# Patient Record
Sex: MALE | Race: WHITE | NOT HISPANIC OR LATINO | Employment: OTHER | URBAN - METROPOLITAN AREA
[De-identification: names, ages, dates, MRNs, and addresses within clinical notes are randomized per-mention and may not be internally consistent; named-entity substitution may affect disease eponyms.]

---

## 2017-01-04 ENCOUNTER — TRANSCRIBE ORDERS (OUTPATIENT)
Dept: PHYSICAL THERAPY | Facility: CLINIC | Age: 47
End: 2017-01-04

## 2017-01-04 ENCOUNTER — TREATMENT (OUTPATIENT)
Dept: PHYSICAL THERAPY | Facility: CLINIC | Age: 47
End: 2017-01-04

## 2017-01-04 DIAGNOSIS — Z96.651 STATUS POST TOTAL RIGHT KNEE REPLACEMENT: Primary | ICD-10-CM

## 2017-01-04 DIAGNOSIS — M25.561 RIGHT KNEE PAIN, UNSPECIFIED CHRONICITY: ICD-10-CM

## 2017-01-04 DIAGNOSIS — M25.661 KNEE STIFFNESS, RIGHT: Primary | ICD-10-CM

## 2017-01-04 DIAGNOSIS — Z96.651 STATUS POST TOTAL RIGHT KNEE REPLACEMENT: ICD-10-CM

## 2017-01-04 PROCEDURE — 97014 ELECTRIC STIMULATION THERAPY: CPT | Performed by: PHYSICAL THERAPIST

## 2017-01-04 PROCEDURE — 97161 PT EVAL LOW COMPLEX 20 MIN: CPT | Performed by: PHYSICAL THERAPIST

## 2017-01-04 PROCEDURE — 97110 THERAPEUTIC EXERCISES: CPT | Performed by: PHYSICAL THERAPIST

## 2017-01-04 NOTE — PATIENT INSTRUCTIONS
Access Code: [email or web address]   URL: [email or web address]   Date: 01/04/2017   Prepared by: Yissel Flannery     Exercises   Long Sitting Quad Set - 10 reps - 10 hold - 1x daily   Supine Hamstring Stretch with Strap - 3 reps - 20 hold - 1x daily   Supine Heel Slide with Strap - 10 reps - 2 sets - 1x daily

## 2017-01-04 NOTE — PROGRESS NOTES
Physical Therapy Initial Evaluation      Patient Name: Anthony Sol Jr.       Patient MRN: [id number]  : 1970  Physician: Dr. Anthony Andino  Date: 2017    Encounter Diagnoses   Name Primary?   • Knee stiffness, right Yes   • Right knee pain, unspecified chronicity    • Status post total right knee replacement      History: Right non-cemented TKA on 12/15/2016.  Home health until last Friday.  Pt is WBAT with walker for 6 weeks, with no heavy resistance until 6 weeks post op.  Pt diagnosed with DVT on 2016, no activity restrictions at this time.  Pt works for [employer], is currently off work.  Pain ranges from 4/10 at best to 10/10 at worst.    Objective Testing: See flowsheet    THERAPY ASSESSMENT  Anthony Sol Jr. is a pleasant 46 y.o. year old male who presents with signs and symptoms consistent with diagnosis.  Subjective outcome measure indicates 56% perceived disability.  Patient is a  good candidate for skilled PT services in order to address impairments and facilitate return to normal daily activities including ADL's, work and recreational activities.        Functional Limitations: Walking, Sleeping, Complaints of Pain, Difficulty moving, Decreased Strength, Decreased ROM, Decreased ability to perform critical demands of job, Decreased ability to perform ADL's   Length of Therapy: 1 month   PT Frequency: PT 3x week   PT Interventions: Therapeutic exercise - AROM, Therapeutic exercise - stretching, Therapeutic exercise - strengthening, Manual Therapy, Bracing/Taping, Soft Tissue mobilization, Hot packs/ Moist heat, Cold packs, Electrical stimulation, Balance Training, Home Exercise Program   Patient Agrees with Plan of Care: Yes    REHAB POTENTIAL: good            Short Term Goals: 4 weeks. Patient will:  1. Be independent with initial HEP  2. Be instructed in posture and body mechanics  3. Report pain of </= 3 with all daily activities  4. Demonstrate Right knee ROM 5-100 degrees  5.  Demonstrate normal patellar mobility    Long Term Goals: 6-8 weeks. Patient will:  1. Demonstrate improved Bilateral lower extremity MMT of >/= 4+/5  2. Demonstrate Right knee ROM 0-125 degrees for independence with ADL's and return to recreational activities.  3. Report pain of </= 0/10 with all daily activities  4. Ambulate with normal symmetrical gait without need for AD  5. LEFS >/= 75/80  6. Be independent with long term HEP  7. Return to work     Therapy Exercise [id number] 24 minutes    Timed Code Treatment: 24   Minutes     Total Treatment Time: 55      Minutes    PT SIGNATURE: Yissel Flannery, PT   DATE TREATMENT INITIATED: 1/4/2017    Initial Certification Certification Period: 2/3/2017  I certify that the therapy services are furnished while this patient is under my care.  The services outlined above are required by this patient, and will be reviewed every 30 days.     PHYSICIAN:       DATE:     Please sign and return via fax to [phone].. Thank you, [facility].

## 2017-01-06 ENCOUNTER — TREATMENT (OUTPATIENT)
Dept: PHYSICAL THERAPY | Facility: CLINIC | Age: 47
End: 2017-01-06

## 2017-01-06 DIAGNOSIS — Z96.651 STATUS POST TOTAL RIGHT KNEE REPLACEMENT: ICD-10-CM

## 2017-01-06 DIAGNOSIS — M25.561 RIGHT KNEE PAIN, UNSPECIFIED CHRONICITY: ICD-10-CM

## 2017-01-06 DIAGNOSIS — M25.661 KNEE STIFFNESS, RIGHT: Primary | ICD-10-CM

## 2017-01-06 PROCEDURE — 97110 THERAPEUTIC EXERCISES: CPT | Performed by: PHYSICAL THERAPIST

## 2017-01-06 PROCEDURE — 97014 ELECTRIC STIMULATION THERAPY: CPT | Performed by: PHYSICAL THERAPIST

## 2017-01-06 NOTE — PROGRESS NOTES
Daily Progress Note      Subjective   Pt states his knee swelled a lot yesterday, difficult to bend or straighten.  States his friend is going to let him use a recumbent bike.  Pain Scale (0-10): 4/10      Objective          PROCEDURES AND MODALITIES:  Paraffin:    Moist Heat:    Ice: Rx Minutes: 15 mins  E-Stim: Rx Minutes: 15 mins  Ultrasound:    Ionto:   Traction:        Therapy Exercise [id number] 45 minutes     Timed Code Treatment: 45 Minutes  Total Treatment Time: 60 Minutes    Assessment/Plan   Tolerated well.  Unable to make full revolution on recumbent bike, but advised pt to do partial revolutions at home until ROM improves.  Progress per Plan of Care         Yissel Flannery, PT  Physical Therapist

## 2017-01-09 ENCOUNTER — TREATMENT (OUTPATIENT)
Dept: PHYSICAL THERAPY | Facility: CLINIC | Age: 47
End: 2017-01-09

## 2017-01-09 DIAGNOSIS — M25.661 KNEE STIFFNESS, RIGHT: Primary | ICD-10-CM

## 2017-01-09 DIAGNOSIS — M25.561 RIGHT KNEE PAIN, UNSPECIFIED CHRONICITY: ICD-10-CM

## 2017-01-09 DIAGNOSIS — Z96.651 STATUS POST TOTAL RIGHT KNEE REPLACEMENT: ICD-10-CM

## 2017-01-09 PROCEDURE — 97014 ELECTRIC STIMULATION THERAPY: CPT | Performed by: PHYSICAL THERAPIST

## 2017-01-09 PROCEDURE — 97110 THERAPEUTIC EXERCISES: CPT | Performed by: PHYSICAL THERAPIST

## 2017-01-09 NOTE — PROGRESS NOTES
Daily Progress Note      Subjective   Knee feels stiff this morning, states he sat a lot yesterday and his knee swelled.    Pain Scale (0-10): 4/10      Objective    Increased swelling today.      PROCEDURES AND MODALITIES:  Paraffin:    Moist Heat:    Ice: Rx Minutes: 15 mins  E-Stim: Rx Minutes: 15 mins  Ultrasound:    Ionto:   Traction:        Manual PT [id number] 3 minutes and Therapy Exercise [id number] 45 minutes     Timed Code Treatment: 48 Minutes  Total Treatment Time: 63 Minutes    Assessment/Plan   Edema likely contributing to limitation in flexion ROM today.  Extension is improved.  Progress per Plan of Care         Yissel Flannery, PT  Physical Therapist

## 2017-01-12 ENCOUNTER — TREATMENT (OUTPATIENT)
Dept: PHYSICAL THERAPY | Facility: CLINIC | Age: 47
End: 2017-01-12

## 2017-01-12 DIAGNOSIS — M25.661 KNEE STIFFNESS, RIGHT: Primary | ICD-10-CM

## 2017-01-12 DIAGNOSIS — Z96.651 STATUS POST TOTAL RIGHT KNEE REPLACEMENT: ICD-10-CM

## 2017-01-12 DIAGNOSIS — M25.561 RIGHT KNEE PAIN, UNSPECIFIED CHRONICITY: ICD-10-CM

## 2017-01-12 PROCEDURE — 97110 THERAPEUTIC EXERCISES: CPT | Performed by: PHYSICAL THERAPIST

## 2017-01-12 PROCEDURE — 97140 MANUAL THERAPY 1/> REGIONS: CPT | Performed by: PHYSICAL THERAPIST

## 2017-01-12 PROCEDURE — 97014 ELECTRIC STIMULATION THERAPY: CPT | Performed by: PHYSICAL THERAPIST

## 2017-01-12 NOTE — PROGRESS NOTES
Daily Progress Note      Subjective   Saw MD, who is pleased with progress.     Pain Scale (0-10): 3/10      Objective          PROCEDURES AND MODALITIES:  Paraffin:    Moist Heat:    Ice: Rx Minutes: 15 mins  E-Stim: Rx Minutes: 15 mins  Ultrasound:    Ionto:   Traction:        Manual PT 08300 8 minutes and Therapy Exercise [id number] 35 minutes     Timed Code Treatment: 43 Minutes  Total Treatment Time: 58 Minutes    Assessment/Plan   ROM improving.  Progress per Plan of Care         Yissel Flannery, PT  Physical Therapist

## 2017-01-13 ENCOUNTER — TREATMENT (OUTPATIENT)
Dept: PHYSICAL THERAPY | Facility: CLINIC | Age: 47
End: 2017-01-13

## 2017-01-13 DIAGNOSIS — M25.661 KNEE STIFFNESS, RIGHT: Primary | ICD-10-CM

## 2017-01-13 DIAGNOSIS — Z96.651 STATUS POST TOTAL RIGHT KNEE REPLACEMENT: ICD-10-CM

## 2017-01-13 DIAGNOSIS — M25.561 RIGHT KNEE PAIN, UNSPECIFIED CHRONICITY: ICD-10-CM

## 2017-01-13 PROCEDURE — 97110 THERAPEUTIC EXERCISES: CPT | Performed by: PHYSICAL THERAPIST

## 2017-01-13 PROCEDURE — 97140 MANUAL THERAPY 1/> REGIONS: CPT | Performed by: PHYSICAL THERAPIST

## 2017-01-13 PROCEDURE — 97014 ELECTRIC STIMULATION THERAPY: CPT | Performed by: PHYSICAL THERAPIST

## 2017-01-13 NOTE — PROGRESS NOTES
Daily Progress Note      Subjective   No new complaints.  Feels that manual therapy is helping ROM.  Pain Scale (0-10): 3/10      Objective          PROCEDURES AND MODALITIES:  Paraffin:    Moist Heat:    Ice: Rx Minutes: 15 mins  E-Stim: Rx Minutes: 15 mins  Ultrasound:    Ionto:   Traction:        Manual PT 08041 8 minutes and Therapy Exercise [id number] 35 minutes     Timed Code Treatment: 43 Minutes  Total Treatment Time: 58 Minutes    Assessment/Plan   ROM improving.  Progressing steadily toward goals.  Progress per Plan of Care         Yissel Flannery, PT  Physical Therapist

## 2017-01-16 ENCOUNTER — TREATMENT (OUTPATIENT)
Dept: PHYSICAL THERAPY | Facility: CLINIC | Age: 47
End: 2017-01-16

## 2017-01-16 DIAGNOSIS — Z96.651 STATUS POST TOTAL RIGHT KNEE REPLACEMENT: ICD-10-CM

## 2017-01-16 DIAGNOSIS — M25.661 KNEE STIFFNESS, RIGHT: Primary | ICD-10-CM

## 2017-01-16 DIAGNOSIS — M25.561 RIGHT KNEE PAIN, UNSPECIFIED CHRONICITY: ICD-10-CM

## 2017-01-16 PROCEDURE — 97014 ELECTRIC STIMULATION THERAPY: CPT | Performed by: PHYSICAL THERAPIST

## 2017-01-16 PROCEDURE — 97140 MANUAL THERAPY 1/> REGIONS: CPT | Performed by: PHYSICAL THERAPIST

## 2017-01-16 PROCEDURE — 97110 THERAPEUTIC EXERCISES: CPT | Performed by: PHYSICAL THERAPIST

## 2017-01-16 NOTE — PROGRESS NOTES
Daily Progress Note      Subjective   Pt reports area of swelling in calf over the weekend, felt that it was due to his brace being too tight.  Adjusted brace, swelling resolved.    Pain Scale (0-10): 4/10      Objective          PROCEDURES AND MODALITIES:  Paraffin:    Moist Heat:    Ice: Rx Minutes: 15 mins  E-Stim: Rx Minutes: 15 mins  Ultrasound:    Ionto:   Traction:        Manual PT 76560 10 minutes and Therapy Exercise 73340 40 minutes     Timed Code Treatment: 50 Minutes  Total Treatment Time: 65 Minutes    Assessment/Plan   Tolerated well.  Progress per Plan of Care         Yissel Flannery, PT  Physical Therapist

## 2017-01-18 ENCOUNTER — TREATMENT (OUTPATIENT)
Dept: PHYSICAL THERAPY | Facility: CLINIC | Age: 47
End: 2017-01-18

## 2017-01-18 DIAGNOSIS — M25.661 KNEE STIFFNESS, RIGHT: Primary | ICD-10-CM

## 2017-01-18 DIAGNOSIS — M25.561 RIGHT KNEE PAIN, UNSPECIFIED CHRONICITY: ICD-10-CM

## 2017-01-18 DIAGNOSIS — Z96.651 STATUS POST TOTAL RIGHT KNEE REPLACEMENT: ICD-10-CM

## 2017-01-18 PROCEDURE — 97110 THERAPEUTIC EXERCISES: CPT | Performed by: PHYSICAL THERAPIST

## 2017-01-18 PROCEDURE — 97014 ELECTRIC STIMULATION THERAPY: CPT | Performed by: PHYSICAL THERAPIST

## 2017-01-18 PROCEDURE — 97140 MANUAL THERAPY 1/> REGIONS: CPT | Performed by: PHYSICAL THERAPIST

## 2017-01-18 NOTE — PROGRESS NOTES
"Daily Progress Note      Subjective   Calf muscle cramped last night, no swelling or redness.  States he \"overdid it yesterday\", felt good so he was on his feet a lot doing things around the house.    Pain Scale (0-10): 4/10      Objective          PROCEDURES AND MODALITIES:  Paraffin:    Moist Heat:    Ice: Rx Minutes: 15 mins  E-Stim: Rx Minutes: 15 mins  Ultrasound:    Ionto:   Traction:        Manual PT 05598 10 minutes and Therapy Exercise [id number] 30 minutes     Timed Code Treatment: 40 Minutes  Total Treatment Time: 55 Minutes    Assessment/Plan   ROM is improving, as is patellar mobility.  Progress per Plan of Care         Yissel Flannery, PT  Physical Therapist        "

## 2017-01-20 ENCOUNTER — TREATMENT (OUTPATIENT)
Dept: PHYSICAL THERAPY | Facility: CLINIC | Age: 47
End: 2017-01-20

## 2017-01-20 DIAGNOSIS — Z96.651 STATUS POST TOTAL RIGHT KNEE REPLACEMENT: ICD-10-CM

## 2017-01-20 DIAGNOSIS — M25.661 KNEE STIFFNESS, RIGHT: Primary | ICD-10-CM

## 2017-01-20 DIAGNOSIS — M25.561 RIGHT KNEE PAIN, UNSPECIFIED CHRONICITY: ICD-10-CM

## 2017-01-20 PROCEDURE — 97110 THERAPEUTIC EXERCISES: CPT | Performed by: PHYSICAL THERAPIST

## 2017-01-20 PROCEDURE — 97140 MANUAL THERAPY 1/> REGIONS: CPT | Performed by: PHYSICAL THERAPIST

## 2017-01-20 PROCEDURE — 97014 ELECTRIC STIMULATION THERAPY: CPT | Performed by: PHYSICAL THERAPIST

## 2017-01-20 NOTE — PROGRESS NOTES
Daily Progress Note      Subjective   Feels like pain is a lot better.  Now is anxious for knee to move better.    Pain Scale (0-10): 2/10      Objective          PROCEDURES AND MODALITIES:  Paraffin:    Moist Heat:    Ice: Rx Minutes: 15 mins  E-Stim: Rx Minutes: 15 mins  Ultrasound:    Ionto:   Traction:        Manual PT 17547 10 minutes and Therapy Exercise 49055 35 minutes     Timed Code Treatment: 45 Minutes  Total Treatment Time: 60 Minutes    Assessment/Plan   Tolerated well.   Progress per Plan of Care         Yissel Flannery, PT  Physical Therapist

## 2017-01-23 ENCOUNTER — TREATMENT (OUTPATIENT)
Dept: PHYSICAL THERAPY | Facility: CLINIC | Age: 47
End: 2017-01-23

## 2017-01-23 DIAGNOSIS — Z96.651 STATUS POST TOTAL RIGHT KNEE REPLACEMENT: ICD-10-CM

## 2017-01-23 DIAGNOSIS — M25.561 RIGHT KNEE PAIN, UNSPECIFIED CHRONICITY: ICD-10-CM

## 2017-01-23 DIAGNOSIS — M25.661 KNEE STIFFNESS, RIGHT: Primary | ICD-10-CM

## 2017-01-23 PROCEDURE — 97014 ELECTRIC STIMULATION THERAPY: CPT | Performed by: PHYSICAL THERAPIST

## 2017-01-23 PROCEDURE — 97110 THERAPEUTIC EXERCISES: CPT | Performed by: PHYSICAL THERAPIST

## 2017-01-23 PROCEDURE — 97140 MANUAL THERAPY 1/> REGIONS: CPT | Performed by: PHYSICAL THERAPIST

## 2017-01-23 NOTE — PROGRESS NOTES
Physical Therapy Daily Progress Note    Subjective     Anthony Sol reports: he was on his feet a lot yesterday for kids ball games.  Overall feels like his knee is moving better      Objective     Passive Range of Motion     Right Knee   Flexion: 97 degrees   Extension: 5 degrees      See Exercise, Manual, and Modality Logs for complete treatment.       Assessment/Plan  Continues to make good progress toward goals.  Able to make full revolution on rec bike for part of the time today.  Progress per Plan of Care           Manual Therapy:    10     mins  89936;  Therapeutic Exercise:    35     mins  44961;     Neuromuscular Zach:    0    mins  76517;    Therapeutic Activity:     0     mins  73885;     Gait Trainin     mins  74310;     Ultrasound:     0     mins  51218;    Electrical Stimulation:    15     mins  00692 ( );    Timed Treatment:   45   mins   Total Treatment:     60   mins    Yissel Flannery PT  Physical Therapist  [place] License #[id number]

## 2017-01-25 ENCOUNTER — TREATMENT (OUTPATIENT)
Dept: PHYSICAL THERAPY | Facility: CLINIC | Age: 47
End: 2017-01-25

## 2017-01-25 DIAGNOSIS — M25.561 RIGHT KNEE PAIN, UNSPECIFIED CHRONICITY: ICD-10-CM

## 2017-01-25 DIAGNOSIS — Z96.651 STATUS POST TOTAL RIGHT KNEE REPLACEMENT: ICD-10-CM

## 2017-01-25 DIAGNOSIS — M25.661 KNEE STIFFNESS, RIGHT: Primary | ICD-10-CM

## 2017-01-25 PROCEDURE — 97140 MANUAL THERAPY 1/> REGIONS: CPT | Performed by: PHYSICAL THERAPIST

## 2017-01-25 PROCEDURE — 97014 ELECTRIC STIMULATION THERAPY: CPT | Performed by: PHYSICAL THERAPIST

## 2017-01-25 PROCEDURE — 97110 THERAPEUTIC EXERCISES: CPT | Performed by: PHYSICAL THERAPIST

## 2017-01-25 NOTE — PROGRESS NOTES
Re-Assessment / Re-Certification    Patient: Anthony Sol Jr.   : 1970  Diagnosis/ICD-10 Code:  Knee stiffness, right [M25.661]  Referring practitioner: Anthony Andino MD  Date of Initial Visit: 2017  Today's Date: 2017  Patient seen for 10 sessions      Subjective:   Anthony Sol reports: pain is significantly improved, he is diligent with his HEP.  Is anxious to walk without walker  Subjective Questionnaire: LEFS: 36/80  Clinical Progress: improved  Home Program Compliance: Yes  Treatment has included: therapeutic exercise, neuromuscular re-education, manual therapy, electrical stimulation and cryotherapy    Subjective Evaluation    Pain  At best pain ratin  At worst pain ratin  Location: right knee  Quality: dull ache and sharp         Objective     Passive Range of Motion     Right Knee   Flexion: 95 degrees   Extension: 1 degrees     Strength/Myotome Testing     Left Knee   Flexion: 5  Prone flexion: 5  Quadriceps contraction: good    Right Knee   Flexion: 3-  Extension: 3-  Quadriceps contraction: good    Ambulation     Ambulation: Level Surfaces   Ambulation with assistive device: independent (walker and brace)    Observational Gait   Gait: antalgic   Decreased walking speed and stride length.     Quality of Movement During Gait     Hip    Hip (Right): Positive hike.      Assessment/Plan  Progress toward previous goals: Partially Met    Short Term Goals: 4 weeks. Patient will:  1. Be independent with initial HEP (MET)  2. Be instructed in posture and body mechanics (MET)  3. Report pain of </= 3 with all daily activities (NOT MET)  4. Demonstrate Right knee ROM 5-100 degrees (PARTIALLY MET)  5. Demonstrate normal patellar mobility (NOT MET)     Long Term Goals: 6-8 weeks. Patient will:  1. Demonstrate improved Bilateral lower extremity MMT of >/= 4+/5 (NOT MET)  2. Demonstrate Right knee ROM 0-125 degrees for independence with ADL's and return to recreational activities. (NOT  MET)  3. Report pain of </= 0/10 with all daily activities (NOT MET)  4. Ambulate with normal symmetrical gait without need for AD (NOT MET)  5. LEFS >/= 75/80 (NOT MET)  6. Be independent with long term HEP (NOT MET)  7. Return to work (NOT MET)      Recommendations: Continue as planned, progressing as able when pt is allowed to wean from brace and walker  Timeframe: 3 months  Prognosis to achieve goals: good    PT Signature: Yissel Flannery, PT  [place] License #[id number]      Based upon review of the patient's progress and continued therapy plan, it is my medical opinion that Anthony Sol should continue physical therapy treatment at [facility]  [address] [place] [id number].    Signature: __________________________________  Anthony Andino MD    Manual Therapy:    10     mins  68765;  Therapeutic Exercise:    35     mins  80491;     Neuromuscular Zach:    0    mins  02131;    Therapeutic Activity:     0     mins  79019;     Gait Trainin     mins  77482;     Ultrasound:     0     mins  73275;    Electrical Stimulation:    15     mins  49254 ( );    Timed Treatment:   45   mins   Total Treatment:     60   mins

## 2017-01-26 ENCOUNTER — TREATMENT (OUTPATIENT)
Dept: PHYSICAL THERAPY | Facility: CLINIC | Age: 47
End: 2017-01-26

## 2017-01-26 DIAGNOSIS — M25.561 RIGHT KNEE PAIN, UNSPECIFIED CHRONICITY: ICD-10-CM

## 2017-01-26 DIAGNOSIS — M25.661 KNEE STIFFNESS, RIGHT: Primary | ICD-10-CM

## 2017-01-26 DIAGNOSIS — Z96.651 STATUS POST TOTAL RIGHT KNEE REPLACEMENT: ICD-10-CM

## 2017-01-26 PROCEDURE — 97110 THERAPEUTIC EXERCISES: CPT | Performed by: PHYSICAL THERAPIST

## 2017-01-26 PROCEDURE — 97014 ELECTRIC STIMULATION THERAPY: CPT | Performed by: PHYSICAL THERAPIST

## 2017-01-26 PROCEDURE — 97140 MANUAL THERAPY 1/> REGIONS: CPT | Performed by: PHYSICAL THERAPIST

## 2017-01-26 NOTE — PROGRESS NOTES
" Physical Therapy Daily Progress Note    Subjective     Anthony Sol reports: severe pain in calf yesterday, \"I thought I had another blood clot\", pain resolved by this morning.  Sees MD tomorrow.      Objective   See Exercise, Manual, and Modality Logs for complete treatment.       Assessment/Plan  ROM continues to improve  Progress per Plan of Care           Manual Therapy:    10     mins  18902;  Therapeutic Exercise:    35     mins  45824;     Neuromuscular Zach:    0    mins  17795;    Therapeutic Activity:     0     mins  63087;     Gait Trainin     mins  28998;     Ultrasound:     0     mins  13051;    Electrical Stimulation:    15     mins  42096 ( );    Timed Treatment:   45   mins   Total Treatment:     60   mins    Yissel Flannery PT  Physical Therapist  [place] License #[id number]                    "

## 2017-01-30 ENCOUNTER — TREATMENT (OUTPATIENT)
Dept: PHYSICAL THERAPY | Facility: CLINIC | Age: 47
End: 2017-01-30

## 2017-01-30 DIAGNOSIS — M25.561 RIGHT KNEE PAIN, UNSPECIFIED CHRONICITY: ICD-10-CM

## 2017-01-30 DIAGNOSIS — Z96.651 STATUS POST TOTAL RIGHT KNEE REPLACEMENT: ICD-10-CM

## 2017-01-30 DIAGNOSIS — M25.661 KNEE STIFFNESS, RIGHT: Primary | ICD-10-CM

## 2017-01-30 PROCEDURE — 97110 THERAPEUTIC EXERCISES: CPT | Performed by: PHYSICAL THERAPIST

## 2017-01-30 PROCEDURE — 97014 ELECTRIC STIMULATION THERAPY: CPT | Performed by: PHYSICAL THERAPIST

## 2017-01-30 PROCEDURE — 97140 MANUAL THERAPY 1/> REGIONS: CPT | Performed by: PHYSICAL THERAPIST

## 2017-01-30 NOTE — PROGRESS NOTES
" Physical Therapy Daily Progress Note    Subjective     Anthony Sol reports: he saw MD, who D/C'd his brace and walker.  Pt is to use a cane PRN, \"but not get too attached to it\".  Pt has a cane at home, ambulates into clinic without AD today.      Objective     Ambulation     Observational Gait   Gait: antalgic and asymmetric   Decreased stride length, right swing time and right step length.      See Exercise, Manual, and Modality Logs for complete treatment.       Assessment/Plan  PT did well with new exercises.  Significant gait deviation without cane, but much improved when cane is used.  Educated pt regarding use of cane when walking in the community to ensure good gait pattern.  Progress per Plan of Care           Manual Therapy:    10     mins  49638;  Therapeutic Exercise:    45     mins  81664;     Neuromuscular Zach:    0    mins  38889;    Therapeutic Activity:     0     mins  23351;     Gait Trainin     mins  28986;     Ultrasound:     0     mins  85469;    Electrical Stimulation:    15     mins  53826 ( );    Timed Treatment:   55   mins   Total Treatment:     70   mins    Yissel Flannery PT  Physical Therapist  [place] License #[id number]                    "

## 2017-02-01 ENCOUNTER — TREATMENT (OUTPATIENT)
Dept: PHYSICAL THERAPY | Facility: CLINIC | Age: 47
End: 2017-02-01

## 2017-02-01 DIAGNOSIS — M25.661 KNEE STIFFNESS, RIGHT: Primary | ICD-10-CM

## 2017-02-01 DIAGNOSIS — M25.561 RIGHT KNEE PAIN, UNSPECIFIED CHRONICITY: ICD-10-CM

## 2017-02-01 DIAGNOSIS — Z96.651 STATUS POST TOTAL RIGHT KNEE REPLACEMENT: ICD-10-CM

## 2017-02-01 PROCEDURE — 97014 ELECTRIC STIMULATION THERAPY: CPT | Performed by: PHYSICAL THERAPIST

## 2017-02-01 PROCEDURE — 97110 THERAPEUTIC EXERCISES: CPT | Performed by: PHYSICAL THERAPIST

## 2017-02-01 PROCEDURE — 97140 MANUAL THERAPY 1/> REGIONS: CPT | Performed by: PHYSICAL THERAPIST

## 2017-02-01 NOTE — PROGRESS NOTES
Physical Therapy Daily Progress Note    Subjective     Anthony Sol reports he is using the cane, shin has been very sore but he is working hard on exercises at home.      Objective   See Exercise, Manual, and Modality Logs for complete treatment.       Assessment/Plan  Gait is improved.  ROM continues to increase.  Progressing well toward goals.  Progress per Plan of Care           Manual Therapy:    10     mins  91302;  Therapeutic Exercise:    45     mins  05990;     Neuromuscular Zach:    0    mins  27179;    Therapeutic Activity:     0     mins  19950;     Gait Trainin     mins  73675;     Ultrasound:     0     mins  21978;    Electrical Stimulation:    15     mins  99686 ( );    Timed Treatment:   55   mins   Total Treatment:     70   mins    Yissel Flannery PT  Physical Therapist  [place] License #[id number]

## 2017-02-03 ENCOUNTER — TREATMENT (OUTPATIENT)
Dept: PHYSICAL THERAPY | Facility: CLINIC | Age: 47
End: 2017-02-03

## 2017-02-03 DIAGNOSIS — M25.661 KNEE STIFFNESS, RIGHT: Primary | ICD-10-CM

## 2017-02-03 DIAGNOSIS — Z96.651 STATUS POST TOTAL RIGHT KNEE REPLACEMENT: ICD-10-CM

## 2017-02-03 DIAGNOSIS — M25.561 RIGHT KNEE PAIN, UNSPECIFIED CHRONICITY: ICD-10-CM

## 2017-02-03 PROCEDURE — 97140 MANUAL THERAPY 1/> REGIONS: CPT | Performed by: PHYSICAL THERAPIST

## 2017-02-03 PROCEDURE — 97110 THERAPEUTIC EXERCISES: CPT | Performed by: PHYSICAL THERAPIST

## 2017-02-03 PROCEDURE — 97014 ELECTRIC STIMULATION THERAPY: CPT | Performed by: PHYSICAL THERAPIST

## 2017-02-03 NOTE — PROGRESS NOTES
Physical Therapy Daily Progress Note    Subjective     Anthony Sol reports: continued adherence with HEP.  Calf muscle is sore.      Objective   See Exercise, Manual, and Modality Logs for complete treatment.       Assessment/Plan  ROM continues to improve.  Gait is also becoming more symmetrical, with improved dynamic knee extension.  Progress strengthening /stabilization /functional activity           Manual Therapy:    10     mins  40928;  Therapeutic Exercise:    35     mins  91861;     Neuromuscular Zach:    0    mins  42183;    Therapeutic Activity:     0     mins  89956;     Gait Trainin     mins  09299;     Ultrasound:     0     mins  55438;    Electrical Stimulation:    15     mins  35259 ( );    Timed Treatment:   45   mins   Total Treatment:     60   mins    Yissel Flannery PT, DPT  Physical Therapist  [place] License #[id number]

## 2017-02-06 ENCOUNTER — TREATMENT (OUTPATIENT)
Dept: PHYSICAL THERAPY | Facility: CLINIC | Age: 47
End: 2017-02-06

## 2017-02-06 DIAGNOSIS — Z96.651 STATUS POST TOTAL RIGHT KNEE REPLACEMENT: ICD-10-CM

## 2017-02-06 DIAGNOSIS — M25.661 KNEE STIFFNESS, RIGHT: Primary | ICD-10-CM

## 2017-02-06 DIAGNOSIS — M25.561 RIGHT KNEE PAIN, UNSPECIFIED CHRONICITY: ICD-10-CM

## 2017-02-06 PROCEDURE — 97014 ELECTRIC STIMULATION THERAPY: CPT | Performed by: PHYSICAL THERAPIST

## 2017-02-06 PROCEDURE — 97110 THERAPEUTIC EXERCISES: CPT | Performed by: PHYSICAL THERAPIST

## 2017-02-06 PROCEDURE — 97140 MANUAL THERAPY 1/> REGIONS: CPT | Performed by: PHYSICAL THERAPIST

## 2017-02-06 NOTE — PROGRESS NOTES
Physical Therapy Daily Progress Note    Subjective Evaluation    Pain  Current pain ratin  At best pain ratin  At worst pain ratin        Anthony Sol reports: walking is getting easier, is adherent with HEP.  Had some significant swelling yesterday after being on his feet for a long time, but resolved with elevation of the limb.      Objective     Passive Range of Motion     Right Knee   Flexion: 94 degrees   Extension: 0 degrees     Strength/Myotome Testing     Left Knee   Flexion: 5  Extension: 5  Quadriceps contraction: good    Right Knee   Flexion: 3-  Extension: 3-  Quadriceps contraction: good    Additional Strength Details  MMT based on ROM limitation; strength of right quad/hamstring 4/5 in available range     See Exercise, Manual, and Modality Logs for complete treatment.       Assessment/Plan  Gait is improving, still with antalgic pattern, decreased hip/knee flexion, decreased stance phase on the right.  Overall making good progress toward all goals, appropriate to continue 3x weekly to increase ROM/strength and normalize gait.  Progress per Plan of Care           Manual Therapy:    10     mins  23862;  Therapeutic Exercise:    35     mins  33963;     Neuromuscular Zach:    0    mins  14244;    Therapeutic Activity:     0     mins  83587;     Gait Trainin     mins  82037;     Ultrasound:     0     mins  00368;    Electrical Stimulation:    15     mins  33722 ( );    Timed Treatment:   45   mins   Total Treatment:     60   mins    Yissel Flannery PT, DPT  Physical Therapist  [place] License #[id number]

## 2017-02-08 ENCOUNTER — TREATMENT (OUTPATIENT)
Dept: PHYSICAL THERAPY | Facility: CLINIC | Age: 47
End: 2017-02-08

## 2017-02-08 DIAGNOSIS — M25.561 RIGHT KNEE PAIN, UNSPECIFIED CHRONICITY: ICD-10-CM

## 2017-02-08 DIAGNOSIS — M25.661 KNEE STIFFNESS, RIGHT: Primary | ICD-10-CM

## 2017-02-08 DIAGNOSIS — Z96.651 STATUS POST TOTAL RIGHT KNEE REPLACEMENT: ICD-10-CM

## 2017-02-08 PROCEDURE — 97110 THERAPEUTIC EXERCISES: CPT | Performed by: PHYSICAL THERAPIST

## 2017-02-08 PROCEDURE — 97140 MANUAL THERAPY 1/> REGIONS: CPT | Performed by: PHYSICAL THERAPIST

## 2017-02-08 PROCEDURE — 97014 ELECTRIC STIMULATION THERAPY: CPT | Performed by: PHYSICAL THERAPIST

## 2017-02-08 NOTE — PROGRESS NOTES
Physical Therapy Daily Progress Note    Subjective     Anthony Sol reports: knee is still swelling, but resolved with rest/elevation      Objective   See Exercise, Manual, and Modality Logs for complete treatment.       Assessment/Plan  Gait continues to improve.  Increased patellar mobility noted.  Progress per Plan of Care           Manual Therapy:    10     mins  85793;  Therapeutic Exercise:    45     mins  64532;     Neuromuscular Zach:    0    mins  47599;    Therapeutic Activity:     0     mins  05900;     Gait Trainin     mins  96178;     Ultrasound:     0     mins  69884;    Electrical Stimulation:    15     mins  73667 ( );    Timed Treatment:   55   mins   Total Treatment:     70   mins    Yissel Flannery PT, DPT  Physical Therapist  [place] License #[id number]

## 2017-02-10 ENCOUNTER — TREATMENT (OUTPATIENT)
Dept: PHYSICAL THERAPY | Facility: CLINIC | Age: 47
End: 2017-02-10

## 2017-02-10 DIAGNOSIS — M25.661 KNEE STIFFNESS, RIGHT: Primary | ICD-10-CM

## 2017-02-10 DIAGNOSIS — Z96.651 STATUS POST TOTAL RIGHT KNEE REPLACEMENT: ICD-10-CM

## 2017-02-10 DIAGNOSIS — M25.561 RIGHT KNEE PAIN, UNSPECIFIED CHRONICITY: ICD-10-CM

## 2017-02-10 PROCEDURE — 97110 THERAPEUTIC EXERCISES: CPT | Performed by: PHYSICAL THERAPIST

## 2017-02-10 PROCEDURE — 97014 ELECTRIC STIMULATION THERAPY: CPT | Performed by: PHYSICAL THERAPIST

## 2017-02-10 PROCEDURE — 97140 MANUAL THERAPY 1/> REGIONS: CPT | Performed by: PHYSICAL THERAPIST

## 2017-02-10 NOTE — PROGRESS NOTES
Physical Therapy Daily Progress Note    Subjective     Anthony Sol reports: he saw his MD this morning regarding swelling.   He states MD isn't concerned about swelling, does not suspect another blood clot.  However, he did decide to schedule pt for a SUKI next Thursday due to limited knee flexion ROM.      Objective   See Exercise, Manual, and Modality Logs for complete treatment.       Assessment/Plan  Gait pattern still antalgic, but improved with regard to lateral sway and knee extension.  Progress per Plan of Care           Manual Therapy:    10     mins  42071;  Therapeutic Exercise:    20     mins  66416;   direct  Neuromuscular Zach:    0    mins  57336;    Therapeutic Activity:     0     mins  03797;     Gait Trainin     mins  47708;     Ultrasound:     0     mins  43644;    Electrical Stimulation:    15     mins  38778 ( );    Timed Treatment:   30   mins   Total Treatment:     60   mins    Yissel Flannery PT, DPT  Physical Therapist  [place] License #[id number]

## 2017-02-13 ENCOUNTER — TREATMENT (OUTPATIENT)
Dept: PHYSICAL THERAPY | Facility: CLINIC | Age: 47
End: 2017-02-13

## 2017-02-13 ENCOUNTER — APPOINTMENT (OUTPATIENT)
Dept: PREADMISSION TESTING | Facility: HOSPITAL | Age: 47
End: 2017-02-13

## 2017-02-13 ENCOUNTER — HOSPITAL ENCOUNTER (OUTPATIENT)
Dept: GENERAL RADIOLOGY | Facility: HOSPITAL | Age: 47
Discharge: HOME | End: 2017-02-13
Admitting: ORTHOPAEDIC SURGERY

## 2017-02-13 VITALS
RESPIRATION RATE: 16 BRPM | DIASTOLIC BLOOD PRESSURE: 91 MMHG | BODY MASS INDEX: 35.73 KG/M2 | WEIGHT: 263.8 LBS | SYSTOLIC BLOOD PRESSURE: 138 MMHG | TEMPERATURE: 98.9 F | OXYGEN SATURATION: 96 % | HEIGHT: 72 IN | HEART RATE: 75 BPM

## 2017-02-13 DIAGNOSIS — Z96.651 STATUS POST TOTAL RIGHT KNEE REPLACEMENT: ICD-10-CM

## 2017-02-13 DIAGNOSIS — M25.661 KNEE STIFFNESS, RIGHT: Primary | ICD-10-CM

## 2017-02-13 DIAGNOSIS — M25.561 RIGHT KNEE PAIN, UNSPECIFIED CHRONICITY: ICD-10-CM

## 2017-02-13 LAB
ALBUMIN SERPL-MCNC: 4.7 G/DL (ref 3.5–5.2)
ALBUMIN/GLOB SERPL: 1.9 G/DL
ALP SERPL-CCNC: 44 U/L (ref 39–117)
ALT SERPL W P-5'-P-CCNC: 31 U/L (ref 1–41)
ANION GAP SERPL CALCULATED.3IONS-SCNC: 16.2 MMOL/L
AST SERPL-CCNC: 20 U/L (ref 1–40)
BASOPHILS # BLD AUTO: 0.02 10*3/MM3 (ref 0–0.2)
BASOPHILS NFR BLD AUTO: 0.3 % (ref 0–1.5)
BILIRUB SERPL-MCNC: 0.2 MG/DL (ref 0.1–1.2)
BUN BLD-MCNC: 14 MG/DL (ref 6–20)
BUN/CREAT SERPL: 14.4 (ref 7–25)
CALCIUM SPEC-SCNC: 9.8 MG/DL (ref 8.6–10.5)
CHLORIDE SERPL-SCNC: 99 MMOL/L (ref 98–107)
CO2 SERPL-SCNC: 24.8 MMOL/L (ref 22–29)
CREAT BLD-MCNC: 0.97 MG/DL (ref 0.76–1.27)
DEPRECATED RDW RBC AUTO: 38.6 FL (ref 37–54)
EOSINOPHIL # BLD AUTO: 0.12 10*3/MM3 (ref 0–0.7)
EOSINOPHIL NFR BLD AUTO: 1.6 % (ref 0.3–6.2)
ERYTHROCYTE [DISTWIDTH] IN BLOOD BY AUTOMATED COUNT: 12.7 % (ref 11.5–14.5)
GFR SERPL CREATININE-BSD FRML MDRD: 83 ML/MIN/1.73
GLOBULIN UR ELPH-MCNC: 2.5 GM/DL
GLUCOSE BLD-MCNC: 118 MG/DL (ref 65–99)
HCT VFR BLD AUTO: 40.6 % (ref 40.4–52.2)
HGB BLD-MCNC: 13.4 G/DL (ref 13.7–17.6)
IMM GRANULOCYTES # BLD: 0.03 10*3/MM3 (ref 0–0.03)
IMM GRANULOCYTES NFR BLD: 0.4 % (ref 0–0.5)
LYMPHOCYTES # BLD AUTO: 1.94 10*3/MM3 (ref 0.9–4.8)
LYMPHOCYTES NFR BLD AUTO: 25.8 % (ref 19.6–45.3)
MCH RBC QN AUTO: 27.8 PG (ref 27–32.7)
MCHC RBC AUTO-ENTMCNC: 33 G/DL (ref 32.6–36.4)
MCV RBC AUTO: 84.2 FL (ref 79.8–96.2)
MONOCYTES # BLD AUTO: 0.39 10*3/MM3 (ref 0.2–1.2)
MONOCYTES NFR BLD AUTO: 5.2 % (ref 5–12)
NEUTROPHILS # BLD AUTO: 5.01 10*3/MM3 (ref 1.9–8.1)
NEUTROPHILS NFR BLD AUTO: 66.7 % (ref 42.7–76)
PLATELET # BLD AUTO: 302 10*3/MM3 (ref 140–500)
PMV BLD AUTO: 10.4 FL (ref 6–12)
POTASSIUM BLD-SCNC: 3.9 MMOL/L (ref 3.5–5.2)
PROT SERPL-MCNC: 7.2 G/DL (ref 6–8.5)
RBC # BLD AUTO: 4.82 10*6/MM3 (ref 4.6–6)
SODIUM BLD-SCNC: 140 MMOL/L (ref 136–145)
WBC NRBC COR # BLD: 7.51 10*3/MM3 (ref 4.5–10.7)

## 2017-02-13 PROCEDURE — 97140 MANUAL THERAPY 1/> REGIONS: CPT | Performed by: PHYSICAL THERAPIST

## 2017-02-13 PROCEDURE — 85025 COMPLETE CBC W/AUTO DIFF WBC: CPT | Performed by: ORTHOPAEDIC SURGERY

## 2017-02-13 PROCEDURE — 36415 COLL VENOUS BLD VENIPUNCTURE: CPT

## 2017-02-13 PROCEDURE — 71020 HC CHEST PA AND LATERAL: CPT

## 2017-02-13 PROCEDURE — 97110 THERAPEUTIC EXERCISES: CPT | Performed by: PHYSICAL THERAPIST

## 2017-02-13 PROCEDURE — 80053 COMPREHEN METABOLIC PANEL: CPT | Performed by: ORTHOPAEDIC SURGERY

## 2017-02-13 PROCEDURE — 97014 ELECTRIC STIMULATION THERAPY: CPT | Performed by: PHYSICAL THERAPIST

## 2017-02-13 RX ORDER — AMLODIPINE BESYLATE AND BENAZEPRIL HYDROCHLORIDE 10; 40 MG/1; MG/1
1 CAPSULE ORAL EVERY MORNING
COMMUNITY
End: 2017-03-14 | Stop reason: SDUPTHER

## 2017-02-13 RX ORDER — OXYCODONE HYDROCHLORIDE AND ACETAMINOPHEN 5; 325 MG/1; MG/1
1-2 TABLET ORAL EVERY 6 HOURS PRN
Refills: 0 | COMMUNITY
Start: 2017-02-10 | End: 2017-03-03 | Stop reason: CLARIF

## 2017-02-13 RX ORDER — ASPIRIN 325 MG
325 TABLET ORAL DAILY
COMMUNITY
End: 2018-02-06 | Stop reason: ALTCHOICE

## 2017-02-13 RX ORDER — TESTOSTERONE 16.2 MG/G
1 GEL TRANSDERMAL DAILY
COMMUNITY
Start: 2017-02-07 | End: 2018-02-06 | Stop reason: ALTCHOICE

## 2017-02-13 NOTE — PROGRESS NOTES
Physical Therapy Daily Progress Note    Subjective     Anthony Sol reports: swelling has been better.  Is pushing his ROM as much as he can at home.      Objective   See Exercise, Manual, and Modality Logs for complete treatment.       Assessment/Plan  Gait is much improved, with increased speed and decreased lateral sway.    Progress per Plan of Care           Manual Therapy:    10     mins  38941;  Therapeutic Exercise:    35     mins  73926;     Neuromuscular Zach:    0    mins  02883;    Therapeutic Activity:     0     mins  25335;     Gait Trainin     mins  21783;     Ultrasound:     0     mins  50510;    Electrical Stimulation:    15     mins  57812 ( );    Timed Treatment:   45   mins   Total Treatment:     60   mins    Yissel Flannery PT, DPT  Physical Therapist  [place] License #[id number]

## 2017-02-13 NOTE — DISCHARGE INSTRUCTIONS
Take the following medications the morning of surgery with a small sip of water.  METOPROLOL    Arrive to hospital on your day of surgery at 5:30 AM    General Instructions:  • Do not eat or drink after midnight: includes water, mints, or gum. You may brush your teeth.  • Do not smoke, chew tobacco, or drink alcohol.  • Bring medications in original bottles, any inhalers and if applicable your C-PAP/ BI-PAP machine.  • Bring any papers given to you in the doctor’s office.  • Wear clean comfortable clothes and socks.  • Do not wear contact lenses or make-up.  Bring a case for your glasses if applicable.   • Bring crutches or walker if applicable.  • Leave all other valuables and jewelry at home.    If you were given a blood bank ID arm band remember to bring it with you the day of surgery.    Preventing a Surgical Site Infection:  Shower on the morning of surgery using a fresh bar of anti-bacterial soap (such as Dial) and clean washcloth.  Dry with a clean towel and dress in clean clothing.  For 2 to 3 days before surgery, avoid shaving with a razor near where you will have surgery because the razor can irritate skin and make it easier to develop an infection  Ask your surgeon if you will be receiving antibiotics prior to surgery  Make sure you, your family, and all healthcare providers clean their hands with soap and water or an alcohol based hand  before caring for you or your wound  If at all possible, quit smoking as many days before surgery as you can.    Day of surgery:  Upon arrival, a Pre-op nurse and Anesthesiologist will review your health history, obtain vital signs, and answer questions you may have.  The only belongings needed at this time will be your home medications and if applicable your C-PAP/BI-PAP machine.  If you are staying overnight your family can leave the rest of your belongings in the car and bring them to your room later.  A Pre-op nurse will start an IV and you may receive  medication in preparation for surgery, including something to help you relax.  Your family will be able to see you in the Pre-op area.  While you are in surgery your family should notify the waiting room  if they leave the waiting room area and provide a contact phone number.    Please be aware that surgery does come with discomfort.  We want to make every effort to control your discomfort so please discuss any uncontrolled symptoms with your nurse.   Your doctor will most likely have prescribed pain medications.      If you are going home after surgery you will receive individualized written care instructions before being discharged.  A responsible adult must drive you to and from the hospital on the day of your surgery and stay with you for 24 hours.    If you are staying overnight following surgery, you will be transported to your hospital room following the recovery period.  [facility] has all private rooms.    If you have any questions please call Pre-Admission Testing at [phone].  Deductibles and co-payments are collected on the day of service. Please be prepared to pay the required co-pay, deductible or deposit on the day of service as defined by your plan.

## 2017-02-16 ENCOUNTER — TREATMENT (OUTPATIENT)
Dept: PHYSICAL THERAPY | Facility: CLINIC | Age: 47
End: 2017-02-16

## 2017-02-16 ENCOUNTER — HOSPITAL ENCOUNTER (OUTPATIENT)
Facility: HOSPITAL | Age: 47
Setting detail: HOSPITAL OUTPATIENT SURGERY
Discharge: HOME | End: 2017-02-16
Attending: ORTHOPAEDIC SURGERY | Admitting: ORTHOPAEDIC SURGERY

## 2017-02-16 ENCOUNTER — ANESTHESIA (OUTPATIENT)
Dept: PERIOP | Facility: HOSPITAL | Age: 47
End: 2017-02-16

## 2017-02-16 ENCOUNTER — ANESTHESIA EVENT (OUTPATIENT)
Dept: PERIOP | Facility: HOSPITAL | Age: 47
End: 2017-02-16

## 2017-02-16 VITALS
RESPIRATION RATE: 16 BRPM | BODY MASS INDEX: 35.89 KG/M2 | HEART RATE: 63 BPM | SYSTOLIC BLOOD PRESSURE: 104 MMHG | TEMPERATURE: 98 F | OXYGEN SATURATION: 93 % | WEIGHT: 265 LBS | HEIGHT: 72 IN | DIASTOLIC BLOOD PRESSURE: 66 MMHG

## 2017-02-16 DIAGNOSIS — M25.561 RIGHT KNEE PAIN, UNSPECIFIED CHRONICITY: ICD-10-CM

## 2017-02-16 DIAGNOSIS — Z96.651 STATUS POST TOTAL RIGHT KNEE REPLACEMENT: ICD-10-CM

## 2017-02-16 DIAGNOSIS — M25.661 KNEE STIFFNESS, RIGHT: Primary | ICD-10-CM

## 2017-02-16 LAB
GLUCOSE BLDC GLUCOMTR-MCNC: 123 MG/DL (ref 70–130)
GLUCOSE BLDC GLUCOMTR-MCNC: 141 MG/DL (ref 70–130)

## 2017-02-16 PROCEDURE — 97110 THERAPEUTIC EXERCISES: CPT | Performed by: PHYSICAL THERAPIST

## 2017-02-16 PROCEDURE — 25010000002 ONDANSETRON PER 1 MG: Performed by: ANESTHESIOLOGY

## 2017-02-16 PROCEDURE — 25010000002 FENTANYL CITRATE (PF) 100 MCG/2ML SOLUTION: Performed by: ANESTHESIOLOGY

## 2017-02-16 PROCEDURE — 25010000002 MIDAZOLAM PER 1 MG

## 2017-02-16 PROCEDURE — 97140 MANUAL THERAPY 1/> REGIONS: CPT | Performed by: PHYSICAL THERAPIST

## 2017-02-16 PROCEDURE — 97014 ELECTRIC STIMULATION THERAPY: CPT | Performed by: PHYSICAL THERAPIST

## 2017-02-16 PROCEDURE — 82962 GLUCOSE BLOOD TEST: CPT

## 2017-02-16 PROCEDURE — 25010000002 PROPOFOL 10 MG/ML EMULSION: Performed by: ANESTHESIOLOGY

## 2017-02-16 RX ORDER — MIDAZOLAM HYDROCHLORIDE 1 MG/ML
2 INJECTION INTRAMUSCULAR; INTRAVENOUS
Status: DISCONTINUED | OUTPATIENT
Start: 2017-02-16 | End: 2017-02-16

## 2017-02-16 RX ORDER — MIDAZOLAM HYDROCHLORIDE 1 MG/ML
INJECTION INTRAMUSCULAR; INTRAVENOUS
Status: COMPLETED
Start: 2017-02-16 | End: 2017-02-16

## 2017-02-16 RX ORDER — PROMETHAZINE HYDROCHLORIDE 25 MG/ML
12.5 INJECTION, SOLUTION INTRAMUSCULAR; INTRAVENOUS ONCE AS NEEDED
Status: DISCONTINUED | OUTPATIENT
Start: 2017-02-16 | End: 2017-02-16 | Stop reason: HOSPADM

## 2017-02-16 RX ORDER — SODIUM CHLORIDE 0.9 % (FLUSH) 0.9 %
1-10 SYRINGE (ML) INJECTION AS NEEDED
Status: DISCONTINUED | OUTPATIENT
Start: 2017-02-16 | End: 2017-02-16

## 2017-02-16 RX ORDER — HYDROMORPHONE HYDROCHLORIDE 1 MG/ML
0.25 INJECTION, SOLUTION INTRAMUSCULAR; INTRAVENOUS; SUBCUTANEOUS
Status: DISCONTINUED | OUTPATIENT
Start: 2017-02-16 | End: 2017-02-16 | Stop reason: HOSPADM

## 2017-02-16 RX ORDER — PROPOFOL 10 MG/ML
VIAL (ML) INTRAVENOUS AS NEEDED
Status: DISCONTINUED | OUTPATIENT
Start: 2017-02-16 | End: 2017-02-16 | Stop reason: SURG

## 2017-02-16 RX ORDER — HYDROCODONE BITARTRATE AND ACETAMINOPHEN 7.5; 325 MG/1; MG/1
1 TABLET ORAL ONCE AS NEEDED
Status: DISCONTINUED | OUTPATIENT
Start: 2017-02-16 | End: 2017-02-16 | Stop reason: HOSPADM

## 2017-02-16 RX ORDER — HYDROMORPHONE HYDROCHLORIDE 1 MG/ML
0.5 INJECTION, SOLUTION INTRAMUSCULAR; INTRAVENOUS; SUBCUTANEOUS
Status: DISCONTINUED | OUTPATIENT
Start: 2017-02-16 | End: 2017-02-16 | Stop reason: HOSPADM

## 2017-02-16 RX ORDER — MIDAZOLAM HYDROCHLORIDE 1 MG/ML
1 INJECTION INTRAMUSCULAR; INTRAVENOUS
Status: DISCONTINUED | OUTPATIENT
Start: 2017-02-16 | End: 2017-02-16

## 2017-02-16 RX ORDER — LABETALOL HYDROCHLORIDE 5 MG/ML
5 INJECTION, SOLUTION INTRAVENOUS
Status: DISCONTINUED | OUTPATIENT
Start: 2017-02-16 | End: 2017-02-16 | Stop reason: HOSPADM

## 2017-02-16 RX ORDER — ONDANSETRON 2 MG/ML
4 INJECTION INTRAMUSCULAR; INTRAVENOUS ONCE AS NEEDED
Status: COMPLETED | OUTPATIENT
Start: 2017-02-16 | End: 2017-02-16

## 2017-02-16 RX ORDER — FAMOTIDINE 10 MG/ML
20 INJECTION, SOLUTION INTRAVENOUS ONCE
Status: COMPLETED | OUTPATIENT
Start: 2017-02-16 | End: 2017-02-16

## 2017-02-16 RX ORDER — DIPHENHYDRAMINE HYDROCHLORIDE 50 MG/ML
12.5 INJECTION INTRAMUSCULAR; INTRAVENOUS
Status: DISCONTINUED | OUTPATIENT
Start: 2017-02-16 | End: 2017-02-16 | Stop reason: HOSPADM

## 2017-02-16 RX ORDER — FLUMAZENIL 0.1 MG/ML
0.2 INJECTION INTRAVENOUS AS NEEDED
Status: DISCONTINUED | OUTPATIENT
Start: 2017-02-16 | End: 2017-02-16 | Stop reason: HOSPADM

## 2017-02-16 RX ORDER — FAMOTIDINE 10 MG/ML
INJECTION, SOLUTION INTRAVENOUS
Status: COMPLETED
Start: 2017-02-16 | End: 2017-02-16

## 2017-02-16 RX ORDER — HYDRALAZINE HYDROCHLORIDE 20 MG/ML
5 INJECTION INTRAMUSCULAR; INTRAVENOUS
Status: DISCONTINUED | OUTPATIENT
Start: 2017-02-16 | End: 2017-02-16 | Stop reason: HOSPADM

## 2017-02-16 RX ORDER — OXYCODONE HYDROCHLORIDE AND ACETAMINOPHEN 5; 325 MG/1; MG/1
1-2 TABLET ORAL EVERY 4 HOURS PRN
Qty: 75 TABLET | Refills: 0 | Status: SHIPPED | OUTPATIENT
Start: 2017-02-16 | End: 2017-03-03 | Stop reason: CLARIF

## 2017-02-16 RX ORDER — PROMETHAZINE HYDROCHLORIDE 25 MG/1
25 TABLET ORAL ONCE AS NEEDED
Status: DISCONTINUED | OUTPATIENT
Start: 2017-02-16 | End: 2017-02-16 | Stop reason: HOSPADM

## 2017-02-16 RX ORDER — FENTANYL CITRATE 50 UG/ML
INJECTION, SOLUTION INTRAMUSCULAR; INTRAVENOUS
Status: DISCONTINUED
Start: 2017-02-16 | End: 2017-02-16 | Stop reason: HOSPADM

## 2017-02-16 RX ORDER — PROMETHAZINE HYDROCHLORIDE 25 MG/1
25 SUPPOSITORY RECTAL ONCE AS NEEDED
Status: DISCONTINUED | OUTPATIENT
Start: 2017-02-16 | End: 2017-02-16 | Stop reason: HOSPADM

## 2017-02-16 RX ORDER — LIDOCAINE HYDROCHLORIDE 20 MG/ML
INJECTION, SOLUTION INFILTRATION; PERINEURAL AS NEEDED
Status: DISCONTINUED | OUTPATIENT
Start: 2017-02-16 | End: 2017-02-16 | Stop reason: SURG

## 2017-02-16 RX ORDER — OXYCODONE AND ACETAMINOPHEN 7.5; 325 MG/1; MG/1
1 TABLET ORAL ONCE AS NEEDED
Status: COMPLETED | OUTPATIENT
Start: 2017-02-16 | End: 2017-02-16

## 2017-02-16 RX ORDER — FENTANYL CITRATE 50 UG/ML
50 INJECTION, SOLUTION INTRAMUSCULAR; INTRAVENOUS
Status: DISCONTINUED | OUTPATIENT
Start: 2017-02-16 | End: 2017-02-16 | Stop reason: HOSPADM

## 2017-02-16 RX ORDER — SODIUM CHLORIDE, SODIUM LACTATE, POTASSIUM CHLORIDE, CALCIUM CHLORIDE 600; 310; 30; 20 MG/100ML; MG/100ML; MG/100ML; MG/100ML
9 INJECTION, SOLUTION INTRAVENOUS CONTINUOUS
Status: DISCONTINUED | OUTPATIENT
Start: 2017-02-16 | End: 2017-02-16

## 2017-02-16 RX ORDER — NALOXONE HCL 0.4 MG/ML
0.2 VIAL (ML) INJECTION AS NEEDED
Status: DISCONTINUED | OUTPATIENT
Start: 2017-02-16 | End: 2017-02-16 | Stop reason: HOSPADM

## 2017-02-16 RX ORDER — PROMETHAZINE HYDROCHLORIDE 25 MG/1
12.5 TABLET ORAL ONCE AS NEEDED
Status: DISCONTINUED | OUTPATIENT
Start: 2017-02-16 | End: 2017-02-16 | Stop reason: HOSPADM

## 2017-02-16 RX ORDER — OXYCODONE AND ACETAMINOPHEN 7.5; 325 MG/1; MG/1
TABLET ORAL
Status: DISCONTINUED
Start: 2017-02-16 | End: 2017-02-16 | Stop reason: HOSPADM

## 2017-02-16 RX ORDER — FENTANYL CITRATE 50 UG/ML
50 INJECTION, SOLUTION INTRAMUSCULAR; INTRAVENOUS
Status: DISCONTINUED | OUTPATIENT
Start: 2017-02-16 | End: 2017-02-16

## 2017-02-16 RX ADMIN — FENTANYL CITRATE 50 MCG: 50 INJECTION INTRAMUSCULAR; INTRAVENOUS at 07:01

## 2017-02-16 RX ADMIN — FAMOTIDINE 20 MG: 10 INJECTION, SOLUTION INTRAVENOUS at 06:56

## 2017-02-16 RX ADMIN — FENTANYL CITRATE 50 MCG: 50 INJECTION INTRAMUSCULAR; INTRAVENOUS at 07:35

## 2017-02-16 RX ADMIN — FENTANYL CITRATE 50 MCG: 50 INJECTION INTRAMUSCULAR; INTRAVENOUS at 07:45

## 2017-02-16 RX ADMIN — SODIUM CHLORIDE, POTASSIUM CHLORIDE, SODIUM LACTATE AND CALCIUM CHLORIDE 9 ML/HR: 600; 310; 30; 20 INJECTION, SOLUTION INTRAVENOUS at 06:57

## 2017-02-16 RX ADMIN — FENTANYL CITRATE 50 MCG: 50 INJECTION INTRAMUSCULAR; INTRAVENOUS at 07:55

## 2017-02-16 RX ADMIN — LIDOCAINE HYDROCHLORIDE 50 MG: 20 INJECTION, SOLUTION INFILTRATION; PERINEURAL at 07:08

## 2017-02-16 RX ADMIN — PROPOFOL 250 MG: 10 INJECTION, EMULSION INTRAVENOUS at 07:08

## 2017-02-16 RX ADMIN — OXYCODONE HYDROCHLORIDE AND ACETAMINOPHEN 1 TABLET: 7.5; 325 TABLET ORAL at 07:48

## 2017-02-16 RX ADMIN — MIDAZOLAM HYDROCHLORIDE 2 MG: 1 INJECTION INTRAMUSCULAR; INTRAVENOUS at 06:57

## 2017-02-16 RX ADMIN — MIDAZOLAM 2 MG: 1 INJECTION INTRAMUSCULAR; INTRAVENOUS at 06:57

## 2017-02-16 RX ADMIN — LABETALOL HYDROCHLORIDE 5 MG: 5 INJECTION, SOLUTION INTRAVENOUS at 07:55

## 2017-02-16 RX ADMIN — ONDANSETRON 4 MG: 2 INJECTION, SOLUTION INTRAMUSCULAR; INTRAVENOUS at 08:46

## 2017-02-16 RX ADMIN — FENTANYL CITRATE 50 MCG: 50 INJECTION INTRAMUSCULAR; INTRAVENOUS at 08:07

## 2017-02-16 NOTE — ANESTHESIA POSTPROCEDURE EVALUATION
Patient: Anthony Sol Jr.    Procedure Summary     Date Anesthesia Start Anesthesia Stop Room / Location    02/16/17 0701 0722  [facility]       Procedure Diagnosis Surgeon Provider    MANIPULATION OF RT KNEE (Right ) No diagnosis on file. MD Kem Park MD          Anesthesia Type: MAC  Last vitals  /85 (02/16/17 0830)    Temp 36.7 °C (98 °F) (02/16/17 0815)    Pulse 68 (02/16/17 0830)   Resp 16 (02/16/17 0830)    SpO2 97 % (02/16/17 0830)      Post Anesthesia Care and Evaluation    Patient location during evaluation: PACU  Patient participation: complete - patient participated  Level of consciousness: awake and alert  Pain management: adequate  Airway patency: patent  Anesthetic complications: No anesthetic complications    Cardiovascular status: acceptable  Respiratory status: acceptable  Hydration status: acceptable

## 2017-02-16 NOTE — DISCHARGE INSTRUCTIONS
You were given Percocet (pain pills) today at 7:48 am.  You may have the next dose at 11:50 am.      Outpatient Surgery Guidelines, Adult  Outpatient procedures are those for which the person having the procedure is allowed to go home the same day as the procedure. Various procedures are done on an outpatient basis. You should follow some general guidelines if you will be having an outpatient procedure.  AFTER THE  PROCEDURE  After surgery, you will be taken to a recovery area, where your progress will be monitored. If there are no complications, you will be allowed to go home when you are awake, stable, and taking fluids well. You may have numbness around the surgical site. Healing will take some time. You will have tenderness at the surgical site and may have some swelling and bruising. You may also have some nausea.  HOME CARE INSTRUCTIONS  · Do not drive for 24 hours, or as directed by your health care provider. Do not drive while taking prescription pain medicines.  · Do not drink alcohol for 24 hours.  · Do not make important decisions or sign legal documents for 24 hours.  · Plan on having a responsible adult stay with your for 24 hours following your procedure.  · You may resume a normal diet and activities as directed.  · Only take over-the-counter or prescription medicines as directed by your health care provider.  · Follow up with your health care provider as directed.  SEEK MEDICAL CARE IF:  · You have redness, swelling, or increasing pain in the wound.  · You have a fever > 101.  · You feel lightheaded or faint.  · You develop a rash.  · You have trouble breathing.  · You develop allergies.  MAKE SURE YOU:  · Understand these instructions.  · Will watch your condition.  · Will get help right away if you are not doing well or get worse.

## 2017-02-16 NOTE — OP NOTE
DATE OF PROCEDURE:  02/16/2017    PREOPERATIVE DIAGNOSIS:   Arthrofibrosis, cementless right total knee replacement.     POSTOPERATIVE DIAGNOSIS:   Arthrofibrosis, cementless right total knee replacement.     ANESTHESIA: General.     SURGEON:  Anthony Andino MD    PROCEDURE PERFORMED:   Manipulation right knee.     DESCRIPTION OF PROCEDURE: After anesthesia was induced, I manipulated his knee to full extension, 135° of flexion. Peripatellar adhesions were felt to release. There were no complications.       Anthony Andino M.D.  FERN:[email or web address]  D:   02/16/2017 07:15:46  T:   02/16/2017 07:31:38  Job ID:   [id number]  Document ID:   [id number]  cc:

## 2017-02-16 NOTE — PLAN OF CARE
Problem: Patient Care Overview (Adult)  Goal: Plan of Care Review  Outcome: Outcome(s) achieved Date Met:  02/16/17 02/16/17 0928   Coping/Psychosocial Response Interventions   Plan Of Care Reviewed With patient;spouse   Outcome Evaluation   Outcome Summary/Follow up Plan ready for discharge       Goal: Adult Individualization and Mutuality  Outcome: Outcome(s) achieved Date Met:  02/16/17  Goal: Discharge Needs Assessment  Outcome: Outcome(s) achieved Date Met:  02/16/17

## 2017-02-16 NOTE — ANESTHESIA PREPROCEDURE EVALUATION
Anesthesia Evaluation     Patient summary reviewed   NPO Status: > 8 hours   Airway   Mallampati: III  TM distance: >3 FB  Neck ROM: full  no difficulty expected  Dental - normal exam     Pulmonary    Cardiovascular   Exercise tolerance: good (4-7 METS)    Rhythm: regular    (+) hypertension well controlled,       Neuro/Psych  GI/Hepatic/Renal/Endo      Musculoskeletal     Abdominal    Substance History      OB/GYN          Other                                    Anesthesia Plan    ASA 3     MAC     intravenous induction

## 2017-02-16 NOTE — PROGRESS NOTES
Re-Assessment / Re-Certification    Patient: Anthony Sol Jr.   : 1970  Diagnosis/ICD-10 Code:  Knee stiffness, right [M25.661]  Referring practitioner: Anthony Andino MD  Date of Initial Visit: 2017  Today's Date: 2017  Patient seen for 19 sessions      Subjective:   Anthony Sol reports: SUKI performed this morning.  States he isn't having very much pain in his knee, but his hamstring and quad muscles are severely painful.  He is lightheaded and nauseated because of medication.  His wife brought him to PT and will be with him the rest of the day.  ROM was 0-135 degrees in the OR.  Clinical Progress: improved  Home Program Compliance: Yes  Treatment has included: therapeutic exercise, neuromuscular re-education, manual therapy, electrical stimulation and cryotherapy    Subjective Evaluation    Pain  Current pain ratin  At best pain ratin  At worst pain ratin  Location: right knee, quad, hamstring        Objective     Passive Range of Motion     Right Knee   Flexion: 97 degrees   Extension: 5 degrees      Assessment/Plan   Pt with significant pain, nausea that limited tolerance to AROM/PROM today.  Pt required CGA for safety with transfers and was unable to walk more than 10 feet due to pain.   Progress toward previous goals: Partially Met    Short Term Goals: 4 weeks. Patient will:  1. Be independent with initial HEP (MET)  2. Be instructed in posture and body mechanics (MET)  3. Report pain of </= 3 with all daily activities (NOT MET)  4. Demonstrate Right knee ROM 5-100 degrees (PARTIALLY MET)  5. Demonstrate normal patellar mobility (NOT MET)      Long Term Goals: 6-8 weeks. Patient will:  1. Demonstrate improved Bilateral lower extremity MMT of >/= 4+/5 (NOT MET)  2. Demonstrate Right knee ROM 0-125 degrees for independence with ADL's and return to recreational activities. (NOT MET)  3. Report pain of </= 0/10 with all daily activities (NOT MET)  4. Ambulate with normal  symmetrical gait without need for AD (NOT MET)  5. LEFS >/= 75/80 (NOT MET)  6. Be independent with long term HEP (NOT MET)  7. Return to work (NOT MET)      Recommendations: Continue as planned  Timeframe: 2 months  Prognosis to achieve goals: good    PT Signature: Yissel Flannery PT, DPT                         Physical Therapist                         [place] License #[id number]    Based upon review of the patient's progress and continued therapy plan, it is my medical opinion that Anthony Slo should continue physical therapy treatment at [facility] [place] [place] [facility]  [address] [place] [id number].    Signature: __________________________________  Anthony Andino MD    Manual Therapy:    30     mins  23838;  Therapeutic Exercise:    15     mins  61197;     Neuromuscular Zach:    0    mins  32050;    Therapeutic Activity:     0     mins  03813;     Gait Trainin     mins  96877;     Ultrasound:     0     mins  56461;    Electrical Stimulation:    15     mins  75969 ( );    Timed Treatment:   45   mins   Total Treatment:     60   mins

## 2017-02-16 NOTE — BRIEF OP NOTE
MANIPULATION OF  Procedure Note    Anthony Sol Jr.  2/16/2017    Pre-op Diagnosis:   * No pre-op diagnosis entered *    Post-op Diagnosis:     * No Diagnosis Codes entered *    Procedure/CPT® Codes:      Procedure(s):  MANIPULATION OF RT KNEE    Surgeon(s):  Anthony Andino MD    Anesthesia: General    Staff:   Circulator: Monica Eddy RN  Scrub Person: Sheila Bernal    Estimated Blood Loss: * No values recorded between 2/16/2017  7:01 AM and 2/16/2017  7:13 AM *    Specimens:                * No specimens in log *      Drains:           Findings:     Complications:       Anthony Andino MD     Date: 2/16/2017  Time: 7:15 AM

## 2017-02-17 ENCOUNTER — TREATMENT (OUTPATIENT)
Dept: PHYSICAL THERAPY | Facility: CLINIC | Age: 47
End: 2017-02-17

## 2017-02-17 DIAGNOSIS — Z96.651 STATUS POST TOTAL RIGHT KNEE REPLACEMENT: ICD-10-CM

## 2017-02-17 DIAGNOSIS — M25.661 KNEE STIFFNESS, RIGHT: Primary | ICD-10-CM

## 2017-02-17 DIAGNOSIS — M25.561 RIGHT KNEE PAIN, UNSPECIFIED CHRONICITY: ICD-10-CM

## 2017-02-17 PROCEDURE — 97110 THERAPEUTIC EXERCISES: CPT | Performed by: PHYSICAL THERAPIST

## 2017-02-17 PROCEDURE — 97014 ELECTRIC STIMULATION THERAPY: CPT | Performed by: PHYSICAL THERAPIST

## 2017-02-17 PROCEDURE — 97140 MANUAL THERAPY 1/> REGIONS: CPT | Performed by: PHYSICAL THERAPIST

## 2017-02-17 NOTE — PROGRESS NOTES
" Physical Therapy Daily Progress Note    Subjective     Anthony Sol reports: Adherence with HEP every couple of hours last night.  Knee isn't too painful, quad is \"swollen and tight and very painful\"      Objective   See Exercise, Manual, and Modality Logs for complete treatment.   Edema noted in right thigh, knee.  No erythema or pitting edema noted.  Calf is soft and nontender.  Right knee PROM 5-97 degrees today.    Assessment/Plan  Pain, edema limits ROM in the clinic despite varying approaches to stretching and exercise.  Progress per Plan of Care           Manual Therapy:    30     mins  78031;  Therapeutic Exercise:    25     mins  38611;     Neuromuscular Zach:    0    mins  42049;    Therapeutic Activity:     0     mins  86536;     Gait Trainin     mins  66154;     Ultrasound:     0     mins  35615;    Electrical Stimulation:    15     mins  90696 ( );    Timed Treatment:   55   mins   Total Treatment:     70   mins    Yissel Flannery PT, DPT  Physical Therapist  [place] License #[id number]                    "

## 2017-02-20 ENCOUNTER — TREATMENT (OUTPATIENT)
Dept: PHYSICAL THERAPY | Facility: CLINIC | Age: 47
End: 2017-02-20

## 2017-02-20 DIAGNOSIS — M25.561 RIGHT KNEE PAIN, UNSPECIFIED CHRONICITY: ICD-10-CM

## 2017-02-20 DIAGNOSIS — Z96.651 STATUS POST TOTAL RIGHT KNEE REPLACEMENT: ICD-10-CM

## 2017-02-20 DIAGNOSIS — M25.661 KNEE STIFFNESS, RIGHT: Primary | ICD-10-CM

## 2017-02-20 PROCEDURE — 97110 THERAPEUTIC EXERCISES: CPT | Performed by: PHYSICAL THERAPIST

## 2017-02-20 PROCEDURE — 97014 ELECTRIC STIMULATION THERAPY: CPT | Performed by: PHYSICAL THERAPIST

## 2017-02-20 NOTE — PROGRESS NOTES
Physical Therapy Daily Progress Note    Subjective     Anthony Sol reports: continued significant swelling that is not affected by ice and elevation.      Objective   See Exercise, Manual, and Modality Logs for complete treatment.   Area of bruising noted right medial thigh   Girth measurements of thigh/knee ranged from 5 cm to 7 cm difference compared to left side  PT contacted MD office, who set up appointment for doppler.  Pt aware.    Assessment/Plan  Held further PT Rx until after doppler.  Progress per Plan of Care when cleared by MD           Manual Therapy:    0     mins  25950;  Therapeutic Exercise:    20     mins  67192;     Neuromuscular Zach:    0    mins  60393;    Therapeutic Activity:     0     mins  00170;     Gait Trainin     mins  92409;     Ultrasound:     0     mins  90955;    Electrical Stimulation:    15     mins  97004 ( );    Timed Treatment:   20   mins   Total Treatment:     60   mins    Yissel Flannery PT, DPT  Physical Therapist  [place] License #[id number]

## 2017-02-21 ENCOUNTER — TREATMENT (OUTPATIENT)
Dept: PHYSICAL THERAPY | Facility: CLINIC | Age: 47
End: 2017-02-21

## 2017-02-21 DIAGNOSIS — Z96.651 STATUS POST TOTAL RIGHT KNEE REPLACEMENT: ICD-10-CM

## 2017-02-21 DIAGNOSIS — M25.661 KNEE STIFFNESS, RIGHT: Primary | ICD-10-CM

## 2017-02-21 DIAGNOSIS — M25.561 RIGHT KNEE PAIN, UNSPECIFIED CHRONICITY: ICD-10-CM

## 2017-02-21 PROCEDURE — 97014 ELECTRIC STIMULATION THERAPY: CPT | Performed by: PHYSICAL THERAPIST

## 2017-02-21 PROCEDURE — 97140 MANUAL THERAPY 1/> REGIONS: CPT | Performed by: PHYSICAL THERAPIST

## 2017-02-21 PROCEDURE — 97110 THERAPEUTIC EXERCISES: CPT | Performed by: PHYSICAL THERAPIST

## 2017-02-21 NOTE — PROGRESS NOTES
" Physical Therapy Daily Progress Note    Subjective     Anthony Sol reports: he had doppler yesterday, no evidence of DVT.  States \"the pain is just too much\" to push the range of motion. Is seeing MD this afternoon.      Objective   See Exercise, Manual, and Modality Logs for complete treatment.       Assessment/Plan  See letter to MD.  Pt unable to tolerate aggressive stretching due to pain level.    Progress per Plan of Care           Manual Therapy:    15     mins  49112;  Therapeutic Exercise:    35     mins  83569;     Neuromuscular Zach:    0    mins  34493;    Therapeutic Activity:     0     mins  11959;     Gait Trainin     mins  06133;     Ultrasound:     0     mins  76512;    Electrical Stimulation:    15     mins  07564 ( );    Timed Treatment:   50   mins   Total Treatment:     65   mins    Yissel Flannery PT, DPT  Physical Therapist  [place] License #[id number]                    "

## 2017-02-22 ENCOUNTER — TREATMENT (OUTPATIENT)
Dept: PHYSICAL THERAPY | Facility: CLINIC | Age: 47
End: 2017-02-22

## 2017-02-22 DIAGNOSIS — Z96.651 STATUS POST TOTAL RIGHT KNEE REPLACEMENT: ICD-10-CM

## 2017-02-22 DIAGNOSIS — M25.561 RIGHT KNEE PAIN, UNSPECIFIED CHRONICITY: ICD-10-CM

## 2017-02-22 DIAGNOSIS — M25.661 KNEE STIFFNESS, RIGHT: Primary | ICD-10-CM

## 2017-02-22 PROCEDURE — 97110 THERAPEUTIC EXERCISES: CPT | Performed by: PHYSICAL THERAPIST

## 2017-02-22 PROCEDURE — 97014 ELECTRIC STIMULATION THERAPY: CPT | Performed by: PHYSICAL THERAPIST

## 2017-02-22 NOTE — PROGRESS NOTES
Physical Therapy Daily Progress Note    Subjective     Anthony Hernandezmore reports: he saw Dr. Andino yesterday, who told him that his quad was torn during the manipulation.  Recommended returning to therapy, but to avoid aggressive ROM/stretching.      Objective   See Exercise, Manual, and Modality Logs for complete treatment.   Pt ambulates with straight cane today, area of bruising right medial thigh.      Assessment/Plan  Tolerated limited treatment well without increased.pain.  Progress per Plan of Care, contact MD office for clarification of restrictions/goals for PT.           Manual Therapy:    0     mins  89548;  Therapeutic Exercise:    25     mins  94138;     Neuromuscular Zach:    0    mins  16119;    Therapeutic Activity:     0     mins  80570;     Gait Trainin     mins  97969;     Ultrasound:     0     mins  20206;    Electrical Stimulation:    15     mins  00853 ( );    Timed Treatment:   25   mins   Total Treatment:     40   mins    Yissel Flannery PT, DPT  Physical Therapist  [place] License #[id number]

## 2017-02-24 ENCOUNTER — TREATMENT (OUTPATIENT)
Dept: PHYSICAL THERAPY | Facility: CLINIC | Age: 47
End: 2017-02-24

## 2017-02-24 DIAGNOSIS — M25.561 RIGHT KNEE PAIN, UNSPECIFIED CHRONICITY: ICD-10-CM

## 2017-02-24 DIAGNOSIS — M25.661 KNEE STIFFNESS, RIGHT: Primary | ICD-10-CM

## 2017-02-24 DIAGNOSIS — Z96.651 STATUS POST TOTAL RIGHT KNEE REPLACEMENT: ICD-10-CM

## 2017-02-24 PROCEDURE — 97110 THERAPEUTIC EXERCISES: CPT | Performed by: PHYSICAL THERAPIST

## 2017-02-24 PROCEDURE — 97014 ELECTRIC STIMULATION THERAPY: CPT | Performed by: PHYSICAL THERAPIST

## 2017-02-24 PROCEDURE — 97140 MANUAL THERAPY 1/> REGIONS: CPT | Performed by: PHYSICAL THERAPIST

## 2017-02-24 NOTE — PROGRESS NOTES
Physical Therapy Daily Progress Note    Subjective     Anthony Sol reports: it feels like his kneecap is stuck again, has pain relief with patellar mobs.  Quad pain is getting better.      Objective   See Exercise, Manual, and Modality Logs for complete treatment.       Assessment/Plan   Decreased patellar mobility compared to before SUKI.  Did improve motion and pain with mobilizations.  Progress per Plan of Care           Manual Therapy:    10     mins  43664;  Therapeutic Exercise:    30     mins  64557;     Neuromuscular Zahc:    0    mins  60091;    Therapeutic Activity:     0     mins  64374;     Gait Trainin     mins  20069;     Ultrasound:     0     mins  81834;    Electrical Stimulation:    15     mins  38116 ( );    Timed Treatment:   40   mins   Total Treatment:     55   mins    Yissel Flannery PT, DPT  Physical Therapist  [place] License #[id number]

## 2017-02-28 ENCOUNTER — TREATMENT (OUTPATIENT)
Dept: PHYSICAL THERAPY | Facility: CLINIC | Age: 47
End: 2017-02-28

## 2017-02-28 DIAGNOSIS — M25.561 RIGHT KNEE PAIN, UNSPECIFIED CHRONICITY: ICD-10-CM

## 2017-02-28 DIAGNOSIS — M25.661 KNEE STIFFNESS, RIGHT: Primary | ICD-10-CM

## 2017-02-28 DIAGNOSIS — Z96.651 STATUS POST TOTAL RIGHT KNEE REPLACEMENT: ICD-10-CM

## 2017-02-28 PROCEDURE — 97110 THERAPEUTIC EXERCISES: CPT | Performed by: PHYSICAL THERAPIST

## 2017-02-28 PROCEDURE — 97140 MANUAL THERAPY 1/> REGIONS: CPT | Performed by: PHYSICAL THERAPIST

## 2017-02-28 PROCEDURE — 97014 ELECTRIC STIMULATION THERAPY: CPT | Performed by: PHYSICAL THERAPIST

## 2017-02-28 NOTE — PROGRESS NOTES
Physical Therapy Daily Progress Note      Anthony Sol Jr. reports: he saw MD today, said they are going to try to schedule surgery in 2 weeks.  Pt reports MD gave no change in restrictions for PT, said to continue doing recumbent bike to tolerance.     Objective  See Exercise, Manual, and Modality Logs for complete treatment.     Assessment/Plan  Pt performed well with all therex, reported pain with patellar mobilizations at medial kneecap.  Pt would benefit from additional skilled PT to continue to progress knee ROM to tolerance.    Progress per Plan of Care    Manual PT 06139 10 minutes and Therapy Exercise 96554 30 minutes    Timed Code Treatment: 40   Minutes     Total Treatment Time: 60      Minutes    Molly Meraz, PT, DPT  Physical Therapist #[id number]

## 2017-03-02 ENCOUNTER — TREATMENT (OUTPATIENT)
Dept: PHYSICAL THERAPY | Facility: CLINIC | Age: 47
End: 2017-03-02

## 2017-03-02 DIAGNOSIS — Z96.651 STATUS POST TOTAL RIGHT KNEE REPLACEMENT: ICD-10-CM

## 2017-03-02 DIAGNOSIS — M25.661 KNEE STIFFNESS, RIGHT: Primary | ICD-10-CM

## 2017-03-02 DIAGNOSIS — M25.561 RIGHT KNEE PAIN, UNSPECIFIED CHRONICITY: ICD-10-CM

## 2017-03-02 PROCEDURE — 97014 ELECTRIC STIMULATION THERAPY: CPT | Performed by: PHYSICAL THERAPIST

## 2017-03-02 PROCEDURE — 97110 THERAPEUTIC EXERCISES: CPT | Performed by: PHYSICAL THERAPIST

## 2017-03-02 NOTE — PROGRESS NOTES
Physical Therapy Daily Progress Note    Subjective     Anthony Sol reports: his surgery is rescheduled to Monday morning.      Objective   See Exercise, Manual, and Modality Logs for complete treatment.       Assessment/Plan  Tolerated exercises well.  Hold PT at this time since surgery is planned, will reevaluate when ordered by MD.           Manual Therapy:    0     mins  85059;  Therapeutic Exercise:    40     mins  74655;     Neuromuscular Zach:    0    mins  99725;    Therapeutic Activity:     0     mins  43047;     Gait Trainin     mins  92844;     Ultrasound:     0     mins  48003;    Electrical Stimulation:    15     mins  46875 ( );    Timed Treatment:   40   mins   Total Treatment:     55   mins    Yissel Flannery PT, DPT  Physical Therapist  [place] License #[id number]

## 2017-03-03 ENCOUNTER — APPOINTMENT (OUTPATIENT)
Dept: PREADMISSION TESTING | Facility: HOSPITAL | Age: 47
End: 2017-03-03

## 2017-03-03 ENCOUNTER — HOSPITAL ENCOUNTER (OUTPATIENT)
Dept: GENERAL RADIOLOGY | Facility: HOSPITAL | Age: 47
Discharge: HOME | End: 2017-03-03
Admitting: ORTHOPAEDIC SURGERY

## 2017-03-03 ENCOUNTER — HOSPITAL ENCOUNTER (OUTPATIENT)
Dept: GENERAL RADIOLOGY | Facility: HOSPITAL | Age: 47
Discharge: HOME | End: 2017-03-03

## 2017-03-03 VITALS
HEART RATE: 70 BPM | WEIGHT: 261 LBS | RESPIRATION RATE: 20 BRPM | TEMPERATURE: 98.2 F | OXYGEN SATURATION: 97 % | BODY MASS INDEX: 35.35 KG/M2 | HEIGHT: 72 IN

## 2017-03-03 LAB
ALBUMIN SERPL-MCNC: 4.7 G/DL (ref 3.5–5.2)
ALBUMIN/GLOB SERPL: 2 G/DL
ALP SERPL-CCNC: 45 U/L (ref 39–117)
ALT SERPL W P-5'-P-CCNC: 26 U/L (ref 1–41)
ANION GAP SERPL CALCULATED.3IONS-SCNC: 13.3 MMOL/L
APTT PPP: 31 SECONDS (ref 22.7–35.4)
AST SERPL-CCNC: 19 U/L (ref 1–40)
BASOPHILS # BLD AUTO: 0.01 10*3/MM3 (ref 0–0.2)
BASOPHILS NFR BLD AUTO: 0.2 % (ref 0–1.5)
BILIRUB SERPL-MCNC: 0.4 MG/DL (ref 0.1–1.2)
BILIRUB UR QL STRIP: NEGATIVE
BUN BLD-MCNC: 14 MG/DL (ref 6–20)
BUN/CREAT SERPL: 14.9 (ref 7–25)
CALCIUM SPEC-SCNC: 9.7 MG/DL (ref 8.6–10.5)
CHLORIDE SERPL-SCNC: 101 MMOL/L (ref 98–107)
CLARITY UR: CLEAR
CO2 SERPL-SCNC: 26.7 MMOL/L (ref 22–29)
COLOR UR: YELLOW
CREAT BLD-MCNC: 0.94 MG/DL (ref 0.76–1.27)
DEPRECATED RDW RBC AUTO: 40.8 FL (ref 37–54)
EOSINOPHIL # BLD AUTO: 0.14 10*3/MM3 (ref 0–0.7)
EOSINOPHIL NFR BLD AUTO: 2.6 % (ref 0.3–6.2)
ERYTHROCYTE [DISTWIDTH] IN BLOOD BY AUTOMATED COUNT: 13.1 % (ref 11.5–14.5)
GFR SERPL CREATININE-BSD FRML MDRD: 85 ML/MIN/1.73
GLOBULIN UR ELPH-MCNC: 2.4 GM/DL
GLUCOSE BLD-MCNC: 119 MG/DL (ref 65–99)
GLUCOSE UR STRIP-MCNC: NEGATIVE MG/DL
HCT VFR BLD AUTO: 35.2 % (ref 40.4–52.2)
HGB BLD-MCNC: 11.1 G/DL (ref 13.7–17.6)
HGB UR QL STRIP.AUTO: NEGATIVE
IMM GRANULOCYTES # BLD: 0.02 10*3/MM3 (ref 0–0.03)
IMM GRANULOCYTES NFR BLD: 0.4 % (ref 0–0.5)
INR PPP: 1.01 (ref 0.9–1.1)
KETONES UR QL STRIP: NEGATIVE
LEUKOCYTE ESTERASE UR QL STRIP.AUTO: NEGATIVE
LYMPHOCYTES # BLD AUTO: 1.58 10*3/MM3 (ref 0.9–4.8)
LYMPHOCYTES NFR BLD AUTO: 29.3 % (ref 19.6–45.3)
MCH RBC QN AUTO: 26.9 PG (ref 27–32.7)
MCHC RBC AUTO-ENTMCNC: 31.5 G/DL (ref 32.6–36.4)
MCV RBC AUTO: 85.4 FL (ref 79.8–96.2)
MONOCYTES # BLD AUTO: 0.49 10*3/MM3 (ref 0.2–1.2)
MONOCYTES NFR BLD AUTO: 9.1 % (ref 5–12)
NEUTROPHILS # BLD AUTO: 3.16 10*3/MM3 (ref 1.9–8.1)
NEUTROPHILS NFR BLD AUTO: 58.4 % (ref 42.7–76)
NITRITE UR QL STRIP: NEGATIVE
PH UR STRIP.AUTO: 7 [PH] (ref 5–8)
PLATELET # BLD AUTO: 364 10*3/MM3 (ref 140–500)
PMV BLD AUTO: 9.6 FL (ref 6–12)
POTASSIUM BLD-SCNC: 3.7 MMOL/L (ref 3.5–5.2)
PROT SERPL-MCNC: 7.1 G/DL (ref 6–8.5)
PROT UR QL STRIP: NEGATIVE
PROTHROMBIN TIME: 12.9 SECONDS (ref 11.7–14.2)
RBC # BLD AUTO: 4.12 10*6/MM3 (ref 4.6–6)
SODIUM BLD-SCNC: 141 MMOL/L (ref 136–145)
SP GR UR STRIP: 1.01 (ref 1–1.03)
UROBILINOGEN UR QL STRIP: NORMAL
WBC NRBC COR # BLD: 5.4 10*3/MM3 (ref 4.5–10.7)

## 2017-03-03 PROCEDURE — 85730 THROMBOPLASTIN TIME PARTIAL: CPT | Performed by: ORTHOPAEDIC SURGERY

## 2017-03-03 PROCEDURE — 85025 COMPLETE CBC W/AUTO DIFF WBC: CPT | Performed by: ORTHOPAEDIC SURGERY

## 2017-03-03 PROCEDURE — 85610 PROTHROMBIN TIME: CPT | Performed by: ORTHOPAEDIC SURGERY

## 2017-03-03 PROCEDURE — 80053 COMPREHEN METABOLIC PANEL: CPT | Performed by: ORTHOPAEDIC SURGERY

## 2017-03-03 PROCEDURE — 36415 COLL VENOUS BLD VENIPUNCTURE: CPT

## 2017-03-03 PROCEDURE — 81003 URINALYSIS AUTO W/O SCOPE: CPT | Performed by: ORTHOPAEDIC SURGERY

## 2017-03-03 PROCEDURE — 73560 X-RAY EXAM OF KNEE 1 OR 2: CPT

## 2017-03-03 RX ORDER — CHLORHEXIDINE GLUCONATE 500 MG/1
1 CLOTH TOPICAL
COMMUNITY
End: 2017-03-08 | Stop reason: HOSPADM

## 2017-03-03 RX ORDER — OXYCODONE AND ACETAMINOPHEN 7.5; 325 MG/1; MG/1
1-2 TABLET ORAL EVERY 4 HOURS PRN
COMMUNITY
Start: 2017-02-23 | End: 2017-08-24

## 2017-03-03 NOTE — DISCHARGE INSTRUCTIONS
Take the following medications the morning of surgery with a small sip of water.        General Instructions:  • Do not eat or drink after midnight: includes water, mints, or gum. You may brush your teeth.  • Do not smoke, chew tobacco, or drink alcohol.  • The Pre-Admission Testing nurse will instruct you to bring medications if unable to obtain an accurate list in Pre-Admission Testing.    • If applicable bring your C-PAP/ BI-PAP machine.  • Bring any papers given to you in the doctor’s office.  • Wear clean comfortable clothes and socks.  • Do not wear contact lenses or make-up.  Bring a case for your glasses if applicable.   • Bring crutches or walker if applicable.  • Leave all other valuables and jewelry at home.    If you were given a blood bank ID arm band remember to bring it with you the day of surgery.    Preventing a Surgical Site Infection:  Shower on the morning of surgery using a fresh bar of anti-bacterial soap (such as Dial) and clean washcloth.  Dry with a clean towel and dress in clean clothing.  For 2 to 3 days before surgery, avoid shaving with a razor near where you will have surgery because the razor can irritate skin and make it easier to develop an infection  Ask your surgeon if you will be receiving antibiotics prior to surgery  Make sure you, your family, and all healthcare providers clean their hands with soap and water or an alcohol based hand  before caring for you or your wound  If at all possible, quit smoking as many days before surgery as you can.    Day of surgery:3/6/17  0530  Upon arrival, a Pre-op nurse and Anesthesiologist will review your health history, obtain vital signs, and answer questions you may have.  The only belongings needed at this time will be your home medications and if applicable your C-PAP/BI-PAP machine.  If you are staying overnight your family can leave the rest of your belongings in the car and bring them to your room later.  A Pre-op nurse will  start an IV and you may receive medication in preparation for surgery, including something to help you relax.  Your family will be able to see you in the Pre-op area.  While you are in surgery your family should notify the waiting room  if they leave the waiting room area and provide a contact phone number.    Please be aware that surgery does come with discomfort.  We want to make every effort to control your discomfort so please discuss any uncontrolled symptoms with your nurse.   Your doctor will most likely have prescribed pain medications.      If you are going home after surgery you will receive individualized written care instructions before being discharged.  A responsible adult must drive you to and from the hospital on the day of your surgery and stay with you for 24 hours.    If you are staying overnight following surgery, you will be transported to your hospital room following the recovery period.  [facility] has all private rooms.    If you have any questions please call Pre-Admission Testing at [phone].  Deductibles and co-payments are collected on the day of service. Please be prepared to pay the required co-pay, deductible or deposit on the day of service as defined by your plan.    2% CHLORAHEXIDINE GLUCONATE* CLOTH  Preparing or “prepping” skin before surgery can reduce the risk of infection at the surgical site. To make the process easier, [facility] has chosen disposable cloths moistened with a rinse-free, 2% Chlorhexidine Gluconate (CHG) antiseptic solution. The steps below outline the prepping process and should be carefully followed.        Use the prep cloth on the area that is circled in the diagram             Directions Night before Surgery  1) Shower using a fresh bar of anti-bacterial soap (such as Dial) and clean washcloth.  Use a clean towel to completely dry your skin.  2) Do not use any lotions, oils or creams on your skin.  3) Open the  package and remove 1 cloth, wipe your skin for 30 seconds in a circular motion.  Allow to dry for 3 minutes.  4) Repeat #3 with second cloth.  5) Do not touch your eyes, ears, or mouth with the prep cloth.  6) Allow the wet prep solution to air dry.  7) Discard the prep cloth and wash your hands with soap and water.   8) Dress in clean bed clothes and sleep on fresh clean bed sheets.   9) You may experience some temporary itching after the prep.    Directions Day of Surgery  1) Repeat steps 1,2,3,4,5,6,7, and 9.   2) Dress in clean clothes before coming to the hospital.    BACTROBAN NASAL OINTMENT  There are many germs normally in your nose. Bactroban is an ointment that will help reduce these germs. Please follow these instructions for Bactroban use:    ____Two days before surgery in the evening Date________    _1___The day before surgery in the morning  Date_3/5/17_______    _2___The day before surgery in the evening              Date_3/5/17_______    _3___The day of surgery in the morning    Date_3/6/17_______    **Squirt ½ package of Bactroban Ointment onto a cotton applicator and apply to inside of 1st nostril.  Squirt the remaining Bactroban and apply to the inside of the other nostril.    PERIDEX- ORAL:  Use only if your surgeon has ordered  Use the night before and morning of surgery - Swish, gargle, and spit - do not swallow.

## 2017-03-06 ENCOUNTER — ANESTHESIA EVENT (OUTPATIENT)
Dept: PERIOP | Facility: HOSPITAL | Age: 47
End: 2017-03-06

## 2017-03-06 ENCOUNTER — APPOINTMENT (OUTPATIENT)
Dept: GENERAL RADIOLOGY | Facility: HOSPITAL | Age: 47
End: 2017-03-06

## 2017-03-06 ENCOUNTER — HOSPITAL ENCOUNTER (INPATIENT)
Facility: HOSPITAL | Age: 47
LOS: 2 days | Discharge: HOME HEALTH | End: 2017-03-08
Attending: ORTHOPAEDIC SURGERY | Admitting: ORTHOPAEDIC SURGERY

## 2017-03-06 ENCOUNTER — ANESTHESIA (OUTPATIENT)
Dept: PERIOP | Facility: HOSPITAL | Age: 47
End: 2017-03-06

## 2017-03-06 DIAGNOSIS — T84.84XA PAINFUL TOTAL KNEE REPLACEMENT (HCC): ICD-10-CM

## 2017-03-06 DIAGNOSIS — Z96.659 PAINFUL TOTAL KNEE REPLACEMENT (HCC): ICD-10-CM

## 2017-03-06 DIAGNOSIS — R26.2 DIFFICULTY WALKING: Primary | ICD-10-CM

## 2017-03-06 LAB
GLUCOSE BLDC GLUCOMTR-MCNC: 107 MG/DL (ref 70–130)
GLUCOSE BLDC GLUCOMTR-MCNC: 121 MG/DL (ref 70–130)
GLUCOSE BLDC GLUCOMTR-MCNC: 127 MG/DL (ref 70–130)
GLUCOSE BLDC GLUCOMTR-MCNC: 153 MG/DL (ref 70–130)

## 2017-03-06 PROCEDURE — 0SUV09Z SUPPLEMENT RIGHT KNEE JOINT, TIBIAL SURFACE WITH LINER, OPEN APPROACH: ICD-10-PCS | Performed by: ORTHOPAEDIC SURGERY

## 2017-03-06 PROCEDURE — C1713 ANCHOR/SCREW BN/BN,TIS/BN: HCPCS | Performed by: ORTHOPAEDIC SURGERY

## 2017-03-06 PROCEDURE — 94799 UNLISTED PULMONARY SVC/PX: CPT

## 2017-03-06 PROCEDURE — 73560 X-RAY EXAM OF KNEE 1 OR 2: CPT

## 2017-03-06 PROCEDURE — 82962 GLUCOSE BLOOD TEST: CPT

## 2017-03-06 PROCEDURE — 25010000002 MIDAZOLAM PER 1 MG: Performed by: NURSE ANESTHETIST, CERTIFIED REGISTERED

## 2017-03-06 PROCEDURE — 87070 CULTURE OTHR SPECIMN AEROBIC: CPT | Performed by: ORTHOPAEDIC SURGERY

## 2017-03-06 PROCEDURE — 25010000002 METHYLPREDNISOLONE PER 40 MG: Performed by: ORTHOPAEDIC SURGERY

## 2017-03-06 PROCEDURE — 25010000003 CEFAZOLIN IN DEXTROSE 2-4 GM/100ML-% SOLUTION: Performed by: ORTHOPAEDIC SURGERY

## 2017-03-06 PROCEDURE — C1776 JOINT DEVICE (IMPLANTABLE): HCPCS | Performed by: ORTHOPAEDIC SURGERY

## 2017-03-06 PROCEDURE — 25010000002 PROPOFOL 10 MG/ML EMULSION: Performed by: NURSE ANESTHETIST, CERTIFIED REGISTERED

## 2017-03-06 PROCEDURE — 25010000002 SUCCINYLCHOLINE PER 20 MG: Performed by: NURSE ANESTHETIST, CERTIFIED REGISTERED

## 2017-03-06 PROCEDURE — 3E0U3BZ INTRODUCTION OF ANESTHETIC AGENT INTO JOINTS, PERCUTANEOUS APPROACH: ICD-10-PCS | Performed by: ORTHOPAEDIC SURGERY

## 2017-03-06 PROCEDURE — 0SPC0JZ REMOVAL OF SYNTHETIC SUBSTITUTE FROM RIGHT KNEE JOINT, OPEN APPROACH: ICD-10-PCS | Performed by: ORTHOPAEDIC SURGERY

## 2017-03-06 PROCEDURE — 97162 PT EVAL MOD COMPLEX 30 MIN: CPT | Performed by: PHYSICAL THERAPIST

## 2017-03-06 PROCEDURE — 25010000002 MIDAZOLAM PER 1 MG: Performed by: ANESTHESIOLOGY

## 2017-03-06 PROCEDURE — 25010000002 FENTANYL CITRATE (PF) 100 MCG/2ML SOLUTION: Performed by: NURSE ANESTHETIST, CERTIFIED REGISTERED

## 2017-03-06 PROCEDURE — 25010000002 KETOROLAC TROMETHAMINE PER 15 MG: Performed by: ORTHOPAEDIC SURGERY

## 2017-03-06 PROCEDURE — 97110 THERAPEUTIC EXERCISES: CPT | Performed by: PHYSICAL THERAPIST

## 2017-03-06 PROCEDURE — 0SPC09Z REMOVAL OF LINER FROM RIGHT KNEE JOINT, OPEN APPROACH: ICD-10-PCS | Performed by: ORTHOPAEDIC SURGERY

## 2017-03-06 PROCEDURE — 0SRC0J9 REPLACEMENT OF RIGHT KNEE JOINT WITH SYNTHETIC SUBSTITUTE, CEMENTED, OPEN APPROACH: ICD-10-PCS | Performed by: ORTHOPAEDIC SURGERY

## 2017-03-06 PROCEDURE — 87075 CULTR BACTERIA EXCEPT BLOOD: CPT | Performed by: ORTHOPAEDIC SURGERY

## 2017-03-06 PROCEDURE — 25010000002 FENTANYL CITRATE (PF) 100 MCG/2ML SOLUTION: Performed by: ANESTHESIOLOGY

## 2017-03-06 PROCEDURE — 87205 SMEAR GRAM STAIN: CPT | Performed by: ORTHOPAEDIC SURGERY

## 2017-03-06 PROCEDURE — 25010000002 VANCOMYCIN PER 500 MG: Performed by: ORTHOPAEDIC SURGERY

## 2017-03-06 PROCEDURE — 3E0U33Z INTRODUCTION OF ANTI-INFLAMMATORY INTO JOINTS, PERCUTANEOUS APPROACH: ICD-10-PCS | Performed by: ORTHOPAEDIC SURGERY

## 2017-03-06 PROCEDURE — 25010000002 ONDANSETRON PER 1 MG: Performed by: ANESTHESIOLOGY

## 2017-03-06 PROCEDURE — 25010000002 HYDROMORPHONE PER 4 MG: Performed by: ORTHOPAEDIC SURGERY

## 2017-03-06 PROCEDURE — 25010000002 HYDROMORPHONE PER 4 MG: Performed by: NURSE ANESTHETIST, CERTIFIED REGISTERED

## 2017-03-06 PROCEDURE — 25010000002 ONDANSETRON PER 1 MG: Performed by: NURSE ANESTHETIST, CERTIFIED REGISTERED

## 2017-03-06 DEVICE — IMPLANTABLE DEVICE
Type: IMPLANTABLE DEVICE | Site: KNEE | Status: FUNCTIONAL
Brand: BIOMET KNEE SYSTEM

## 2017-03-06 DEVICE — IMPLANTABLE DEVICE: Type: IMPLANTABLE DEVICE | Site: KNEE | Status: FUNCTIONAL

## 2017-03-06 DEVICE — IMPLANTABLE DEVICE
Type: IMPLANTABLE DEVICE | Site: KNEE | Status: FUNCTIONAL
Brand: VANGUARD KNEE SYSTEM

## 2017-03-06 DEVICE — IMPLANTABLE DEVICE
Type: IMPLANTABLE DEVICE | Site: KNEE | Status: FUNCTIONAL
Brand: VANGUARD® KNEE SYSTEM

## 2017-03-06 DEVICE — CMT BONE PALACOS 120001: Type: IMPLANTABLE DEVICE | Site: KNEE | Status: FUNCTIONAL

## 2017-03-06 RX ORDER — CEFAZOLIN SODIUM 2 G/100ML
2 INJECTION, SOLUTION INTRAVENOUS ONCE
Status: COMPLETED | OUTPATIENT
Start: 2017-03-06 | End: 2017-03-06

## 2017-03-06 RX ORDER — FENTANYL CITRATE 50 UG/ML
50 INJECTION, SOLUTION INTRAMUSCULAR; INTRAVENOUS
Status: DISCONTINUED | OUTPATIENT
Start: 2017-03-06 | End: 2017-03-06 | Stop reason: HOSPADM

## 2017-03-06 RX ORDER — SODIUM CHLORIDE, SODIUM LACTATE, POTASSIUM CHLORIDE, CALCIUM CHLORIDE 600; 310; 30; 20 MG/100ML; MG/100ML; MG/100ML; MG/100ML
9 INJECTION, SOLUTION INTRAVENOUS CONTINUOUS
Status: DISCONTINUED | OUTPATIENT
Start: 2017-03-06 | End: 2017-03-08 | Stop reason: HOSPADM

## 2017-03-06 RX ORDER — ONDANSETRON 2 MG/ML
INJECTION INTRAMUSCULAR; INTRAVENOUS AS NEEDED
Status: DISCONTINUED | OUTPATIENT
Start: 2017-03-06 | End: 2017-03-06 | Stop reason: SURG

## 2017-03-06 RX ORDER — LIDOCAINE HYDROCHLORIDE 20 MG/ML
INJECTION, SOLUTION INFILTRATION; PERINEURAL AS NEEDED
Status: DISCONTINUED | OUTPATIENT
Start: 2017-03-06 | End: 2017-03-06 | Stop reason: SURG

## 2017-03-06 RX ORDER — ONDANSETRON 4 MG/1
4 TABLET, FILM COATED ORAL EVERY 6 HOURS PRN
Status: DISCONTINUED | OUTPATIENT
Start: 2017-03-06 | End: 2017-03-08 | Stop reason: HOSPADM

## 2017-03-06 RX ORDER — PANTOPRAZOLE SODIUM 40 MG/1
40 TABLET, DELAYED RELEASE ORAL
Status: DISCONTINUED | OUTPATIENT
Start: 2017-03-07 | End: 2017-03-08 | Stop reason: HOSPADM

## 2017-03-06 RX ORDER — PROMETHAZINE HYDROCHLORIDE 25 MG/ML
12.5 INJECTION, SOLUTION INTRAMUSCULAR; INTRAVENOUS ONCE AS NEEDED
Status: DISCONTINUED | OUTPATIENT
Start: 2017-03-06 | End: 2017-03-06 | Stop reason: HOSPADM

## 2017-03-06 RX ORDER — OXYCODONE AND ACETAMINOPHEN 10; 325 MG/1; MG/1
1 TABLET ORAL EVERY 4 HOURS PRN
Status: DISCONTINUED | OUTPATIENT
Start: 2017-03-06 | End: 2017-03-08 | Stop reason: HOSPADM

## 2017-03-06 RX ORDER — ACETAMINOPHEN 500 MG
1000 TABLET ORAL ONCE
Status: COMPLETED | OUTPATIENT
Start: 2017-03-06 | End: 2017-03-06

## 2017-03-06 RX ORDER — ACETAMINOPHEN 325 MG/1
325 TABLET ORAL EVERY 4 HOURS PRN
Status: DISCONTINUED | OUTPATIENT
Start: 2017-03-06 | End: 2017-03-08 | Stop reason: HOSPADM

## 2017-03-06 RX ORDER — HYDROMORPHONE HYDROCHLORIDE 1 MG/ML
0.5 INJECTION, SOLUTION INTRAMUSCULAR; INTRAVENOUS; SUBCUTANEOUS
Status: DISCONTINUED | OUTPATIENT
Start: 2017-03-06 | End: 2017-03-06 | Stop reason: HOSPADM

## 2017-03-06 RX ORDER — SODIUM CHLORIDE 0.9 % (FLUSH) 0.9 %
1-10 SYRINGE (ML) INJECTION AS NEEDED
Status: DISCONTINUED | OUTPATIENT
Start: 2017-03-06 | End: 2017-03-08 | Stop reason: HOSPADM

## 2017-03-06 RX ORDER — ACETAMINOPHEN 325 MG/1
650 TABLET ORAL EVERY 4 HOURS PRN
Status: DISCONTINUED | OUTPATIENT
Start: 2017-03-06 | End: 2017-03-08 | Stop reason: HOSPADM

## 2017-03-06 RX ORDER — MIDAZOLAM HYDROCHLORIDE 1 MG/ML
1 INJECTION INTRAMUSCULAR; INTRAVENOUS
Status: DISCONTINUED | OUTPATIENT
Start: 2017-03-06 | End: 2017-03-06 | Stop reason: HOSPADM

## 2017-03-06 RX ORDER — OXYCODONE AND ACETAMINOPHEN 7.5; 325 MG/1; MG/1
1 TABLET ORAL ONCE AS NEEDED
Status: DISCONTINUED | OUTPATIENT
Start: 2017-03-06 | End: 2017-03-06 | Stop reason: HOSPADM

## 2017-03-06 RX ORDER — SODIUM CHLORIDE, SODIUM LACTATE, POTASSIUM CHLORIDE, CALCIUM CHLORIDE 600; 310; 30; 20 MG/100ML; MG/100ML; MG/100ML; MG/100ML
100 INJECTION, SOLUTION INTRAVENOUS CONTINUOUS
Status: DISCONTINUED | OUTPATIENT
Start: 2017-03-06 | End: 2017-03-08 | Stop reason: HOSPADM

## 2017-03-06 RX ORDER — MIDAZOLAM HYDROCHLORIDE 1 MG/ML
INJECTION INTRAMUSCULAR; INTRAVENOUS AS NEEDED
Status: DISCONTINUED | OUTPATIENT
Start: 2017-03-06 | End: 2017-03-06 | Stop reason: SURG

## 2017-03-06 RX ORDER — PROMETHAZINE HYDROCHLORIDE 25 MG/1
25 TABLET ORAL ONCE AS NEEDED
Status: DISCONTINUED | OUTPATIENT
Start: 2017-03-06 | End: 2017-03-06 | Stop reason: HOSPADM

## 2017-03-06 RX ORDER — NALOXONE HCL 0.4 MG/ML
0.1 VIAL (ML) INJECTION
Status: DISCONTINUED | OUTPATIENT
Start: 2017-03-06 | End: 2017-03-08 | Stop reason: HOSPADM

## 2017-03-06 RX ORDER — KETOROLAC TROMETHAMINE 30 MG/ML
30 INJECTION, SOLUTION INTRAMUSCULAR; INTRAVENOUS EVERY 6 HOURS
Status: COMPLETED | OUTPATIENT
Start: 2017-03-06 | End: 2017-03-07

## 2017-03-06 RX ORDER — DIPHENHYDRAMINE HYDROCHLORIDE 50 MG/ML
12.5 INJECTION INTRAMUSCULAR; INTRAVENOUS
Status: DISCONTINUED | OUTPATIENT
Start: 2017-03-06 | End: 2017-03-06 | Stop reason: HOSPADM

## 2017-03-06 RX ORDER — HYDROMORPHONE HCL 110MG/55ML
PATIENT CONTROLLED ANALGESIA SYRINGE INTRAVENOUS AS NEEDED
Status: DISCONTINUED | OUTPATIENT
Start: 2017-03-06 | End: 2017-03-06 | Stop reason: SURG

## 2017-03-06 RX ORDER — ONDANSETRON 2 MG/ML
4 INJECTION INTRAMUSCULAR; INTRAVENOUS EVERY 6 HOURS PRN
Status: DISCONTINUED | OUTPATIENT
Start: 2017-03-06 | End: 2017-03-08 | Stop reason: HOSPADM

## 2017-03-06 RX ORDER — MIDAZOLAM HYDROCHLORIDE 1 MG/ML
2 INJECTION INTRAMUSCULAR; INTRAVENOUS
Status: DISCONTINUED | OUTPATIENT
Start: 2017-03-06 | End: 2017-03-06 | Stop reason: HOSPADM

## 2017-03-06 RX ORDER — SUCCINYLCHOLINE CHLORIDE 20 MG/ML
INJECTION INTRAMUSCULAR; INTRAVENOUS AS NEEDED
Status: DISCONTINUED | OUTPATIENT
Start: 2017-03-06 | End: 2017-03-06 | Stop reason: SURG

## 2017-03-06 RX ORDER — ASPIRIN 325 MG
325 TABLET, DELAYED RELEASE (ENTERIC COATED) ORAL DAILY
Status: DISCONTINUED | OUTPATIENT
Start: 2017-03-06 | End: 2017-03-07

## 2017-03-06 RX ORDER — DOCUSATE SODIUM 100 MG/1
100 CAPSULE, LIQUID FILLED ORAL 2 TIMES DAILY PRN
Status: DISCONTINUED | OUTPATIENT
Start: 2017-03-06 | End: 2017-03-08 | Stop reason: HOSPADM

## 2017-03-06 RX ORDER — BISACODYL 10 MG
10 SUPPOSITORY, RECTAL RECTAL DAILY PRN
Status: DISCONTINUED | OUTPATIENT
Start: 2017-03-06 | End: 2017-03-08 | Stop reason: HOSPADM

## 2017-03-06 RX ORDER — AMLODIPINE BESYLATE AND BENAZEPRIL HYDROCHLORIDE 10; 20 MG/1; MG/1
1 CAPSULE ORAL EVERY MORNING
Status: DISCONTINUED | OUTPATIENT
Start: 2017-03-07 | End: 2017-03-08 | Stop reason: HOSPADM

## 2017-03-06 RX ORDER — TRANEXAMIC ACID 100 MG/ML
INJECTION, SOLUTION INTRAVENOUS AS NEEDED
Status: DISCONTINUED | OUTPATIENT
Start: 2017-03-06 | End: 2017-03-06 | Stop reason: SURG

## 2017-03-06 RX ORDER — OXYCODONE AND ACETAMINOPHEN 10; 325 MG/1; MG/1
2 TABLET ORAL EVERY 4 HOURS PRN
Status: DISCONTINUED | OUTPATIENT
Start: 2017-03-06 | End: 2017-03-08 | Stop reason: HOSPADM

## 2017-03-06 RX ORDER — CEFAZOLIN SODIUM 2 G/100ML
2 INJECTION, SOLUTION INTRAVENOUS EVERY 8 HOURS
Status: COMPLETED | OUTPATIENT
Start: 2017-03-06 | End: 2017-03-07

## 2017-03-06 RX ORDER — ONDANSETRON 4 MG/1
4 TABLET, ORALLY DISINTEGRATING ORAL EVERY 6 HOURS PRN
Status: DISCONTINUED | OUTPATIENT
Start: 2017-03-06 | End: 2017-03-08 | Stop reason: HOSPADM

## 2017-03-06 RX ORDER — FLUMAZENIL 0.1 MG/ML
0.2 INJECTION INTRAVENOUS AS NEEDED
Status: DISCONTINUED | OUTPATIENT
Start: 2017-03-06 | End: 2017-03-06 | Stop reason: HOSPADM

## 2017-03-06 RX ORDER — OXYCODONE AND ACETAMINOPHEN 7.5; 325 MG/1; MG/1
1 TABLET ORAL EVERY 4 HOURS PRN
Status: DISCONTINUED | OUTPATIENT
Start: 2017-03-06 | End: 2017-03-08 | Stop reason: HOSPADM

## 2017-03-06 RX ORDER — FENTANYL CITRATE 50 UG/ML
INJECTION, SOLUTION INTRAMUSCULAR; INTRAVENOUS AS NEEDED
Status: DISCONTINUED | OUTPATIENT
Start: 2017-03-06 | End: 2017-03-06 | Stop reason: SURG

## 2017-03-06 RX ORDER — NALOXONE HCL 0.4 MG/ML
0.2 VIAL (ML) INJECTION AS NEEDED
Status: DISCONTINUED | OUTPATIENT
Start: 2017-03-06 | End: 2017-03-06 | Stop reason: HOSPADM

## 2017-03-06 RX ORDER — METOPROLOL TARTRATE 50 MG/1
50 TABLET, FILM COATED ORAL 2 TIMES DAILY
Status: DISCONTINUED | OUTPATIENT
Start: 2017-03-06 | End: 2017-03-08 | Stop reason: HOSPADM

## 2017-03-06 RX ORDER — GLIPIZIDE 10 MG/1
10 TABLET, FILM COATED, EXTENDED RELEASE ORAL DAILY
Status: DISCONTINUED | OUTPATIENT
Start: 2017-03-06 | End: 2017-03-08 | Stop reason: HOSPADM

## 2017-03-06 RX ORDER — HYDRALAZINE HYDROCHLORIDE 20 MG/ML
5 INJECTION INTRAMUSCULAR; INTRAVENOUS
Status: DISCONTINUED | OUTPATIENT
Start: 2017-03-06 | End: 2017-03-06 | Stop reason: HOSPADM

## 2017-03-06 RX ORDER — ONDANSETRON 2 MG/ML
4 INJECTION INTRAMUSCULAR; INTRAVENOUS ONCE AS NEEDED
Status: COMPLETED | OUTPATIENT
Start: 2017-03-06 | End: 2017-03-06

## 2017-03-06 RX ORDER — SODIUM CHLORIDE 0.9 % (FLUSH) 0.9 %
1-10 SYRINGE (ML) INJECTION AS NEEDED
Status: DISCONTINUED | OUTPATIENT
Start: 2017-03-06 | End: 2017-03-06 | Stop reason: HOSPADM

## 2017-03-06 RX ORDER — METHYLPREDNISOLONE ACETATE 40 MG/ML
INJECTION, SUSPENSION INTRA-ARTICULAR; INTRALESIONAL; INTRAMUSCULAR; SOFT TISSUE AS NEEDED
Status: DISCONTINUED | OUTPATIENT
Start: 2017-03-06 | End: 2017-03-06 | Stop reason: HOSPADM

## 2017-03-06 RX ORDER — LABETALOL HYDROCHLORIDE 5 MG/ML
5 INJECTION, SOLUTION INTRAVENOUS
Status: DISCONTINUED | OUTPATIENT
Start: 2017-03-06 | End: 2017-03-06 | Stop reason: HOSPADM

## 2017-03-06 RX ORDER — FENOFIBRATE 145 MG/1
145 TABLET, COATED ORAL DAILY
Status: DISCONTINUED | OUTPATIENT
Start: 2017-03-06 | End: 2017-03-08 | Stop reason: HOSPADM

## 2017-03-06 RX ORDER — PROMETHAZINE HYDROCHLORIDE 25 MG/1
25 SUPPOSITORY RECTAL ONCE AS NEEDED
Status: DISCONTINUED | OUTPATIENT
Start: 2017-03-06 | End: 2017-03-06 | Stop reason: HOSPADM

## 2017-03-06 RX ORDER — PROPOFOL 10 MG/ML
VIAL (ML) INTRAVENOUS AS NEEDED
Status: DISCONTINUED | OUTPATIENT
Start: 2017-03-06 | End: 2017-03-06 | Stop reason: SURG

## 2017-03-06 RX ORDER — HYDROCODONE BITARTRATE AND ACETAMINOPHEN 7.5; 325 MG/1; MG/1
1 TABLET ORAL ONCE AS NEEDED
Status: DISCONTINUED | OUTPATIENT
Start: 2017-03-06 | End: 2017-03-06 | Stop reason: HOSPADM

## 2017-03-06 RX ORDER — FAMOTIDINE 10 MG/ML
20 INJECTION, SOLUTION INTRAVENOUS ONCE
Status: COMPLETED | OUTPATIENT
Start: 2017-03-06 | End: 2017-03-06

## 2017-03-06 RX ORDER — DIPHENHYDRAMINE HCL 25 MG
25 CAPSULE ORAL EVERY 6 HOURS PRN
Status: DISCONTINUED | OUTPATIENT
Start: 2017-03-06 | End: 2017-03-08 | Stop reason: HOSPADM

## 2017-03-06 RX ORDER — TRIAMTERENE AND HYDROCHLOROTHIAZIDE 37.5; 25 MG/1; MG/1
1 TABLET ORAL DAILY
Status: DISCONTINUED | OUTPATIENT
Start: 2017-03-06 | End: 2017-03-08 | Stop reason: HOSPADM

## 2017-03-06 RX ORDER — OXYCODONE AND ACETAMINOPHEN 7.5; 325 MG/1; MG/1
2 TABLET ORAL EVERY 4 HOURS PRN
Status: DISCONTINUED | OUTPATIENT
Start: 2017-03-06 | End: 2017-03-08 | Stop reason: HOSPADM

## 2017-03-06 RX ORDER — PROMETHAZINE HYDROCHLORIDE 25 MG/1
12.5 TABLET ORAL ONCE AS NEEDED
Status: DISCONTINUED | OUTPATIENT
Start: 2017-03-06 | End: 2017-03-06 | Stop reason: HOSPADM

## 2017-03-06 RX ORDER — FERROUS SULFATE 325(65) MG
325 TABLET ORAL
Status: DISCONTINUED | OUTPATIENT
Start: 2017-03-06 | End: 2017-03-08 | Stop reason: HOSPADM

## 2017-03-06 RX ADMIN — METFORMIN HYDROCHLORIDE 1000 MG: 1000 TABLET ORAL at 17:58

## 2017-03-06 RX ADMIN — FENTANYL CITRATE 50 MCG: 50 INJECTION, SOLUTION INTRAMUSCULAR; INTRAVENOUS at 07:25

## 2017-03-06 RX ADMIN — OXYCODONE HYDROCHLORIDE AND ACETAMINOPHEN 2 TABLET: 10; 325 TABLET ORAL at 23:04

## 2017-03-06 RX ADMIN — HYDROMORPHONE HYDROCHLORIDE 0.5 MG: 2 INJECTION, SOLUTION INTRAMUSCULAR; INTRAVENOUS; SUBCUTANEOUS at 07:57

## 2017-03-06 RX ADMIN — FENTANYL CITRATE 50 MCG: 50 INJECTION INTRAMUSCULAR; INTRAVENOUS at 11:51

## 2017-03-06 RX ADMIN — HYDROMORPHONE HYDROCHLORIDE 0.5 MG: 2 INJECTION, SOLUTION INTRAMUSCULAR; INTRAVENOUS; SUBCUTANEOUS at 08:37

## 2017-03-06 RX ADMIN — FAMOTIDINE 20 MG: 10 INJECTION, SOLUTION INTRAVENOUS at 06:50

## 2017-03-06 RX ADMIN — METOPROLOL TARTRATE 50 MG: 50 TABLET ORAL at 20:44

## 2017-03-06 RX ADMIN — ACETAMINOPHEN 1000 MG: 500 TABLET ORAL at 06:48

## 2017-03-06 RX ADMIN — SODIUM CHLORIDE, POTASSIUM CHLORIDE, SODIUM LACTATE AND CALCIUM CHLORIDE 9 ML/HR: 600; 310; 30; 20 INJECTION, SOLUTION INTRAVENOUS at 21:21

## 2017-03-06 RX ADMIN — SODIUM CHLORIDE, POTASSIUM CHLORIDE, SODIUM LACTATE AND CALCIUM CHLORIDE: 600; 310; 30; 20 INJECTION, SOLUTION INTRAVENOUS at 06:58

## 2017-03-06 RX ADMIN — VANCOMYCIN HYDROCHLORIDE 2000 MG: 1 INJECTION, POWDER, LYOPHILIZED, FOR SOLUTION INTRAVENOUS at 06:30

## 2017-03-06 RX ADMIN — TRANEXAMIC ACID 1000 MG: 100 INJECTION, SOLUTION INTRAVENOUS at 09:07

## 2017-03-06 RX ADMIN — HYDROMORPHONE HYDROCHLORIDE 0.5 MG: 2 INJECTION, SOLUTION INTRAMUSCULAR; INTRAVENOUS; SUBCUTANEOUS at 08:06

## 2017-03-06 RX ADMIN — OXYCODONE HYDROCHLORIDE AND ACETAMINOPHEN 2 TABLET: 10; 325 TABLET ORAL at 13:58

## 2017-03-06 RX ADMIN — HYDROMORPHONE HYDROCHLORIDE 0.5 MG: 2 INJECTION, SOLUTION INTRAMUSCULAR; INTRAVENOUS; SUBCUTANEOUS at 07:43

## 2017-03-06 RX ADMIN — FENTANYL CITRATE 50 MCG: 50 INJECTION, SOLUTION INTRAMUSCULAR; INTRAVENOUS at 07:32

## 2017-03-06 RX ADMIN — SUCCINYLCHOLINE CHLORIDE 120 MG: 20 INJECTION, SOLUTION INTRAMUSCULAR; INTRAVENOUS; PARENTERAL at 08:02

## 2017-03-06 RX ADMIN — CEFAZOLIN SODIUM 2 G: 2 INJECTION, SOLUTION INTRAVENOUS at 11:56

## 2017-03-06 RX ADMIN — MIDAZOLAM 2 MG: 1 INJECTION INTRAMUSCULAR; INTRAVENOUS at 06:49

## 2017-03-06 RX ADMIN — SITAGLIPTIN 100 MG: 100 TABLET, FILM COATED ORAL at 15:35

## 2017-03-06 RX ADMIN — HYDROMORPHONE HYDROCHLORIDE 1 MG: 1 INJECTION, SOLUTION INTRAMUSCULAR; INTRAVENOUS; SUBCUTANEOUS at 12:20

## 2017-03-06 RX ADMIN — LIDOCAINE HYDROCHLORIDE 60 MG: 20 INJECTION, SOLUTION INFILTRATION; PERINEURAL at 07:06

## 2017-03-06 RX ADMIN — FENTANYL CITRATE 50 MCG: 50 INJECTION, SOLUTION INTRAMUSCULAR; INTRAVENOUS at 07:42

## 2017-03-06 RX ADMIN — FENOFIBRATE 145 MG: 145 TABLET ORAL at 20:45

## 2017-03-06 RX ADMIN — ONDANSETRON 4 MG: 2 INJECTION, SOLUTION INTRAMUSCULAR; INTRAVENOUS at 11:27

## 2017-03-06 RX ADMIN — KETOROLAC TROMETHAMINE 30 MG: 30 INJECTION, SOLUTION INTRAMUSCULAR at 17:58

## 2017-03-06 RX ADMIN — CEFAZOLIN SODIUM 2 G: 2 INJECTION, SOLUTION INTRAVENOUS at 18:24

## 2017-03-06 RX ADMIN — PROPOFOL 300 MG: 10 INJECTION, EMULSION INTRAVENOUS at 07:06

## 2017-03-06 RX ADMIN — FERROUS SULFATE TAB 325 MG (65 MG ELEMENTAL FE) 325 MG: 325 (65 FE) TAB at 15:35

## 2017-03-06 RX ADMIN — DOCUSATE SODIUM 100 MG: 100 CAPSULE, LIQUID FILLED ORAL at 15:35

## 2017-03-06 RX ADMIN — SODIUM CHLORIDE, POTASSIUM CHLORIDE, SODIUM LACTATE AND CALCIUM CHLORIDE 100 ML/HR: 600; 310; 30; 20 INJECTION, SOLUTION INTRAVENOUS at 15:38

## 2017-03-06 RX ADMIN — GLIPIZIDE 10 MG: 10 TABLET, FILM COATED, EXTENDED RELEASE ORAL at 15:35

## 2017-03-06 RX ADMIN — OXYCODONE HYDROCHLORIDE AND ACETAMINOPHEN 2 TABLET: 10; 325 TABLET ORAL at 17:57

## 2017-03-06 RX ADMIN — HYDROMORPHONE HYDROCHLORIDE 1 MG: 1 INJECTION, SOLUTION INTRAMUSCULAR; INTRAVENOUS; SUBCUTANEOUS at 20:49

## 2017-03-06 RX ADMIN — MIDAZOLAM HYDROCHLORIDE 2 MG: 1 INJECTION, SOLUTION INTRAMUSCULAR; INTRAVENOUS at 07:00

## 2017-03-06 RX ADMIN — KETOROLAC TROMETHAMINE 30 MG: 30 INJECTION, SOLUTION INTRAMUSCULAR at 11:07

## 2017-03-06 RX ADMIN — ASPIRIN 325 MG: 325 TABLET, DELAYED RELEASE ORAL at 15:35

## 2017-03-06 RX ADMIN — FENTANYL CITRATE 50 MCG: 50 INJECTION, SOLUTION INTRAMUSCULAR; INTRAVENOUS at 07:27

## 2017-03-06 RX ADMIN — FENTANYL CITRATE 50 MCG: 50 INJECTION, SOLUTION INTRAMUSCULAR; INTRAVENOUS at 07:06

## 2017-03-06 RX ADMIN — FENTANYL CITRATE 50 MCG: 50 INJECTION INTRAMUSCULAR; INTRAVENOUS at 11:05

## 2017-03-06 RX ADMIN — KETOROLAC TROMETHAMINE 30 MG: 30 INJECTION, SOLUTION INTRAMUSCULAR at 23:06

## 2017-03-06 RX ADMIN — SODIUM CHLORIDE, POTASSIUM CHLORIDE, SODIUM LACTATE AND CALCIUM CHLORIDE: 600; 310; 30; 20 INJECTION, SOLUTION INTRAVENOUS at 08:37

## 2017-03-06 RX ADMIN — ONDANSETRON 4 MG: 2 INJECTION INTRAMUSCULAR; INTRAVENOUS at 09:02

## 2017-03-06 RX ADMIN — PROPOFOL 100 MG: 10 INJECTION, EMULSION INTRAVENOUS at 08:01

## 2017-03-06 NOTE — OP NOTE
Date of Procedure:  03/06/2017     PREOPERATIVE DIAGNOSIS: Failed cementless right total knee replacement due to the lack of ingrowth in both the femoral and tibial implant complicated by arthrofibrosis, severe.     POSTOPERATIVE DIAGNOSIS: Failed cementless right total knee replacement due to the lack of ingrowth in both the femoral and tibial implant complicated by arthrofibrosis, severe.     ANESTHESIA: General supplemented by periarticular Exparel/bupivacaine injection.     SURGEON: Anthony Andino MD     ASSISTANT: Stella Larose PA-C     NOTE: As part of the surgical procedure I utilized the services of an assistant surgeon; specifically, Stella Larose PA-C. The assistant surgeon participated in crucial portions of the operation. The use of the assistant surgeon greatly reduced overall operative time, thus significantly reducing overall morbidity for the patient.     PROCEDURE PERFORMED: Revision right knee replacement.     IMPLANT: Biomet 65 mm open box cemented cruciate-sacrificing femoral implant. I used a 79 mm modular tibial tray with a 40 stem. We used a 14 mm standard PS polyethylene tibial insert.     NOTE: As part of the surgical procedure I did inject the left osteoarthritic knee with 40 mg Depo-Medrol and 0.25% Xylocaine.     ESTIMATED BLOOD LOSS: 40 mL.     NOTE: We gave 1 g of IV tranexamic acid with implantation of the implants.     COMPLICATION: None.     QUALITY MEASURES: I have documented the patient's current medications including dosage, frequency, and route of administration in the medical record today. Conservative alternatives were discussed with the patient as part of the decision-making process prior to the procedure. The patient was evaluated immediately preoperatively for cardiovascular and thromboembolic risk factors. This patient has had a calf thrombus in the past. Prophylactic IV antibiotics were administered before the tourniquet went up.     DESCRIPTION OF  PROCEDURE: I prepped and draped the left knee and I injected it with 40 mg of Depo-Medrol and 2 mL of 0.25% Xylocaine.     We prepped and draped the right knee. I utilized the previous skin incision, making a midline longitudinal anterior approach. Medial parapatellar arthrotomy was extended to the vastus medialis obliquus. Fluid present in the knee was cleared and this was cultured, though there was no sign of infection. He had dense arthrofibrosis. We began the case by excising excessive scar tissue in the suprapatellar pouch and thinning out the medial and lateral capsule. Care was taken to protect the peroneal nerve and the popliteal neurovascular structures. Once this was accomplished I removed the polyethylene tibial insert. I then flexed the knee. Prior to surgery he only had about 60° of flexion. We had to progressively work on removing and resecting scar tissue to be able to flex him enough to throw the femur into relief. Adhesions on the rectus femoris to the femoral shaft were released. Ultimately after I was able to get the knee to 90° I demonstrated the femoral component was loose. For the most part I just worked this loose, removing fibrous tissue with an osteotome under the implant, but really it was about exposure in terms of getting the femoral implant out. I was able to pat the implant out and then examined the remaining bony surfaces. We had excellent remaining bony surfaces and did not remove any bone with explantation of the femoral component.     Attention was turned to the tibia. We quickly learned the tibia had not ingrown either. The challenge was flexing the knee enough to be able to get the tibia out without damaging the femur. With further resection of the scar tissue and mobilization of the knee, ultimately I was able to get underneath the tibial implant and tap it out without damaging the femur.     Attention was turned back to the femur. We sized the implant to 65 mm. I freshened the  distal cut about 3 mm. Anterior, posterior, and chamfer cuts were made. I took care to anteriorize the implant enough to increase the flexion space. I had great bony surfaces throughout and we did not necessarily need a stem. I then freshened the tibial cut about 3-4 mm, getting down to good healthy bone, over an intramedullary david cut in 5° posterior slope. I removed scar tissue in the back of the knee, again protecting the peroneal nerve and posterior neurovascular structures. I did a trial reduction at this point and we were still very tight in flexion and had excessive rollback. I elected to resect the intercondylar notch and the PCL. The flexion and extension space balance was much better now.     The bony surface was thoroughly cleaned and dried. I injected the posterior capsule and mediolateral gutter with an Exparel solution containing 20 mL of Exparel, 25 mL of 0.25% bupivacaine with epinephrine, and 20 mL of saline. Care again was taken to protect the popliteal neurovascular structures and the peroneal nerve. I cemented implants in stages. With my first stage of cement, I cemented in the 79 modular tibial baseplate with a 40 stem. After this cement hardened I cemented the 65 open box PS femoral component. I did put the lugs on the femoral component as these matched up with the old lug holes. The patellar component, which was fine, was examined. I had to do a lateral release of the patella to get it tracked properly. Trial reduction revealed a 14 mm standard PS polyethylene insert with best balance, stability, and range of motion, and this was locked in the tibial tray.     The tourniquet was released. Hemostasis was obtained. The wound was closed in layers with #1 Vicryl in the capsule, 2-0 Vicryl in the subcutaneous tissue, and staples in the skin over a 1/8-inch drain. Note that I injected my capsule with my Exparel solution during closure.       Anthony Andino M.D.  FERN:ab  D:   03/06/2017  15:03:05  T:   03/06/2017 16:22:42  Job ID:   [id number]  Document ID:   [id number]  cc:

## 2017-03-06 NOTE — ANESTHESIA PROCEDURE NOTES
Airway  Urgency: elective    Airway not difficult    General Information and Staff    Patient location during procedure: OR  Anesthesiologist: PAM MAHER  CRNA: ROSINA PULIDO    Indications and Patient Condition  Indications for airway management: airway protection    Preoxygenated: yes  MILS maintained throughout  Mask difficulty assessment: 0 - not attempted    Final Airway Details  Final airway type: endotracheal airway      Successful airway: ETT  Cuffed: yes   Successful intubation technique: direct laryngoscopy  Facilitating devices/methods: intubating stylet  Endotracheal tube insertion site: oral  Blade: Guzman  Blade size: #2  ETT size: 8.0 mm  Cormack-Lehane Classification: grade I - full view of glottis  Placement verified by: chest auscultation and capnometry   Cuff volume (mL): 8  Measured from: lips  ETT to lips (cm): 24  Number of attempts at approach: 1    Additional Comments  Pt preoxygenated, SIVI, bag mask vent, ATETI, dentition as before

## 2017-03-06 NOTE — ANESTHESIA PREPROCEDURE EVALUATION
Anesthesia Evaluation     Patient summary reviewed and Nursing notes reviewed      Airway   Mallampati: II  Neck ROM: limited  no difficulty expected  Dental      Pulmonary - negative pulmonary ROS   Cardiovascular - negative cardio ROS    ECG reviewed  Rhythm: regular  Rate: normal        Neuro/Psych- negative ROS  GI/Hepatic/Renal/Endo - negative ROS     Musculoskeletal (-) negative ROS    Abdominal    Substance History - negative use     OB/GYN negative ob/gyn ROS         Other                                    Anesthesia Plan    ASA 2     general     intravenous induction   Anesthetic plan and risks discussed with patient.

## 2017-03-06 NOTE — PLAN OF CARE
Problem: Patient Care Overview (Adult)  Goal: Plan of Care Review    03/06/17 1444   Outcome Evaluation   Outcome Summary/Follow up Plan Pt would benefit fromPT to address strengthening, ROM and gait training.         Problem: Inpatient Physical Therapy  Goal: Bed Mobility Goal LTG- PT    03/06/17 1444   Bed Mobility PT LTG   Bed Mobility PT LTG, Date Established 03/06/17   Bed Mobility PT LTG, Time to Achieve 4 days   Bed Mobility PT LTG, Activity Type all bed mobility   Bed Mobility PT LTG, [place] Level independent       Goal: Transfer Training Goal 1 LTG- PT    03/06/17 1444   Transfer Training PT LTG   Transfer Training PT LTG, Date Established 03/06/17   Transfer Training PT LTG, Time to Achieve 4 days   Transfer Training PT LTG, Activity Type all transfers   Transfer Training PT LTG, [place] Level supervision required   Transfer Training PT LTG, Assist Device walker, rolling       Goal: Gait Training Goal LTG- PT    03/06/17 1444   Gait Training PT LTG   Gait Training Goal PT LTG, Date Established 03/06/17   Gait Training Goal PT LTG, Time to Achieve 4 days   Gait Training Goal PT LTG, [place] Level supervision required   Gait Training Goal PT LTG, Assist Device walker, rolling   Gait Training Goal PT LTG, Distance to Achieve 150 ft       Goal: Stair Training Goal LTG- PT    03/06/17 1444   Stair Training PT LTG   Stair Training Goal PT LTG, Date Established 03/06/17   Stair Training Goal PT LTG, Time to Achieve 4 days   Stair Training Goal PT LTG, Number of Steps 3   Stair Training Goal PT LTG, [place] Level contact guard assist   Stair Training Goal PT LTG, Assist Device 1 handrail       Goal: Range of Motion Goal LTG- PT    03/06/17 1444   Range of Motion PT LTG   Range of Motion Goal PT LTG, Date Established 03/06/17   Range of Motion Goal PT LTG, Time to Achieve 4 days   Range fo Motion Goal PT LTG, Joint R knee   Range of Motion Goal PT LTG, AROM Measure 5-85       Goal: Patient  Education Goal LTG- PT    03/06/17 1444   Patient Education PT LTG   Patient Education PT LTG, Date Established 03/06/17   Patient Education PT LTG, Time to Achieve 4 days   Patient Education PT LTG, Education Type HEP   Patient Education PT LTG, Education Understanding demonstrate adequately

## 2017-03-06 NOTE — ANESTHESIA PROCEDURE NOTES
Airway  Urgency: elective    Airway not difficult    General Information and Staff    Patient location during procedure: OR  Anesthesiologist: PAM MAHER  CRNA: ROSINA PULIDO    Indications and Patient Condition  Indications for airway management: airway protection    Preoxygenated: yes  MILS maintained throughout  Mask difficulty assessment: 1 - vent by mask    Final Airway Details  Final airway type: supraglottic airway      Successful airway: classic  Size 5    Number of attempts at approach: 1    Additional Comments  Pt preoxygenated, sivi, LMA placed with adequate seal and TV

## 2017-03-06 NOTE — PLAN OF CARE
Problem: Patient Care Overview (Adult)  Goal: Plan of Care Review  Outcome: Outcome(s) achieved Date Met:  03/06/17 03/06/17 0655   Coping/Psychosocial Response Interventions   Plan Of Care Reviewed With patient   Patient Care Overview   Progress no change       Goal: Adult Individualization and Mutuality  Outcome: Ongoing (interventions implemented as appropriate)    03/06/17 0655   Individualization   Patient Specific Preferences PT GOES BY [facility]       Goal: Discharge Needs Assessment  Outcome: Ongoing (interventions implemented as appropriate)    03/06/17 0655   Self-Care   Equipment Currently Used at Home cane, straight         Problem: Perioperative Period (Adult)  Goal: Signs and Symptoms of Listed Potential Problems Will be Absent or Manageable (Perioperative Period)  Outcome: Ongoing (interventions implemented as appropriate)    03/06/17 0655   Perioperative Period   Problems Assessed (Perioperative Period) pain;infection   Problems Present (Perioperative Period) pain

## 2017-03-06 NOTE — ANESTHESIA POSTPROCEDURE EVALUATION
Patient: Anthony Sol Jr.    Procedure Summary     Date Anesthesia Start Anesthesia Stop Room / Location    03/06/17 0658 1009 [facility] OR [facility] / [facility]       Procedure Diagnosis Surgeon Provider    RIGHT TOTAL KNEE ARTHROPLASTY REVISION WITH LEFT KNEE STERIOID INJECTION (Right Knee) No diagnosis on file. MD Joe Park MD          Anesthesia Type: general  Last vitals  /84 (03/06/17 1130)    Temp 37 °C (98.6 °F) (03/06/17 1130)    Pulse 76 (03/06/17 1130)   Resp 14 (03/06/17 1130)    SpO2 95 % (03/06/17 1130)      Post Anesthesia Care and Evaluation    Patient location during evaluation: PACU  Patient participation: complete - patient participated  Level of consciousness: awake and alert  Pain management: adequate  Airway patency: patent  Anesthetic complications: No anesthetic complications    Cardiovascular status: acceptable  Respiratory status: acceptable  Hydration status: acceptable

## 2017-03-06 NOTE — PROGRESS NOTES
Acute Care - Physical Therapy Initial Evaluation  [facility]     Patient Name: Anthony Sol Jr.  : 1970  MRN: [id number]  Today's Date: 3/6/2017                Admit Date: 3/6/2017     Visit Dx:    ICD-10-CM ICD-9-CM   1. Difficulty walking R26.2 719.7   2. Painful total knee replacement T84.84XA 996.77    Z96.659 V43.65     Patient Active Problem List   Diagnosis   • Type 2 diabetes mellitus, uncontrolled   • Hyperlipidemia   • Essential hypertension   • DJD (degenerative joint disease) of knee   • Type 2 diabetes mellitus with hyperglycemia   • Hyponatremia   • Acute kidney injury   • Painful total knee replacement     Past Medical History   Diagnosis Date   • Adhesive capsulitis of right knee    • Arthritis    • Diabetes mellitus    • Diverticulosis    • GERD (gastroesophageal reflux disease)    • H/O blood clots      rt calf  following rt total knee surgery 2016 was on xarelto now off on aspirin only   • Heart murmur    • History of hepatitis      AS A CHILD food related   • Hyperlipidemia    • Hypertension    • Knee pain, right    • Low back pain      Past Surgical History   Procedure Laterality Date   • Back surgery     • Tonsillectomy     • Adenoidectomy     • Knee arthroscopy Right      X3   • Pr total knee arthroplasty Right 12/15/2016     Procedure: RT TOTAL KNEE ARTHROPLASTY;  Surgeon: Anthony Andino MD;  Location: [facility];  Service: Orthopedics   • Joint manipulation Right 2017     Procedure: MANIPULATION OF RT KNEE;  Surgeon: Anthony Andino MD;  Location: [facility];  Service:           PT ASSESSMENT (last 72 hours)      PT Evaluation       17 1438 17 1248    Rehab Evaluation    Document Type evaluation  -KH     Subjective Information agree to therapy;no complaints  -KH     Patient Effort, Rehab Treatment excellent  -KH     Symptoms Noted During/After Treatment none  -KH     General Information    General Observations sitting EOB  -KH      Precautions/Limitations fall precautions  -     Prior Level of Function independent:  -KH     Equipment Currently Used at Home  cane, straight  -KS    Plans/Goals Discussed With patient and family  -     Clinical Impression    Patient/Family Goals Statement return  home  -     Criteria for Skilled Therapeutic Interventions Met yes;treatment indicated  -     Impairments Found (describe specific impairments) gait, locomotion, and balance;ROM  -     Rehab Potential good, to achieve stated therapy goals  -     Cognitive Assessment/Intervention    Current Cognitive/Communication Assessment functional  -     Orientation Status oriented x 4  -     Follows Commands/Answers Questions 100% of the time  -     Personal Safety WNL/WFL  -     Personal Safety Interventions fall prevention program maintained;gait belt  -     ROM (Range of Motion)    General ROM Detail [facility] except R knee 15-65  -     MMT (Manual Muscle Testing)    General MMT Assessment Detail WF  -     Bed Mobility, Assessment/Treatment    Bed Mob, Supine to Sit, [place] not tested  -     Bed Mobility, Comment sitting EOB  -     Transfer Assessment/Treatment    Transfers, Sit-Stand [place] contact guard assist;2 person assist required  -     Transfers, Stand-Sit [place] contact guard assist;2 person assist required  -     Transfers, Sit-Stand-Sit, Assist Device rolling walker  -     Gait Assessment/Treatment    Gait, [place] Level contact guard assist;2 person assist required  -     Gait, Assistive Device rolling walker  -     Gait, Distance (Feet) 40  -     Gait, Gait Deviations sunil decreased;step length decreased  -     Gait, Safety Issues step length decreased  -     Therapy Exercises    Exercise Protocols total knee  -     Total Knee Exercises right:;10 reps;completed protocol  -     Positioning and Restraints    Pre-Treatment Position in bed  -     Post Treatment Position chair  -      In Chair reclined;call light within reach;encouraged to call for assist;with family/caregiver;notified nsg  -       03/06/17 0655 03/06/17 0633    General Information    Equipment Currently Used at Home cane, straight  -TG none;cane, straight  -TG    Living Environment    Lives With  child(yari), dependent;spouse  -TG    Living Arrangements  house  -TG    Home Accessibility  stairs to enter home  -TG    Number of Stairs to Enter Home  3  -TG    Stair Railings at Home  outside, present on right side  -TG    Type of Financial/Environmental Concern  none  -TG    Transportation Available  car  -TG      User Key  (r) = Recorded By, (t) = Taken By, (c) = Cosigned By    Initials Name Provider Type     Elvia Morales, PT Physical Therapist    TG Kendall Ji, RN Registered Nurse    MACKENZIE Silva RN Registered Nurse          Physical Therapy Education     Title: PT OT SLP Therapies (Done)     Topic: Physical Therapy (Done)     Point: Mobility training (Done)    Learning Progress Summary    Learner Readiness Method Response Comment Documented by Status   Patient Acceptance E [facility] 03/06/17 1443 Done               Point: Home exercise program (Done)    Learning Progress Summary    Learner Readiness Method Response Comment Documented by Status   Patient Acceptance E [facility] 03/06/17 1443 Done                      User Key     Initials Effective Dates Name Provider Type Discipline     05/18/15 -  Elvia Morales, PT Physical Therapist PT                PT Recommendation and Plan  Planned Therapy Interventions: bed mobility training, gait training, home exercise program, patient/family education, ROM (Range of Motion), transfer training, strengthening, stair training  PT Frequency: 2 times/day  Plan of Care Review  Outcome Summary/Follow up Plan: Pt would benefit fromPT to address strengthening, ROM and gait training.          IP PT Goals       03/06/17 1444          Bed Mobility PT LTG    Bed  Mobility PT LTG, Date Established 03/06/17  -KH      Bed Mobility PT LTG, Time to Achieve 4 days  -KH      Bed Mobility PT LTG, Activity Type all bed mobility  -KH      Bed Mobility PT LTG, [place] Level independent  -KH      Transfer Training PT LTG    Transfer Training PT LTG, Date Established 03/06/17  -KH      Transfer Training PT LTG, Time to Achieve 4 days  -KH      Transfer Training PT LTG, Activity Type all transfers  -KH      Transfer Training PT LTG, [place] Level supervision required  -KH      Transfer Training PT LTG, Assist Device walker, rolling  -KH      Gait Training PT LTG    Gait Training Goal PT LTG, Date Established 03/06/17  -KH      Gait Training Goal PT LTG, Time to Achieve 4 days  -KH      Gait Training Goal PT LTG, [place] Level supervision required  -KH      Gait Training Goal PT LTG, Assist Device walker, rolling  -KH      Gait Training Goal PT LTG, Distance to Achieve 150 ft  -KH      Stair Training PT LTG    Stair Training Goal PT LTG, Date Established 03/06/17  -KH      Stair Training Goal PT LTG, Time to Achieve 4 days  -KH      Stair Training Goal PT LTG, Number of Steps 3  -KH      Stair Training Goal PT LTG, [place] Level contact guard assist  -KH      Stair Training Goal PT LTG, Assist Device 1 handrail  -KH      Range of Motion PT LTG    Range of Motion Goal PT LTG, Date Established 03/06/17  -KH      Range of Motion Goal PT LTG, Time to Achieve 4 days  -KH      Range fo Motion Goal PT LTG, Joint R knee  -KH      Range of Motion Goal PT LTG, AROM Measure 5-85  -KH      Patient Education PT LTG    Patient Education PT LTG, Date Established 03/06/17  -KH      Patient Education PT LTG, Time to Achieve 4 days  -KH      Patient Education PT LTG, Education Type HEP  -KH      Patient Education PT LTG, Education Understanding demonstrate adequately  -KH        User Key  (r) = Recorded By, (t) = Taken By, (c) = Cosigned By    Initials Name Provider Type    ABHISHEK Gan  Angélica Morales, PT Physical Therapist                Outcome Measures       03/06/17 1445          How much help from another person do you currently need...    Turning from your back to your side while in flat bed without using bedrails? 3  -KH      Moving from lying on back to sitting on the side of a flat bed without bedrails? 3  -KH      Moving to and from a bed to a chair (including a wheelchair)? 3  -KH      Standing up from a chair using your arms (e.g., wheelchair, bedside chair)? 3  -KH      Climbing 3-5 steps with a railing? 2  -KH      To walk in hospital room? 3  -KH      AM-PAC 6 Clicks Score 17  -KH      Functional Assessment    Outcome Measure Options AM-PAC 6 Clicks Basic Mobility (PT)  -ABHISHEK        User Key  (r) = Recorded By, (t) = Taken By, (c) = Cosigned By    Initials Name Provider Type    ABHISHEK Morales, PT Physical Therapist           Time Calculation:         PT Charges       03/06/17 1446          Time Calculation    Start Time 1435  -KH      Stop Time 1455  -KH      Time Calculation (min) 20 min  -KH      PT Received On 03/06/17  -ABHISHEK      PT - Next Appointment 03/07/17  -ABHISHEK      PT Goal Re-Cert Due Date 03/10/17  -ABHISHEK        User Key  (r) = Recorded By, (t) = Taken By, (c) = Cosigned By    Initials Name Provider Type    ABHISHEK Morales, PT Physical Therapist          Therapy Charges for Today     Code Description Service Date Service Provider Modifiers Qty    47896064592 HC PT EVAL MOD COMPLEXITY 2 3/6/2017 Elvia Morales, PT GP 1    64668269005 HC PT THER PROC EA 15 MIN 3/6/2017 Elvia Morales, PT GP 1          PT G-Codes  Outcome Measure Options: AM-PAC 6 Clicks Basic Mobility (PT)      Elvia Morales, PT  3/6/2017

## 2017-03-07 ENCOUNTER — APPOINTMENT (OUTPATIENT)
Dept: CT IMAGING | Facility: HOSPITAL | Age: 47
End: 2017-03-07
Attending: ORTHOPAEDIC SURGERY

## 2017-03-07 LAB
ANION GAP SERPL CALCULATED.3IONS-SCNC: 11.9 MMOL/L
BUN BLD-MCNC: 12 MG/DL (ref 6–20)
BUN/CREAT SERPL: 11.4 (ref 7–25)
CALCIUM SPEC-SCNC: 8.4 MG/DL (ref 8.6–10.5)
CHLORIDE SERPL-SCNC: 102 MMOL/L (ref 98–107)
CO2 SERPL-SCNC: 27.1 MMOL/L (ref 22–29)
CREAT BLD-MCNC: 1.05 MG/DL (ref 0.76–1.27)
GFR SERPL CREATININE-BSD FRML MDRD: 75 ML/MIN/1.73
GLUCOSE BLD-MCNC: 131 MG/DL (ref 65–99)
GLUCOSE BLDC GLUCOMTR-MCNC: 102 MG/DL (ref 70–130)
GLUCOSE BLDC GLUCOMTR-MCNC: 124 MG/DL (ref 70–130)
GLUCOSE BLDC GLUCOMTR-MCNC: 128 MG/DL (ref 70–130)
GLUCOSE BLDC GLUCOMTR-MCNC: 142 MG/DL (ref 70–130)
HCT VFR BLD AUTO: 27.5 % (ref 40.4–52.2)
HGB BLD-MCNC: 8.6 G/DL (ref 13.7–17.6)
POTASSIUM BLD-SCNC: 3.5 MMOL/L (ref 3.5–5.2)
SODIUM BLD-SCNC: 141 MMOL/L (ref 136–145)

## 2017-03-07 PROCEDURE — 71275 CT ANGIOGRAPHY CHEST: CPT

## 2017-03-07 PROCEDURE — 85018 HEMOGLOBIN: CPT | Performed by: ORTHOPAEDIC SURGERY

## 2017-03-07 PROCEDURE — 97150 GROUP THERAPEUTIC PROCEDURES: CPT

## 2017-03-07 PROCEDURE — 25010000003 CEFAZOLIN IN DEXTROSE 2-4 GM/100ML-% SOLUTION: Performed by: ORTHOPAEDIC SURGERY

## 2017-03-07 PROCEDURE — 25010000002 KETOROLAC TROMETHAMINE PER 15 MG: Performed by: ORTHOPAEDIC SURGERY

## 2017-03-07 PROCEDURE — 0 IOPAMIDOL PER 1 ML: Performed by: ORTHOPAEDIC SURGERY

## 2017-03-07 PROCEDURE — 82962 GLUCOSE BLOOD TEST: CPT

## 2017-03-07 PROCEDURE — 97110 THERAPEUTIC EXERCISES: CPT

## 2017-03-07 PROCEDURE — 80048 BASIC METABOLIC PNL TOTAL CA: CPT | Performed by: ORTHOPAEDIC SURGERY

## 2017-03-07 PROCEDURE — 93005 ELECTROCARDIOGRAM TRACING: CPT | Performed by: ORTHOPAEDIC SURGERY

## 2017-03-07 PROCEDURE — 94799 UNLISTED PULMONARY SVC/PX: CPT

## 2017-03-07 PROCEDURE — 85014 HEMATOCRIT: CPT | Performed by: ORTHOPAEDIC SURGERY

## 2017-03-07 PROCEDURE — 93010 ELECTROCARDIOGRAM REPORT: CPT | Performed by: INTERNAL MEDICINE

## 2017-03-07 RX ADMIN — OXYCODONE HYDROCHLORIDE AND ACETAMINOPHEN 2 TABLET: 10; 325 TABLET ORAL at 18:52

## 2017-03-07 RX ADMIN — IOPAMIDOL 95 ML: 755 INJECTION, SOLUTION INTRAVENOUS at 12:26

## 2017-03-07 RX ADMIN — KETOROLAC TROMETHAMINE 30 MG: 30 INJECTION, SOLUTION INTRAMUSCULAR at 06:14

## 2017-03-07 RX ADMIN — OXYCODONE HYDROCHLORIDE AND ACETAMINOPHEN 2 TABLET: 10; 325 TABLET ORAL at 07:02

## 2017-03-07 RX ADMIN — FENOFIBRATE 145 MG: 145 TABLET ORAL at 07:57

## 2017-03-07 RX ADMIN — METOPROLOL TARTRATE 50 MG: 50 TABLET ORAL at 07:57

## 2017-03-07 RX ADMIN — AMLODIPINE AND BENAZEPRIL HYDROCHLORIDE 1 CAPSULE: 10; 20 CAPSULE ORAL at 07:57

## 2017-03-07 RX ADMIN — SITAGLIPTIN 100 MG: 100 TABLET, FILM COATED ORAL at 07:57

## 2017-03-07 RX ADMIN — FERROUS SULFATE TAB 325 MG (65 MG ELEMENTAL FE) 325 MG: 325 (65 FE) TAB at 07:57

## 2017-03-07 RX ADMIN — TRIAMTERENE AND HYDROCHLOROTHIAZIDE 1 TABLET: 37.5; 25 TABLET ORAL at 07:57

## 2017-03-07 RX ADMIN — PANTOPRAZOLE SODIUM 40 MG: 40 TABLET, DELAYED RELEASE ORAL at 06:11

## 2017-03-07 RX ADMIN — RIVAROXABAN 15 MG: 15 TABLET, FILM COATED ORAL at 14:59

## 2017-03-07 RX ADMIN — OXYCODONE HYDROCHLORIDE AND ACETAMINOPHEN 2 TABLET: 10; 325 TABLET ORAL at 03:05

## 2017-03-07 RX ADMIN — OXYCODONE HYDROCHLORIDE AND ACETAMINOPHEN 2 TABLET: 10; 325 TABLET ORAL at 10:45

## 2017-03-07 RX ADMIN — METFORMIN HYDROCHLORIDE 1000 MG: 1000 TABLET ORAL at 07:57

## 2017-03-07 RX ADMIN — OXYCODONE HYDROCHLORIDE AND ACETAMINOPHEN 2 TABLET: 10; 325 TABLET ORAL at 14:59

## 2017-03-07 RX ADMIN — OXYCODONE HYDROCHLORIDE AND ACETAMINOPHEN 2 TABLET: 10; 325 TABLET ORAL at 23:00

## 2017-03-07 RX ADMIN — METOPROLOL TARTRATE 50 MG: 50 TABLET ORAL at 17:19

## 2017-03-07 RX ADMIN — GLIPIZIDE 10 MG: 10 TABLET, FILM COATED, EXTENDED RELEASE ORAL at 07:57

## 2017-03-07 RX ADMIN — CEFAZOLIN SODIUM 2 G: 2 INJECTION, SOLUTION INTRAVENOUS at 03:05

## 2017-03-07 NOTE — PROGRESS NOTES
"Visit Vitals   • /64 (BP Location: Left arm, Patient Position: Lying)   • Pulse 81   • Temp 97 °F (36.1 °C) (Oral)   • Resp 16   • Ht 72\" (182.9 cm)   • Wt 261 lb (118 kg)   • SpO2 97%   • BMI 35.4 kg/m2       Lab Results (last 24 hours)     Procedure Component Value Units Date/Time    Anaerobic Culture [[id number]] Collected:  03/06/17 0735    Specimen:  Wound from Knee, Right Updated:  03/06/17 0753    Wound Culture [[id number]] Collected:  03/06/17 0735    Specimen:  Wound from Knee, Right Updated:  03/06/17 1125     Gram Stain Result Rare (1+) WBCs seen       No organisms seen     POC Glucose Fingerstick [[id number]]  (Normal) Collected:  03/06/17 1248    Specimen:  Blood Updated:  03/06/17 1250     Glucose 127 mg/dL     Narrative:       Meter: [id number] : [id number] [employer]    POC Glucose Fingerstick [[id number]]  (Normal) Collected:  03/06/17 1639    Specimen:  Blood Updated:  03/06/17 1645     Glucose 107 mg/dL     Narrative:       Meter: [id number] : [id number] Darudar Zeynep    POC Glucose Fingerstick [[id number]]  (Abnormal) Collected:  03/06/17 2137    Specimen:  Blood Updated:  03/06/17 2139     Glucose 153 (H) mg/dL     Narrative:       Meter: [id number] : [id number] Gabriela Bennett    Basic Metabolic Panel [[id number]]  (Abnormal) Collected:  03/07/17 0421    Specimen:  Blood Updated:  03/07/17 0511     Glucose 131 (H) mg/dL      BUN 12 mg/dL      Creatinine 1.05 mg/dL      Sodium 141 mmol/L      Potassium 3.5 mmol/L      Chloride 102 mmol/L      CO2 27.1 mmol/L      Calcium 8.4 (L) mg/dL      eGFR Non African Amer 75 mL/min/1.73      BUN/Creatinine Ratio 11.4      Anion Gap 11.9 mmol/L     Narrative:       GFR Normal >60  Chronic Kidney Disease <60  Kidney Failure <15    Hemoglobin & Hematocrit, Blood [[id number]]  (Abnormal) Collected:  03/07/17 0421    Specimen:  Blood Updated:  03/07/17 0523     Hemoglobin 8.6 (L) g/dL      Hematocrit 27.5 (L) %     POC Glucose Fingerstick " [[id number]]  (Normal) Collected:  03/07/17 0610    Specimen:  Blood Updated:  03/07/17 0612     Glucose 128 mg/dL     Narrative:       Meter: [id number] : [id number] Bennett          Imaging Results (last 24 hours)     Procedure Component Value Units Date/Time    XR Knee 1 or 2 View Right [[id number]] Collected:  03/06/17 1041     Updated:  03/06/17 1045    Narrative:       XR KNEE 1 OR 2 VW RIGHT-     POSTOP PORTABLE 2 VIEWS RIGHT KNEE     CLINICAL INFORMATION: Post arthroplasty     FINDINGS: Prosthesis is satisfactory in position. A complicating process  is not identified.     This report was finalized on 3/6/2017 10:42 AM by Dr. Trey Reyes MD.             Patient Care Team:  Myranda Levine MD as PCP - General  Richard Mccloud MD as Consulting Physician (Urology)    SUBJECTIVE  Fairly comfortable  PHYSICAL EXAM   NV intact  Active Problems:    Painful total knee replacement  acute blood loss anemia on top of chronic anemia (pre op Hb 11)    PLAN / DISPOSITION:  Xarelto started today (nistory of calf vein clot)  HH discharge probably Thur  Watch Hb  Anthony Andino MD  03/07/17  7:04 AM

## 2017-03-07 NOTE — PLAN OF CARE
Problem: Patient Care Overview (Adult)  Goal: Plan of Care Review    03/07/17 1137   Coping/Psychosocial Response Interventions   Plan Of Care Reviewed With patient   Patient Care Overview   Progress improving   Outcome Evaluation   Outcome Summary/Follow up Plan Improved tolerance to functional activity this AM with an increase in gait distance, progression of ther. ex. protocol, and decreased assist required for overall functional mobility.

## 2017-03-07 NOTE — PROGRESS NOTES
Acute Care - Physical Therapy Treatment Note  [facility]     Patient Name: Anthony Sol Jr.  : 1970  MRN: [id number]  Today's Date: 3/7/2017             Admit Date: 3/6/2017    Visit Dx:    ICD-10-CM ICD-9-CM   1. Difficulty walking R26.2 719.7   2. Painful total knee replacement T84.84XA 996.77    Z96.659 V43.65     Patient Active Problem List   Diagnosis   • Type 2 diabetes mellitus, uncontrolled   • Hyperlipidemia   • Essential hypertension   • DJD (degenerative joint disease) of knee   • Type 2 diabetes mellitus with hyperglycemia   • Hyponatremia   • Acute kidney injury   • Painful total knee replacement               Adult Rehabilitation Note       17 1135          Rehab Assessment/Intervention    Discipline physical therapist  -MS      Document Type therapy note (daily note)  -MS      Subjective Information agree to therapy;complains of;fatigue;pain  -MS      Patient Effort, Rehab Treatment good  -MS      Symptoms Noted During/After Treatment fatigue;increased pain  -MS      Precautions/Limitations fall precautions  -MS      Recorded by [MS] Ayan Stark, PT      Pain Assessment    Pain Assessment 0-10  -MS      Pain Score 6  -MS      Post Pain Score 6  -MS      Pain Type Acute pain;Surgical pain  -MS      Pain Location Knee  -MS      Pain Orientation Right  -MS      Pain Intervention(s) Medication (See MAR);Cold applied;Repositioned;Elevated;Rest  -MS      Recorded by [MS] Ayan Stark, PT      Cognitive Assessment/Intervention    Current Cognitive/Communication Assessment functional  -MS      Orientation Status oriented x 4  -MS      Follows Commands/Answers Questions 100% of the time  -MS      Personal Safety WNL/WFL  -MS      Personal Safety Interventions fall prevention program maintained;gait belt;nonskid shoes/slippers when out of bed;supervised activity  -MS      Recorded by [MS] Ayan Stark, PT      ROM (Range of Motion)    General ROM Detail Right knee AROM (-8, 78)   -MS      Recorded by [MS] Ayan Stark PT      Bed Mobility, Assessment/Treatment    Bed Mobility, Comment Up in chair  -MS      Recorded by [MS] Ayan Stark PT      Transfer Assessment/Treatment    Transfers, Sit-Stand [place] contact guard assist  -MS      Transfers, Stand-Sit [place] contact guard assist  -MS      Transfers, Sit-Stand-Sit, Assist Device rolling walker  -MS      Recorded by [MS] Ayan Stark PT      Gait Assessment/Treatment    Gait, [place] Level contact guard assist  -MS      Gait, Assistive Device rolling walker  -MS      Gait, Distance (Feet) 55  -MS      Gait, Gait Deviations right:;antalgic;sunil decreased;forward flexed posture;step length decreased  -MS      Gait, Comment Verbal/tactile cues for posture correction.  -MS      Recorded by [MS] Ayan Satrk PT      Therapy Exercises    Exercise Protocols total knee  -MS      Total Knee Exercises right:;15 reps;completed protocol  -MS      Recorded by [MS] Ayan Stark PT      Positioning and Restraints    Pre-Treatment Position sitting in chair/recliner  -MS      Post Treatment Position chair  -MS      In Chair notified nsg;reclined;sitting;call light within reach;encouraged to call for assist   Ice pack to Right knee.  -MS      Recorded by [MS] Ayan Stark PT        User Key  (r) = Recorded By, (t) = Taken By, (c) = Cosigned By    Initials Name Effective Dates    MS Ayan Stark, PT 12/01/15 -                 IP PT Goals       03/06/17 1444          Bed Mobility PT LTG    Bed Mobility PT LTG, Date Established 03/06/17  -KH      Bed Mobility PT LTG, Time to Achieve 4 days  -KH      Bed Mobility PT LTG, Activity Type all bed mobility  -KH      Bed Mobility PT LTG, [place] Level independent  -KH      Transfer Training PT LTG    Transfer Training PT LTG, Date Established 03/06/17  -KH      Transfer Training PT LTG, Time to Achieve 4 days  -KH      Transfer Training PT LTG, Activity Type  all transfers  -KH      Transfer Training PT LTG, [place] Level supervision required  -KH      Transfer Training PT LTG, Assist Device walker, rolling  -KH      Gait Training PT LTG    Gait Training Goal PT LTG, Date Established 03/06/17  -KH      Gait Training Goal PT LTG, Time to Achieve 4 days  -KH      Gait Training Goal PT LTG, [place] Level supervision required  -KH      Gait Training Goal PT LTG, Assist Device walker, rolling  -KH      Gait Training Goal PT LTG, Distance to Achieve 150 ft  -KH      Stair Training PT LTG    Stair Training Goal PT LTG, Date Established 03/06/17  -KH      Stair Training Goal PT LTG, Time to Achieve 4 days  -KH      Stair Training Goal PT LTG, Number of Steps 3  -KH      Stair Training Goal PT LTG, [place] Level contact guard assist  -KH      Stair Training Goal PT LTG, Assist Device 1 handrail  -KH      Range of Motion PT LTG    Range of Motion Goal PT LTG, Date Established 03/06/17  -KH      Range of Motion Goal PT LTG, Time to Achieve 4 days  -KH      Range fo Motion Goal PT LTG, Joint R knee  -KH      Range of Motion Goal PT LTG, AROM Measure 5-85  -KH      Patient Education PT LTG    Patient Education PT LTG, Date Established 03/06/17  -      Patient Education PT LTG, Time to Achieve 4 days  -KH      Patient Education PT LTG, Education Type HEP  -KH      Patient Education PT LTG, Education Understanding demonstrate adequately  -KH        User Key  (r) = Recorded By, (t) = Taken By, (c) = Cosigned By    Initials Name Provider Type    ABHISHEK Morales, PT Physical Therapist          Physical Therapy Education     Title: PT OT SLP Therapies (Done)     Topic: Physical Therapy (Done)     Point: Mobility training (Done)    Learning Progress Summary    Learner Readiness Method Response Comment Documented by Status   Patient Acceptance E,D NR,SKYLER  MS 03/07/17 1137 Done    Acceptance E SKYLER  KH 03/06/17 1443 Done               Point: Home exercise program  (Done)    Learning Progress Summary    Learner Readiness Method Response Comment Documented by Status   Patient Acceptance E,D NR,SKYLER  MS 03/07/17 1137 Done    Acceptance E [facility] 03/06/17 1443 Done                      User Key     Initials Effective Dates Name Provider Type Discipline     05/18/15 -  Elvia Morales, PT Physical Therapist PT    MS 12/01/15 -  Ayan Stark, PT Physical Therapist PT                    PT Recommendation and Plan  Planned Therapy Interventions: bed mobility training, gait training, home exercise program, patient/family education, ROM (Range of Motion), transfer training, strengthening, stair training  PT Frequency: 2 times/day  Plan of Care Review  Plan Of Care Reviewed With: patient  Progress: improving  Outcome Summary/Follow up Plan: Improved tolerance to functional activity this AM with an increase in gait distance, progression of ther. ex. protocol, and decreased assist required for overall functional mobility.          Outcome Measures       03/07/17 1100 03/06/17 1445       How much help from another person do you currently need...    Turning from your back to your side while in flat bed without using bedrails? 3  -MS 3  -KH     Moving from lying on back to sitting on the side of a flat bed without bedrails? 3  -MS 3  -KH     Moving to and from a bed to a chair (including a wheelchair)? 3  -MS 3  -KH     Standing up from a chair using your arms (e.g., wheelchair, bedside chair)? 3  -MS 3  -KH     Climbing 3-5 steps with a railing? 3  -MS 2  -KH     To walk in hospital room? 3  -MS 3  -KH     AM-PAC 6 Clicks Score 18  -MS 17  -KH     Functional Assessment    Outcome Measure Options AM-PAC 6 Clicks Basic Mobility (PT)  -MS AM-PAC 6 Clicks Basic Mobility (PT)  -KH       User Key  (r) = Recorded By, (t) = Taken By, (c) = Cosigned By    Initials Name Provider Type    ABHISHEK Morales, PT Physical Therapist    MS Ayan Stark, PT Physical Therapist            Time Calculation:         PT Charges       03/07/17 1138          Time Calculation    Start Time 0929  -MS      Stop Time 0958  -MS      Time Calculation (min) 29 min  -MS      PT Received On 03/07/17  -MS      PT - Next Appointment 03/07/17  -MS        User Key  (r) = Recorded By, (t) = Taken By, (c) = Cosigned By    Initials Name Provider Type    MS Ayan Stark, PT Physical Therapist          Therapy Charges for Today     Code Description Service Date Service Provider Modifiers Qty    70666112736 HC PT THER PROC EA 15 MIN 3/7/2017 Ayan Stark, PT GP 1    92718576272 HC PT THER PROC GROUP 3/7/2017 Ayan Stark, PT GP 1          PT G-Codes  Outcome Measure Options: AM-PAC 6 Clicks Basic Mobility (PT)    Ayan Stark, PT  3/7/2017

## 2017-03-07 NOTE — PLAN OF CARE
Problem: Patient Care Overview (Adult)  Goal: Plan of Care Review  Outcome: Ongoing (interventions implemented as appropriate)    03/07/17 0340   Coping/Psychosocial Response Interventions   Plan Of Care Reviewed With patient   Patient Care Overview   Progress progress toward functional goals as expected   Outcome Evaluation   Outcome Summary/Follow up Plan PO PAIN MEDICATION CONTROLLING PAIN. AMBULATING WELL WITH 1 ASSIST. VSS       Goal: Adult Individualization and Mutuality  Outcome: Ongoing (interventions implemented as appropriate)  Goal: Discharge Needs Assessment  Outcome: Ongoing (interventions implemented as appropriate)    Problem: Perioperative Period (Adult)  Goal: Signs and Symptoms of Listed Potential Problems Will be Absent or Manageable (Perioperative Period)  Outcome: Ongoing (interventions implemented as appropriate)    Problem: Knee Replacement, Total (Adult)  Goal: Signs and Symptoms of Listed Potential Problems Will be Absent or Manageable (Knee Replacement, Total)  Outcome: Ongoing (interventions implemented as appropriate)    Problem: Fall Risk (Adult)  Goal: Identify Related Risk Factors and Signs and Symptoms  Outcome: Outcome(s) achieved Date Met:  03/07/17  Goal: Absence of Falls  Outcome: Ongoing (interventions implemented as appropriate)

## 2017-03-07 NOTE — PLAN OF CARE
Problem: Patient Care Overview (Adult)  Goal: Plan of Care Review    03/07/17 1625   Coping/Psychosocial Response Interventions   Plan Of Care Reviewed With patient   Patient Care Overview   Progress improving   Outcome Evaluation   Outcome Summary/Follow up Plan Improved tolerance to functional activity this PM with an increase in gait distance and progression of ther. ex. protocol.

## 2017-03-07 NOTE — PLAN OF CARE
Problem: Patient Care Overview (Adult)  Goal: Plan of Care Review  Outcome: Ongoing (interventions implemented as appropriate)    03/07/17 1531   Coping/Psychosocial Response Interventions   Plan Of Care Reviewed With patient;spouse   Patient Care Overview   Progress progress toward functional goals as expected   Outcome Evaluation   Outcome Summary/Follow up Plan pt ambulating well with therapy. pain control issues, but better. family remains at bedside, dressing C/D/I. VSS. possible c/d home with home health tomorrow.        Goal: Adult Individualization and Mutuality  Outcome: Ongoing (interventions implemented as appropriate)  Goal: Discharge Needs Assessment  Outcome: Ongoing (interventions implemented as appropriate)    Problem: Perioperative Period (Adult)  Goal: Signs and Symptoms of Listed Potential Problems Will be Absent or Manageable (Perioperative Period)  Outcome: Ongoing (interventions implemented as appropriate)    Problem: Knee Replacement, Total (Adult)  Goal: Signs and Symptoms of Listed Potential Problems Will be Absent or Manageable (Knee Replacement, Total)  Outcome: Ongoing (interventions implemented as appropriate)    Problem: Fall Risk (Adult)  Goal: Identify Related Risk Factors and Signs and Symptoms  Outcome: Ongoing (interventions implemented as appropriate)  Goal: Absence of Falls  Outcome: Ongoing (interventions implemented as appropriate)

## 2017-03-07 NOTE — SIGNIFICANT NOTE
Was called into patients room per wife.  Pt stating he is having jaw pain when he bites down and chest pain all across his chest that is worse with deep breaths and coughing or the use of the IS. All vss. O2 sats stable. Pt states that the pain odes not radiate anywhere else. Dr. Andino notified. Orders given per ZENON Larose and noted. Will cont. To monitor.

## 2017-03-07 NOTE — PROGRESS NOTES
Discharge Planning Assessment  [facility]     Patient Name: Anthony Sol Jr.  MRN: [id number]  Today's Date: 3/7/2017    Admit Date: 3/6/2017          Discharge Needs Assessment       03/07/17 1520    Living Environment    Lives With child(yari), dependent;spouse    Living Arrangements house    Discharge Needs Assessment    Concerns To Be Addressed basic needs concerns    Readmission Within The Last 30 Days no previous admission in last 30 days    Anticipated Changes Related to Illness none    Equipment Currently Used at Home walker, standard    Discharge Facility/Level Of Care Needs home with home health    Transportation Available car;family or friend will provide            Discharge Plan       03/07/17 1520    Case Management/Social Work Plan    Plan [facility]    Patient/Family In Agreement With Plan yes    Additional Comments Spoke with pt, verified correct information on facesheet and explained the role of CCP. Pt would like to d/c home with [facility], referral given to Kimi with [facility] who states they are able to accept. Plan will be to d/c home with HH and family support.        Discharge Placement     Facility/Agency Request Status Selected? Address Phone Number Fax Number    [facility] Accepted    [address] [address] [phone]        Arabella Alonzo RN

## 2017-03-08 VITALS
SYSTOLIC BLOOD PRESSURE: 121 MMHG | WEIGHT: 261 LBS | OXYGEN SATURATION: 97 % | TEMPERATURE: 97.6 F | HEART RATE: 85 BPM | BODY MASS INDEX: 35.35 KG/M2 | HEIGHT: 72 IN | RESPIRATION RATE: 16 BRPM | DIASTOLIC BLOOD PRESSURE: 69 MMHG

## 2017-03-08 LAB
BASOPHILS # BLD AUTO: 0.01 10*3/MM3 (ref 0–0.2)
BASOPHILS NFR BLD AUTO: 0.2 % (ref 0–1.5)
DEPRECATED RDW RBC AUTO: 40.7 FL (ref 37–54)
EOSINOPHIL # BLD AUTO: 0.16 10*3/MM3 (ref 0–0.7)
EOSINOPHIL NFR BLD AUTO: 3 % (ref 0.3–6.2)
ERYTHROCYTE [DISTWIDTH] IN BLOOD BY AUTOMATED COUNT: 13 % (ref 11.5–14.5)
GLUCOSE BLDC GLUCOMTR-MCNC: 123 MG/DL (ref 70–130)
GLUCOSE BLDC GLUCOMTR-MCNC: 91 MG/DL (ref 70–130)
HCT VFR BLD AUTO: 26 % (ref 40.4–52.2)
HGB BLD-MCNC: 8.2 G/DL (ref 13.7–17.6)
IMM GRANULOCYTES # BLD: 0 10*3/MM3 (ref 0–0.03)
IMM GRANULOCYTES NFR BLD: 0 % (ref 0–0.5)
LYMPHOCYTES # BLD AUTO: 1.17 10*3/MM3 (ref 0.9–4.8)
LYMPHOCYTES NFR BLD AUTO: 21.9 % (ref 19.6–45.3)
MCH RBC QN AUTO: 27.2 PG (ref 27–32.7)
MCHC RBC AUTO-ENTMCNC: 31.5 G/DL (ref 32.6–36.4)
MCV RBC AUTO: 86.1 FL (ref 79.8–96.2)
MONOCYTES # BLD AUTO: 0.51 10*3/MM3 (ref 0.2–1.2)
MONOCYTES NFR BLD AUTO: 9.5 % (ref 5–12)
NEUTROPHILS # BLD AUTO: 3.5 10*3/MM3 (ref 1.9–8.1)
NEUTROPHILS NFR BLD AUTO: 65.4 % (ref 42.7–76)
PLATELET # BLD AUTO: 249 10*3/MM3 (ref 140–500)
PMV BLD AUTO: 9.9 FL (ref 6–12)
RBC # BLD AUTO: 3.02 10*6/MM3 (ref 4.6–6)
WBC NRBC COR # BLD: 5.35 10*3/MM3 (ref 4.5–10.7)

## 2017-03-08 PROCEDURE — 82962 GLUCOSE BLOOD TEST: CPT

## 2017-03-08 PROCEDURE — 97110 THERAPEUTIC EXERCISES: CPT

## 2017-03-08 PROCEDURE — 85025 COMPLETE CBC W/AUTO DIFF WBC: CPT | Performed by: ORTHOPAEDIC SURGERY

## 2017-03-08 PROCEDURE — 97150 GROUP THERAPEUTIC PROCEDURES: CPT

## 2017-03-08 RX ADMIN — FENOFIBRATE 145 MG: 145 TABLET ORAL at 07:52

## 2017-03-08 RX ADMIN — SITAGLIPTIN 100 MG: 100 TABLET, FILM COATED ORAL at 07:52

## 2017-03-08 RX ADMIN — GLIPIZIDE 10 MG: 10 TABLET, FILM COATED, EXTENDED RELEASE ORAL at 07:51

## 2017-03-08 RX ADMIN — PANTOPRAZOLE SODIUM 40 MG: 40 TABLET, DELAYED RELEASE ORAL at 06:51

## 2017-03-08 RX ADMIN — OXYCODONE HYDROCHLORIDE AND ACETAMINOPHEN 2 TABLET: 10; 325 TABLET ORAL at 03:07

## 2017-03-08 RX ADMIN — FERROUS SULFATE TAB 325 MG (65 MG ELEMENTAL FE) 325 MG: 325 (65 FE) TAB at 07:51

## 2017-03-08 RX ADMIN — OXYCODONE HYDROCHLORIDE AND ACETAMINOPHEN 2 TABLET: 10; 325 TABLET ORAL at 06:51

## 2017-03-08 RX ADMIN — TRIAMTERENE AND HYDROCHLOROTHIAZIDE 1 TABLET: 37.5; 25 TABLET ORAL at 07:51

## 2017-03-08 RX ADMIN — DOCUSATE SODIUM 100 MG: 100 CAPSULE, LIQUID FILLED ORAL at 07:57

## 2017-03-08 RX ADMIN — AMLODIPINE AND BENAZEPRIL HYDROCHLORIDE 1 CAPSULE: 10; 20 CAPSULE ORAL at 07:52

## 2017-03-08 RX ADMIN — OXYCODONE HYDROCHLORIDE AND ACETAMINOPHEN 2 TABLET: 10; 325 TABLET ORAL at 10:46

## 2017-03-08 RX ADMIN — RIVAROXABAN 15 MG: 15 TABLET, FILM COATED ORAL at 07:51

## 2017-03-08 RX ADMIN — METOPROLOL TARTRATE 50 MG: 50 TABLET ORAL at 07:51

## 2017-03-08 NOTE — PLAN OF CARE
Problem: Patient Care Overview (Adult)  Goal: Plan of Care Review  Outcome: Ongoing (interventions implemented as appropriate)    03/08/17 0324 03/08/17 1156   Coping/Psychosocial Response Interventions   Plan Of Care Reviewed With patient --    Patient Care Overview   Progress improving --    Outcome Evaluation   Outcome Summary/Follow up Plan --  pain is better controlled today., ambulating well. vss. dressing c/d/i. family at bedside. d/c home today with home health.        Goal: Adult Individualization and Mutuality  Outcome: Ongoing (interventions implemented as appropriate)  Goal: Discharge Needs Assessment  Outcome: Ongoing (interventions implemented as appropriate)    Problem: Perioperative Period (Adult)  Goal: Signs and Symptoms of Listed Potential Problems Will be Absent or Manageable (Perioperative Period)  Outcome: Ongoing (interventions implemented as appropriate)    Problem: Knee Replacement, Total (Adult)  Goal: Signs and Symptoms of Listed Potential Problems Will be Absent or Manageable (Knee Replacement, Total)  Outcome: Ongoing (interventions implemented as appropriate)    Problem: Fall Risk (Adult)  Goal: Identify Related Risk Factors and Signs and Symptoms  Outcome: Ongoing (interventions implemented as appropriate)  Goal: Absence of Falls  Outcome: Ongoing (interventions implemented as appropriate)

## 2017-03-08 NOTE — PROGRESS NOTES
"Visit Vitals   • /86 (BP Location: Left arm, Patient Position: Lying)   • Pulse 88   • Temp 97.5 °F (36.4 °C) (Oral)   • Resp 16   • Ht 72\" (182.9 cm)   • Wt 261 lb (118 kg)   • SpO2 96%   • BMI 35.4 kg/m2         Results from last 7 days  Lab Units 03/08/17  0403   WBC 10*3/mm3 5.35   HEMOGLOBIN g/dL 8.2*   HEMATOCRIT % 26.0*   PLATELETS 10*3/mm3 249         Results from last 7 days  Lab Units 03/07/17  0421   SODIUM mmol/L 141   POTASSIUM mmol/L 3.5   CHLORIDE mmol/L 102   TOTAL CO2 mmol/L 27.1   BUN mg/dL 12   CREATININE mg/dL 1.05   GLUCOSE mg/dL 131*   CALCIUM mg/dL 8.4*       Imaging Results (last 24 hours)     Procedure Component Value Units Date/Time    CT Angiogram Chest With & Without Contrast [[id number]] Collected:  03/07/17 1341     Updated:  03/07/17 2100    Narrative:       CTA CHEST WITH CONTRAST     HISTORY: Shortness of breath, recent knee surgery.     TECHNIQUE: Spiral axial CT imaging of the chest was performed at 2 mm  intervals throughout. Intravenous contrast was administered and imaging  was performed with contrast in the arterial phase. Sagittal coronal and  3-D reconstructions are provided.     COMPARISON: Chest radiograph 02/13/2017 is reviewed. There is a prior  CTA chest of 03/12/2015.     FINDINGS: There is good contrast opacification of the pulmonary  arteries. No pulmonary embolism is identified. Heart and great vessels  demonstrate an unremarkable contrasted appearance. Mediastinal and hilar  structures are unremarkable. The chest wall is unremarkable. Lungs  demonstrate some minimal dependent atelectasis, otherwise are normally  aerated and clear. There is no nodule or mass. There is no pleural  effusion or pneumothorax. Limited imaging of the upper abdomen  demonstrates fatty infiltration of the liver with some focal fatty  sparing at the maureen hepatis.       Impression:       Negative CTA chest.     This report was finalized on 3/7/2017 8:57 PM by Dr. Gaudencio Mcneil MD.       "       Patient Care Team:  Myranda Levine MD as PCP - General  Richard Mccloud MD as Consulting Physician (Urology)    SUBJECTIVE  Doing ok  PHYSICAL EXAM   Wound looks good  Active Problems:    Painful total knee replacement      PLAN / DISPOSITION:  D/c with TIFF Ordoñez  03/08/17  8:20 AM

## 2017-03-08 NOTE — PLAN OF CARE
Problem: Patient Care Overview (Adult)  Goal: Plan of Care Review  Outcome: Ongoing (interventions implemented as appropriate)    03/08/17 0324   Coping/Psychosocial Response Interventions   Plan Of Care Reviewed With patient   Patient Care Overview   Progress improving   Outcome Evaluation   Outcome Summary/Follow up Plan PAIN UNDER CONTROL WITH PAIN MEDS. VITALS STABLE. AMBULATES WITH ASSIST X1.       Goal: Adult Individualization and Mutuality  Outcome: Ongoing (interventions implemented as appropriate)  Goal: Discharge Needs Assessment  Outcome: Ongoing (interventions implemented as appropriate)    Problem: Perioperative Period (Adult)  Goal: Signs and Symptoms of Listed Potential Problems Will be Absent or Manageable (Perioperative Period)  Outcome: Ongoing (interventions implemented as appropriate)    Problem: Knee Replacement, Total (Adult)  Goal: Signs and Symptoms of Listed Potential Problems Will be Absent or Manageable (Knee Replacement, Total)  Outcome: Ongoing (interventions implemented as appropriate)    Problem: Fall Risk (Adult)  Goal: Identify Related Risk Factors and Signs and Symptoms  Outcome: Outcome(s) achieved Date Met:  03/08/17  Goal: Absence of Falls  Outcome: Ongoing (interventions implemented as appropriate)

## 2017-03-08 NOTE — PLAN OF CARE
Problem: Inpatient Physical Therapy  Goal: Bed Mobility Goal LTG- PT  Outcome: Outcome(s) achieved Date Met:  03/08/17 03/08/17 1635   Bed Mobility PT LTG   Bed Mobility PT LTG, Outcome goal met       Goal: Transfer Training Goal 1 LTG- PT  Outcome: Outcome(s) achieved Date Met:  03/08/17 03/08/17 1635   Transfer Training PT LTG   Transfer Training PT LTG, Outcome goal met       Goal: Gait Training Goal LTG- PT  Outcome: Unable to achieve outcome(s) by discharge Date Met:  03/08/17 03/08/17 1635   Gait Training PT LTG   Gait Training Goal PT LTG, Outcome goal not met   Gait Training Goal PT LTG, Reason Goal Not Met discharged from facility         03/08/17 1635   Gait Training PT LTG   Gait Training Goal PT LTG, Outcome goal not met   Gait Training Goal PT LTG, Reason Goal Not Met discharged from facility       Goal: Stair Training Goal LTG- PT  Outcome: Unable to achieve outcome(s) by discharge Date Met:  03/08/17 03/08/17 1635   Stair Training PT LTG   Stair Training Goal PT LTG, Outcome goal not met  (pt declined , but verbalized correctly)   Stair Training Goal PT LTG, Reason Goal Not Met discharged from facility       Goal: Range of Motion Goal LTG- PT  Outcome: Unable to achieve outcome(s) by discharge Date Met:  03/08/17 03/08/17 1635   Range of Motion PT LTG   Range of Motion Goal PT LTG, Outcome goal not met  (-6-77)   Reason Goal Not Met (ROM) PT LTG discharged from facility       Goal: Patient Education Goal LTG- PT  Outcome: Outcome(s) achieved Date Met:  03/08/17 03/08/17 1635   Patient Education PT LTG   Patient Education PT LTG Outcome goal met  (w/assist)

## 2017-03-08 NOTE — DISCHARGE INSTRUCTIONS
General Information: The length of hospital stay after knee and hip replacement surgery has, over the last several years,  decreased significantly. Reasons for this include concerns regarding costs and attempts to keep those costs down. However, as surgeons have improved their surgical techniques, as those techniques have become less invasive, and as pain management has improved with the Confucianist of longer acting blocks that do not interfere with early Physical Therapy, safe early hospital discharge has become common place.    Discharge Home vs Rehab: Though it may seem intuitive that being discharged from the hospital to a rehab facility would be advantageous due to the seeming greater availability of physical therapy, the reverse is actually true. When patients return to the office at the 3-week post op yue, those patients who have been discharged to their home environment consistently out preform those patients who spent time in an inpatient rehab facility. There are several reasons for this. First, home health nursing and physical therapy services have over the years become cutting edge. Second, when placed in the home environment, patients inevitably will do more for themselves in terms of self-care and mobilization. The ‘getting up and doing for oneself’ is in and of itself, physical therapy. Even the task of managing stairs at home is a form of therapy. Those patients who go home after a brief hospital stay typically are walking better, have more of a spring in their step, are functioning more independently, and are closer to getting back to a normal lifestyle than those who have spent time in an inpatient rehab facility. In addition, my mantra is this: if you don’t want to get sick, stay away from places where sick people are. Going home is the safest and best option after hospital discharge for any patient who is not totally alone and can arrange for any kind of help at home (this is another reason, by  the way ,to try to get out of the hospital sooner rather than later).    Specific Post Op Instructions:  Blood Thinners: All patients will be on some type of blood thinner post op to prevent blood clots. Often a blood thinning shot is used while in the hospital. Most patients who are not high risk are discharged on aspirin (either 325 mg or 81 mg depending on age and size). High risk patients may be discharged on a more potent oral or injectable form of a blood thinner. Keep in mind that the more aggressive the blood thinner used, the greater the risk of bleeding complications post op. This is always a balancing act.  Ice: Early post op until the swelling diminishes, ice should be applied to the knee for 30 minutes out of every 2 hours while awake.  Staples: Staples are taken out at 14 days post op. Usually the nurse at home will perform this task.  Dressing Changes: If the wound is clean and dry with no redness or drainage, the dressings can be discontinued on the 3rd or 4th day post op.  Showering: The edges of the wound take 3 days after surgery to seal. Given the magnitude of hip and knee replacement surgery, I always like to build in a safety margin in  terms of getting the wound wet. Thus waiting till the 5th -7th day post op is recommended before showering. Do not soak the wound in water till the staples are out and the staple puncture wounds have healed. If there is any redness or drainage, the wound should be kept dry and covered by a sterile dressing until this resolves.  Weight Bearing/Ambulatory Aids: Most hip and knee replacments are implanted such that full immediate weight bearing is allowed. The walker and cane can be discarded when tolerated. There are a few special circumstances where 6 weeks of protected weight bearing may be required.  Follow-up Appointment: The first follow-up office appointment is at the 3-week yue post op. You need to call to set up this appointment. We want to see you back  sooner if there are any problems.  Physical Therapy: Generally about 6 weeks of therapy is required after hip and knee replacement surgery. Hips usually require less PT than do knees. Usually the first 3 weeks or so of PT is done at home. Knees especially often need to follow that up with 3 weeks of outpatient therapy.  Driving a Car: With left leg surgery, driving is permitted when the patient is off of pain medication and feels sufficiently alert and strong enough to physically handle a car safely. Due to liability issues, it is generally recommended to not drive until 6 weeks post op after right leg surgery.  Returning to Daily and Recreational Activities: For the most part, patients can progress to daily activities as tolerated. Initially, it is best not to “overdo it” in an attempt to minimize early post op swelling. Once the swelling is controlled, the patient may progress as tolerated. It usually is 6 weeks before patients feel up to most all daily activities, and 3 months before feeling up to more vigorous physical activities. A golfer might start back playing at about 6 weeks. A  would probably not feel up to resuming playing until 3 months.  Return to Work: My basic premise is this: I am not the work police. I try to implant both knee and hip replacements such that it is safe to perform any activity that the patient can tolerate and then let the patient decide. The average time until returning to a sedentary job is 6 weeks. The average time until returning to a physical job is 3 months. There is great variability. The return to work date depends on many factors. It often depends in part on the patient’s perceived need to work, flexibility of demands in the work place environment, and other social and physical factors. Suffice to say that we will provide you with a note to return to work whenever you think you can manage whatever it is you will face on returning to work.

## 2017-03-08 NOTE — THERAPY DISCHARGE NOTE
Acute Care - Physical Therapy Treatment Note/Discharge  [facility]     Patient Name: Anthony Sol Jr.  : 1970  MRN: [id number]  Today's Date: 3/8/2017             Admit Date: 3/6/2017    Visit Dx:    ICD-10-CM ICD-9-CM   1. Difficulty walking R26.2 719.7   2. Painful total knee replacement T84.84XA 996.77    Z96.659 V43.65     Patient Active Problem List   Diagnosis   • Type 2 diabetes mellitus, uncontrolled   • Hyperlipidemia   • Essential hypertension   • DJD (degenerative joint disease) of knee   • Type 2 diabetes mellitus with hyperglycemia   • Hyponatremia   • Acute kidney injury   • Painful total knee replacement       Physical Therapy Education     Title: PT OT SLP Therapies (Resolved)     Topic: Physical Therapy (Resolved)     Point: Mobility training (Resolved)    Learning Progress Summary    Learner Readiness Method Response Comment Documented by Status   Patient Eager E,TIFFANIE CASTANO 17 1634 Done    Acceptance E,D NR,[facility] 17 1137 Done    Acceptance E [facility] 17 1443 Done   Family Eager E,TIFFANIE PIERRE   17 1634 Done               Point: Home exercise program (Resolved)    Learning Progress Summary    Learner Readiness Method Response Comment Documented by Status   Patient Eager E,TIFFANIE PIERRE   17 1634 Done    Acceptance E,D NR,[facility] 17 1137 Done    Acceptance E [facility] 17 1443 Done   Family Eager E,TIFFANIE CASTANO 17 1634 Done               Point: Body mechanics (Resolved)    Learning Progress Summary    Learner Readiness Method Response Comment Documented by Status   Patient Eager E,TIFFANIE CASTANO 17 1634 Done   Family Eager E,TIFFANIE CASTANO 17 1634 Done               Point: Precautions (Resolved)    Learning Progress Summary    Learner Readiness Method Response Comment Documented by Status   Patient Eager E,TIFFANIE CASTANO 17 1634 Done   Family Eagmarbin TAYLOR,TIFFANIE CASTANO 17 1634 Done                      User Key     Initials Effective Dates Name Provider Type  Discipline     05/18/15 -  Elvia Morales, PT Physical Therapist PT     02/18/16 -  Doreen Guzman, PTA Physical Therapy Assistant PT    MS 12/01/15 -  Ayan Stark, PT Physical Therapist PT                    IP PT Goals       03/08/17 1635 03/06/17 1444       Bed Mobility PT LTG    Bed Mobility PT LTG, Date Established  03/06/17  -     Bed Mobility PT LTG, Time to Achieve  4 days  -KH     Bed Mobility PT LTG, Activity Type  all bed mobility  -     Bed Mobility PT LTG, [place] Level  independent  -     Bed Mobility PT LTG, Outcome goal met  -      Transfer Training PT LTG    Transfer Training PT LTG, Date Established  03/06/17  -     Transfer Training PT LTG, Time to Achieve  4 days  -     Transfer Training PT LTG, Activity Type  all transfers  -     Transfer Training PT LTG, [place] Level  supervision required  -     Transfer Training PT LTG, Assist Device  walker, rolling  -     Transfer Training PT LTG, Outcome goal met  -      Gait Training PT LTG    Gait Training Goal PT LTG, Date Established  03/06/17  -     Gait Training Goal PT LTG, Time to Achieve  4 days  -     Gait Training Goal PT LTG, [place] Level  supervision required  -     Gait Training Goal PT LTG, Assist Device  walker, rolling  -     Gait Training Goal PT LTG, Distance to Achieve  150 ft  -     Gait Training Goal PT LTG, Outcome goal not met  -      Gait Training Goal PT LTG, Reason Goal Not Met discharged from facility  -      Stair Training PT LTG    Stair Training Goal PT LTG, Date Established  03/06/17  -     Stair Training Goal PT LTG, Time to Achieve  4 days  -     Stair Training Goal PT LTG, Number of Steps  3  -     Stair Training Goal PT LTG, [place] Level  contact guard assist  -     Stair Training Goal PT LTG, Assist Device  1 handrail  -     Stair Training Goal PT LTG, Outcome goal not met   pt declined , but verbalized correctly  -      Stair Training  Goal PT LTG, Reason Goal Not Met discharged from facility  -      Range of Motion PT LTG    Range of Motion Goal PT LTG, Date Established  03/06/17  -     Range of Motion Goal PT LTG, Time to Achieve  4 days  -KH     Range fo Motion Goal PT LTG, Joint  R knee  -KH     Range of Motion Goal PT LTG, AROM Measure  5-85  -KH     Range of Motion Goal PT LTG, Outcome goal not met   -6-77  -      Reason Goal Not Met (ROM) PT LTG discharged from facility  -      Patient Education PT LTG    Patient Education PT LTG, Date Established  03/06/17  -     Patient Education PT LTG, Time to Achieve  4 days  -KH     Patient Education PT LTG, Education Type  HEP  -     Patient Education PT LTG, Education Understanding  demonstrate adequately  -     Patient Education PT LTG Outcome goal met   w/assist  -ALEJANDRA        User Key  (r) = Recorded By, (t) = Taken By, (c) = Cosigned By    Initials Name Provider Type     Elvia Morales, RODRIGO Physical Therapist    ALEJANDRA Guzman PTA Physical Therapy Assistant              Adult Rehabilitation Note       03/08/17 1630 03/07/17 1623 03/07/17 1135    Rehab Assessment/Intervention    Discipline physical therapy assistant  - physical therapist  -MS physical therapist  -MS    Document Type discharge summary;therapy note (daily note)  - therapy note (daily note)  -MS therapy note (daily note)  -MS    Subjective Information agree to therapy;complains of;pain;swelling  - agree to therapy;complains of;pain  -MS agree to therapy;complains of;fatigue;pain  -MS    Patient Effort, Rehab Treatment   good  -MS    Symptoms Noted During/After Treatment  fatigue  -MS fatigue;increased pain  -MS    Precautions/Limitations fall precautions  - fall precautions  -MS fall precautions  -MS    Equipment Issued to Patient --   home CPM arranged by MARTHA SOTO      Recorded by [ALEJANDRA] Doreen Guzman PTA [MS] Ayan Stark, PT [MS] Ayan Stark, PT    Pain Assessment    Pain Assessment 0-10   - 0-10  -MS 0-10  -MS    Pain Score 5  -JM 4  -MS 6  -MS    Post Pain Score 7  - 4  -MS 6  -MS    Pain Type Surgical pain  -JM Acute pain;Surgical pain  -MS Acute pain;Surgical pain  -MS    Pain Location Knee  -JM Knee  -MS Knee  -MS    Pain Orientation Right  -JM Right  -MS Right  -MS    Pain Intervention(s) Medication (See MAR);Repositioned;Cold applied  -JM Medication (See MAR);Cold applied;Repositioned;Elevated;Rest  -MS Medication (See MAR);Cold applied;Repositioned;Elevated;Rest  -MS    Recorded by [JM] Doreen Guzman PTA [MS] Ayan Stark, PT [MS] Ayan Stark PT    Cognitive Assessment/Intervention    Current Cognitive/Communication Assessment   functional  -MS    Orientation Status   oriented x 4  -MS    Follows Commands/Answers Questions   100% of the time  -MS    Personal Safety   WNL/WFL  -MS    Personal Safety Interventions   fall prevention program maintained;gait belt;nonskid shoes/slippers when out of bed;supervised activity  -MS    Recorded by   [MS] Ayan Stark PT    ROM (Range of Motion)    General ROM Detail -6-77  -JM  Right knee AROM (-8, 78)  -MS    Recorded by [JM] Doreen Guzman PTA  [MS] Ayan Stark PT    Bed Mobility, Assessment/Treatment    Bed Mob, Sit to Supine, [place] conditional independence  - contact guard assist  -MS     Bed Mobility, Comment   Up in chair  -MS    Recorded by [JM] Doreen Guzman PTA [MS] Ayan Stark PT [MS] Ayan Stark PT    Transfer Assessment/Treatment    Transfers, Sit-Stand [place] supervision required;verbal cues required  - contact guard assist  -MS contact guard assist  -MS    Transfers, Stand-Sit [place] contact guard assist;supervision required;verbal cues required  - contact guard assist  -MS contact guard assist  -MS    Transfers, Sit-Stand-Sit, Assist Device rolling walker  - rolling walker  -MS rolling walker  -MS    Recorded by [JM] Doreen Guzman PTA [MS] Ayan Stark PT [MS]  Ayan Stark PT    Gait Assessment/Treatment    Gait, [place] Level conditional independence  -JM contact guard assist  -MS contact guard assist  -MS    Gait, Assistive Device rolling walker  -JM rolling walker  -MS rolling walker  -MS    Gait, Distance (Feet) 100  -JM 85  -MS 55  -MS    Gait, Gait Deviations antalgic;sunil decreased;forward flexed posture;decreased heel strike  -JM right:;antalgic;sunil decreased;forward flexed posture  -MS right:;antalgic;sunil decreased;forward flexed posture;step length decreased  -MS    Gait, Comment   Verbal/tactile cues for posture correction.  -MS    Recorded by [ALEJANDRA] Doreen Guzman PTA [MS] Ayan Stark PT [MS] Ayan Stark PT    Stairs Assessment/Treatment    Number of Stairs verbalized per pt request-had recent TKR   this is revision  -      Recorded by [ALEJANDRA] Doreen Guzman PTA      Therapy Exercises    Exercise Protocols total knee  -JM total knee  -MS total knee  -MS    Total Knee Exercises right:;completed protocol;25 reps;with assist;SLR;SAQ  -JM right:;20 reps;completed protocol  -MS right:;15 reps;completed protocol  -MS    Recorded by [ALEJANDRA] Doreen Guzman PTA [MS] Ayan Stark PT [MS] Ayan Stark PT    Positioning and Restraints    Pre-Treatment Position sitting in chair/recliner  -JM sitting in chair/recliner  -MS sitting in chair/recliner  -MS    Post Treatment Position other  -JM bed  -MS chair  -MS    In Bed  notified nsg;supine;call light within reach;encouraged to call for assist;with family/caregiver   Ice pack to Right knee  -MS     In Chair   notified nsg;reclined;sitting;call light within reach;encouraged to call for assist   Ice pack to Right knee.  -MS    Other Position return to room with caregiver  -      Recorded by [ALEJANDRA] Doreen Guzman PTA [MS] Ayan Stark PT [MS] Ayan Stark PT      User Key  (r) = Recorded By, (t) = Taken By, (c) = Cosigned By    Initials Name Effective Dates    ALEJANDRA Logan  Thomas, PTA 02/18/16 -     MS Ayan Stark, PT 12/01/15 -           PT Recommendation and Plan  Planned Therapy Interventions: bed mobility training, gait training, home exercise program, patient/family education, ROM (Range of Motion), transfer training, strengthening, stair training  PT Frequency: 2 times/day             Outcome Measures       03/08/17 1600 03/07/17 1600 03/07/17 1100    How much help from another person do you currently need...    Turning from your back to your side while in flat bed without using bedrails? 4  -JM 4  -MS 3  -MS    Moving from lying on back to sitting on the side of a flat bed without bedrails? 4  -JM 3  -MS 3  -MS    Moving to and from a bed to a chair (including a wheelchair)? 4  -JM 3  -MS 3  -MS    Standing up from a chair using your arms (e.g., wheelchair, bedside chair)? 4  -JM 3  -MS 3  -MS    Climbing 3-5 steps with a railing? 3  -JM 3  -MS 3  -MS    To walk in hospital room? 4  -JM 3  -MS 3  -MS    AM-PAC 6 Clicks Score 23  -JM 19  -MS 18  -MS    Functional Assessment    Outcome Measure Options  AM-PAC 6 Clicks Basic Mobility (PT)  -MS AM-PAC 6 Clicks Basic Mobility (PT)  -MS      03/06/17 1445          How much help from another person do you currently need...    Turning from your back to your side while in flat bed without using bedrails? 3  -KH      Moving from lying on back to sitting on the side of a flat bed without bedrails? 3  -KH      Moving to and from a bed to a chair (including a wheelchair)? 3  -KH      Standing up from a chair using your arms (e.g., wheelchair, bedside chair)? 3  -KH      Climbing 3-5 steps with a railing? 2  -KH      To walk in hospital room? 3  -KH      AM-PAC 6 Clicks Score 17  -KH      Functional Assessment    Outcome Measure Options AM-PAC 6 Clicks Basic Mobility (PT)  -KH        User Key  (r) = Recorded By, (t) = Taken By, (c) = Cosigned By    Initials Name Provider Type    ABHISHEK Morales, PT Physical Therapist    ALEJANDRA  Doreen Guzman PTA Physical Therapy Assistant    MS Ayan ODEN Mi, PT Physical Therapist           Time Calculation:         PT Charges       03/08/17 1643          Time Calculation    Start Time 0940  -ALEJANDRA      Stop Time 1030  -ALEJANDRA      Time Calculation (min) 50 min  -ALEJANDRA      PT Received On 03/08/17  -ALEJANDRA        User Key  (r) = Recorded By, (t) = Taken By, (c) = Cosigned By    Initials Name Provider Type    ALEJANDRA Guzman PTA Physical Therapy Assistant          Therapy Charges for Today     Code Description Service Date Service Provider Modifiers Qty    13780376271 HC PT THER PROC EA 15 MIN 3/8/2017 Doreen Guzman PTA GP 1    60123861083 HC PT THER PROC GROUP 3/8/2017 Doreen Guzman PTA GP 1          PT G-Codes  Outcome Measure Options: AM-PAC 6 Clicks Basic Mobility (PT)    PT Discharge Summary  Reason for Discharge: Discharge from facility  Outcomes Achieved: Refer to plan of care for updates on goals achieved  Discharge Destination: Home with home health, Home with assist    Doreen Guzman PTA  3/8/2017

## 2017-03-09 LAB
BACTERIA SPEC AEROBE CULT: NORMAL
GRAM STN SPEC: NORMAL
GRAM STN SPEC: NORMAL

## 2017-03-09 NOTE — DISCHARGE SUMMARY
DATE OF ADMISSION: 03/06/2017  DATE OF DISCHARGE: 03/08/2017    HISTORY OF PRESENT ILLNESS: This is a 47-year-old who underwent a cementless total knee replacement back in December 2016. He continued to have pain. He is admitted for a revision of his cementless knee replacement to a cemented knee replacement.     HOSPITAL COURSE: He tolerated the procedure well. On postop day #1 his blood pressure was 101/64, pulse was 81. He was afebrile. BUN was 12, creatinine was 1.05, potassium was 3.5. His hemoglobin was 8.6. His postop x-ray looked good, and he was neurovascularly intact. We took out the drain, changed the dressing, and had discharge planning team come and see. We started him on Xarelto 15 mg.  He started complaining of increased jaw pain and chest pain. He underwent a CT to rule out PE which was negative. EKG was also within normal limits. On postop day #2 he was afebrile, pulse was 88, blood pressure was 140/86. His hemoglobin was 8.2. His wound looked good and he was discharged with home health.     FINAL DIAGNOSES: Status post right total knee replacement revision.     PLAN: He is going to be discharged on Xarelto 15 mg once daily for 3 weeks. Staples are to be removed in 14 days. He is to do physical therapy, total knee replacement protocol, weight-bear as tolerated. Will see him back in the office in 3 weeks, sooner if he has any increased problems. He was given a prescription for Percocet 10 mg for pain. He may shower on postop day #7 if the wound is clean and dry.     TIFF Whitehead, dictating for MD Stella Gavin PA  KH:tp  D:   03/08/2017 08:19:53  T:   03/09/2017 00:28:04  Job ID:   [id number]  Document ID:   [id number]  cc:

## 2017-03-11 LAB — BACTERIA SPEC ANAEROBE CULT: NORMAL

## 2017-03-13 ENCOUNTER — TREATMENT (OUTPATIENT)
Dept: PHYSICAL THERAPY | Facility: CLINIC | Age: 47
End: 2017-03-13

## 2017-03-13 ENCOUNTER — TELEPHONE (OUTPATIENT)
Dept: FAMILY MEDICINE CLINIC | Facility: CLINIC | Age: 47
End: 2017-03-13

## 2017-03-13 DIAGNOSIS — M25.561 RIGHT KNEE PAIN, UNSPECIFIED CHRONICITY: ICD-10-CM

## 2017-03-13 DIAGNOSIS — Z96.651 STATUS POST TOTAL RIGHT KNEE REPLACEMENT: ICD-10-CM

## 2017-03-13 DIAGNOSIS — M25.661 KNEE STIFFNESS, RIGHT: Primary | ICD-10-CM

## 2017-03-13 PROCEDURE — 97014 ELECTRIC STIMULATION THERAPY: CPT | Performed by: PHYSICAL THERAPIST

## 2017-03-13 PROCEDURE — 97110 THERAPEUTIC EXERCISES: CPT | Performed by: PHYSICAL THERAPIST

## 2017-03-13 NOTE — PROGRESS NOTES
Re-Assessment / Re-Certification    Patient: Anthony Sol Jr.   : 1970  Diagnosis/ICD-10 Code:  Knee stiffness, right [M25.661]  Referring practitioner: Anthony Andino MD  Date of Initial Visit: 3/13/2017  Today's Date: 3/13/2017  Patient seen for 27 sessions      Subjective:   Anthony Sol reports: pain is well controlled, has been walking with his walker as much as he can tolerate.  [place] post [place] the [place] before PT.  Subjective Questionnaire: LEFS  Clinical Progress: restarting secondary to new surgery  Home Program Compliance: Yes  Treatment has included: therapeutic exercise, neuromuscular re-education, manual therapy, electrical stimulation and cryotherapy    Subjective Evaluation    History of Present Illness  Date of surgery: 3/6/2017  Mechanism of injury: Revision to cemented TKA on 3/6/2017, inpatient through 3/8/2017, then home health.  Was cleared to return to OPPT today.    Pain  Current pain ratin  At best pain ratin  At worst pain ratin  Location: right knee  Quality: sharp, dull ache and throbbing  Relieving factors: ice, medications, support and rest  Aggravating factors: movement    Diagnostic Tests  Abnormal x-ray: good position of prosthesis per chart.         Objective     Observations     Right Knee   Positive for incision.     Additional Observation Details  Staples in place, two areas of dried bloody drainage noted along incision, no active drainage at time of evaluation.  Pt with skin redness that appears to be where band-aids were removed, educated pt regarding signs/symptoms of infection, to monitor for further drainage or redness.    Active Range of Motion     Right Knee   Flexion: 69 degrees   Extensor lag: 15 degrees     Strength/Myotome Testing     Right Knee   Flexion: 3-  Extension: 3-     Assessment/Plan  Progress toward previous goals: Partially Met    Short Term Goals: 4 weeks. Patient will:  1. Be independent with initial HEP  (MET)  2. Be instructed in posture and body mechanics (MET)  3. Report pain of </= 3 with all daily activities (NOT MET)  4. Demonstrate Right knee ROM 5-100 degrees (NOT MET)  5. Demonstrate normal patellar mobility (NOT MET)      Long Term Goals: 6-8 weeks. Patient will:  1. Demonstrate improved Bilateral lower extremity MMT of >/= 4+/5 (NOT MET)  2. Demonstrate Right knee ROM 0-125 degrees for independence with ADL's and return to recreational activities. (NOT MET)  3. Report pain of </= 0/10 with all daily activities (NOT MET)  4. Ambulate with normal symmetrical gait without need for AD (NOT MET)  5. LEFS >/= 75/80 (NOT MET)  6. Be independent with long term HEP (NOT MET)  7. Return to work (NOT MET)      Recommendations: Continue as planned  Timeframe: 3 months  Prognosis to achieve goals: good    PT Signature: Yissel Flannery PT, DPT                         Physical Therapist                         [place] License #[id number]    Based upon review of the patient's progress and continued therapy plan, it is my medical opinion that Anthony Sol should continue physical therapy treatment at [facility]  [address] [place] [id number].    Signature: __________________________________  Anthony Andino MD    Manual Therapy:    0     mins  64901;  Therapeutic Exercise:    30     mins  75871;     Neuromuscular Zach:    0    mins  28983;    Therapeutic Activity:     0     mins  13762;     Gait Trainin     mins  56292;     Ultrasound:     0     mins  32750;    Electrical Stimulation:    15     mins  38127 ( );    Timed Treatment:   30   mins   Total Treatment:     60   mins

## 2017-03-13 NOTE — TELEPHONE ENCOUNTER
Pt called and said that he had knee replacement last week and they told him in the hospital that his hemoglobin was low and wanted him to get those rechecked. He was wondering if he needs to make an appointment with you or if we can just put the order in? He went to [facility], [place], operated by [facility].

## 2017-03-13 NOTE — PATIENT INSTRUCTIONS
Access Code: [email or web address]   URL: [email or web address]   Date: 03/13/2017   Prepared by: Yissel Flannery     Exercises   Supine Quad Set - 10 reps - 1 sets - 10 hold - 1x daily   Standing Knee Flexion AROM with Chair Support - 10 reps - 2 sets - 1x daily   Heel Raises with Unilateral Counter Support - 10 reps - 2 sets - 1x daily   Supine Active Straight Leg Raise - 10 reps - 1 sets - 1x daily   Supine Heel Slide with Strap - 10 reps - 1 sets - 10 hold - 1x daily   Seated Knee Flexion Extension AROM - 10 reps - 2 sets - 1x daily   Standing Hip Abduction with Counter Support - 10 reps - 2 sets - 1x daily   Standing Hip Extension - 10 reps - 2 sets - 1x daily

## 2017-03-14 ENCOUNTER — TREATMENT (OUTPATIENT)
Dept: PHYSICAL THERAPY | Facility: CLINIC | Age: 47
End: 2017-03-14

## 2017-03-14 ENCOUNTER — OFFICE VISIT (OUTPATIENT)
Dept: FAMILY MEDICINE CLINIC | Facility: CLINIC | Age: 47
End: 2017-03-14

## 2017-03-14 VITALS
OXYGEN SATURATION: 98 % | BODY MASS INDEX: 34.62 KG/M2 | WEIGHT: 255.6 LBS | DIASTOLIC BLOOD PRESSURE: 90 MMHG | HEART RATE: 79 BPM | SYSTOLIC BLOOD PRESSURE: 142 MMHG | TEMPERATURE: 98.2 F | HEIGHT: 72 IN

## 2017-03-14 DIAGNOSIS — D50.8 OTHER IRON DEFICIENCY ANEMIA: Primary | ICD-10-CM

## 2017-03-14 DIAGNOSIS — I10 HYPERTENSION, ESSENTIAL: ICD-10-CM

## 2017-03-14 DIAGNOSIS — IMO0001 UNCONTROLLED TYPE 2 DIABETES MELLITUS WITHOUT COMPLICATION, WITHOUT LONG-TERM CURRENT USE OF INSULIN: ICD-10-CM

## 2017-03-14 DIAGNOSIS — I10 ESSENTIAL HYPERTENSION: ICD-10-CM

## 2017-03-14 DIAGNOSIS — E78.49 OTHER HYPERLIPIDEMIA: ICD-10-CM

## 2017-03-14 DIAGNOSIS — Z96.651 STATUS POST TOTAL RIGHT KNEE REPLACEMENT: ICD-10-CM

## 2017-03-14 DIAGNOSIS — M25.561 RIGHT KNEE PAIN, UNSPECIFIED CHRONICITY: ICD-10-CM

## 2017-03-14 DIAGNOSIS — M25.661 KNEE STIFFNESS, RIGHT: Primary | ICD-10-CM

## 2017-03-14 PROCEDURE — 97014 ELECTRIC STIMULATION THERAPY: CPT | Performed by: PHYSICAL THERAPIST

## 2017-03-14 PROCEDURE — 99213 OFFICE O/P EST LOW 20 MIN: CPT | Performed by: FAMILY MEDICINE

## 2017-03-14 PROCEDURE — 97110 THERAPEUTIC EXERCISES: CPT | Performed by: PHYSICAL THERAPIST

## 2017-03-14 RX ORDER — AMLODIPINE BESYLATE AND BENAZEPRIL HYDROCHLORIDE 10; 40 MG/1; MG/1
CAPSULE ORAL
Qty: 90 CAPSULE | Refills: 5 | Status: SHIPPED | OUTPATIENT
Start: 2017-03-14 | End: 2017-10-05 | Stop reason: SDUPTHER

## 2017-03-14 NOTE — PROGRESS NOTES
Subjective   Anthony Sol Jr. is a 47 y.o. male. CC: anemia    History of Present Illness  Anthony is a 47 year old male who comes in today for anemia.  He underwent right TKR on 12/15.  On 2/16 he had to have manipulation of the knee.  He ripped his quadriceps and thus had to have the TKR redone on 3/6.  His hemoglobin dropped to 8.6 following this surgery.  He has been very tired.  He started taking some iron several days ago.  He has felt tired.  No changes in his bowel movements.  No blood, no melena.  He is due to have his diabetes checked as well as his cholesterol.  No problems with the blood sugar following the surgery.      The following portions of the patient's history were reviewed and updated as appropriate: allergies, current medications, past medical history, past social history, past surgical history and problem list.    Review of Systems   Constitutional: Positive for fatigue. Negative for chills, diaphoresis, fever and unexpected weight change.   HENT: Negative.  Negative for congestion.    Respiratory: Negative.  Negative for cough, shortness of breath and wheezing.    Cardiovascular: Negative.  Negative for chest pain, palpitations and leg swelling.   Gastrointestinal: Negative.  Negative for abdominal pain, blood in stool, constipation, diarrhea, nausea and vomiting.   Genitourinary: Negative.  Negative for difficulty urinating.   Musculoskeletal: Positive for arthralgias.   Skin: Negative.  Negative for rash.   Neurological: Negative.  Negative for dizziness, light-headedness and headaches.   Psychiatric/Behavioral: Negative.  Negative for dysphoric mood and sleep disturbance. The patient is not nervous/anxious.        Objective   Physical Exam   Constitutional: He is oriented to person, place, and time. He appears well-developed and well-nourished.   Cardiovascular: Normal rate, regular rhythm and normal heart sounds.    No murmur heard.  Pulmonary/Chest: Effort normal and breath sounds  normal. No respiratory distress.   Musculoskeletal:   Surgical scar right knee   Neurological: He is alert and oriented to person, place, and time.   Skin: Skin is warm and dry. No rash noted.   Psychiatric: He has a normal mood and affect. His behavior is normal.   Nursing note and vitals reviewed.    Vitals:    03/14/17 1341   BP: 142/90   Pulse: 79   Temp: 98.2 °F (36.8 °C)   SpO2: 98%     Assessment/Plan   Problems Addressed this Visit        Cardiovascular and Mediastinum    Hyperlipidemia    Relevant Orders    Comprehensive metabolic panel (Completed)    Lipid Panel With LDL/HDL Ratio (Completed)    Essential hypertension       Endocrine    Uncontrolled type 2 diabetes mellitus without complication, without long-term current use of insulin    Relevant Orders    Hemoglobin A1c (Completed)      Other Visit Diagnoses     Other iron deficiency anemia    -  Primary    Relevant Orders    Iron level (Completed)    CBC & Differential (Completed)    Ferritin (Completed)

## 2017-03-14 NOTE — PROGRESS NOTES
Physical Therapy Daily Progress Note    Subjective     Anthony Sol reports: adherence with HEP, no drainage from incision.      Objective   See Exercise, Manual, and Modality Logs for complete treatment.       Assessment/Plan  ROM is improving, as is gait.  Will benefit from continued PT per protocol focused on ROM, strength and gait.  Progress per Plan of Care           Manual Therapy:    0     mins  93121;  Therapeutic Exercise:    40     mins  62567;     Neuromuscular Zach:    0    mins  26940;    Therapeutic Activity:     0     mins  52151;     Gait Trainin     mins  96014;     Ultrasound:     0     mins  10368;    Electrical Stimulation:    15     mins  45527 ( );    Timed Treatment:   40   mins   Total Treatment:     55   mins    Yissel Flannery, PT, DPT  Physical Therapist  [place] License #[id number]

## 2017-03-15 LAB
ALBUMIN SERPL-MCNC: 4.8 G/DL (ref 3.5–5.2)
ALBUMIN/GLOB SERPL: 1.8 G/DL
ALP SERPL-CCNC: 52 U/L (ref 39–117)
ALT SERPL-CCNC: 14 U/L (ref 1–41)
AST SERPL-CCNC: 14 U/L (ref 1–40)
BASOPHILS # BLD AUTO: 0.01 10*3/MM3 (ref 0–0.2)
BASOPHILS NFR BLD AUTO: 0.1 % (ref 0–1.5)
BILIRUB SERPL-MCNC: 0.4 MG/DL (ref 0.1–1.2)
BUN SERPL-MCNC: 12 MG/DL (ref 6–20)
BUN/CREAT SERPL: 13 (ref 7–25)
CALCIUM SERPL-MCNC: 10.4 MG/DL (ref 8.6–10.5)
CHLORIDE SERPL-SCNC: 98 MMOL/L (ref 98–107)
CHOLEST SERPL-MCNC: 195 MG/DL (ref 0–200)
CO2 SERPL-SCNC: 25.5 MMOL/L (ref 22–29)
CREAT SERPL-MCNC: 0.92 MG/DL (ref 0.76–1.27)
EOSINOPHIL # BLD AUTO: 0.06 10*3/MM3 (ref 0–0.7)
EOSINOPHIL NFR BLD AUTO: 0.8 % (ref 0.3–6.2)
ERYTHROCYTE [DISTWIDTH] IN BLOOD BY AUTOMATED COUNT: 13.6 % (ref 11.5–14.5)
FERRITIN SERPL-MCNC: 367.8 NG/ML (ref 30–400)
GLOBULIN SER CALC-MCNC: 2.6 GM/DL
GLUCOSE SERPL-MCNC: 129 MG/DL (ref 65–99)
HBA1C MFR BLD: 5.34 % (ref 4.8–5.6)
HCT VFR BLD AUTO: 32.3 % (ref 40.4–52.2)
HDLC SERPL-MCNC: 29 MG/DL (ref 40–60)
HGB BLD-MCNC: 10 G/DL (ref 13.7–17.6)
IMM GRANULOCYTES # BLD: 0.05 10*3/MM3 (ref 0–0.03)
IMM GRANULOCYTES NFR BLD: 0.7 % (ref 0–0.5)
IRON SERPL-MCNC: 51 MCG/DL (ref 59–158)
LDLC SERPL CALC-MCNC: 118 MG/DL (ref 0–100)
LDLC/HDLC SERPL: 4.08 {RATIO}
LYMPHOCYTES # BLD AUTO: 1.4 10*3/MM3 (ref 0.9–4.8)
LYMPHOCYTES NFR BLD AUTO: 19.7 % (ref 19.6–45.3)
MCH RBC QN AUTO: 26.6 PG (ref 27–32.7)
MCHC RBC AUTO-ENTMCNC: 31 G/DL (ref 32.6–36.4)
MCV RBC AUTO: 85.9 FL (ref 79.8–96.2)
MONOCYTES # BLD AUTO: 0.45 10*3/MM3 (ref 0.2–1.2)
MONOCYTES NFR BLD AUTO: 6.3 % (ref 5–12)
NEUTROPHILS # BLD AUTO: 5.14 10*3/MM3 (ref 1.9–8.1)
NEUTROPHILS NFR BLD AUTO: 72.4 % (ref 42.7–76)
PLATELET # BLD AUTO: 535 10*3/MM3 (ref 140–500)
POTASSIUM SERPL-SCNC: 4 MMOL/L (ref 3.5–5.2)
PROT SERPL-MCNC: 7.4 G/DL (ref 6–8.5)
RBC # BLD AUTO: 3.76 10*6/MM3 (ref 4.6–6)
SODIUM SERPL-SCNC: 141 MMOL/L (ref 136–145)
TRIGL SERPL-MCNC: 239 MG/DL (ref 0–150)
VLDLC SERPL CALC-MCNC: 47.8 MG/DL (ref 5–40)
WBC # BLD AUTO: 7.11 10*3/MM3 (ref 4.5–10.7)

## 2017-03-17 ENCOUNTER — TREATMENT (OUTPATIENT)
Dept: PHYSICAL THERAPY | Facility: CLINIC | Age: 47
End: 2017-03-17

## 2017-03-17 DIAGNOSIS — Z96.651 STATUS POST TOTAL RIGHT KNEE REPLACEMENT: ICD-10-CM

## 2017-03-17 DIAGNOSIS — M25.561 RIGHT KNEE PAIN, UNSPECIFIED CHRONICITY: ICD-10-CM

## 2017-03-17 DIAGNOSIS — M25.661 KNEE STIFFNESS, RIGHT: Primary | ICD-10-CM

## 2017-03-17 PROCEDURE — 97140 MANUAL THERAPY 1/> REGIONS: CPT | Performed by: PHYSICAL THERAPIST

## 2017-03-17 PROCEDURE — 97014 ELECTRIC STIMULATION THERAPY: CPT | Performed by: PHYSICAL THERAPIST

## 2017-03-17 PROCEDURE — 97110 THERAPEUTIC EXERCISES: CPT | Performed by: PHYSICAL THERAPIST

## 2017-03-17 NOTE — PROGRESS NOTES
Physical Therapy Daily Progress Note    Subjective     Anthony Sol reports: he has been working hard on HEP.       Objective   See Exercise, Manual, and Modality Logs for complete treatment.       Assessment/Plan  Good tolerance to stretching; ROM and pain improved after.  Progress per Plan of Care           Manual Therapy:    8     mins  42862;  Therapeutic Exercise:    35     mins  87575;     Neuromuscular Zach:    0    mins  19541;    Therapeutic Activity:     0     mins  58514;     Gait Trainin     mins  86531;     Ultrasound:     0     mins  29973;    Electrical Stimulation:    15     mins  27355 ( );    Timed Treatment:   43   mins   Total Treatment:     58   mins    Yissel Flannery PT, DPT  Physical Therapist  [place] License #[id number]

## 2017-03-20 ENCOUNTER — TREATMENT (OUTPATIENT)
Dept: PHYSICAL THERAPY | Facility: CLINIC | Age: 47
End: 2017-03-20

## 2017-03-20 ENCOUNTER — CLINICAL SUPPORT (OUTPATIENT)
Dept: FAMILY MEDICINE CLINIC | Facility: CLINIC | Age: 47
End: 2017-03-20

## 2017-03-20 DIAGNOSIS — M25.661 KNEE STIFFNESS, RIGHT: Primary | ICD-10-CM

## 2017-03-20 DIAGNOSIS — R79.9 ABNORMAL BLOOD FINDING: Primary | ICD-10-CM

## 2017-03-20 DIAGNOSIS — M25.561 RIGHT KNEE PAIN, UNSPECIFIED CHRONICITY: ICD-10-CM

## 2017-03-20 DIAGNOSIS — Z96.651 STATUS POST TOTAL RIGHT KNEE REPLACEMENT: ICD-10-CM

## 2017-03-20 LAB
EXPIRATION DATE: ABNORMAL
GASTROCULT GAST QL: POSITIVE
Lab: ABNORMAL

## 2017-03-20 PROCEDURE — 97110 THERAPEUTIC EXERCISES: CPT | Performed by: PHYSICAL THERAPIST

## 2017-03-20 PROCEDURE — 97140 MANUAL THERAPY 1/> REGIONS: CPT | Performed by: PHYSICAL THERAPIST

## 2017-03-20 PROCEDURE — 82274 ASSAY TEST FOR BLOOD FECAL: CPT | Performed by: FAMILY MEDICINE

## 2017-03-20 PROCEDURE — 97014 ELECTRIC STIMULATION THERAPY: CPT | Performed by: PHYSICAL THERAPIST

## 2017-03-20 NOTE — PROGRESS NOTES
Physical Therapy Daily Progress Note    Subjective     Anthony Sol reports: knee is doing a little better each day.  Sees MD tomorrow for staple removal.      Objective   See Exercise, Manual, and Modality Logs for complete treatment.       Assessment/Plan  ROM is improving, gait still significantly antalgic.  Encouraged pt to use his cane and focus on gait pattern to avoid long term gait deviation.  Progress per Plan of Care           Manual Therapy:    8     mins  47065;  Therapeutic Exercise:    35     mins  63519;     Neuromuscular Zach:    0    mins  03963;    Therapeutic Activity:     0     mins  28849;     Gait Trainin     mins  37635;     Ultrasound:     0     mins  42564;    Electrical Stimulation:    15     mins  67355 ( );    Timed Treatment:   43   mins   Total Treatment:     58   mins    Yissel Flannery PT, DPT  Physical Therapist  [place] License #[id number]

## 2017-03-21 DIAGNOSIS — R19.5 HEME POSITIVE STOOL: Primary | ICD-10-CM

## 2017-03-21 DIAGNOSIS — D50.8 OTHER IRON DEFICIENCY ANEMIA: ICD-10-CM

## 2017-03-22 ENCOUNTER — TREATMENT (OUTPATIENT)
Dept: PHYSICAL THERAPY | Facility: CLINIC | Age: 47
End: 2017-03-22

## 2017-03-22 DIAGNOSIS — M25.661 KNEE STIFFNESS, RIGHT: Primary | ICD-10-CM

## 2017-03-22 DIAGNOSIS — M25.561 RIGHT KNEE PAIN, UNSPECIFIED CHRONICITY: ICD-10-CM

## 2017-03-22 DIAGNOSIS — Z96.651 STATUS POST TOTAL RIGHT KNEE REPLACEMENT: ICD-10-CM

## 2017-03-22 PROCEDURE — 97110 THERAPEUTIC EXERCISES: CPT | Performed by: PHYSICAL THERAPIST

## 2017-03-22 PROCEDURE — 97014 ELECTRIC STIMULATION THERAPY: CPT | Performed by: PHYSICAL THERAPIST

## 2017-03-22 PROCEDURE — 97140 MANUAL THERAPY 1/> REGIONS: CPT | Performed by: PHYSICAL THERAPIST

## 2017-03-22 NOTE — PROGRESS NOTES
Physical Therapy Daily Progress Note    Subjective     Anthony Sol reports: he saw MD yesterday for staple removal, new order to increase PT frequency to 4-5x weekly.        Objective   See Exercise, Manual, and Modality Logs for complete treatment.   Incision healing well, no signs of infection.      Assessment/Plan  ROM and gait are both improving.  Will reassess next visit, appropriate to increase frequency as ordered by MD.  Progress per Plan of Care           Manual Therapy:    8     mins  48105;  Therapeutic Exercise:    45     mins  32152;     Neuromuscular Zach:    0    mins  83447;    Therapeutic Activity:     0     mins  23312;     Gait Trainin     mins  18463;     Ultrasound:     0     mins  06585;    Electrical Stimulation:    15     mins  34742 ( );    Timed Treatment:   53   mins   Total Treatment:     68   mins    Yissel Flannery PT, DPT  Physical Therapist  [place] License #[id number]

## 2017-03-23 ENCOUNTER — TREATMENT (OUTPATIENT)
Dept: PHYSICAL THERAPY | Facility: CLINIC | Age: 47
End: 2017-03-23

## 2017-03-23 DIAGNOSIS — M25.661 KNEE STIFFNESS, RIGHT: Primary | ICD-10-CM

## 2017-03-23 DIAGNOSIS — Z96.651 STATUS POST TOTAL RIGHT KNEE REPLACEMENT: ICD-10-CM

## 2017-03-23 DIAGNOSIS — M25.561 RIGHT KNEE PAIN, UNSPECIFIED CHRONICITY: ICD-10-CM

## 2017-03-23 PROCEDURE — 97110 THERAPEUTIC EXERCISES: CPT | Performed by: PHYSICAL THERAPIST

## 2017-03-23 PROCEDURE — 97014 ELECTRIC STIMULATION THERAPY: CPT | Performed by: PHYSICAL THERAPIST

## 2017-03-23 PROCEDURE — 97140 MANUAL THERAPY 1/> REGIONS: CPT | Performed by: PHYSICAL THERAPIST

## 2017-03-23 NOTE — PROGRESS NOTES
Re-Assessment / Re-Certification    Patient: Anthony Sol Jr.   : 1970  Diagnosis/ICD-10 Code:  Knee stiffness, right [M25.661]  Referring practitioner: Dr. Anthony Andino  Date of Initial Visit: 2017  Today's Date: 3/23/2017  Patient seen for 31 sessions      Subjective:   Anthony Sol reports: continued adherence with HEP, feels that ROM exercises are easier since most recent surgery.    Subjective Questionnaire: LEFS  Clinical Progress: improved  Home Program Compliance: Yes  Treatment has included: therapeutic exercise, neuromuscular re-education, manual therapy, electrical stimulation and cryotherapy    Subjective Evaluation    Pain  Current pain ratin  At best pain ratin  At worst pain ratin         Objective     Passive Range of Motion     Right Knee   Flexion: 89 degrees   Extension: 5 degrees     Patellar Mobility     Right Knee Hypomobile in the medial, lateral, superior and inferior patellar tendon(s).     Additional Patellar Mobility Details  Improving with manual therapy     Assessment/Plan  Progress toward previous goals: Partially Met    Short Term Goals: 4 weeks. Patient will:  1. Be independent with initial HEP (MET)  2. Be instructed in posture and body mechanics (MET)  3. Report pain of </= 3 with all daily activities (NOT MET)  4. Demonstrate Right knee ROM 5-100 degrees (NOT MET)  5. Demonstrate normal patellar mobility (NOT MET)      Long Term Goals: 6-8 weeks. Patient will:  1. Demonstrate improved Bilateral lower extremity MMT of >/= 4+/5 (NOT MET)  2. Demonstrate Right knee ROM 0-125 degrees for independence with ADL's and return to recreational activities. (NOT MET)  3. Report pain of </= 0/10 with all daily activities (NOT MET)  4. Ambulate with normal symmetrical gait without need for AD (NOT MET)  5. LEFS >/= 75/80 (NOT MET)  6. Be independent with long term HEP (NOT MET)  7. Return to work (NOT MET)      Recommendations: Continue with recommendations increase  frequency to 5x/week as ordered by MD  Timeframe: 3 months  Prognosis to achieve goals: good    PT Signature: Yissel Flannery PT, DPT                         Physical Therapist                         [place] License #[id number]    Manual Therapy:    12     mins  36786;  Therapeutic Exercise:    45     mins  26637;     Neuromuscular Zach:    0    mins  46848;    Therapeutic Activity:     0     mins  87353;     Gait Trainin     mins  75451;     Ultrasound:     0     mins  38453;    Electrical Stimulation:    15     mins  27338 ( );    Timed Treatment:   57   mins   Total Treatment:     72   mins

## 2017-03-24 ENCOUNTER — TREATMENT (OUTPATIENT)
Dept: PHYSICAL THERAPY | Facility: CLINIC | Age: 47
End: 2017-03-24

## 2017-03-24 DIAGNOSIS — Z96.651 STATUS POST TOTAL RIGHT KNEE REPLACEMENT: ICD-10-CM

## 2017-03-24 DIAGNOSIS — M25.661 KNEE STIFFNESS, RIGHT: Primary | ICD-10-CM

## 2017-03-24 DIAGNOSIS — M25.561 RIGHT KNEE PAIN, UNSPECIFIED CHRONICITY: ICD-10-CM

## 2017-03-24 PROCEDURE — 97110 THERAPEUTIC EXERCISES: CPT | Performed by: PHYSICAL THERAPIST

## 2017-03-24 PROCEDURE — 97014 ELECTRIC STIMULATION THERAPY: CPT | Performed by: PHYSICAL THERAPIST

## 2017-03-24 PROCEDURE — 97140 MANUAL THERAPY 1/> REGIONS: CPT | Performed by: PHYSICAL THERAPIST

## 2017-03-24 NOTE — PROGRESS NOTES
Physical Therapy Daily Progress Note    Subjective     Anthony Sol reports: no new complaints      Objective   See Exercise, Manual, and Modality Logs for complete treatment.       Assessment/Plan  Tolerated well.  Requires cueing for knee ROM during standing activities, to avoid hip hike  Progress per Plan of Care           Manual Therapy:    12     mins  78057;  Therapeutic Exercise:    48     mins  40193;     Neuromuscular Zach:    0    mins  14500;    Therapeutic Activity:     0     mins  87906;     Gait Trainin     mins  82753;     Ultrasound:     0     mins  75982;    Electrical Stimulation:    15     mins  03605 ( );    Timed Treatment:   60   mins   Total Treatment:     75   mins    Yissel Flannery PT, DPT  Physical Therapist  [place] License #[id number]

## 2017-03-27 ENCOUNTER — TREATMENT (OUTPATIENT)
Dept: PHYSICAL THERAPY | Facility: CLINIC | Age: 47
End: 2017-03-27

## 2017-03-27 DIAGNOSIS — Z96.651 STATUS POST TOTAL RIGHT KNEE REPLACEMENT: ICD-10-CM

## 2017-03-27 DIAGNOSIS — M25.561 RIGHT KNEE PAIN, UNSPECIFIED CHRONICITY: ICD-10-CM

## 2017-03-27 DIAGNOSIS — M25.661 KNEE STIFFNESS, RIGHT: Primary | ICD-10-CM

## 2017-03-27 PROCEDURE — 97140 MANUAL THERAPY 1/> REGIONS: CPT | Performed by: PHYSICAL THERAPIST

## 2017-03-27 PROCEDURE — 97110 THERAPEUTIC EXERCISES: CPT | Performed by: PHYSICAL THERAPIST

## 2017-03-27 PROCEDURE — 97014 ELECTRIC STIMULATION THERAPY: CPT | Performed by: PHYSICAL THERAPIST

## 2017-03-27 NOTE — PROGRESS NOTES
Physical Therapy Daily Progress Note    Subjective     Anthony Sol reports: quad and knee feel tight      Objective   See Exercise, Manual, and Modality Logs for complete treatment.       Assessment/Plan  Significant restriction in soft tissue surrounding knee, which is limiting ROM.  Responded well to initiation of STM, will work closer to scar as healing progresses.  Progress per Plan of Care           Manual Therapy:    15     mins  18585;  Therapeutic Exercise:    40     mins  27555;     Neuromuscular Zach:    0    mins  49601;    Therapeutic Activity:     0     mins  34065;     Gait Trainin     mins  93283;     Ultrasound:     0     mins  09435;    Electrical Stimulation:    15     mins  31014 ( );    Timed Treatment:   55   mins   Total Treatment:     70   mins    Yissel Flannery PT, DPT  Physical Therapist  [place] License #[id number]

## 2017-03-28 ENCOUNTER — TREATMENT (OUTPATIENT)
Dept: PHYSICAL THERAPY | Facility: CLINIC | Age: 47
End: 2017-03-28

## 2017-03-28 DIAGNOSIS — M25.661 KNEE STIFFNESS, RIGHT: Primary | ICD-10-CM

## 2017-03-28 DIAGNOSIS — Z96.651 STATUS POST TOTAL RIGHT KNEE REPLACEMENT: ICD-10-CM

## 2017-03-28 DIAGNOSIS — M25.561 RIGHT KNEE PAIN, UNSPECIFIED CHRONICITY: ICD-10-CM

## 2017-03-28 PROCEDURE — 97110 THERAPEUTIC EXERCISES: CPT | Performed by: PHYSICAL THERAPIST

## 2017-03-28 PROCEDURE — 97014 ELECTRIC STIMULATION THERAPY: CPT | Performed by: PHYSICAL THERAPIST

## 2017-03-28 PROCEDURE — 97140 MANUAL THERAPY 1/> REGIONS: CPT | Performed by: PHYSICAL THERAPIST

## 2017-03-28 NOTE — PROGRESS NOTES
Physical Therapy Daily Progress Note    Subjective     Anthony Sol reports: quad massage helped his pain and ROM      Objective   See Exercise, Manual, and Modality Logs for complete treatment.       Assessment/Plan  Pt continues to have soft tissue restriction in quad and surrounding knee that limits ROM.  Improved ROM with manual therapy addressing this.  Progress per Plan of Care           Manual Therapy:    15     mins  08560;  Therapeutic Exercise:    40     mins  16730;     Neuromuscular Zach:    0    mins  16293;    Therapeutic Activity:     0     mins  20996;     Gait Trainin     mins  80830;     Ultrasound:     0     mins  32320;    Electrical Stimulation:    15     mins  11142 ( );    Timed Treatment:   55   mins   Total Treatment:     70   mins    Yissel Flannery, PT, DPT  Physical Therapist  [place] License #[id number]

## 2017-03-29 ENCOUNTER — TREATMENT (OUTPATIENT)
Dept: PHYSICAL THERAPY | Facility: CLINIC | Age: 47
End: 2017-03-29

## 2017-03-29 PROCEDURE — 97014 ELECTRIC STIMULATION THERAPY: CPT | Performed by: PHYSICAL THERAPIST

## 2017-03-29 PROCEDURE — 97110 THERAPEUTIC EXERCISES: CPT | Performed by: PHYSICAL THERAPIST

## 2017-03-29 PROCEDURE — 97140 MANUAL THERAPY 1/> REGIONS: CPT | Performed by: PHYSICAL THERAPIST

## 2017-03-29 NOTE — PROGRESS NOTES
Physical Therapy Daily Progress Note    Subjective     Anthony Sol reports: continued adherence with HEP, soft tissue mobilization of quad is decreasing pain.      Objective     Active Range of Motion     Right Knee   Flexion: 75 degrees   Extensor la degrees     Strength/Myotome Testing     Right Knee   Flexion: 3-  Extension: 3-  Quadriceps contraction: fair    Ambulation     Observational Gait   Gait: antalgic and asymmetric   Decreased walking speed, stride length, right stance time and right step length.   Right foot contact pattern: foot flat  Base of support: normal    Additional Observational Gait Details  Hip hike with LE circumduction noted as well; gait improves with cueing in clinic    Pt is currently unable to climb stairs with reciprocal pattern due to soft tissue restriction and joint ROM limitation.     LEFS 33/80 indicating 59% perceived disability    See Exercise, Manual, and Modality Logs for complete treatment.       Assessment/Plan  Gait improves with max cueing, but pt reverts to gait as above when walking out of clinic.  May benefit from treadmill for gait training.  Recommend continued PT 5x weekly at this time to aggressively address soft tissue restrictions so patient can regain normal ROM, and improve gait so he can return to normal daily activities.  Progress per Plan of Care           Manual Therapy:    17     mins  35764;  Therapeutic Exercise:    40     mins  15962;     Neuromuscular Zach:    0    mins  03359;    Therapeutic Activity:     0     mins  50212;     Gait Trainin     mins  74192;     Ultrasound:     0     mins  92747;    Electrical Stimulation:    15     mins  73635 ( );    Timed Treatment:   57   mins   Total Treatment:     72   mins    Yissel Flannery PT, DPT  Physical Therapist  [place] License #[id number]

## 2017-03-30 ENCOUNTER — TREATMENT (OUTPATIENT)
Dept: PHYSICAL THERAPY | Facility: CLINIC | Age: 47
End: 2017-03-30

## 2017-03-30 DIAGNOSIS — Z96.651 STATUS POST TOTAL RIGHT KNEE REPLACEMENT: ICD-10-CM

## 2017-03-30 DIAGNOSIS — M25.561 RIGHT KNEE PAIN, UNSPECIFIED CHRONICITY: ICD-10-CM

## 2017-03-30 DIAGNOSIS — M25.661 KNEE STIFFNESS, RIGHT: Primary | ICD-10-CM

## 2017-03-30 PROCEDURE — 97110 THERAPEUTIC EXERCISES: CPT | Performed by: PHYSICAL THERAPIST

## 2017-03-30 PROCEDURE — 97140 MANUAL THERAPY 1/> REGIONS: CPT | Performed by: PHYSICAL THERAPIST

## 2017-03-30 PROCEDURE — 97014 ELECTRIC STIMULATION THERAPY: CPT | Performed by: PHYSICAL THERAPIST

## 2017-03-30 NOTE — PROGRESS NOTES
Physical Therapy Daily Progress Note    Subjective     Anthony Sol reports: area of redness proximal scar.  Denies fever.      Objective   See Exercise, Manual, and Modality Logs for complete treatment.     Erythema proximal ~2 inches of scar.  No drainage. Pt contacts MD office while in clinic and sends picture, MD office to call in antibiotic.      Assessment/Plan  Soft tissue mobility improved in quad.  Still very point tender distal half of rectus femoris.    Progress per Plan of Care           Manual Therapy:    17     mins  23856;  Therapeutic Exercise:    40     mins  75278;     Neuromuscular Zach:    0    mins  37099;    Therapeutic Activity:     0     mins  98209;     Gait Trainin     mins  83565;     Ultrasound:     0     mins  62579;    Electrical Stimulation:    15     mins  13303 ( );    Timed Treatment:   57   mins   Total Treatment:     72   mins    Yissel Flannery PT, DPT  Physical Therapist  [place] License #[id number]

## 2017-03-31 ENCOUNTER — TREATMENT (OUTPATIENT)
Dept: PHYSICAL THERAPY | Facility: CLINIC | Age: 47
End: 2017-03-31

## 2017-03-31 DIAGNOSIS — M25.661 KNEE STIFFNESS, RIGHT: Primary | ICD-10-CM

## 2017-03-31 DIAGNOSIS — Z96.651 STATUS POST TOTAL RIGHT KNEE REPLACEMENT: ICD-10-CM

## 2017-03-31 DIAGNOSIS — M25.561 RIGHT KNEE PAIN, UNSPECIFIED CHRONICITY: ICD-10-CM

## 2017-03-31 PROCEDURE — 97014 ELECTRIC STIMULATION THERAPY: CPT | Performed by: PHYSICAL THERAPIST

## 2017-03-31 PROCEDURE — 97110 THERAPEUTIC EXERCISES: CPT | Performed by: PHYSICAL THERAPIST

## 2017-03-31 PROCEDURE — 97140 MANUAL THERAPY 1/> REGIONS: CPT | Performed by: PHYSICAL THERAPIST

## 2017-03-31 NOTE — PROGRESS NOTES
" Physical Therapy Daily Progress Note    Subjective     Anthnoy Sol reports: he walked a lot for son's [employer] tournament this morning, \"my knee is killing me\"      Objective   See Exercise, Manual, and Modality Logs for complete treatment.       Assessment/Plan  Held knee flexion manual stretching because pt could not tolerate.  Will resume next week.  Progress per Plan of Care           Manual Therapy:    12     mins  75263;  Therapeutic Exercise:    35     mins  54004;     Neuromuscular Zach:    0    mins  75598;    Therapeutic Activity:     0     mins  28895;     Gait Trainin     mins  44302;     Ultrasound:     0     mins  01648;    Electrical Stimulation:    15     mins  12453 ( );    Timed Treatment:   47   mins   Total Treatment:     62   mins    Yissel Flannery PT, DPT  Physical Therapist  [place] License #[id number]                    "

## 2017-04-07 ENCOUNTER — TREATMENT (OUTPATIENT)
Dept: PHYSICAL THERAPY | Facility: CLINIC | Age: 47
End: 2017-04-07

## 2017-04-07 DIAGNOSIS — Z96.651 STATUS POST TOTAL RIGHT KNEE REPLACEMENT: Primary | ICD-10-CM

## 2017-04-07 PROCEDURE — 97140 MANUAL THERAPY 1/> REGIONS: CPT | Performed by: PHYSICAL THERAPIST

## 2017-04-07 PROCEDURE — 97032 APPL MODALITY 1+ESTIM EA 15: CPT | Performed by: PHYSICAL THERAPIST

## 2017-04-07 PROCEDURE — 97110 THERAPEUTIC EXERCISES: CPT | Performed by: PHYSICAL THERAPIST

## 2017-04-07 NOTE — PROGRESS NOTES
Physical Therapy Daily Progress Note  39 treatments  Subjective     Anthony Sol reports: Continues to feel that he is limping significantly if he is not using his cane. He is having less pain on this date than prior visit but continues to have significant symptoms of stiffness throughout the day.         Objective   See Exercise, Manual, and Modality Logs for complete treatment.       Assessment/Plan   We discussed the cause of limp during normal ambulation gait assessment; it is apparent that at this time his gait pattern abnormalities are due to both pain and ROM restriction. Gait training was performed on treadmill allowing patient to off load LE's enough to normalize gait pattern mos of the way with VC for posture, sunil, and step length.   Patient tolerated all treatment well and was able to tolerate progressions in therapeutic exercises. Patient had discomfort with stretching but tolerated other therapy exercises well. Patient continues to need skilled therapy to improve knee ROM, functional mobility, and LE strength. Patient is continuing to show progress at this time with activity tolerance and knee ROM. Will continue to advance POC as tolerated.     Progress per Plan of Care           Manual Therapy:    12     mins  54372;  Therapeutic Exercise:    26     mins  61028;     Neuromuscular Zach:        mins  85174;    Therapeutic Activity:          mins  99244;     Gait Trainin     mins  32952;     Ultrasound:          mins  76377;    Electrical Stimulation:    15     mins  34624 ( );  Dry Needling          mins self-pay    Timed Treatment:   46   mins   Total Treatment:     65   mins    Marco Antonio Durham PT  Physical Therapist  [place] License # [id number]

## 2017-04-10 ENCOUNTER — TREATMENT (OUTPATIENT)
Dept: PHYSICAL THERAPY | Facility: CLINIC | Age: 47
End: 2017-04-10

## 2017-04-10 DIAGNOSIS — M25.561 RIGHT KNEE PAIN, UNSPECIFIED CHRONICITY: ICD-10-CM

## 2017-04-10 DIAGNOSIS — Z96.651 STATUS POST TOTAL RIGHT KNEE REPLACEMENT: Primary | ICD-10-CM

## 2017-04-10 DIAGNOSIS — M25.661 KNEE STIFFNESS, RIGHT: ICD-10-CM

## 2017-04-10 PROCEDURE — 97140 MANUAL THERAPY 1/> REGIONS: CPT | Performed by: PHYSICAL THERAPIST

## 2017-04-10 PROCEDURE — 97014 ELECTRIC STIMULATION THERAPY: CPT | Performed by: PHYSICAL THERAPIST

## 2017-04-10 PROCEDURE — 97110 THERAPEUTIC EXERCISES: CPT | Performed by: PHYSICAL THERAPIST

## 2017-04-10 NOTE — PROGRESS NOTES
Physical Therapy Daily Progress Note    Subjective     Anthony Sol reports: he did a lot over the weekend, walking and doing yard work.  Knee feels more stiff this morning than it did on Friday.  Sees MD tomorrow.      Objective     Passive Range of Motion     Right Knee   Flexion: 90 degrees      See Exercise, Manual, and Modality Logs for complete treatment.       Assessment/Plan  Improved quad flexibility with STM; joint mobility continues to be restricted but improves with manual therapy.  Pt is doing well with treadmill training, is able to walk with more consistent pattern with off loading.  Pain persists, which affects gait on flat surfaces without cane.  Pt reported feeling increased intensity with ESTIM today, no skin irritation noted after Rx.  Advised pt to monitor skin.  Hip and quad strength are improved.    Progress per Plan of Care, reassess next visit.            Manual Therapy:    20     mins  97472;  Therapeutic Exercise:    30     mins  38176;     Neuromuscular Zach:    0    mins  52760;    Therapeutic Activity:     0     mins  37150;     Gait Trainin     mins  44571;     Ultrasound:     0     mins  62167;    Electrical Stimulation:    15     mins  80052 ( );    Timed Treatment:   50   mins   Total Treatment:     65   mins    Yissel Flannery PT, DPT  Physical Therapist  [place] License #[id number]

## 2017-04-12 ENCOUNTER — TREATMENT (OUTPATIENT)
Dept: PHYSICAL THERAPY | Facility: CLINIC | Age: 47
End: 2017-04-12

## 2017-04-12 DIAGNOSIS — Z96.651 STATUS POST TOTAL RIGHT KNEE REPLACEMENT: Primary | ICD-10-CM

## 2017-04-12 DIAGNOSIS — M25.561 RIGHT KNEE PAIN, UNSPECIFIED CHRONICITY: ICD-10-CM

## 2017-04-12 DIAGNOSIS — M25.661 KNEE STIFFNESS, RIGHT: ICD-10-CM

## 2017-04-12 PROCEDURE — 97014 ELECTRIC STIMULATION THERAPY: CPT | Performed by: PHYSICAL THERAPIST

## 2017-04-12 PROCEDURE — 97110 THERAPEUTIC EXERCISES: CPT | Performed by: PHYSICAL THERAPIST

## 2017-04-12 PROCEDURE — 97140 MANUAL THERAPY 1/> REGIONS: CPT | Performed by: PHYSICAL THERAPIST

## 2017-04-12 NOTE — PROGRESS NOTES
"Re-Assessment / Re-Certification    Patient: Anthony Sol Jr.   : 1970  Diagnosis/ICD-10 Code:  Status post total right knee replacement [Z96.651]  Referring practitioner: Anthony Andino MD  Date of Initial Visit: 2017  Today's Date: 2017  Patient seen for 39 sessions      Subjective:   Anthony Sol reports: \"good days and bad days\", pain and swelling vary.  Saw MD yesterday, who advised him to continue PT and to follow up with him in four weeks.  Subjective Questionnaire: LEFS: 33/80  Clinical Progress: improved functional strength, gait, quad soft tissue restriction; mild improvement in ROM; pain is variable  Home Program Compliance: Yes  Treatment has included: therapeutic exercise, neuromuscular re-education, manual therapy, gait training, electrical stimulation and cryotherapy    Subjective Evaluation    Pain  Current pain ratin  At best pain ratin  At worst pain ratin         Objective     Active Range of Motion     Right Knee   Flexion: 80 degrees     Passive Range of Motion     Right Knee   Flexion: 91 degrees   Extension: 2 degrees     Patellar Mobility     Right Knee Patellar tendons within functional limits include the medial and lateral. Hypomobile in the superior and inferior patellar tendon(s).     Additional Patellar Mobility Details  Patellar mobility decreases when pt experiences increased swelling.    Strength/Myotome Testing     Left Hip   Planes of Motion   Flexion: 4+  Extension: 4+  Abduction: 4+    Right Hip   Planes of Motion   Flexion: 4+  Extension: 4+  Abduction: 4+    Left Knee   Flexion: 5  Extension: 5  Quadriceps contraction: good    Right Knee   Flexion: 3-  Extension: 3-  Quadriceps contraction: good    Additional Strength Details  MMT is 3-/5 since pt lacks full active ROM; pt is able to maintain right quad and hamstring contraction against max resistance in available range.    Ambulation     Observational Gait   Stride length, left step length and " right step length within functional limits. Increased left stance time and right swing time. Decreased walking speed, right stance time and left swing time.   Left foot contact pattern: heel to toe  Right foot contact pattern: heel to toe  Left arm swing: within functional limits  Right arm swing: within functional limits  Base of support: normal    Additional Observational Gait Details  Gait pattern is improving with addition of gait training on treadmill.  Pt has increased his step length on the right, demonstrates heel strike and is able to achieve improved terminal knee extension.     Assessment/Plan  Progress toward previous goals: Partially Met    Short Term Goals: 4 weeks. Patient will:  1. Be independent with initial HEP (MET)  2. Be instructed in posture and body mechanics (MET)  3. Report pain of </= 3 with all daily activities (NOT MET)  4. Demonstrate Right knee ROM 5-100 degrees (NOT MET)  5. Demonstrate normal patellar mobility (NOT MET)      Long Term Goals: 6-8 weeks. Patient will:  1. Demonstrate improved Bilateral lower extremity MMT of >/= 4+/5 (NOT MET)  2. Demonstrate Right knee ROM 0-125 degrees for independence with ADL's and return to recreational activities. (NOT MET)  3. Report pain of </= 0/10 with all daily activities (NOT MET)  4. Ambulate with normal symmetrical gait without need for AD (NOT MET)  5. LEFS >/= 75/80 (NOT MET)  6. Be independent with long term HEP (NOT MET)  7. Return to work (NOT MET)      Recommendations: Continue as planned 3x weekly for 4 weeks  Timeframe: 1 month  Prognosis to achieve goals: good    PT Signature: Yissel Flannery PT, DPT                         Physical Therapist                         [place] License #[id number]    Based upon review of the patient's progress and continued therapy plan, it is my medical opinion that Anthony Sol should continue physical therapy treatment at [facility] PHYSICAL THERAPY  [address] [place] [address]  [address] [place] [id number]  [phone].    Signature: __________________________________  Anthony Andino MD    Manual Therapy:    20     mins  10848;  Therapeutic Exercise:    40     mins  76295;     Neuromuscular Zach:    0    mins  60048;    Therapeutic Activity:     0     mins  91346;     Gait Trainin     mins  71981;     Ultrasound:     0     mins  60875;    Electrical Stimulation:    15     mins  47402 ( );    Timed Treatment:   60   mins   Total Treatment:     75   mins

## 2017-04-14 ENCOUNTER — TREATMENT (OUTPATIENT)
Dept: PHYSICAL THERAPY | Facility: CLINIC | Age: 47
End: 2017-04-14

## 2017-04-14 DIAGNOSIS — Z96.651 STATUS POST TOTAL RIGHT KNEE REPLACEMENT: Primary | ICD-10-CM

## 2017-04-14 DIAGNOSIS — M25.661 KNEE STIFFNESS, RIGHT: ICD-10-CM

## 2017-04-14 DIAGNOSIS — M25.561 RIGHT KNEE PAIN, UNSPECIFIED CHRONICITY: ICD-10-CM

## 2017-04-14 PROCEDURE — 97140 MANUAL THERAPY 1/> REGIONS: CPT | Performed by: PHYSICAL THERAPIST

## 2017-04-14 PROCEDURE — 97116 GAIT TRAINING THERAPY: CPT | Performed by: PHYSICAL THERAPIST

## 2017-04-14 PROCEDURE — 97110 THERAPEUTIC EXERCISES: CPT | Performed by: PHYSICAL THERAPIST

## 2017-04-14 PROCEDURE — 97014 ELECTRIC STIMULATION THERAPY: CPT | Performed by: PHYSICAL THERAPIST

## 2017-04-14 NOTE — PROGRESS NOTES
Physical Therapy Daily Progress Note    Subjective     Anthony Sol reports: his knee feels good today, feels like he is walking more smoothly      Objective   See Exercise, Manual, and Modality Logs for complete treatment.       Assessment/Plan  Pt demonstrates increased stance time on right, increased walking speed compared to past visits, with less lateral sway.    Progress per Plan of Care           Manual Therapy:    25     mins  31983;  Therapeutic Exercise:    20     mins  92961;     Neuromuscular Zach:    0    mins  29370;    Therapeutic Activity:     0     mins  27692;     Gait Trainin     mins  04006;     Ultrasound:     0     mins  49566;    Electrical Stimulation:    15     mins  29164 ( );    Timed Treatment:   53   mins   Total Treatment:     68   mins    Yissel Flannery, PT, DPT  Physical Therapist  [place] License #[id number]

## 2017-04-17 ENCOUNTER — TREATMENT (OUTPATIENT)
Dept: PHYSICAL THERAPY | Facility: CLINIC | Age: 47
End: 2017-04-17

## 2017-04-17 DIAGNOSIS — Z96.651 STATUS POST TOTAL RIGHT KNEE REPLACEMENT: Primary | ICD-10-CM

## 2017-04-17 DIAGNOSIS — M25.561 RIGHT KNEE PAIN, UNSPECIFIED CHRONICITY: ICD-10-CM

## 2017-04-17 DIAGNOSIS — M25.661 KNEE STIFFNESS, RIGHT: ICD-10-CM

## 2017-04-17 PROCEDURE — 97014 ELECTRIC STIMULATION THERAPY: CPT | Performed by: PHYSICAL THERAPIST

## 2017-04-17 PROCEDURE — 97110 THERAPEUTIC EXERCISES: CPT | Performed by: PHYSICAL THERAPIST

## 2017-04-17 PROCEDURE — 97140 MANUAL THERAPY 1/> REGIONS: CPT | Performed by: PHYSICAL THERAPIST

## 2017-04-17 NOTE — PROGRESS NOTES
Physical Therapy Daily Progress Note    Subjective     Anthony Slo reports: he feels like his motion is better.      Objective   See Exercise, Manual, and Modality Logs for complete treatment.   Right knee PROM flexion = 95 degrees    Assessment/Plan  Gait continues to improve.    Progress per Plan of Care           Manual Therapy:    25     mins  64293;  Therapeutic Exercise:    30     mins  18899;     Neuromuscular Zach:    0    mins  82630;    Therapeutic Activity:     0     mins  59194;     Gait Trainin     mins  69903;     Ultrasound:     0     mins  36797;    Electrical Stimulation:    15     mins  42345 ( );    Timed Treatment:   55   mins   Total Treatment:     70   mins    Yissel Flannery PT, DPT  Physical Therapist  [place] License #[id number]

## 2017-04-19 ENCOUNTER — TREATMENT (OUTPATIENT)
Dept: PHYSICAL THERAPY | Facility: CLINIC | Age: 47
End: 2017-04-19

## 2017-04-19 DIAGNOSIS — Z96.651 STATUS POST TOTAL RIGHT KNEE REPLACEMENT: Primary | ICD-10-CM

## 2017-04-19 DIAGNOSIS — M25.561 RIGHT KNEE PAIN, UNSPECIFIED CHRONICITY: ICD-10-CM

## 2017-04-19 DIAGNOSIS — M25.661 KNEE STIFFNESS, RIGHT: ICD-10-CM

## 2017-04-19 PROCEDURE — 97110 THERAPEUTIC EXERCISES: CPT | Performed by: PHYSICAL THERAPIST

## 2017-04-19 PROCEDURE — 97014 ELECTRIC STIMULATION THERAPY: CPT | Performed by: PHYSICAL THERAPIST

## 2017-04-19 PROCEDURE — 97140 MANUAL THERAPY 1/> REGIONS: CPT | Performed by: PHYSICAL THERAPIST

## 2017-04-19 NOTE — PROGRESS NOTES
Physical Therapy Daily Progress Note    Subjective     Anthony Sol reports: he feels like the swelling is decreased, so his knee is moving better.  Quad is less painful as well.      Objective   See Exercise, Manual, and Modality Logs for complete treatment.       Assessment/Plan  Progress noted on ROM and gait.  Good quad contraction with CKC exercises.  Pt may benefit from weightbearing training on force plate.  Progress per Plan of Care           Manual Therapy:    20     mins  13374;  Therapeutic Exercise:    30     mins  43107;     Neuromuscular Zach:    0    mins  01292;    Therapeutic Activity:     0     mins  80572;     Gait Trainin     mins  73744;     Ultrasound:     0     mins  77926;    Electrical Stimulation:    15     mins  09973 ( );    Timed Treatment:   50   mins   Total Treatment:     65   mins    Yissel Flannery PT, DPT  Physical Therapist  [place] License #[id number]

## 2017-04-24 ENCOUNTER — TREATMENT (OUTPATIENT)
Dept: PHYSICAL THERAPY | Facility: CLINIC | Age: 47
End: 2017-04-24

## 2017-04-24 DIAGNOSIS — M25.561 RIGHT KNEE PAIN, UNSPECIFIED CHRONICITY: ICD-10-CM

## 2017-04-24 DIAGNOSIS — M25.661 KNEE STIFFNESS, RIGHT: ICD-10-CM

## 2017-04-24 DIAGNOSIS — Z96.651 STATUS POST TOTAL RIGHT KNEE REPLACEMENT: Primary | ICD-10-CM

## 2017-04-24 PROCEDURE — 97110 THERAPEUTIC EXERCISES: CPT | Performed by: PHYSICAL THERAPIST

## 2017-04-24 PROCEDURE — 97140 MANUAL THERAPY 1/> REGIONS: CPT | Performed by: PHYSICAL THERAPIST

## 2017-04-24 PROCEDURE — 97014 ELECTRIC STIMULATION THERAPY: CPT | Performed by: PHYSICAL THERAPIST

## 2017-04-24 NOTE — PROGRESS NOTES
Physical Therapy Daily Progress Note    Subjective     Anthony Sol reports: he feels like his knee is moving better. 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      Objective   See Exercise, Manual, and Modality Logs for complete treatment.       Assessment/Plan  Able to walk on TM at 3.0 MPH.  Less lateral sway, gait is less antalgic.  Progress per Plan of Care, reassess next visit, force plate testing           Manual Therapy:    20     mins  33902;  Therapeutic Exercise:    40    mins  51818;     Neuromuscular Zach:    0    mins  98852;    Therapeutic Activity:     0     mins  46083;     Gait Trainin     mins  86801;     Ultrasound:     0     mins  42062;    Electrical Stimulation:    15     mins  76043 ( );    Timed Treatment:   60   mins   Total Treatment:     75   mins    Yissel Flannery PT, DPT  Physical Therapist  [place] License #[id number]

## 2017-04-26 ENCOUNTER — TREATMENT (OUTPATIENT)
Dept: PHYSICAL THERAPY | Facility: CLINIC | Age: 47
End: 2017-04-26

## 2017-04-26 DIAGNOSIS — Z96.651 STATUS POST TOTAL RIGHT KNEE REPLACEMENT: Primary | ICD-10-CM

## 2017-04-26 DIAGNOSIS — M25.661 KNEE STIFFNESS, RIGHT: ICD-10-CM

## 2017-04-26 DIAGNOSIS — M25.561 RIGHT KNEE PAIN, UNSPECIFIED CHRONICITY: ICD-10-CM

## 2017-04-26 PROCEDURE — 97014 ELECTRIC STIMULATION THERAPY: CPT | Performed by: PHYSICAL THERAPIST

## 2017-04-26 PROCEDURE — 97140 MANUAL THERAPY 1/> REGIONS: CPT | Performed by: PHYSICAL THERAPIST

## 2017-04-26 PROCEDURE — 97110 THERAPEUTIC EXERCISES: CPT | Performed by: PHYSICAL THERAPIST

## 2017-04-26 NOTE — PROGRESS NOTES
Re-Assessment / Re-Certification    Patient: Anthony Sol Jr.   : 1970  Diagnosis/ICD-10 Code:  Status post total right knee replacement [Z96.651]  Referring practitioner: Anthony Andino MD  Date of Initial Visit: 2017  Today's Date: 2017  Patient seen for 46 sessions      Subjective:   Anthony Sol reports: he feels stronger, activity tolerance is improved.  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    Subjective Questionnaire: LEFS: 43/80 (improved from 33/80 at reassessment on 2017)  Clinical Progress: improved  Home Program Compliance: Yes  Treatment has included: therapeutic exercise, neuromuscular re-education, manual therapy, gait training, electrical stimulation and cryotherapy    Subjective Evaluation    Pain  Current pain ratin  At best pain ratin  At worst pain ratin  Location: right knee         Objective     Active Range of Motion     Right Knee   Flexion: 85 degrees   Extension: 2 degrees     Passive Range of Motion     Right Knee   Flexion: 95 degrees   Extension: 0 degrees     Strength/Myotome Testing     Left Knee   Quadriceps contraction: good    Right Knee   Flexion: 3-  Extension: 3-  Quadriceps contraction: good    Additional Strength Details  MMT 3-/5 based on AROM limitation; pt is able to maintain quad and hamstring contraction against max resistance    Ambulation     Observational Gait   Gait: antalgic   Left stance time, left swing time, right swing time and left step length within functional limits. Decreased walking speed, stride length, right stance time and right step length.   Left foot contact pattern: heel to toe  Right foot contact pattern: heel to toe  Left arm swing: within functional limits  Right arm swing: within functional limits  Base of support: normal    Additional Observational Gait Details  Force plate testing performed this date:  1. Forward lunge: symmetrical, WFL compared  to same age peers  2. Step up and over: asymmetrical % of weight bearing R vs L  3. Squat: asymmetrical weight bearing at 60 degrees and 90 degrees that is not WFL compared to same age peers.     Assessment/Plan  Progress toward previous goals: Partially Met    Short Term Goals: 4 weeks. Patient will:  1. Be independent with initial HEP (MET)  2. Be instructed in posture and body mechanics (MET)  3. Report pain of </= 3 with all daily activities (NOT MET)  4. Demonstrate Right knee ROM 5-100 degrees (NOT MET)  5. Demonstrate normal patellar mobility (MET)      Long Term Goals: 6-8 weeks. Patient will:  1. Demonstrate improved Bilateral lower extremity MMT of >/= 4+/5 (NOT MET)  2. Demonstrate Right knee ROM 0-125 degrees for independence with ADL's and return to recreational activities. (NOT MET)  3. Report pain of </= 0/10 with all daily activities (NOT MET)  4. Ambulate with normal symmetrical gait without need for AD (NOT MET)  5. LEFS >/= 75/80 (NOT MET)  6. Be independent with long term HEP (NOT MET)  7. Return to work (MET)  8. Pt to demo force plate testing WFL compared to same age peers.      Recommendations: Continue as planned  Timeframe: 2-3x/week for 1 month  Prognosis to achieve goals: good    PT Signature: Yissel Flannery PT, DPT                         Physical Therapist                         [place] License #[id number]    Based upon review of the patient's progress and continued therapy plan, it is my medical opinion that Anthony Sol should continue physical therapy treatment at [facility]  [address] [place] [id number] [phone].    Signature: __________________________________  Anthony Andino MD    Manual Therapy:    20     mins  85168;  Therapeutic Exercise:    35     mins  34160;     Neuromuscular Zach:    0    mins  01101;    Therapeutic Activity:     0     mins  23848;     Gait Trainin     mins  97216;     Ultrasound:     0      mins  52469;    Electrical Stimulation:    15     mins  27873 ( );    Timed Treatment:   55   mins   Total Treatment:     70   mins

## 2017-04-28 ENCOUNTER — TREATMENT (OUTPATIENT)
Dept: PHYSICAL THERAPY | Facility: CLINIC | Age: 47
End: 2017-04-28

## 2017-04-28 DIAGNOSIS — M25.661 KNEE STIFFNESS, RIGHT: ICD-10-CM

## 2017-04-28 DIAGNOSIS — Z96.651 STATUS POST TOTAL RIGHT KNEE REPLACEMENT: Primary | ICD-10-CM

## 2017-04-28 DIAGNOSIS — M25.561 RIGHT KNEE PAIN, UNSPECIFIED CHRONICITY: ICD-10-CM

## 2017-04-28 PROCEDURE — 97110 THERAPEUTIC EXERCISES: CPT | Performed by: PHYSICAL THERAPIST

## 2017-04-28 PROCEDURE — 97014 ELECTRIC STIMULATION THERAPY: CPT | Performed by: PHYSICAL THERAPIST

## 2017-04-28 PROCEDURE — 97140 MANUAL THERAPY 1/> REGIONS: CPT | Performed by: PHYSICAL THERAPIST

## 2017-04-28 NOTE — PROGRESS NOTES
" Physical Therapy Daily Progress Note    Subjective     Anthony Sol reports: he is noticing improvement in activity tolerance, \"I'm able to get around better and better\"      Objective   See Exercise, Manual, and Modality Logs for complete treatment.       Assessment/Plan  Able to perform squat to 90 degrees, but requires cueing for symmetrical weight bearing.  Progress per Plan of Care           Manual Therapy:    20     mins  54656;  Therapeutic Exercise:    35     mins  20278;     Neuromuscular Zach:    0    mins  51392;    Therapeutic Activity:     0     mins  80161;     Gait Trainin     mins  46408;     Ultrasound:     0     mins  12171;    Electrical Stimulation:    15     mins  33264 ( );    Timed Treatment:   55   mins   Total Treatment:     70   mins    Yissel Flannery PT, DPT  Physical Therapist  [place] License #[id number]                    "

## 2017-05-01 ENCOUNTER — TREATMENT (OUTPATIENT)
Dept: PHYSICAL THERAPY | Facility: CLINIC | Age: 47
End: 2017-05-01

## 2017-05-01 DIAGNOSIS — Z96.651 STATUS POST TOTAL RIGHT KNEE REPLACEMENT: Primary | ICD-10-CM

## 2017-05-01 DIAGNOSIS — M25.661 KNEE STIFFNESS, RIGHT: ICD-10-CM

## 2017-05-01 DIAGNOSIS — M25.561 RIGHT KNEE PAIN, UNSPECIFIED CHRONICITY: ICD-10-CM

## 2017-05-01 PROCEDURE — 97014 ELECTRIC STIMULATION THERAPY: CPT | Performed by: PHYSICAL THERAPIST

## 2017-05-01 PROCEDURE — 97140 MANUAL THERAPY 1/> REGIONS: CPT | Performed by: PHYSICAL THERAPIST

## 2017-05-01 PROCEDURE — 97110 THERAPEUTIC EXERCISES: CPT | Performed by: PHYSICAL THERAPIST

## 2017-05-03 ENCOUNTER — TREATMENT (OUTPATIENT)
Dept: PHYSICAL THERAPY | Facility: CLINIC | Age: 47
End: 2017-05-03

## 2017-05-03 DIAGNOSIS — M25.661 KNEE STIFFNESS, RIGHT: ICD-10-CM

## 2017-05-03 DIAGNOSIS — Z96.651 STATUS POST TOTAL RIGHT KNEE REPLACEMENT: Primary | ICD-10-CM

## 2017-05-03 DIAGNOSIS — M25.561 RIGHT KNEE PAIN, UNSPECIFIED CHRONICITY: ICD-10-CM

## 2017-05-03 PROCEDURE — 97110 THERAPEUTIC EXERCISES: CPT | Performed by: PHYSICAL THERAPIST

## 2017-05-03 PROCEDURE — 97140 MANUAL THERAPY 1/> REGIONS: CPT | Performed by: PHYSICAL THERAPIST

## 2017-05-03 PROCEDURE — 97014 ELECTRIC STIMULATION THERAPY: CPT | Performed by: PHYSICAL THERAPIST

## 2017-05-08 ENCOUNTER — TREATMENT (OUTPATIENT)
Dept: PHYSICAL THERAPY | Facility: CLINIC | Age: 47
End: 2017-05-08

## 2017-05-08 DIAGNOSIS — Z96.651 STATUS POST TOTAL RIGHT KNEE REPLACEMENT: Primary | ICD-10-CM

## 2017-05-08 DIAGNOSIS — M25.661 KNEE STIFFNESS, RIGHT: ICD-10-CM

## 2017-05-08 DIAGNOSIS — M25.561 RIGHT KNEE PAIN, UNSPECIFIED CHRONICITY: ICD-10-CM

## 2017-05-08 PROCEDURE — 97014 ELECTRIC STIMULATION THERAPY: CPT | Performed by: PHYSICAL THERAPIST

## 2017-05-08 PROCEDURE — 97140 MANUAL THERAPY 1/> REGIONS: CPT | Performed by: PHYSICAL THERAPIST

## 2017-05-12 ENCOUNTER — TREATMENT (OUTPATIENT)
Dept: PHYSICAL THERAPY | Facility: CLINIC | Age: 47
End: 2017-05-12

## 2017-05-12 DIAGNOSIS — M25.561 RIGHT KNEE PAIN, UNSPECIFIED CHRONICITY: ICD-10-CM

## 2017-05-12 DIAGNOSIS — M25.661 KNEE STIFFNESS, RIGHT: ICD-10-CM

## 2017-05-12 DIAGNOSIS — Z96.651 STATUS POST TOTAL RIGHT KNEE REPLACEMENT: Primary | ICD-10-CM

## 2017-05-12 PROCEDURE — 97014 ELECTRIC STIMULATION THERAPY: CPT | Performed by: PHYSICAL THERAPIST

## 2017-05-12 PROCEDURE — 97140 MANUAL THERAPY 1/> REGIONS: CPT | Performed by: PHYSICAL THERAPIST

## 2017-06-15 DIAGNOSIS — Z80.0 FAMILY HISTORY OF COLON CANCER: Primary | ICD-10-CM

## 2017-06-15 NOTE — H&P
Date: 6/15/2017     Patient: Anthony Sol Jr.    : 1970   [id number]     CC:  Screening Colonoscopy, with family history of colon cancer    History:   The patient is a 47 y.o. male of Myranda Levine MD  who presents to discuss screening colonoscopy with family history of colon cancer. The patient currently has no complaints.  Patient denies any history of nausea, abdominal pain, weight loss, change in bowel habits or rectal bleeding.  Patient denies melena, hematochezia or BRBPR.  No family history of ulcerative colitis, Crohn's disease or familial polyposis.    The following portions of the patient's history were reviewed and updated as appropriate: allergies, current medications, past family history, past medical history, past social history, past surgical history and problem list.    Past Medical History:   Diagnosis Date   • Adhesive capsulitis of right knee    • Arthritis    • Diabetes mellitus    • Diverticulosis    • GERD (gastroesophageal reflux disease)    • H/O blood clots     rt calf  following rt total knee surgery 2016 was on xarelto now off on aspirin only   • Heart murmur    • History of hepatitis     AS A CHILD food related   • Hyperlipidemia    • Hypertension    • Knee pain, right    • Low back pain       Past Surgical History:   Procedure Laterality Date   • ADENOIDECTOMY     • BACK SURGERY     • JOINT MANIPULATION Right 2017    Procedure: MANIPULATION OF RT KNEE;  Surgeon: Anthony Andino MD;  Location: [facility];  Service:    • KNEE ARTHROSCOPY Right     X3   • MN REVISE KNEE JOINT REPLACE,1 PART Right 3/6/2017    Procedure: RIGHT TOTAL KNEE ARTHROPLASTY REVISION WITH LEFT KNEE STERIOID INJECTION;  Surgeon: Anthony Andino MD;  Location: [facility] OR;  Service: Orthopedics   • MN TOTAL KNEE ARTHROPLASTY Right 12/15/2016    Procedure: RT TOTAL KNEE ARTHROPLASTY;  Surgeon: Anthony Andino MD;  Location: [facility] OR;  Service: Orthopedics   • TONSILLECTOMY        Medications:   (Not in a hospital admission)  Allergies: Review of patient's allergies indicates no known allergies.   Social History:  Social History     Social History   • Marital status:      Spouse name: N/A   • Number of children: N/A   • Years of education: N/A     Social History Main Topics   • Smoking status: Never Smoker   • Smokeless tobacco: Never Used   • Alcohol use Yes      Comment: social   • Drug use: Yes     Special: Hydrocodone      Comment: prescribed by MD   • Sexual activity: Not Asked     Other Topics Concern   • None     Social History Narrative      Family History   Problem Relation Age of Onset   • Diabetes Mother    • Hypertension Mother    • Colon polyps Mother    • Cancer Paternal Uncle      colon   • Hypertension Father    • No Known Problems Brother         Review of Systems:   General: Patient reports good health  Eyes: No eye problems  Ears, nose, mouth and throat: No rhinitis, no hearing problems, no chronic cough  Cardiovascular/heart: Denies palpitations, syncope or chest pain  Respiratory/lung: Denies shortness of breath, hemoptysis, or dyspnea on exertion   Genital/urinary: No frequency, hematuria or dysuria  Hematological/lymphatic: Denies anemia or other problems  Musculoskeletal: No joint pain, no defects  Skin: No psoriasis or other skin issues  Neurological: No seizures or other neurological problems  Psychiatric: None  Endocrine: Negative  Gastro-intestinal: No constipation, no diarrhea, no melena, no hematochezia    Physical Examination:  General: Alert and oriented x3 in no acute distress  HEENT: Normal cephalic, atraumatic, PERRLA, EOMI, sclera anicteric, moist mucous membranes, neck is supple, no JVD, no carotid bruits, no thyromegaly no adenopathy  Chest: CTA and percussion  CVA: RRR, normal S1-S2, no murmurs, no gallops or rubs  Abdomen: Positive BS, soft, nondistended, nontender, no rebound, no guarding, no hernias, no organomegaly and no  masses  Extremities: Full range of motion, no clubbing, no cyanosis or edema  Neurovascular: Grossly intact    Impression:  47 y.o. male for screening colonoscopy with family history of colon cancer.    Plan:  Patient is presenting for screening colonoscopy with family history of colon cancer.  I have recommended that the patient undergo a screening colonoscopy in accordance of American Cancer Society's guidelines.  I have discussed this procedure in detail with the patient.  I have discussed the risks, benefits and alternatives.  I have discussed the risk of anesthesia, bleeding and perforation.  Patient understands these risks, benefits and alternatives and wishes to proceed.      Marycruz Feliciano MD  General, Minimally Invasive and Endoscopic Surgery  [facility]    [address], [address]  [place], [place], [address]  P: [phone]  F: [phone]    CC: Myranda Levine MD

## 2017-07-10 DIAGNOSIS — I10 HYPERTENSION, ESSENTIAL: ICD-10-CM

## 2017-07-10 RX ORDER — METOPROLOL TARTRATE 50 MG/1
TABLET, FILM COATED ORAL
Qty: 180 TABLET | Refills: 3 | Status: SHIPPED | OUTPATIENT
Start: 2017-07-10 | End: 2017-10-05 | Stop reason: SDUPTHER

## 2017-08-02 DIAGNOSIS — E11.8 UNCONTROLLED TYPE 2 DIABETES MELLITUS WITH COMPLICATION, UNSPECIFIED LONG TERM INSULIN USE STATUS: ICD-10-CM

## 2017-08-02 DIAGNOSIS — E11.65 UNCONTROLLED TYPE 2 DIABETES MELLITUS WITH COMPLICATION, UNSPECIFIED LONG TERM INSULIN USE STATUS: ICD-10-CM

## 2017-08-02 DIAGNOSIS — E78.01 FAMILIAL HYPERCHOLESTEROLEMIA: ICD-10-CM

## 2017-08-02 RX ORDER — FENOFIBRATE 145 MG/1
145 TABLET, COATED ORAL DAILY
Qty: 90 TABLET | Refills: 3 | Status: SHIPPED | OUTPATIENT
Start: 2017-08-02 | End: 2017-10-05 | Stop reason: SDUPTHER

## 2017-08-02 RX ORDER — GLIPIZIDE 10 MG/1
10 TABLET, FILM COATED, EXTENDED RELEASE ORAL EVERY MORNING
Qty: 90 TABLET | Refills: 3 | Status: SHIPPED | OUTPATIENT
Start: 2017-08-02 | End: 2017-10-05 | Stop reason: SDUPTHER

## 2017-08-08 DIAGNOSIS — IMO0002 TYPE 2 DIABETES MELLITUS, UNCONTROLLED: ICD-10-CM

## 2017-08-24 ENCOUNTER — OFFICE VISIT (OUTPATIENT)
Dept: FAMILY MEDICINE CLINIC | Facility: CLINIC | Age: 47
End: 2017-08-24

## 2017-08-24 VITALS
DIASTOLIC BLOOD PRESSURE: 90 MMHG | BODY MASS INDEX: 37.33 KG/M2 | WEIGHT: 275.6 LBS | HEART RATE: 68 BPM | TEMPERATURE: 98 F | SYSTOLIC BLOOD PRESSURE: 125 MMHG | HEIGHT: 72 IN | OXYGEN SATURATION: 97 %

## 2017-08-24 DIAGNOSIS — E78.49 OTHER HYPERLIPIDEMIA: ICD-10-CM

## 2017-08-24 DIAGNOSIS — IMO0001 UNCONTROLLED TYPE 2 DIABETES MELLITUS WITHOUT COMPLICATION, WITHOUT LONG-TERM CURRENT USE OF INSULIN: Primary | ICD-10-CM

## 2017-08-24 DIAGNOSIS — Z23 ENCOUNTER FOR IMMUNIZATION: ICD-10-CM

## 2017-08-24 DIAGNOSIS — I10 ESSENTIAL HYPERTENSION: ICD-10-CM

## 2017-08-24 PROCEDURE — 90471 IMMUNIZATION ADMIN: CPT | Performed by: FAMILY MEDICINE

## 2017-08-24 PROCEDURE — 99213 OFFICE O/P EST LOW 20 MIN: CPT | Performed by: FAMILY MEDICINE

## 2017-08-24 PROCEDURE — 90715 TDAP VACCINE 7 YRS/> IM: CPT | Performed by: FAMILY MEDICINE

## 2017-08-24 NOTE — TELEPHONE ENCOUNTER
I received a refill request for Xerelto originally prescribed by Dr. Andino.  Does he want him to continue to take this?  Why is he on it?  Did he have a blood clot?

## 2017-08-24 NOTE — PROGRESS NOTES
Subjective   Anthony Sol Jr. is a 47 y.o. male. CC: diabetes    History of Present Illness     Anthony is a 47 year old male who comes in today for his diabetes check.    He states that he is doing well.  Has recovered from the total knee replacement.  He is scheduled to have colonoscopy on Monday, 8/28.  Has not had a tetanus shot in many years.  Agreeable to having one today.  He is due for diabetic eye exam and agrees to schedule that.        The following portions of the patient's history were reviewed and updated as appropriate: allergies, current medications, past medical history, past social history, past surgical history and problem list.    Review of Systems   Constitutional: Negative.  Negative for fatigue and unexpected weight change.   HENT: Negative.  Negative for congestion.    Respiratory: Negative.  Negative for cough, shortness of breath and wheezing.    Cardiovascular: Negative.  Negative for chest pain, palpitations and leg swelling.   Genitourinary: Negative.  Negative for difficulty urinating and hematuria.   Musculoskeletal: Positive for arthralgias. Negative for neck pain.   Skin: Negative.  Negative for rash.   Neurological: Negative for dizziness, light-headedness and headaches.   Psychiatric/Behavioral: Negative.  Negative for dysphoric mood and sleep disturbance. The patient is not nervous/anxious.        Objective   Physical Exam   Constitutional: He is oriented to person, place, and time. He appears well-developed and well-nourished.   Neck: Normal range of motion. Neck supple.   Cardiovascular: Normal rate, regular rhythm and normal heart sounds.    Pulmonary/Chest: Effort normal and breath sounds normal.    Anthony had a diabetic foot exam performed today.   During the foot exam he had a monofilament test performed.    Neurological Sensory Findings - Unaltered hot/cold right ankle/foot discrimination and unaltered hot/cold left ankle/foot discrimination.    Vascular Status -  His  exam exhibits right foot vasculature normal. His exam exhibits no right foot edema. His exam exhibits left foot vasculature normal. His exam exhibits no left foot edema.   Skin Integrity  -  His right foot skin is intact.   He hasno right foot ulcer and non-callous right foot.    Anthony 's left foot skin is intact.  He has no left foot ulcer and non-callous left foot..  Neurological: He is alert and oriented to person, place, and time.   Skin: Skin is warm and dry. No rash noted.   Psychiatric: He has a normal mood and affect. His behavior is normal.   Nursing note and vitals reviewed.    Vitals:    08/24/17 0927   BP: 125/90   Pulse: 68   Temp: 98 °F (36.7 °C)   SpO2: 97%     Assessment/Plan   Problems Addressed this Visit        Cardiovascular and Mediastinum    Hyperlipidemia    Relevant Orders    Comprehensive metabolic panel    Lipid Panel With LDL/HDL Ratio    Essential hypertension       Endocrine    Uncontrolled type 2 diabetes mellitus without complication, without long-term current use of insulin - Primary    Relevant Orders    Hemoglobin A1c    MicroAlbumin, Urine, Random      Other Visit Diagnoses     Encounter for immunization        Relevant Orders    Tdap Vaccine Greater Than or Equal To 8yo IM (Completed)        After obtaining informed consent, the immunization is given by Nicol JEFFERS

## 2017-08-25 LAB
ALBUMIN SERPL-MCNC: 5.1 G/DL (ref 3.5–5.2)
ALBUMIN/GLOB SERPL: 2 G/DL
ALP SERPL-CCNC: 49 U/L (ref 39–117)
ALT SERPL-CCNC: 39 U/L (ref 1–41)
AST SERPL-CCNC: 19 U/L (ref 1–40)
BILIRUB SERPL-MCNC: 0.4 MG/DL (ref 0.1–1.2)
BUN SERPL-MCNC: 18 MG/DL (ref 6–20)
BUN/CREAT SERPL: 15 (ref 7–25)
CALCIUM SERPL-MCNC: 10.4 MG/DL (ref 8.6–10.5)
CHLORIDE SERPL-SCNC: 99 MMOL/L (ref 98–107)
CHOLEST SERPL-MCNC: 191 MG/DL (ref 0–200)
CO2 SERPL-SCNC: 27.7 MMOL/L (ref 22–29)
CREAT SERPL-MCNC: 1.2 MG/DL (ref 0.76–1.27)
GLOBULIN SER CALC-MCNC: 2.6 GM/DL
GLUCOSE SERPL-MCNC: 125 MG/DL (ref 65–99)
HBA1C MFR BLD: 6.64 % (ref 4.8–5.6)
HDLC SERPL-MCNC: 35 MG/DL (ref 40–60)
LDLC SERPL CALC-MCNC: 117 MG/DL (ref 0–100)
LDLC/HDLC SERPL: 3.34 {RATIO}
MICROALBUMIN UR-MCNC: <3 UG/ML
POTASSIUM SERPL-SCNC: 4.2 MMOL/L (ref 3.5–5.2)
PROT SERPL-MCNC: 7.7 G/DL (ref 6–8.5)
SODIUM SERPL-SCNC: 142 MMOL/L (ref 136–145)
TRIGL SERPL-MCNC: 195 MG/DL (ref 0–150)
VLDLC SERPL CALC-MCNC: 39 MG/DL (ref 5–40)

## 2017-08-28 ENCOUNTER — TELEPHONE (OUTPATIENT)
Dept: FAMILY MEDICINE CLINIC | Facility: CLINIC | Age: 47
End: 2017-08-28

## 2017-08-28 ENCOUNTER — OUTSIDE FACILITY SERVICE (OUTPATIENT)
Dept: SURGERY | Facility: CLINIC | Age: 47
End: 2017-08-28

## 2017-08-28 PROCEDURE — 45378 DIAGNOSTIC COLONOSCOPY: CPT | Performed by: SURGERY

## 2017-08-28 NOTE — TELEPHONE ENCOUNTER
----- Message from Nicol Arteaga MA sent at 8/28/2017 10:16 AM EDT -----  Informed pt. Pt said that he is fine with starting a statin      Local or mail order  30 or 90 day?

## 2017-08-29 ENCOUNTER — TELEPHONE (OUTPATIENT)
Dept: FAMILY MEDICINE CLINIC | Facility: CLINIC | Age: 47
End: 2017-08-29

## 2017-08-29 DIAGNOSIS — E78.5 HYPERLIPIDEMIA, UNSPECIFIED HYPERLIPIDEMIA TYPE: Primary | ICD-10-CM

## 2017-08-29 RX ORDER — ATORVASTATIN CALCIUM 40 MG/1
40 TABLET, FILM COATED ORAL DAILY
Qty: 90 TABLET | Refills: 3 | Status: SHIPPED | OUTPATIENT
Start: 2017-08-29 | End: 2017-10-05 | Stop reason: SDUPTHER

## 2017-08-29 RX ORDER — MELOXICAM 15 MG/1
15 TABLET ORAL DAILY
Qty: 90 TABLET | Refills: 3 | Status: SHIPPED | OUTPATIENT
Start: 2017-08-29 | End: 2017-10-05 | Stop reason: SDUPTHER

## 2017-09-05 ENCOUNTER — TELEPHONE (OUTPATIENT)
Dept: FAMILY MEDICINE CLINIC | Facility: CLINIC | Age: 47
End: 2017-09-05

## 2017-09-05 NOTE — TELEPHONE ENCOUNTER
Pt called and said that you were supposed to send over pentazocine and it never got sent to pharmacy?

## 2017-09-05 NOTE — TELEPHONE ENCOUNTER
The message I received last week was a request to refill Meloxicam for pain and that is what I sent in to his pharmacy.    The medication he is requesting is a narcotic which cannot be sent over the computer.  If that is what he is requesting let him know that I am no longer prescribing pain medication.

## 2017-09-11 DIAGNOSIS — K21.9 GASTROESOPHAGEAL REFLUX DISEASE WITHOUT ESOPHAGITIS: ICD-10-CM

## 2017-09-11 RX ORDER — OMEPRAZOLE 20 MG/1
CAPSULE, DELAYED RELEASE ORAL
Qty: 90 CAPSULE | Refills: 3 | Status: SHIPPED | OUTPATIENT
Start: 2017-09-11 | End: 2018-02-06 | Stop reason: SDUPTHER

## 2017-10-03 NOTE — PROGRESS NOTES
Acute Care - Physical Therapy Treatment Note  [facility]     Patient Name: Anthony Sol Jr.  : 1970  MRN: [id number]  Today's Date: 3/7/2017             Admit Date: 3/6/2017    Visit Dx:    ICD-10-CM ICD-9-CM   1. Difficulty walking R26.2 719.7   2. Painful total knee replacement T84.84XA 996.77    Z96.659 V43.65     Patient Active Problem List   Diagnosis   • Type 2 diabetes mellitus, uncontrolled   • Hyperlipidemia   • Essential hypertension   • DJD (degenerative joint disease) of knee   • Type 2 diabetes mellitus with hyperglycemia   • Hyponatremia   • Acute kidney injury   • Painful total knee replacement               Adult Rehabilitation Note       17 1623 17 1135       Rehab Assessment/Intervention    Discipline physical therapist  -MS physical therapist  -MS     Document Type therapy note (daily note)  -MS therapy note (daily note)  -MS     Subjective Information agree to therapy;complains of;pain  -MS agree to therapy;complains of;fatigue;pain  -MS     Patient Effort, Rehab Treatment  good  -MS     Symptoms Noted During/After Treatment fatigue  -MS fatigue;increased pain  -MS     Precautions/Limitations fall precautions  -MS fall precautions  -MS     Recorded by [MS] Ayan Stark, PT [MS] Ayan Stark, PT     Pain Assessment    Pain Assessment 0-10  -MS 0-10  -MS     Pain Score 4  -MS 6  -MS     Post Pain Score 4  -MS 6  -MS     Pain Type Acute pain;Surgical pain  -MS Acute pain;Surgical pain  -MS     Pain Location Knee  -MS Knee  -MS     Pain Orientation Right  -MS Right  -MS     Pain Intervention(s) Medication (See MAR);Cold applied;Repositioned;Elevated;Rest  -MS Medication (See MAR);Cold applied;Repositioned;Elevated;Rest  -MS     Recorded by [MS] Ayan Stark, PT [MS] Ayan Stark, PT     Cognitive Assessment/Intervention    Current Cognitive/Communication Assessment  functional  -MS     Orientation Status  oriented x 4  -MS     Follows Commands/Answers  Questions  100% of the time  -MS     Personal Safety  WNL/WFL  -MS     Personal Safety Interventions  fall prevention program maintained;gait belt;nonskid shoes/slippers when out of bed;supervised activity  -MS     Recorded by  [MS] Ayan Stark PT     ROM (Range of Motion)    General ROM Detail  Right knee AROM (-8, 78)  -MS     Recorded by  [MS] Ayan Stark PT     Bed Mobility, Assessment/Treatment    Bed Mob, Sit to Supine, [place] contact guard assist  -MS      Bed Mobility, Comment  Up in chair  -MS     Recorded by [MS] Ayan Stark PT [MS] Ayan Stark PT     Transfer Assessment/Treatment    Transfers, Sit-Stand [place] contact guard assist  -MS contact guard assist  -MS     Transfers, Stand-Sit [place] contact guard assist  -MS contact guard assist  -MS     Transfers, Sit-Stand-Sit, Assist Device rolling walker  -MS rolling walker  -MS     Recorded by [MS] Ayan Stark PT [MS] Ayan Stark PT     Gait Assessment/Treatment    Gait, [place] Level contact guard assist  -MS contact guard assist  -MS     Gait, Assistive Device rolling walker  -MS rolling walker  -MS     Gait, Distance (Feet) 85  -MS 55  -MS     Gait, Gait Deviations right:;antalgic;sunil decreased;forward flexed posture  -MS right:;antalgic;sunil decreased;forward flexed posture;step length decreased  -MS     Gait, Comment  Verbal/tactile cues for posture correction.  -MS     Recorded by [MS] Ayan Stark PT [MS] Ayan Stark PT     Therapy Exercises    Exercise Protocols total knee  -MS total knee  -MS     Total Knee Exercises right:;20 reps;completed protocol  -MS right:;15 reps;completed protocol  -MS     Recorded by [MS] Ayan Stark PT [MS] Ayan Stark PT     Positioning and Restraints    Pre-Treatment Position sitting in chair/recliner  -MS sitting in chair/recliner  -MS     Post Treatment Position bed  -MS chair  -MS     In Bed notified nsg;supine;call light within  reach;encouraged to call for assist;with family/caregiver   Ice pack to Right knee  -MS      In Chair  notified nsg;reclined;sitting;call light within reach;encouraged to call for assist   Ice pack to Right knee.  -MS     Recorded by [MS] Ayan Stark, PT [MS] Ayan Stark, PT       User Key  (r) = Recorded By, (t) = Taken By, (c) = Cosigned By    Initials Name Effective Dates    MS Ayan ODEN Mi, PT 12/01/15 -                 IP PT Goals       03/06/17 1444          Bed Mobility PT LTG    Bed Mobility PT LTG, Date Established 03/06/17  -KH      Bed Mobility PT LTG, Time to Achieve 4 days  -KH      Bed Mobility PT LTG, Activity Type all bed mobility  -KH      Bed Mobility PT LTG, [place] Level independent  -KH      Transfer Training PT LTG    Transfer Training PT LTG, Date Established 03/06/17  -KH      Transfer Training PT LTG, Time to Achieve 4 days  -KH      Transfer Training PT LTG, Activity Type all transfers  -KH      Transfer Training PT LTG, [place] Level supervision required  -KH      Transfer Training PT LTG, Assist Device walker, rolling  -KH      Gait Training PT LTG    Gait Training Goal PT LTG, Date Established 03/06/17  -KH      Gait Training Goal PT LTG, Time to Achieve 4 days  -KH      Gait Training Goal PT LTG, [place] Level supervision required  -KH      Gait Training Goal PT LTG, Assist Device walker, rolling  -KH      Gait Training Goal PT LTG, Distance to Achieve 150 ft  -KH      Stair Training PT LTG    Stair Training Goal PT LTG, Date Established 03/06/17  -KH      Stair Training Goal PT LTG, Time to Achieve 4 days  -KH      Stair Training Goal PT LTG, Number of Steps 3  -KH      Stair Training Goal PT LTG, [place] Level contact guard assist  -KH      Stair Training Goal PT LTG, Assist Device 1 handrail  -KH      Range of Motion PT LTG    Range of Motion Goal PT LTG, Date Established 03/06/17  -KH      Range of Motion Goal PT LTG, Time to Achieve 4 days  -KH       Range fo Motion Goal PT LTG, Joint R knee  -KH      Range of Motion Goal PT LTG, AROM Measure 5-85  -KH      Patient Education PT LTG    Patient Education PT LTG, Date Established 03/06/17  -      Patient Education PT LTG, Time to Achieve 4 days  -KH      Patient Education PT LTG, Education Type HEP  -KH      Patient Education PT LTG, Education Understanding demonstrate adequately  -        User Key  (r) = Recorded By, (t) = Taken By, (c) = Cosigned By    Initials Name Provider Type     Elvia Morales, PT Physical Therapist          Physical Therapy Education     Title: PT OT SLP Therapies (Done)     Topic: Physical Therapy (Done)     Point: Mobility training (Done)    Learning Progress Summary    Learner Readiness Method Response Comment Documented by Status   Patient Acceptance E,D NR,  MS 03/07/17 1137 Done    Acceptance E [facility] 03/06/17 1443 Done               Point: Home exercise program (Done)    Learning Progress Summary    Learner Readiness Method Response Comment Documented by Status   Patient Acceptance E,D NR,SKYLER  MS 03/07/17 1137 Done    Acceptance E [facility] 03/06/17 1443 Done                      User Key     Initials Effective Dates Name Provider Type [facility] 05/18/15 -  Elvia Morales, PT Physical Therapist PT    MS 12/01/15 -  Ayan Stark, PT Physical Therapist PT                    PT Recommendation and Plan  Planned Therapy Interventions: bed mobility training, gait training, home exercise program, patient/family education, ROM (Range of Motion), transfer training, strengthening, stair training  PT Frequency: 2 times/day  Plan of Care Review  Plan Of Care Reviewed With: patient  Progress: improving  Outcome Summary/Follow up Plan: Improved tolerance to functional activity this PM with an increase in gait distance and progression of ther. ex. protocol.          Outcome Measures       03/07/17 1600 03/07/17 1100 03/06/17 1445    How much help from another  person do you currently need...    Turning from your back to your side while in flat bed without using bedrails? 4  -MS 3  -MS 3  -KH    Moving from lying on back to sitting on the side of a flat bed without bedrails? 3  -MS 3  -MS 3  -KH    Moving to and from a bed to a chair (including a wheelchair)? 3  -MS 3  -MS 3  -KH    Standing up from a chair using your arms (e.g., wheelchair, bedside chair)? 3  -MS 3  -MS 3  -KH    Climbing 3-5 steps with a railing? 3  -MS 3  -MS 2  -KH    To walk in hospital room? 3  -MS 3  -MS 3  -KH    AM-PAC 6 Clicks Score 19  -MS 18  -MS 17  -KH    Functional Assessment    Outcome Measure Options AM-PAC 6 Clicks Basic Mobility (PT)  -MS AM-PAC 6 Clicks Basic Mobility (PT)  -MS AM-PAC 6 Clicks Basic Mobility (PT)  -KH      User Key  (r) = Recorded By, (t) = Taken By, (c) = Cosigned By    Initials Name Provider Type    ABHISHEK oMrales, PT Physical Therapist    MS Ayan Stark, PT Physical Therapist           Time Calculation:         PT Charges       03/07/17 1626 03/07/17 1138       Time Calculation    Start Time 1417  -MS 0929  -MS     Stop Time 1435  -MS 0958  -MS     Time Calculation (min) 18 min  -MS 29 min  -MS     PT Received On 03/07/17  -MS 03/07/17  -MS     PT - Next Appointment 03/08/17  -MS 03/07/17  -MS       User Key  (r) = Recorded By, (t) = Taken By, (c) = Cosigned By    Initials Name Provider Type    MS Ayan L Mi, PT Physical Therapist          Therapy Charges for Today     Code Description Service Date Service Provider Modifiers Qty    86668077389 HC PT THER PROC EA 15 MIN 3/7/2017 Ayan Stark, PT GP 1    83654421242 HC PT THER PROC GROUP 3/7/2017 Ayan Stark, PT GP 1    64972893667 HC PT THER PROC EA 15 MIN 3/7/2017 Ayan Stark, PT GP 1    71630948795 HC PT THER PROC GROUP 3/7/2017 Ayan Stark, PT GP 1          PT G-Codes  Outcome Measure Options: AM-PAC 6 Clicks Basic Mobility (PT)    Ayan Stark, PT  3/7/2017        home w/ home PT

## 2017-10-04 DIAGNOSIS — IMO0002 TYPE 2 DIABETES MELLITUS, UNCONTROLLED: ICD-10-CM

## 2017-10-05 DIAGNOSIS — K21.9 GASTROESOPHAGEAL REFLUX DISEASE WITHOUT ESOPHAGITIS: ICD-10-CM

## 2017-10-05 DIAGNOSIS — E11.65 UNCONTROLLED TYPE 2 DIABETES MELLITUS WITH COMPLICATION, UNSPECIFIED LONG TERM INSULIN USE STATUS: ICD-10-CM

## 2017-10-05 DIAGNOSIS — E78.01 FAMILIAL HYPERCHOLESTEROLEMIA: ICD-10-CM

## 2017-10-05 DIAGNOSIS — I10 HYPERTENSION, ESSENTIAL: ICD-10-CM

## 2017-10-05 DIAGNOSIS — E11.8 UNCONTROLLED TYPE 2 DIABETES MELLITUS WITH COMPLICATION, UNSPECIFIED LONG TERM INSULIN USE STATUS: ICD-10-CM

## 2017-10-05 DIAGNOSIS — E78.5 HYPERLIPIDEMIA, UNSPECIFIED HYPERLIPIDEMIA TYPE: ICD-10-CM

## 2017-10-05 RX ORDER — FENOFIBRATE 145 MG/1
145 TABLET, COATED ORAL DAILY
Qty: 90 TABLET | Refills: 3 | Status: SHIPPED | OUTPATIENT
Start: 2017-10-05 | End: 2018-02-06 | Stop reason: SDUPTHER

## 2017-10-05 RX ORDER — ATORVASTATIN CALCIUM 40 MG/1
40 TABLET, FILM COATED ORAL DAILY
Qty: 90 TABLET | Refills: 3 | Status: SHIPPED | OUTPATIENT
Start: 2017-10-05 | End: 2018-02-06 | Stop reason: SDUPTHER

## 2017-10-05 RX ORDER — MELOXICAM 15 MG/1
15 TABLET ORAL DAILY
Qty: 90 TABLET | Refills: 3 | Status: SHIPPED | OUTPATIENT
Start: 2017-10-05 | End: 2018-09-18 | Stop reason: SDUPTHER

## 2017-10-05 RX ORDER — GLIPIZIDE 10 MG/1
10 TABLET, FILM COATED, EXTENDED RELEASE ORAL EVERY MORNING
Qty: 90 TABLET | Refills: 3 | Status: SHIPPED | OUTPATIENT
Start: 2017-10-05 | End: 2018-02-06 | Stop reason: SDUPTHER

## 2017-10-05 RX ORDER — METOPROLOL TARTRATE 50 MG/1
50 TABLET, FILM COATED ORAL 2 TIMES DAILY
Qty: 180 TABLET | Refills: 3 | Status: SHIPPED | OUTPATIENT
Start: 2017-10-05 | End: 2018-02-06 | Stop reason: SDUPTHER

## 2017-10-05 RX ORDER — AMLODIPINE BESYLATE AND BENAZEPRIL HYDROCHLORIDE 10; 40 MG/1; MG/1
1 CAPSULE ORAL DAILY
Qty: 90 CAPSULE | Refills: 3 | Status: SHIPPED | OUTPATIENT
Start: 2017-10-05 | End: 2018-02-06 | Stop reason: SDUPTHER

## 2017-10-05 RX ORDER — SITAGLIPTIN 100 MG/1
TABLET, FILM COATED ORAL
Qty: 90 TABLET | Refills: 3 | Status: SHIPPED | OUTPATIENT
Start: 2017-10-05 | End: 2018-02-06 | Stop reason: SDUPTHER

## 2017-10-05 RX ORDER — TRIAMTERENE AND HYDROCHLOROTHIAZIDE 37.5; 25 MG/1; MG/1
1 TABLET ORAL DAILY
Qty: 90 TABLET | Refills: 3 | Status: SHIPPED | OUTPATIENT
Start: 2017-10-05 | End: 2018-02-06 | Stop reason: SDUPTHER

## 2017-10-06 DIAGNOSIS — E11.9 CONTROLLED TYPE 2 DIABETES MELLITUS WITHOUT COMPLICATION, WITHOUT LONG-TERM CURRENT USE OF INSULIN (HCC): Primary | ICD-10-CM

## 2018-02-06 ENCOUNTER — OFFICE VISIT (OUTPATIENT)
Dept: FAMILY MEDICINE CLINIC | Facility: CLINIC | Age: 48
End: 2018-02-06

## 2018-02-06 ENCOUNTER — RESULTS ENCOUNTER (OUTPATIENT)
Dept: FAMILY MEDICINE CLINIC | Facility: CLINIC | Age: 48
End: 2018-02-06

## 2018-02-06 VITALS
WEIGHT: 286.6 LBS | HEIGHT: 71 IN | DIASTOLIC BLOOD PRESSURE: 97 MMHG | SYSTOLIC BLOOD PRESSURE: 164 MMHG | BODY MASS INDEX: 40.12 KG/M2 | TEMPERATURE: 97.4 F | HEART RATE: 63 BPM | OXYGEN SATURATION: 97 %

## 2018-02-06 DIAGNOSIS — E11.65 UNCONTROLLED TYPE 2 DIABETES MELLITUS WITH COMPLICATION, UNSPECIFIED LONG TERM INSULIN USE STATUS: Primary | ICD-10-CM

## 2018-02-06 DIAGNOSIS — I10 HYPERTENSION, ESSENTIAL: ICD-10-CM

## 2018-02-06 DIAGNOSIS — E78.01 FAMILIAL HYPERCHOLESTEROLEMIA: ICD-10-CM

## 2018-02-06 DIAGNOSIS — E78.5 HYPERLIPIDEMIA, UNSPECIFIED HYPERLIPIDEMIA TYPE: ICD-10-CM

## 2018-02-06 DIAGNOSIS — E11.8 UNCONTROLLED TYPE 2 DIABETES MELLITUS WITH COMPLICATION, UNSPECIFIED LONG TERM INSULIN USE STATUS: Primary | ICD-10-CM

## 2018-02-06 DIAGNOSIS — K21.9 GASTROESOPHAGEAL REFLUX DISEASE WITHOUT ESOPHAGITIS: ICD-10-CM

## 2018-02-06 DIAGNOSIS — IMO0001 UNCONTROLLED TYPE 2 DIABETES MELLITUS WITHOUT COMPLICATION, WITHOUT LONG-TERM CURRENT USE OF INSULIN: ICD-10-CM

## 2018-02-06 DIAGNOSIS — I10 ESSENTIAL HYPERTENSION: ICD-10-CM

## 2018-02-06 PROBLEM — K21.00 GASTROESOPHAGEAL REFLUX DISEASE WITH ESOPHAGITIS: Status: ACTIVE | Noted: 2018-02-06

## 2018-02-06 PROCEDURE — 99214 OFFICE O/P EST MOD 30 MIN: CPT | Performed by: FAMILY MEDICINE

## 2018-02-06 RX ORDER — OMEPRAZOLE 20 MG/1
CAPSULE, DELAYED RELEASE ORAL
Refills: 0
Start: 2018-02-06 | End: 2018-07-12 | Stop reason: ALTCHOICE

## 2018-02-06 RX ORDER — SYRINGE WITH NEEDLE, 1 ML 25GX5/8"
SYRINGE, EMPTY DISPOSABLE MISCELLANEOUS
Refills: 0 | COMMUNITY
Start: 2017-12-27

## 2018-02-06 RX ORDER — AMLODIPINE BESYLATE AND BENAZEPRIL HYDROCHLORIDE 10; 40 MG/1; MG/1
CAPSULE ORAL
Qty: 90 CAPSULE | Refills: 1 | Status: SHIPPED | OUTPATIENT
Start: 2018-02-06 | End: 2018-09-18 | Stop reason: SDUPTHER

## 2018-02-06 RX ORDER — METOPROLOL TARTRATE 50 MG/1
TABLET, FILM COATED ORAL
Qty: 180 TABLET | Refills: 1 | Status: SHIPPED | OUTPATIENT
Start: 2018-02-06 | End: 2018-08-25 | Stop reason: SDUPTHER

## 2018-02-06 RX ORDER — ASPIRIN 81 MG/1
TABLET ORAL
Qty: 360 TABLET | Refills: 0 | Status: SHIPPED | OUTPATIENT
Start: 2018-02-06 | End: 2021-01-07

## 2018-02-06 RX ORDER — PANTOPRAZOLE SODIUM 40 MG/1
TABLET, DELAYED RELEASE ORAL
Qty: 90 TABLET | Refills: 1 | Status: SHIPPED | OUTPATIENT
Start: 2018-02-06 | End: 2018-08-16 | Stop reason: SDUPTHER

## 2018-02-06 RX ORDER — ATORVASTATIN CALCIUM 40 MG/1
TABLET, FILM COATED ORAL
Qty: 90 TABLET | Refills: 1 | Status: SHIPPED | OUTPATIENT
Start: 2018-02-06 | End: 2018-09-10 | Stop reason: SDUPTHER

## 2018-02-06 RX ORDER — FLUTICASONE PROPIONATE 50 MCG
SPRAY, SUSPENSION (ML) NASAL
Refills: 0 | COMMUNITY
Start: 2017-12-01 | End: 2021-01-07

## 2018-02-06 RX ORDER — GLIPIZIDE 10 MG/1
TABLET, FILM COATED, EXTENDED RELEASE ORAL
Qty: 90 TABLET | Refills: 1 | Status: SHIPPED | OUTPATIENT
Start: 2018-02-06 | End: 2018-08-25 | Stop reason: SDUPTHER

## 2018-02-06 RX ORDER — FENOFIBRATE 145 MG/1
TABLET, COATED ORAL
Qty: 90 TABLET | Refills: 1 | Status: SHIPPED | OUTPATIENT
Start: 2018-02-06 | End: 2018-09-18 | Stop reason: ALTCHOICE

## 2018-02-06 RX ORDER — TESTOSTERONE CYPIONATE 200 MG/ML
INJECTION, SOLUTION INTRAMUSCULAR
Refills: 5 | COMMUNITY
Start: 2017-12-27

## 2018-02-06 RX ORDER — INFLUENZA A VIRUS A/SINGAPORE/GP1908/2015 IVR-180 (H1N1) ANTIGEN (MDCK CELL DERIVED, PROPIOLACTONE INACTIVATED), INFLUENZA A VIRUS A/NORTH CAROLINA/04/2016 (H3N2) HEMAGGLUTININ ANTIGEN (MDCK CELL DERIVED, PROPIOLACTONE INACTIVATED), INFLUENZA B VIRUS B/IOWA/06/2017 HEMAGGLUTININ ANTIGEN (MDCK CELL DERIVED, PROPIOLACTONE INACTIVATED), INFLUENZA B VIRUS B/SINGAPORE/INFTT-16-0610/2016 HEMAGGLUTININ ANTIGEN (MDCK CELL DERIVED, PROPIOLACTONE INACTIVATED) 15; 15; 15; 15 UG/.5ML; UG/.5ML; UG/.5ML; UG/.5ML
INJECTION, SUSPENSION INTRAMUSCULAR
Refills: 0 | COMMUNITY
Start: 2017-11-09 | End: 2018-02-06

## 2018-02-06 RX ORDER — TRIAMTERENE AND HYDROCHLOROTHIAZIDE 37.5; 25 MG/1; MG/1
TABLET ORAL
Qty: 90 TABLET | Refills: 1 | Status: SHIPPED | OUTPATIENT
Start: 2018-02-06 | End: 2018-07-12 | Stop reason: ALTCHOICE

## 2018-02-06 NOTE — PATIENT INSTRUCTIONS
We will arrange follow-up with Dr. Feliciano for endoscopy    Increase your omeprazole to twice daily --if no improvement in next week or so, switch to prescription pantoprazole    May supplement, if needed, with Maalox up to four times daily    Consider elevating head of bed on 4-6 inch blocks to help with night-time reflux  Remember that blood from stomach/esophagus passes in stool as a shiny/tarry black    Follow-up with your eye doctor and ask them to always send us a copy of report.     Continue to check your feet regularly; use the monofilment cem as well as visual inspection    We will enter that first Pneumovax/pneumococcal vaccine about 5 years ago.      Consider reading the book  YOUNGER NEXT YEAR -    Bring your blood pressure cuff in to verify agreement with our staff-check twice weekly, on first getting up and at bedtime; keep log to review at visit    Keep working on the exercise and diet as we discussed  PLEASE BRING ALL OF YOUR MEDICATIONS IN THEIR ORIGINAL BOTTLES TO YOUR NEXT VISIT    IT IS VERY IMPORTANT THAT YOU KEEP A PRINTED LIST OF ALL OF YOUR MEDICATIONS AND DOSES AND OF ALL MEDICATION ALLERGIES WITH YOU/ON YOUR PERSON AT ALL TIMES.  THIS IS OF CRITICAL IMPORTANCE IN THE EVENT THAT YOU ARE INJURED OR ILL AND ARE UNABLE TO CONVEY THIS INFORMATION TO THOSE CARING FOR YOU IN AN EMERGENCY SITUATION.

## 2018-02-06 NOTE — PROGRESS NOTES
Subjective   Anthony Sol Jr. is a 47 y.o. male.     HPI Comments: Comes in to establish care for hypertension diabetes hyperlipidemia with worsening complaints about GERD.  Long-standing hypertension since he was young.  His weight has gone up over the last year or so his pressure has worsened.  Having no difficulty with his medications.  Lifestyle seem to be issues.  He has gained substantial weight since his knee surgeries.  He is attempting to get back into a more consistent pattern of exercise that will not bother his knee too much.  Additionally he is aware of the dietary issues and feels that he knows what he needs to do.  We noted that his his weight goes up his blood pressure obviously goes up..  Respect to his diabetes he has had a few episodes where his blood sugar got a little bit low but he hasn't really been significantly symptomatic with it.  He does get regular eye exams and will have those reports forwarded to us.  Checks his feet.  He's never been scoped.  He did have a colonoscopy which showed diverticular disease.  He doesn't notice any foods in particular aggravate the problem.  He avoids eating before bed at night.  Symptoms do symptoms get worse as his weight goes up.    He is concerned about substantially worsening GERD symptoms.  Despite the omeprazole his reflux is getting worse.  He's being awakened during the night with kei reflux into the oral cavity.  He has not had any those of aspiration that he reports.  He's had no melena.  Does get a burning sensation in his chest with this.    Has been started on testosterone injections by his urologist (we have not talked).  Previously treated with topical agent.  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    Hypertension   This is a chronic problem. The current episode started more than 1 year ago. The problem has been gradually worsening since onset. The problem is uncontrolled. Pertinent negatives include no blurred vision,  chest pain, headaches or shortness of breath. Risk factors for coronary artery disease include diabetes mellitus, dyslipidemia, male gender and obesity. Past treatments include angiotensin blockers, beta blockers, calcium channel blockers and diuretics. There is no history of angina, kidney disease, CAD/MI or CVA.   Hyperlipidemia   Pertinent negatives include no chest pain, myalgias or shortness of breath.   Diabetes   Pertinent negatives for hypoglycemia include no headaches or nervousness/anxiousness. Pertinent negatives for diabetes include no blurred vision, no chest pain, no polydipsia and no weakness. Pertinent negatives for diabetic complications include no CVA.   Heartburn   He reports no abdominal pain, no chest pain, no coughing, no nausea or no sore throat.        The following portions of the patient's history were reviewed and updated as appropriate: allergies, current medications, past family history, past medical history, past social history, past surgical history and problem list.    Review of Systems   Constitutional: Negative for chills and fever.   HENT: Negative for congestion, rhinorrhea and sore throat.    Eyes: Negative for blurred vision, discharge and visual disturbance.   Respiratory: Negative for cough and shortness of breath.    Cardiovascular: Negative for chest pain.   Gastrointestinal: Negative for abdominal pain, anal bleeding, blood in stool, constipation, diarrhea, nausea and vomiting.        Reflux     Endocrine: Negative for polydipsia.   Genitourinary: Negative for dysuria and hematuria.   Musculoskeletal: Negative for arthralgias and myalgias.   Skin: Negative for rash.   Allergic/Immunologic: Negative.    Neurological: Negative for weakness, numbness and headaches.   Hematological: Does not bruise/bleed easily.   Psychiatric/Behavioral: Negative for dysphoric mood. The patient is not nervous/anxious.    All other systems reviewed and are negative.      Objective   Physical  Exam   Constitutional: He is oriented to person, place, and time. He appears well-developed and well-nourished.   HENT:   Head: Normocephalic and atraumatic.   Right Ear: External ear normal.   Left Ear: External ear normal.   Mouth/Throat: No oropharyngeal exudate.   Eyes: Conjunctivae, EOM and lids are normal. Pupils are equal, round, and reactive to light. Right eye exhibits no discharge. Left eye exhibits no discharge. No scleral icterus.   Neck: Trachea normal, normal range of motion and phonation normal. Neck supple. No thyromegaly present.   Cardiovascular: Normal rate, regular rhythm and normal heart sounds.  Exam reveals no gallop and no friction rub.    No murmur heard.  Pulmonary/Chest: Effort normal and breath sounds normal. No stridor. He has no wheezes. He has no rales.   Abdominal: Soft. He exhibits no distension. There is no tenderness.   Musculoskeletal: Normal range of motion. He exhibits no edema.   Lymphadenopathy:     He has no cervical adenopathy.   Neurological: He is alert and oriented to person, place, and time. He has normal strength. No cranial nerve deficit.   Skin: Skin is warm, dry and intact. No petechiae and no rash noted. No cyanosis. Nails show no clubbing.   Psychiatric: He has a normal mood and affect. His speech is normal and behavior is normal. Judgment and thought content normal. Cognition and memory are normal.   Nursing note and vitals reviewed.      Assessment/Plan   Anthony was seen today for hypertension, hyperlipidemia, diabetes and heartburn.    Diagnoses and all orders for this visit:    Uncontrolled type 2 diabetes mellitus with complication, unspecified long term insulin use status  -     metFORMIN (GLUCOPHAGE) 1000 MG tablet; One tab po BID with meals for diabetes  -     glipiZIDE (GLUCOTROL XL) 10 MG 24 hr tablet; One tab PO QDay diabetes    Hypertension, essential  -     triamterene-hydrochlorothiazide (MAXZIDE-25) 37.5-25 MG per tablet; One tab po Qday for blood  pressure  -     metoprolol tartrate (LOPRESSOR) 50 MG tablet; One tab po BID for blood pressure  -     amLODIPine-benazepril (LOTREL) 10-40 MG per capsule; One cap po QDay for blood pressure    Gastroesophageal reflux disease without esophagitis  -     CBC & Differential  -     omeprazole (priLOSEC) 20 MG capsule; One po BID for reflux  -     Ambulatory Referral to General Surgery    Uncontrolled type 2 diabetes mellitus without complication, without long-term current use of insulin  -     Urinalysis With / Microscopic If Indicated - Urine, Clean Catch  -     Comprehensive Metabolic Panel  -     CBC & Differential  -     Hemoglobin A1c  -     Microalbumin / Creatinine Urine Ratio - Urine, Clean Catch; Future  -     SITagliptin (JANUVIA) 100 MG tablet; One tab po QDay for diabetes    Familial hypercholesterolemia  -     fenofibrate (TRICOR) 145 MG tablet; One tab poQAM for lipids    Hyperlipidemia, unspecified hyperlipidemia type  -     Urinalysis With / Microscopic If Indicated - Urine, Clean Catch  -     Lipid Panel  -     Comprehensive Metabolic Panel  -     atorvastatin (LIPITOR) 40 MG tablet; One tab po QPM for cholesterol    Essential hypertension    Other orders  -     pantoprazole (PROTONIX) 40 MG EC tablet; One tab po QDay for reflux  -     aspirin 81 MG EC tablet; One po QDay          Patient Instructions   We will arrange follow-up with Dr. Feliciano for endoscopy    Increase your omeprazole to twice daily --if no improvement in next week or so, switch to prescription pantoprazole    May supplement, if needed, with Maalox up to four times daily    Consider elevating head of bed on 4-6 inch blocks to help with night-time reflux  Remember that blood from stomach/esophagus passes in stool as a shiny/tarry black    Follow-up with your eye doctor and ask them to always send us a copy of report.     Continue to check your feet regularly; use the monofilment cem as well as visual inspection    We will enter that  first Pneumovax/pneumococcal vaccine about 5 years ago.      Consider reading the book  YOUNGER NEXT YEAR -    Bring your blood pressure cuff in to verify agreement with our staff-check twice weekly, on first getting up and at bedtime; keep log to review at visit    Keep working on the exercise and diet as we discussed  PLEASE BRING ALL OF YOUR MEDICATIONS IN THEIR ORIGINAL BOTTLES TO YOUR NEXT VISIT    IT IS VERY IMPORTANT THAT YOU KEEP A PRINTED LIST OF ALL OF YOUR MEDICATIONS AND DOSES AND OF ALL MEDICATION ALLERGIES WITH YOU/ON YOUR PERSON AT ALL TIMES.  THIS IS OF CRITICAL IMPORTANCE IN THE EVENT THAT YOU ARE INJURED OR ILL AND ARE UNABLE TO CONVEY THIS INFORMATION TO THOSE CARING FOR YOU IN AN EMERGENCY SITUATION.

## 2018-02-07 DIAGNOSIS — E11.9 TYPE 2 DIABETES MELLITUS WITHOUT COMPLICATION, WITHOUT LONG-TERM CURRENT USE OF INSULIN (HCC): Primary | ICD-10-CM

## 2018-02-07 LAB
ALBUMIN SERPL-MCNC: 5.3 G/DL (ref 3.5–5.5)
ALBUMIN/GLOB SERPL: 2.3 {RATIO} (ref 1.2–2.2)
ALP SERPL-CCNC: 49 IU/L (ref 39–117)
ALT SERPL-CCNC: 55 IU/L (ref 0–44)
APPEARANCE UR: CLEAR
AST SERPL-CCNC: 30 IU/L (ref 0–40)
BASOPHILS # BLD AUTO: 0 X10E3/UL (ref 0–0.2)
BASOPHILS NFR BLD AUTO: 0 %
BILIRUB SERPL-MCNC: 0.5 MG/DL (ref 0–1.2)
BILIRUB UR QL STRIP: NEGATIVE
BUN SERPL-MCNC: 15 MG/DL (ref 6–24)
BUN/CREAT SERPL: 15 (ref 9–20)
CALCIUM SERPL-MCNC: 10.2 MG/DL (ref 8.7–10.2)
CHLORIDE SERPL-SCNC: 99 MMOL/L (ref 96–106)
CHOLEST SERPL-MCNC: 120 MG/DL (ref 100–199)
CO2 SERPL-SCNC: 25 MMOL/L (ref 18–29)
COLOR UR: YELLOW
CREAT SERPL-MCNC: 0.97 MG/DL (ref 0.76–1.27)
EOSINOPHIL # BLD AUTO: 0.1 X10E3/UL (ref 0–0.4)
EOSINOPHIL NFR BLD AUTO: 2 %
ERYTHROCYTE [DISTWIDTH] IN BLOOD BY AUTOMATED COUNT: 13 % (ref 12.3–15.4)
GFR SERPLBLD CREATININE-BSD FMLA CKD-EPI: 106 ML/MIN/1.73
GFR SERPLBLD CREATININE-BSD FMLA CKD-EPI: 91 ML/MIN/1.73
GLOBULIN SER CALC-MCNC: 2.3 G/DL (ref 1.5–4.5)
GLUCOSE SERPL-MCNC: 171 MG/DL (ref 65–99)
GLUCOSE UR QL: NEGATIVE
HBA1C MFR BLD: 8.1 % (ref 4.8–5.6)
HCT VFR BLD AUTO: 49.1 % (ref 37.5–51)
HDLC SERPL-MCNC: 32 MG/DL
HGB BLD-MCNC: 16.4 G/DL (ref 13–17.7)
HGB UR QL STRIP: NEGATIVE
IMM GRANULOCYTES # BLD: 0 X10E3/UL (ref 0–0.1)
IMM GRANULOCYTES NFR BLD: 0 %
KETONES UR QL STRIP: NEGATIVE
LDLC SERPL CALC-MCNC: 53 MG/DL (ref 0–99)
LEUKOCYTE ESTERASE UR QL STRIP: NEGATIVE
LYMPHOCYTES # BLD AUTO: 1.9 X10E3/UL (ref 0.7–3.1)
LYMPHOCYTES NFR BLD AUTO: 26 %
MCH RBC QN AUTO: 28.9 PG (ref 26.6–33)
MCHC RBC AUTO-ENTMCNC: 33.4 G/DL (ref 31.5–35.7)
MCV RBC AUTO: 86 FL (ref 79–97)
MICRO URNS: NORMAL
MONOCYTES # BLD AUTO: 0.6 X10E3/UL (ref 0.1–0.9)
MONOCYTES NFR BLD AUTO: 8 %
NEUTROPHILS # BLD AUTO: 4.6 X10E3/UL (ref 1.4–7)
NEUTROPHILS NFR BLD AUTO: 64 %
NITRITE UR QL STRIP: NEGATIVE
PH UR STRIP: 6.5 [PH] (ref 5–7.5)
PLATELET # BLD AUTO: 258 X10E3/UL (ref 150–379)
POTASSIUM SERPL-SCNC: 4.3 MMOL/L (ref 3.5–5.2)
PROT SERPL-MCNC: 7.6 G/DL (ref 6–8.5)
PROT UR QL STRIP: NEGATIVE
RBC # BLD AUTO: 5.68 X10E6/UL (ref 4.14–5.8)
SODIUM SERPL-SCNC: 141 MMOL/L (ref 134–144)
SP GR UR: 1.01 (ref 1–1.03)
TRIGL SERPL-MCNC: 174 MG/DL (ref 0–149)
UROBILINOGEN UR STRIP-MCNC: 0.2 MG/DL (ref 0.2–1)
VLDLC SERPL CALC-MCNC: 35 MG/DL (ref 5–40)
WBC # BLD AUTO: 7.3 X10E3/UL (ref 3.4–10.8)

## 2018-02-15 ENCOUNTER — OFFICE VISIT (OUTPATIENT)
Dept: SURGERY | Facility: CLINIC | Age: 48
End: 2018-02-15

## 2018-02-15 VITALS
OXYGEN SATURATION: 98 % | DIASTOLIC BLOOD PRESSURE: 90 MMHG | BODY MASS INDEX: 38.55 KG/M2 | SYSTOLIC BLOOD PRESSURE: 160 MMHG | HEIGHT: 72 IN | HEART RATE: 70 BPM | WEIGHT: 284.6 LBS

## 2018-02-15 DIAGNOSIS — K21.9 GASTROESOPHAGEAL REFLUX DISEASE, ESOPHAGITIS PRESENCE NOT SPECIFIED: Primary | ICD-10-CM

## 2018-02-15 PROCEDURE — 99244 OFF/OP CNSLTJ NEW/EST MOD 40: CPT | Performed by: SURGERY

## 2018-02-15 NOTE — PROGRESS NOTES
Chief Complaint   Patient presents with   • Heartburn      Patient is a 47 y.o. male referred by Ajay Nance MD for Evaluation of reflux.  Patient reports he has had reflux for approximately 7 years and had been on omeprazole.  Patient reports the omeprazole no longer has been working, over the last several months patient has been waking up at night with vomiting and sour taste in his mouth.  Patient denies hoarseness or pneumonia.  Patient denies fevers or chills.  Patient denies difficulties with swallowing.  Patient has currently been switched over to Protonix over the last week has not noticed any difference so far.  Patient has never had an evaluation with an EGD.     Past Medical History:   Diagnosis Date   • Adhesive capsulitis of right knee    • Arthritis    • Deep vein thrombosis    • Diabetes mellitus    • Diverticulosis    • GERD (gastroesophageal reflux disease)    • H/O blood clots     rt calf  following rt total knee surgery 12/2016 was on xarelto now off on aspirin only   • Heart murmur    • History of hepatitis     AS A CHILD food related   • Hyperlipidemia    • Hypertension    • Knee pain, right    • Low back pain      Past Surgical History:   Procedure Laterality Date   • ADENOIDECTOMY     • BACK SURGERY     • JOINT MANIPULATION Right 2/16/2017    Procedure: MANIPULATION OF RT KNEE;  Surgeon: Anthony Andino MD;  Location: [facility];  Service:    • KNEE ARTHROSCOPY Right     X3   • FL REVISE KNEE JOINT REPLACE,1 PART Right 3/6/2017    Procedure: RIGHT TOTAL KNEE ARTHROPLASTY REVISION WITH LEFT KNEE STERIOID INJECTION;  Surgeon: Anthony Andino MD;  Location: [facility] OR;  Service: Orthopedics   • FL TOTAL KNEE ARTHROPLASTY Right 12/15/2016    Procedure: RT TOTAL KNEE ARTHROPLASTY;  Surgeon: Anthony Andino MD;  Location: [facility] OR;  Service: Orthopedics   • TONSILLECTOMY       Family History   Problem Relation Age of Onset   • Diabetes Mother    • Hypertension Mother    •  "Colon polyps Mother    • Cancer Paternal Uncle      colon   • Hypertension Father    • No Known Problems Brother      Social History   Substance Use Topics   • Smoking status: Never Smoker   • Smokeless tobacco: Never Used   • Alcohol use Yes      Comment: social         Current Outpatient Prescriptions:   •  amLODIPine-benazepril (LOTREL) 10-40 MG per capsule, One cap po QDay for blood pressure, Disp: 90 capsule, Rfl: 1  •  aspirin 81 MG EC tablet, One po QDay, Disp: 360 tablet, Rfl: 0  •  atorvastatin (LIPITOR) 40 MG tablet, One tab po QPM for cholesterol, Disp: 90 tablet, Rfl: 1  •  B-D 3CC LUER-CARLI SYR 22GX1\" 22G X 1\" 3 ML misc, USE TO INJECT TESTOSTERONE ONCE WEEKLY, Disp: , Rfl: 0  •  Empagliflozin 10 MG tablet, Take 1 tablet by mouth Daily. For diabetes, Disp: 30 tablet, Rfl: 2  •  fenofibrate (TRICOR) 145 MG tablet, One tab poQAM for lipids, Disp: 90 tablet, Rfl: 1  •  fluticasone (FLONASE) 50 MCG/ACT nasal spray, USE 1 SPRAY ONCE DAILY IN EACH NOSTRIL, Disp: , Rfl: 0  •  glipiZIDE (GLUCOTROL XL) 10 MG 24 hr tablet, One tab PO QDay diabetes, Disp: 90 tablet, Rfl: 1  •  meloxicam (MOBIC) 15 MG tablet, Take 1 tablet by mouth Daily., Disp: 90 tablet, Rfl: 3  •  metFORMIN (GLUCOPHAGE) 1000 MG tablet, One tab po BID with meals for diabetes, Disp: 180 tablet, Rfl: 1  •  metoprolol tartrate (LOPRESSOR) 50 MG tablet, One tab po BID for blood pressure, Disp: 180 tablet, Rfl: 1  •  omeprazole (priLOSEC) 20 MG capsule, One po BID for reflux, Disp: , Rfl: 0  •  pantoprazole (PROTONIX) 40 MG EC tablet, One tab po QDay for reflux, Disp: 90 tablet, Rfl: 1  •  SITagliptin (JANUVIA) 100 MG tablet, One tab po QDay for diabetes, Disp: 90 tablet, Rfl: 1  •  Testosterone Cypionate (DEPOTESTOTERONE CYPIONATE) 200 MG/ML injection, INJECT 0.5 MLS INTO MUSCLE ONCE WEEKLY, Disp: , Rfl: 5  •  triamterene-hydrochlorothiazide (MAXZIDE-25) 37.5-25 MG per tablet, One tab po Qday for blood pressure, Disp: 90 tablet, Rfl: 1    Review of " "Systems   HENT: Positive for voice change.    Respiratory: Positive for stridor.    Gastrointestinal: Positive for abdominal distention and abdominal pain.   Musculoskeletal: Positive for back pain and joint swelling.   All other systems reviewed and are negative.    Vitals:    02/15/18 0900   BP: 160/90   Pulse: 70   SpO2: 98%   Weight: 129 kg (284 lb 9.6 oz)   Height: 182.9 cm (72\")     Physical Exam  General/physcological:   Alert and oriented x3 in no acute distress  HEENT: Normal cephalic, atraumatic, PERRLA, EOMI, sclera anicteric, moist mucous membranes, neck is supple, no JVD, no carotid bruits, no thyromegaly no adenopathy  Respiratory: CTA and percussion  CVA: RRR, normal S1-S2, no murmurs, no gallops or rubs  GI: Positive BS, soft, nondistended, nontender, no rebound, no guarding, no hernias, no organomegaly and no masses  Musculoskeletal: Full range of motion, no clubbing, no cyanosis or edema  Neurovascular: Grossly intact    Patient does not use tobacco products currently and I have counseled the patient to not start using tobacco products in the future.    Assessment:  GERD  Plan:  I have recommended that the patient undergo further evaluation with an EGD.  I have discussed this procedure in detail with the patient.  I have discussed the risks, benefits and alternatives.  I have discussed the risk of anesthesia, bleeding, and perforation.  Patient understands these risks, benefits and alternatives and wishes to proceed.  I have him scheduled at his earliest convenience.    Marycruz Feliciano MD  General, Minimally Invasive and Endoscopic Surgery  [facility]    [address], [address]  [place], [place], [address]  P: [phone]  F: [phone]    Cc:  Ajay Nance MD                    "

## 2018-02-26 ENCOUNTER — OUTSIDE FACILITY SERVICE (OUTPATIENT)
Dept: SURGERY | Facility: CLINIC | Age: 48
End: 2018-02-26

## 2018-02-26 PROCEDURE — 88312 SPECIAL STAINS GROUP 1: CPT | Performed by: SURGERY

## 2018-02-26 PROCEDURE — 88305 TISSUE EXAM BY PATHOLOGIST: CPT | Performed by: SURGERY

## 2018-02-26 PROCEDURE — 45378 DIAGNOSTIC COLONOSCOPY: CPT | Performed by: SURGERY

## 2018-02-27 ENCOUNTER — LAB REQUISITION (OUTPATIENT)
Dept: LAB | Facility: HOSPITAL | Age: 48
End: 2018-02-27

## 2018-02-27 DIAGNOSIS — R10.9 ABDOMINAL PAIN: ICD-10-CM

## 2018-02-28 ENCOUNTER — DOCUMENTATION (OUTPATIENT)
Dept: SURGERY | Facility: CLINIC | Age: 48
End: 2018-02-28

## 2018-02-28 LAB
CYTO UR: NORMAL
LAB AP CASE REPORT: NORMAL
LAB AP CLINICAL INFORMATION: NORMAL
LAB AP INTRADEPARTMENTAL CONSULT: NORMAL
Lab: NORMAL
PATH REPORT.FINAL DX SPEC: NORMAL
PATH REPORT.GROSS SPEC: NORMAL

## 2018-03-06 ENCOUNTER — TELEPHONE (OUTPATIENT)
Dept: FAMILY MEDICINE CLINIC | Facility: CLINIC | Age: 48
End: 2018-03-06

## 2018-03-06 NOTE — TELEPHONE ENCOUNTER
Pt called and stated that his diverticulitis has flared up. Pt states that in the past Dr. Levine would give him Flagyl and Levaquin. How would you like to address this?

## 2018-03-06 NOTE — TELEPHONE ENCOUNTER
I would want him to be examined before treatment.  If I or NP cannot work in today, should be seen in UC.

## 2018-03-21 ENCOUNTER — OFFICE VISIT (OUTPATIENT)
Dept: SURGERY | Facility: CLINIC | Age: 48
End: 2018-03-21

## 2018-03-21 VITALS
OXYGEN SATURATION: 97 % | WEIGHT: 288.5 LBS | HEART RATE: 70 BPM | SYSTOLIC BLOOD PRESSURE: 140 MMHG | DIASTOLIC BLOOD PRESSURE: 84 MMHG | BODY MASS INDEX: 39.13 KG/M2

## 2018-03-21 DIAGNOSIS — R10.12 LEFT UPPER QUADRANT PAIN: Primary | ICD-10-CM

## 2018-03-21 PROCEDURE — 99244 OFF/OP CNSLTJ NEW/EST MOD 40: CPT | Performed by: SURGERY

## 2018-03-21 NOTE — PROGRESS NOTES
Chief complaint: Left sided abdominal pain     Patient is a 48 y.o. male referred by Ajay Nance MD for evaluation of left-sided abdominal pain.  Patient reports he has had an episode of diverticulitis in the past ×2.  Patient reports that about 3 days prior to calling for some antibiotics thinking that he had diverticulitis.  Patient thought that this may have been another episode of diverticulitis and was placed on antibiotics for 2 weeks.  After completing the antibiotics, the patient did not feel better.  Patient reports that the pain is a dull ache that radiates into his left side and back.  Patient denies fever, chills, nausea or vomiting.  Patient denies diarrhea, melena or hematochezia.  Patient had a colonoscopy in August 2017 that revealed multiple diverticuli throughout the sigmoid region.  Patient has not had a recent CT scan or blood work.     Past Medical History:   Diagnosis Date   • Adhesive capsulitis of right knee    • Arthritis    • Deep vein thrombosis    • Diabetes mellitus    • Diverticulosis    • GERD (gastroesophageal reflux disease)    • H/O blood clots     rt calf  following rt total knee surgery 12/2016 was on xarelto now off on aspirin only   • Heart murmur    • History of hepatitis     AS A CHILD food related   • Hyperlipidemia    • Hypertension    • Knee pain, right    • Low back pain      Past Surgical History:   Procedure Laterality Date   • ADENOIDECTOMY     • BACK SURGERY     • JOINT MANIPULATION Right 2/16/2017    Procedure: MANIPULATION OF RT KNEE;  Surgeon: Anthony Andino MD;  Location: [facility];  Service:    • KNEE ARTHROSCOPY Right     X3   • [place] REVISE KNEE JOINT REPLACE,1 PART Right 3/6/2017    Procedure: RIGHT TOTAL KNEE ARTHROPLASTY REVISION WITH LEFT KNEE STERIOID INJECTION;  Surgeon: Anthony Andino MD;  Location: [facility];  Service: Orthopedics   • VA TOTAL KNEE ARTHROPLASTY Right 12/15/2016    Procedure: RT TOTAL KNEE ARTHROPLASTY;  Surgeon: Anthony  "LAXMI Andino MD;  Location: [facility] OR;  Service: Orthopedics   • TONSILLECTOMY       Family History   Problem Relation Age of Onset   • Diabetes Mother    • Hypertension Mother    • Colon polyps Mother    • Cancer Paternal Uncle      colon   • Hypertension Father    • No Known Problems Brother      Social History   Substance Use Topics   • Smoking status: Never Smoker   • Smokeless tobacco: Never Used   • Alcohol use Yes      Comment: social         Current Outpatient Prescriptions:   •  amLODIPine-benazepril (LOTREL) 10-40 MG per capsule, One cap po QDay for blood pressure, Disp: 90 capsule, Rfl: 1  •  aspirin 81 MG EC tablet, One po QDay, Disp: 360 tablet, Rfl: 0  •  atorvastatin (LIPITOR) 40 MG tablet, One tab po QPM for cholesterol, Disp: 90 tablet, Rfl: 1  •  B-D 3CC LUER-CARLI SYR 22GX1\" 22G X 1\" 3 ML misc, USE TO INJECT TESTOSTERONE ONCE WEEKLY, Disp: , Rfl: 0  •  Empagliflozin 10 MG tablet, Take 1 tablet by mouth Daily. For diabetes, Disp: 30 tablet, Rfl: 2  •  fenofibrate (TRICOR) 145 MG tablet, One tab poQAM for lipids, Disp: 90 tablet, Rfl: 1  •  fluticasone (FLONASE) 50 MCG/ACT nasal spray, USE 1 SPRAY ONCE DAILY IN EACH NOSTRIL, Disp: , Rfl: 0  •  glipiZIDE (GLUCOTROL XL) 10 MG 24 hr tablet, One tab PO QDay diabetes, Disp: 90 tablet, Rfl: 1  •  meloxicam (MOBIC) 15 MG tablet, Take 1 tablet by mouth Daily., Disp: 90 tablet, Rfl: 3  •  metFORMIN (GLUCOPHAGE) 1000 MG tablet, One tab po BID with meals for diabetes, Disp: 180 tablet, Rfl: 1  •  metoprolol tartrate (LOPRESSOR) 50 MG tablet, One tab po BID for blood pressure, Disp: 180 tablet, Rfl: 1  •  omeprazole (priLOSEC) 20 MG capsule, One po BID for reflux, Disp: , Rfl: 0  •  pantoprazole (PROTONIX) 40 MG EC tablet, One tab po QDay for reflux, Disp: 90 tablet, Rfl: 1  •  SITagliptin (JANUVIA) 100 MG tablet, One tab po QDay for diabetes, Disp: 90 tablet, Rfl: 1  •  Testosterone Cypionate (DEPOTESTOTERONE CYPIONATE) 200 MG/ML injection, INJECT 0.5 MLS INTO " MUSCLE ONCE WEEKLY, Disp: , Rfl: 5  •  triamterene-hydrochlorothiazide (MAXZIDE-25) 37.5-25 MG per tablet, One tab po Qday for blood pressure, Disp: 90 tablet, Rfl: 1    Review of Systems   General: Patient reports good health  Eyes: No eye problems  Ears, nose, mouth and throat: No rhinitis, no hearing problems, no chronic cough  Cardiovascular/heart: Denies palpitations, syncope or chest pain  Respiratory/lung: Denies shortness of breath, hemoptysis, or dyspnea on exertion   Genital/urinary: No frequency, hematuria or dysuria  Hematological/lymphatic: Denies anemia or other problems  Musculoskeletal: No joint pain, no defects  Skin: No psoriasis or other skin issues  Neurological: No seizures or other neurological problems  Psychiatric: None  Endocrine: Negative  Gastro-intestinal: No constipation, no diarrhea, no melena, no hematochezia  All other systems have been reviewed and are negative.  Vitals:    03/21/18 1020   BP: 140/84   BP Location: Left arm   Patient Position: Sitting   Cuff Size: Adult   Pulse: 70   SpO2: 97%   Weight: 131 kg (288 lb 8 oz)       Physical Exam  General/physcological:   Alert and oriented x3 in no acute distress  HEENT: Normal cephalic, atraumatic, PERRLA, EOMI, sclera anicteric, moist mucous membranes, neck is supple, no JVD, no carotid bruits, no thyromegaly no adenopathy  Respiratory: CTA and percussion  CVA: RRR, normal S1-S2, no murmurs, no gallops or rubs  GI: Positive BS, soft, nondistended, nontender, no rebound, no guarding, no hernias, no organomegaly and no masses  Musculoskeletal: Full range of motion, no clubbing, no cyanosis or edema  Neurovascular: Grossly intact    Patient does not use tobacco products currently and I have counseled the patient to not start using tobacco products in the future.    Assessment:  Left-sided abdominal pain and a history of diverticulitis post 2 weeks of by mouth antibiotics without resolution  Plan:  I have recommended further evaluation  with CT scan of the abdomen and pelvis.  Further clinical decision making will be based upon these findings.    Marycruz Feliciano MD  General, Minimally Invasive and Endoscopic Surgery  [facility]    [address], [address]  [place], [place], [address]  P: [phone]  F: [phone]    Cc:  Ajay Nance MD

## 2018-03-24 DIAGNOSIS — R10.12 LEFT UPPER QUADRANT PAIN: Primary | ICD-10-CM

## 2018-03-26 ENCOUNTER — HOSPITAL ENCOUNTER (OUTPATIENT)
Dept: CT IMAGING | Facility: HOSPITAL | Age: 48
Discharge: HOME | End: 2018-03-26
Attending: SURGERY | Admitting: SURGERY

## 2018-03-26 DIAGNOSIS — R10.12 LEFT UPPER QUADRANT PAIN: ICD-10-CM

## 2018-03-26 LAB — CREAT BLDA-MCNC: 1.1 MG/DL (ref 0.6–1.3)

## 2018-03-26 PROCEDURE — 82565 ASSAY OF CREATININE: CPT

## 2018-03-26 PROCEDURE — 25010000002 IOPAMIDOL 61 % SOLUTION: Performed by: SURGERY

## 2018-03-26 PROCEDURE — 74177 CT ABD & PELVIS W/CONTRAST: CPT

## 2018-03-26 PROCEDURE — 0 DIATRIZOATE MEGLUMINE & SODIUM PER 1 ML: Performed by: SURGERY

## 2018-03-26 RX ADMIN — DIATRIZOATE MEGLUMINE AND DIATRIZOATE SODIUM 30 ML: 660; 100 LIQUID ORAL; RECTAL at 08:38

## 2018-03-26 RX ADMIN — IOPAMIDOL 84 ML: 612 INJECTION, SOLUTION INTRAVENOUS at 08:39

## 2018-05-03 DIAGNOSIS — E11.9 TYPE 2 DIABETES MELLITUS WITHOUT COMPLICATION, WITHOUT LONG-TERM CURRENT USE OF INSULIN (HCC): ICD-10-CM

## 2018-05-03 RX ORDER — EMPAGLIFLOZIN 10 MG/1
1 TABLET, FILM COATED ORAL DAILY
Qty: 30 TABLET | Refills: 2 | Status: SHIPPED | OUTPATIENT
Start: 2018-05-03 | End: 2018-07-05 | Stop reason: SDUPTHER

## 2018-07-05 DIAGNOSIS — E11.9 TYPE 2 DIABETES MELLITUS WITHOUT COMPLICATION, WITHOUT LONG-TERM CURRENT USE OF INSULIN (HCC): ICD-10-CM

## 2018-07-12 ENCOUNTER — OFFICE VISIT (OUTPATIENT)
Dept: FAMILY MEDICINE CLINIC | Facility: CLINIC | Age: 48
End: 2018-07-12

## 2018-07-12 VITALS
TEMPERATURE: 98.7 F | HEART RATE: 65 BPM | DIASTOLIC BLOOD PRESSURE: 90 MMHG | OXYGEN SATURATION: 97 % | SYSTOLIC BLOOD PRESSURE: 130 MMHG | BODY MASS INDEX: 38.05 KG/M2 | HEIGHT: 72 IN | WEIGHT: 280.9 LBS

## 2018-07-12 DIAGNOSIS — E11.65 TYPE 2 DIABETES MELLITUS WITH HYPERGLYCEMIA, WITHOUT LONG-TERM CURRENT USE OF INSULIN (HCC): ICD-10-CM

## 2018-07-12 DIAGNOSIS — I10 ESSENTIAL HYPERTENSION: ICD-10-CM

## 2018-07-12 DIAGNOSIS — K21.00 GASTROESOPHAGEAL REFLUX DISEASE WITH ESOPHAGITIS: ICD-10-CM

## 2018-07-12 DIAGNOSIS — E55.9 HYPOVITAMINOSIS D: ICD-10-CM

## 2018-07-12 DIAGNOSIS — E11.9 TYPE 2 DIABETES MELLITUS WITHOUT COMPLICATION, WITHOUT LONG-TERM CURRENT USE OF INSULIN (HCC): Primary | ICD-10-CM

## 2018-07-12 DIAGNOSIS — E78.49 OTHER HYPERLIPIDEMIA: ICD-10-CM

## 2018-07-12 PROCEDURE — 99214 OFFICE O/P EST MOD 30 MIN: CPT | Performed by: FAMILY MEDICINE

## 2018-07-12 RX ORDER — CLINDAMYCIN PHOSPHATE 10 UG/ML
LOTION TOPICAL
Refills: 3 | COMMUNITY
Start: 2018-06-14 | End: 2021-01-07

## 2018-07-12 RX ORDER — CHLORTHALIDONE 50 MG/1
TABLET ORAL
Qty: 30 TABLET | Refills: 1 | Status: SHIPPED | OUTPATIENT
Start: 2018-07-12 | End: 2018-09-07 | Stop reason: SDUPTHER

## 2018-07-12 NOTE — PATIENT INSTRUCTIONS
Keep working on diet and exercise    Will communicate with you via [employer]     Recommend Centrum for Men over 50    Go ASAP for eye exam and have them send report to us    Schedule 3 month recheck    STOP MAXZIDE AND START CHLORTHALIDONE  Watch BP and report via [employer] how it is

## 2018-07-12 NOTE — PROGRESS NOTES
Subjective   Anthony Sol Jr. is a 48 y.o. male.     Diabetes hypertension hyperlipidemia.  He's doing well.  He's trying to be a little more active.  His right knee does cause him less less difficulty after the revision.  He and his wife are trying to be vegetarian right now and have read and being careful about complimentary protein.  He does take some vitamin supplements.  Had a bout of diverticulitis.  He gets one about once a year.  He did a video doctor's visit and was prescribed double antibiotics and resolved without difficulty.  Since I last saw him he had EGD by Dr. Feliciano which showed no hiatal hernia.  By watching his diet and working on weight and avoiding eating in the evening his GERD is doing much better.  No GI or  blood loss.  His blood pressure still really isn't at target.  He has had problems with this since he was about 17 years old.  He is overdue for an eye visit.         The following portions of the patient's history were reviewed and updated as appropriate: allergies, current medications, past family history, past medical history, past social history, past surgical history and problem list.    Review of Systems   Constitutional: Negative.  Negative for chills and fever.   HENT: Positive for congestion (allergies). Negative for rhinorrhea and sore throat.    Eyes: Negative.  Negative for discharge and visual disturbance.   Respiratory: Negative.  Negative for cough and shortness of breath.    Cardiovascular: Negative.  Negative for chest pain.   Gastrointestinal: Negative.  Negative for abdominal pain, constipation, diarrhea, nausea and vomiting.   Endocrine: Negative.  Negative for polydipsia.   Genitourinary: Negative.  Negative for dysuria and hematuria.   Musculoskeletal: Positive for arthralgias (knees). Negative for myalgias.   Skin: Negative.  Negative for rash.   Allergic/Immunologic: Negative.    Neurological: Negative.  Negative for weakness, numbness and headaches.    Hematological: Negative.  Does not bruise/bleed easily.   Psychiatric/Behavioral: Negative.  Negative for dysphoric mood. The patient is not nervous/anxious.    All other systems reviewed and are negative.      Objective   Physical Exam   Constitutional: He is oriented to person, place, and time. He appears well-developed and well-nourished.   HENT:   Head: Normocephalic and atraumatic.   Right Ear: External ear normal.   Left Ear: External ear normal.   Mouth/Throat: No oropharyngeal exudate.   Eyes: Conjunctivae, EOM and lids are normal. Pupils are equal, round, and reactive to light. Right eye exhibits no discharge. Left eye exhibits no discharge. No scleral icterus.   Neck: Trachea normal, normal range of motion and phonation normal. Neck supple. No thyromegaly present.   Cardiovascular: Normal rate, regular rhythm and normal heart sounds.  Exam reveals no gallop and no friction rub.    No murmur heard.  Pulmonary/Chest: Effort normal and breath sounds normal. No stridor. He has no wheezes. He has no rales.   Abdominal: Soft. He exhibits no distension. There is no tenderness.   Musculoskeletal: Normal range of motion. He exhibits no edema.   Lymphadenopathy:     He has no cervical adenopathy.   Neurological: He is alert and oriented to person, place, and time. He has normal strength. No cranial nerve deficit.   Skin: Skin is warm, dry and intact. No petechiae and no rash noted. No cyanosis. Nails show no clubbing.   Psychiatric: He has a normal mood and affect. His speech is normal and behavior is normal. Judgment and thought content normal. Cognition and memory are normal.   Nursing note and vitals reviewed.    Reinforced diet and exercise.  Discussed importance of regular eye exams and getting us documentation of that.  Change him from Maxzide to chlorthalidone and if that doesn't get us to target with blood pressure will make other changes.    Assessment/Plan   Anthony was seen today for  diabetes.    Diagnoses and all orders for this visit:    Type 2 diabetes mellitus without complication, without long-term current use of insulin (CMS/[facility])  -     Empagliflozin (JARDIANCE) 10 MG tablet; Take 1 tablet by mouth Daily. For diabetes  -     Hemoglobin A1c  -     CBC & Differential  -     Comprehensive Metabolic Panel  -     Lipid Panel  -     Microalbumin / Creatinine Urine Ratio - Urine, Clean Catch    Essential hypertension    Other hyperlipidemia  -     Lipid Panel    Gastroesophageal reflux disease with esophagitis    Type 2 diabetes mellitus with hyperglycemia, without long-term current use of insulin (CMS/[facility])  -     Hemoglobin A1c  -     CBC & Differential  -     Comprehensive Metabolic Panel  -     Lipid Panel  -     Microalbumin / Creatinine Urine Ratio - Urine, Clean Catch    Hypovitaminosis D  -     Vitamin D 25 Hydroxy    Other orders  -     chlorthalidone (HYGROTEN) 50 MG tablet; 1 tab po QDay for BP  STOP MAXZIDE      Patient Instructions   Keep working on diet and exercise    Will communicate with you via [employer]     Recommend Centrum for Men over 50    Go ASAP for eye exam and have them send report to us    Schedule 3 month recheck    STOP MAXZIDE AND START CHLORTHALIDONE  Watch BP and report via [employer] how it is        EMR Dragon/Transcription disclaimer:   Much of this encounter note is an electronic transcription/translation of spoken language to printed text. The electronic translation of spoken language may permit erroneous, or at times, nonsensical words or phrases to be inadvertently transcribed; Although I have reviewed the note for such errors, some may still exist. Please contact me with any questions or concerns about the conduct of this encounter note.

## 2018-07-13 LAB
25(OH)D3+25(OH)D2 SERPL-MCNC: 37.7 NG/ML (ref 30–100)
ALBUMIN SERPL-MCNC: 5.2 G/DL (ref 3.5–5.2)
ALBUMIN/CREAT UR: <13.1 MG/G CREAT (ref 0–30)
ALBUMIN/GLOB SERPL: 2.3 G/DL
ALP SERPL-CCNC: 51 U/L (ref 39–117)
ALT SERPL-CCNC: 41 U/L (ref 1–41)
AST SERPL-CCNC: 21 U/L (ref 1–40)
BASOPHILS # BLD AUTO: 0.02 10*3/MM3 (ref 0–0.2)
BASOPHILS NFR BLD AUTO: 0.3 % (ref 0–1.5)
BILIRUB SERPL-MCNC: 0.4 MG/DL (ref 0.1–1.2)
BUN SERPL-MCNC: 15 MG/DL (ref 6–20)
BUN/CREAT SERPL: 15.6 (ref 7–25)
CALCIUM SERPL-MCNC: 9.9 MG/DL (ref 8.6–10.5)
CHLORIDE SERPL-SCNC: 100 MMOL/L (ref 98–107)
CHOLEST SERPL-MCNC: 108 MG/DL (ref 0–200)
CO2 SERPL-SCNC: 25.4 MMOL/L (ref 22–29)
CREAT SERPL-MCNC: 0.96 MG/DL (ref 0.76–1.27)
CREAT UR-MCNC: 22.9 MG/DL
EOSINOPHIL # BLD AUTO: 0.14 10*3/MM3 (ref 0–0.7)
EOSINOPHIL NFR BLD AUTO: 2.3 % (ref 0.3–6.2)
ERYTHROCYTE [DISTWIDTH] IN BLOOD BY AUTOMATED COUNT: 13.6 % (ref 11.5–14.5)
GLOBULIN SER CALC-MCNC: 2.3 GM/DL
GLUCOSE SERPL-MCNC: 114 MG/DL (ref 65–99)
HBA1C MFR BLD: 6.59 % (ref 4.8–5.6)
HCT VFR BLD AUTO: 53.5 % (ref 40.4–52.2)
HDLC SERPL-MCNC: 31 MG/DL (ref 40–60)
HGB BLD-MCNC: 17.3 G/DL (ref 13.7–17.6)
IMM GRANULOCYTES # BLD: 0.03 10*3/MM3 (ref 0–0.03)
IMM GRANULOCYTES NFR BLD: 0.5 % (ref 0–0.5)
LDLC SERPL CALC-MCNC: 32 MG/DL (ref 0–100)
LYMPHOCYTES # BLD AUTO: 1.68 10*3/MM3 (ref 0.9–4.8)
LYMPHOCYTES NFR BLD AUTO: 27.5 % (ref 19.6–45.3)
MCH RBC QN AUTO: 28.6 PG (ref 27–32.7)
MCHC RBC AUTO-ENTMCNC: 32.3 G/DL (ref 32.6–36.4)
MCV RBC AUTO: 88.4 FL (ref 79.8–96.2)
MICROALBUMIN UR-MCNC: <3 UG/ML
MONOCYTES # BLD AUTO: 0.44 10*3/MM3 (ref 0.2–1.2)
MONOCYTES NFR BLD AUTO: 7.2 % (ref 5–12)
NEUTROPHILS # BLD AUTO: 3.83 10*3/MM3 (ref 1.9–8.1)
NEUTROPHILS NFR BLD AUTO: 62.7 % (ref 42.7–76)
PLATELET # BLD AUTO: 193 10*3/MM3 (ref 140–500)
POTASSIUM SERPL-SCNC: 4.3 MMOL/L (ref 3.5–5.2)
PROT SERPL-MCNC: 7.5 G/DL (ref 6–8.5)
RBC # BLD AUTO: 6.05 10*6/MM3 (ref 4.6–6)
SODIUM SERPL-SCNC: 141 MMOL/L (ref 136–145)
TRIGL SERPL-MCNC: 225 MG/DL (ref 0–150)
VLDLC SERPL CALC-MCNC: 45 MG/DL (ref 5–40)
WBC # BLD AUTO: 6.11 10*3/MM3 (ref 4.5–10.7)

## 2018-07-17 DIAGNOSIS — E11.8 UNCONTROLLED TYPE 2 DIABETES MELLITUS WITH COMPLICATION, UNSPECIFIED LONG TERM INSULIN USE STATUS: ICD-10-CM

## 2018-07-17 DIAGNOSIS — E11.65 UNCONTROLLED TYPE 2 DIABETES MELLITUS WITH COMPLICATION, UNSPECIFIED LONG TERM INSULIN USE STATUS: ICD-10-CM

## 2018-08-01 DIAGNOSIS — E11.9 TYPE 2 DIABETES MELLITUS WITHOUT COMPLICATION, WITHOUT LONG-TERM CURRENT USE OF INSULIN (HCC): ICD-10-CM

## 2018-08-01 RX ORDER — EMPAGLIFLOZIN 10 MG/1
1 TABLET, FILM COATED ORAL DAILY
Qty: 30 TABLET | Refills: 0 | Status: SHIPPED | OUTPATIENT
Start: 2018-08-01 | End: 2018-09-18 | Stop reason: SDUPTHER

## 2018-08-16 RX ORDER — PANTOPRAZOLE SODIUM 40 MG/1
TABLET, DELAYED RELEASE ORAL
Qty: 90 TABLET | Refills: 1 | Status: SHIPPED | OUTPATIENT
Start: 2018-08-16 | End: 2018-09-18 | Stop reason: SDUPTHER

## 2018-08-25 DIAGNOSIS — E11.8 UNCONTROLLED TYPE 2 DIABETES MELLITUS WITH COMPLICATION, UNSPECIFIED LONG TERM INSULIN USE STATUS: ICD-10-CM

## 2018-08-25 DIAGNOSIS — E11.65 UNCONTROLLED TYPE 2 DIABETES MELLITUS WITH COMPLICATION, UNSPECIFIED LONG TERM INSULIN USE STATUS: ICD-10-CM

## 2018-08-25 DIAGNOSIS — IMO0001 UNCONTROLLED TYPE 2 DIABETES MELLITUS WITHOUT COMPLICATION, WITHOUT LONG-TERM CURRENT USE OF INSULIN: ICD-10-CM

## 2018-08-25 DIAGNOSIS — I10 HYPERTENSION, ESSENTIAL: ICD-10-CM

## 2018-08-27 RX ORDER — SITAGLIPTIN 100 MG/1
TABLET, FILM COATED ORAL
Qty: 90 TABLET | Refills: 1 | Status: SHIPPED | OUTPATIENT
Start: 2018-08-27 | End: 2018-09-18 | Stop reason: SDUPTHER

## 2018-08-27 RX ORDER — TRIAMTERENE AND HYDROCHLOROTHIAZIDE 37.5; 25 MG/1; MG/1
TABLET ORAL
Start: 2018-08-27 | End: 2018-09-18 | Stop reason: ALTCHOICE

## 2018-08-27 RX ORDER — GLIPIZIDE 10 MG/1
TABLET, FILM COATED, EXTENDED RELEASE ORAL
Qty: 90 TABLET | Refills: 1 | Status: SHIPPED | OUTPATIENT
Start: 2018-08-27 | End: 2018-09-18 | Stop reason: SDUPTHER

## 2018-08-27 RX ORDER — METOPROLOL TARTRATE 50 MG/1
TABLET, FILM COATED ORAL
Qty: 180 TABLET | Refills: 1 | Status: SHIPPED | OUTPATIENT
Start: 2018-08-27 | End: 2018-09-18 | Stop reason: SDUPTHER

## 2018-09-07 RX ORDER — CHLORTHALIDONE 50 MG/1
TABLET ORAL
Qty: 30 TABLET | Refills: 0 | Status: SHIPPED | OUTPATIENT
Start: 2018-09-07 | End: 2018-09-18 | Stop reason: SDUPTHER

## 2018-09-10 DIAGNOSIS — E78.5 HYPERLIPIDEMIA, UNSPECIFIED HYPERLIPIDEMIA TYPE: ICD-10-CM

## 2018-09-10 RX ORDER — ATORVASTATIN CALCIUM 40 MG/1
TABLET, FILM COATED ORAL
Qty: 30 TABLET | Refills: 0 | Status: SHIPPED | OUTPATIENT
Start: 2018-09-10 | End: 2018-09-18 | Stop reason: SDUPTHER

## 2018-09-10 NOTE — TELEPHONE ENCOUNTER
Pt called and needed his atorvastatin sent [facility] [place] because he has been out for 3 days.

## 2018-09-18 ENCOUNTER — OFFICE VISIT (OUTPATIENT)
Dept: FAMILY MEDICINE CLINIC | Facility: CLINIC | Age: 48
End: 2018-09-18

## 2018-09-18 VITALS
HEART RATE: 75 BPM | WEIGHT: 279.4 LBS | RESPIRATION RATE: 18 BRPM | SYSTOLIC BLOOD PRESSURE: 120 MMHG | HEIGHT: 72 IN | TEMPERATURE: 98.1 F | OXYGEN SATURATION: 95 % | DIASTOLIC BLOOD PRESSURE: 78 MMHG | BODY MASS INDEX: 37.84 KG/M2

## 2018-09-18 DIAGNOSIS — IMO0001 UNCONTROLLED TYPE 2 DIABETES MELLITUS WITHOUT COMPLICATION, WITHOUT LONG-TERM CURRENT USE OF INSULIN: ICD-10-CM

## 2018-09-18 DIAGNOSIS — IMO0001 UNCONTROLLED TYPE 2 DIABETES MELLITUS WITHOUT COMPLICATION, WITHOUT LONG-TERM CURRENT USE OF INSULIN: Primary | ICD-10-CM

## 2018-09-18 DIAGNOSIS — E78.49 OTHER HYPERLIPIDEMIA: ICD-10-CM

## 2018-09-18 DIAGNOSIS — K21.00 GASTROESOPHAGEAL REFLUX DISEASE WITH ESOPHAGITIS: ICD-10-CM

## 2018-09-18 DIAGNOSIS — I10 ESSENTIAL HYPERTENSION: ICD-10-CM

## 2018-09-18 DIAGNOSIS — E11.8 UNCONTROLLED TYPE 2 DIABETES MELLITUS WITH COMPLICATION, UNSPECIFIED LONG TERM INSULIN USE STATUS: ICD-10-CM

## 2018-09-18 DIAGNOSIS — E11.65 UNCONTROLLED TYPE 2 DIABETES MELLITUS WITH COMPLICATION, UNSPECIFIED LONG TERM INSULIN USE STATUS: ICD-10-CM

## 2018-09-18 DIAGNOSIS — E78.5 HYPERLIPIDEMIA, UNSPECIFIED HYPERLIPIDEMIA TYPE: ICD-10-CM

## 2018-09-18 DIAGNOSIS — I10 HYPERTENSION, ESSENTIAL: ICD-10-CM

## 2018-09-18 DIAGNOSIS — R74.8 INCREASED VITAMIN B12 LEVEL: ICD-10-CM

## 2018-09-18 DIAGNOSIS — E11.9 TYPE 2 DIABETES MELLITUS WITHOUT COMPLICATION, WITHOUT LONG-TERM CURRENT USE OF INSULIN (HCC): ICD-10-CM

## 2018-09-18 LAB — VIT B12 SERPL-MCNC: 347 PG/ML (ref 211–946)

## 2018-09-18 PROCEDURE — 99213 OFFICE O/P EST LOW 20 MIN: CPT | Performed by: FAMILY MEDICINE

## 2018-09-18 RX ORDER — CHLORAL HYDRATE 500 MG
CAPSULE ORAL
Start: 2018-09-18 | End: 2023-01-17 | Stop reason: ALTCHOICE

## 2018-09-18 RX ORDER — AMLODIPINE BESYLATE AND BENAZEPRIL HYDROCHLORIDE 10; 40 MG/1; MG/1
CAPSULE ORAL
Qty: 90 CAPSULE | Refills: 1 | Status: SHIPPED | OUTPATIENT
Start: 2018-09-18 | End: 2021-01-07

## 2018-09-18 RX ORDER — ATORVASTATIN CALCIUM 40 MG/1
TABLET, FILM COATED ORAL
Qty: 90 TABLET | Refills: 1 | Status: SHIPPED | OUTPATIENT
Start: 2018-09-18 | End: 2020-03-16

## 2018-09-18 RX ORDER — GLIPIZIDE 10 MG/1
TABLET, FILM COATED, EXTENDED RELEASE ORAL
Qty: 90 TABLET | Refills: 1 | Status: SHIPPED | OUTPATIENT
Start: 2018-09-18 | End: 2021-01-07

## 2018-09-18 RX ORDER — PANTOPRAZOLE SODIUM 40 MG/1
TABLET, DELAYED RELEASE ORAL
Qty: 90 TABLET | Refills: 1 | Status: SHIPPED | OUTPATIENT
Start: 2018-09-18 | End: 2021-01-07

## 2018-09-18 RX ORDER — MELOXICAM 15 MG/1
TABLET ORAL
Qty: 90 TABLET | Refills: 3 | Status: SHIPPED | OUTPATIENT
Start: 2018-09-18 | End: 2021-01-07

## 2018-09-18 RX ORDER — CHLORTHALIDONE 50 MG/1
TABLET ORAL
Qty: 90 TABLET | Refills: 1 | Status: SHIPPED | OUTPATIENT
Start: 2018-09-18 | End: 2021-01-07

## 2018-09-18 RX ORDER — METOPROLOL TARTRATE 50 MG/1
TABLET, FILM COATED ORAL
Qty: 180 TABLET | Refills: 1 | Status: SHIPPED | OUTPATIENT
Start: 2018-09-18 | End: 2019-10-17 | Stop reason: ALTCHOICE

## 2018-09-18 NOTE — PROGRESS NOTES
Subjective   Anthony Sol Jr. is a 48 y.o. male.     Recheck diabetes hypertension hyperlipidemia.  Last time because of inability to reach blood pressure targets switched him from triamterene HCTZ to chlorthalidone.  He's done well with that change.  Weights actually up some this week after some dietary and beverage indiscretion recently.  As well.  He is not having any specific complaints.  He was taken off the fish oil capsules sometime back for reasons that he doesn't recall.  Presently he's on both atorvastatin and fenofibrate.        Hypertension   Pertinent negatives include no chest pain, headaches or shortness of breath.   Hyperlipidemia   Pertinent negatives include no chest pain, myalgias or shortness of breath.        The following portions of the patient's history were reviewed and updated as appropriate: allergies, current medications, past family history, past medical history, past social history, past surgical history and problem list.    Review of Systems   Constitutional: Negative for chills and fever.   HENT: Negative for congestion, rhinorrhea and sore throat.    Eyes: Negative for discharge and visual disturbance.   Respiratory: Negative for cough and shortness of breath.    Cardiovascular: Negative for chest pain.   Gastrointestinal: Negative for abdominal pain, constipation, diarrhea, nausea and vomiting.   Endocrine: Negative for polydipsia.   Genitourinary: Negative for dysuria and hematuria.   Musculoskeletal: Negative for arthralgias and myalgias.   Skin: Negative for rash.   Allergic/Immunologic: Negative.    Neurological: Negative for weakness, numbness and headaches.   Hematological: Does not bruise/bleed easily.   Psychiatric/Behavioral: Negative for dysphoric mood. The patient is not nervous/anxious.    All other systems reviewed and are negative.      Objective   Physical Exam   Constitutional: He is oriented to person, place, and time. He appears well-developed and well-nourished.    HENT:   Head: Normocephalic and atraumatic.   Right Ear: External ear normal.   Left Ear: External ear normal.   Mouth/Throat: No oropharyngeal exudate.   Eyes: Pupils are equal, round, and reactive to light. Conjunctivae, EOM and lids are normal. Right eye exhibits no discharge. Left eye exhibits no discharge. No scleral icterus.   Neck: Trachea normal, normal range of motion and phonation normal. Neck supple. No thyromegaly present.   Cardiovascular: Normal rate, regular rhythm and normal heart sounds.  Exam reveals no gallop and no friction rub.    No murmur heard.  Pulmonary/Chest: Effort normal and breath sounds normal. No stridor. He has no wheezes. He has no rales.   Abdominal: Soft. He exhibits no distension. There is no tenderness.   Musculoskeletal: Normal range of motion. He exhibits no edema.   Lymphadenopathy:     He has no cervical adenopathy.   Neurological: He is alert and oriented to person, place, and time. He has normal strength. No cranial nerve deficit.   Skin: Skin is warm, dry and intact. No petechiae and no rash noted. No cyanosis. Nails show no clubbing.   Psychiatric: He has a normal mood and affect. His speech is normal and behavior is normal. Judgment and thought content normal. Cognition and memory are normal.   Nursing note and vitals reviewed.  Reviewed his last labs.  A1c is good.  Renal function is good.  He did not check the B12 to make sure that is not over supplementing.  Still has issues with low HDL and high triglycerides.  Discussed the FDA recommendations regarding fenofibrate and statin therapy.  We will stop the fenofibrate and start him back on fish oil capsules    Assessment/Plan   Anthony was seen today for diabetes, hypertension and hyperlipidemia.    Diagnoses and all orders for this visit:    Uncontrolled type 2 diabetes mellitus without complication, without long-term current use of insulin (CMS/[facility])    Gastroesophageal reflux disease with esophagitis    Essential  hypertension    Other hyperlipidemia    Increased vitamin B12 level  -     Vitamin B12    Other orders  -     Omega-3 Fatty Acids (FISH OIL) 1000 MG capsule capsule; One OTC capsule twice daily with meals      Patient Instructions   Try sinus irrigation  Increase Flonase/fluticasone to 2 sprays each nostril twice daily    If you want to try the Singulair/montelukast just let me know    Discontinue fenofibrate  Start fish oil capsule twice daily,with breakfast and dinner    Will check for B12 excess today and contact you with results    You should schedule for early in new year with new provider and have fasting lipids rechecked then.       EMR Dragon/Transcription disclaimer:   Much of this encounter note is an electronic transcription/translation of spoken language to printed text. The electronic translation of spoken language may permit erroneous, or at times, nonsensical words or phrases to be inadvertently transcribed; Although I have reviewed the note for such errors, some may still exist. Please contact me with any questions or concerns about the conduct of this encounter note.

## 2018-09-18 NOTE — PATIENT INSTRUCTIONS
Try sinus irrigation  Increase Flonase/fluticasone to 2 sprays each nostril twice daily    If you want to try the Singulair/montelukast just let me know    Discontinue fenofibrate  Start fish oil capsule twice daily,with breakfast and dinner    Will check for B12 excess today and contact you with results    You should schedule for early in new year with new provider and have fasting lipids rechecked then.

## 2018-10-04 RX ORDER — CHLORTHALIDONE 50 MG/1
TABLET ORAL
Qty: 30 TABLET | Refills: 0 | OUTPATIENT
Start: 2018-10-04

## 2018-10-10 DIAGNOSIS — E78.5 HYPERLIPIDEMIA, UNSPECIFIED HYPERLIPIDEMIA TYPE: ICD-10-CM

## 2018-10-10 RX ORDER — ATORVASTATIN CALCIUM 40 MG/1
TABLET, FILM COATED ORAL
Qty: 30 TABLET | Refills: 0 | OUTPATIENT
Start: 2018-10-10

## 2018-10-23 DIAGNOSIS — E78.01 FAMILIAL HYPERCHOLESTEROLEMIA: ICD-10-CM

## 2018-10-23 RX ORDER — FENOFIBRATE 145 MG/1
TABLET, COATED ORAL
Qty: 90 TABLET | Refills: 1 | OUTPATIENT
Start: 2018-10-23

## 2019-01-18 ENCOUNTER — HOSPITAL ENCOUNTER (OUTPATIENT)
Dept: CT IMAGING | Facility: HOSPITAL | Age: 49
Discharge: HOME | End: 2019-01-18
Attending: INTERNAL MEDICINE | Admitting: INTERNAL MEDICINE

## 2019-01-28 ENCOUNTER — HOSPITAL ENCOUNTER (OUTPATIENT)
Dept: OTHER | Facility: HOSPITAL | Age: 49
Discharge: HOME | End: 2019-01-28
Attending: INTERNAL MEDICINE | Admitting: INTERNAL MEDICINE

## 2019-01-28 ENCOUNTER — HOSPITAL ENCOUNTER (OUTPATIENT)
Dept: CARDIOLOGY | Facility: HOSPITAL | Age: 49
Discharge: HOME | End: 2019-01-28
Attending: INTERNAL MEDICINE | Admitting: INTERNAL MEDICINE

## 2019-02-01 ENCOUNTER — HOSPITAL ENCOUNTER (OUTPATIENT)
Dept: CARDIOLOGY | Facility: HOSPITAL | Age: 49
Discharge: HOME | End: 2019-02-01
Attending: INTERNAL MEDICINE | Admitting: INTERNAL MEDICINE

## 2019-02-19 ENCOUNTER — TELEPHONE (OUTPATIENT)
Dept: FAMILY MEDICINE CLINIC | Facility: CLINIC | Age: 49
End: 2019-02-19

## 2019-02-19 DIAGNOSIS — IMO0002 UNCONTROLLED TYPE 2 DIABETES MELLITUS WITH COMPLICATION: ICD-10-CM

## 2019-04-08 ENCOUNTER — TRANSCRIBE ORDERS (OUTPATIENT)
Dept: ADMINISTRATIVE | Facility: HOSPITAL | Age: 49
End: 2019-04-08

## 2019-04-08 DIAGNOSIS — M25.562 ACUTE BILATERAL KNEE PAIN: Primary | ICD-10-CM

## 2019-04-08 DIAGNOSIS — M25.561 ACUTE BILATERAL KNEE PAIN: Primary | ICD-10-CM

## 2019-04-11 ENCOUNTER — HOSPITAL ENCOUNTER (OUTPATIENT)
Dept: NUCLEAR MEDICINE | Facility: HOSPITAL | Age: 49
Discharge: HOME | End: 2019-04-11

## 2019-04-11 DIAGNOSIS — M25.562 ACUTE BILATERAL KNEE PAIN: ICD-10-CM

## 2019-04-11 DIAGNOSIS — M25.561 ACUTE BILATERAL KNEE PAIN: ICD-10-CM

## 2019-04-11 PROCEDURE — 0 TECHNETIUM MEDRONATE KIT: Performed by: ORTHOPAEDIC SURGERY

## 2019-04-11 PROCEDURE — 78306 BONE IMAGING WHOLE BODY: CPT

## 2019-04-11 PROCEDURE — A9503 TC99M MEDRONATE: HCPCS | Performed by: ORTHOPAEDIC SURGERY

## 2019-04-11 RX ORDER — TC 99M MEDRONATE 20 MG/10ML
20.8 INJECTION, POWDER, LYOPHILIZED, FOR SOLUTION INTRAVENOUS
Status: COMPLETED | OUTPATIENT
Start: 2019-04-11 | End: 2019-04-11

## 2019-04-11 RX ADMIN — Medication 20.8 MILLICURIE: at 08:40

## 2019-05-16 ENCOUNTER — CONVERSION ENCOUNTER (OUTPATIENT)
Dept: FAMILY MEDICINE CLINIC | Facility: CLINIC | Age: 49
End: 2019-05-16

## 2019-06-04 VITALS
DIASTOLIC BLOOD PRESSURE: 95 MMHG | RESPIRATION RATE: 18 BRPM | HEIGHT: 72 IN | BODY MASS INDEX: 35.51 KG/M2 | OXYGEN SATURATION: 94 % | HEART RATE: 82 BPM | WEIGHT: 262.2 LBS | SYSTOLIC BLOOD PRESSURE: 144 MMHG

## 2019-10-17 RX ORDER — VERAPAMIL HYDROCHLORIDE 360 MG/1
360 CAPSULE, DELAYED RELEASE PELLETS ORAL NIGHTLY
Qty: 90 CAPSULE | Refills: 3 | Status: SHIPPED | OUTPATIENT
Start: 2019-10-17 | End: 2020-09-18

## 2020-01-07 ENCOUNTER — OFFICE VISIT (OUTPATIENT)
Dept: CARDIOLOGY | Facility: CLINIC | Age: 50
End: 2020-01-07

## 2020-01-07 VITALS
HEART RATE: 79 BPM | SYSTOLIC BLOOD PRESSURE: 130 MMHG | DIASTOLIC BLOOD PRESSURE: 86 MMHG | OXYGEN SATURATION: 99 % | HEIGHT: 72 IN | WEIGHT: 254.8 LBS | BODY MASS INDEX: 34.51 KG/M2

## 2020-01-07 DIAGNOSIS — I10 ESSENTIAL HYPERTENSION: ICD-10-CM

## 2020-01-07 DIAGNOSIS — T84.84XS PAIN DUE TO TOTAL RIGHT KNEE REPLACEMENT, SEQUELA: ICD-10-CM

## 2020-01-07 DIAGNOSIS — E78.2 MIXED HYPERLIPIDEMIA: Primary | ICD-10-CM

## 2020-01-07 DIAGNOSIS — Z96.651 PAIN DUE TO TOTAL RIGHT KNEE REPLACEMENT, SEQUELA: ICD-10-CM

## 2020-01-07 DIAGNOSIS — E66.09 EXOGENOUS OBESITY: ICD-10-CM

## 2020-01-07 DIAGNOSIS — E11.65 TYPE 2 DIABETES MELLITUS WITH HYPERGLYCEMIA, WITHOUT LONG-TERM CURRENT USE OF INSULIN (HCC): ICD-10-CM

## 2020-01-07 PROCEDURE — 93000 ELECTROCARDIOGRAM COMPLETE: CPT | Performed by: INTERNAL MEDICINE

## 2020-01-07 PROCEDURE — 99214 OFFICE O/P EST MOD 30 MIN: CPT | Performed by: INTERNAL MEDICINE

## 2020-01-07 RX ORDER — GLUCOSAMINE/MSM/CHONDROITIN A 500-83-400
TABLET ORAL DAILY
COMMUNITY

## 2020-01-07 RX ORDER — NALOXONE HCL
POWDER (GRAM) MISCELLANEOUS
Status: ON HOLD | COMMUNITY
End: 2022-05-23

## 2020-01-07 RX ORDER — GREEN TEA/HOODIA GORDONII 315-12.5MG
CAPSULE ORAL DAILY
COMMUNITY
Start: 2018-12-01

## 2020-01-07 RX ORDER — ANASTROZOLE 1 MG/1
1 TABLET ORAL WEEKLY
COMMUNITY
Start: 2019-05-16

## 2020-01-07 RX ORDER — ICOSAPENT ETHYL 1000 MG/1
2 CAPSULE ORAL 2 TIMES DAILY
COMMUNITY
Start: 2019-11-04

## 2020-01-07 NOTE — PROGRESS NOTES
"Cardiology Office Visit      Encounter Date:  01/07/2020    Patient ID:   Anthony Sol is a 49 y.o. male.    Reason For Followup:  Hypertension    Brief Clinical History:  Dear Dr. Campos, No Known    I had the pleasure of seeing Anthony Sol today. As you are well aware, this is a 49 y.o. male with no known history of ischemic heart disease.  He does have a history of hypertension, hyperlipidemia, diabetes mellitus, and exogenous obesity.  He presents today for follow-up on the above conditions.    Interval History:  He denies any chest pain pressure heaviness or tightness.  He denies any shortness of breath.  He denies any PND orthopnea.  He denies any syncope or near syncope.  He reports feeling well from a cardiac perspective.    He reports that his blood pressures have been very well controlled.  His pressure today is 130/86 which is a marked improvement on previous values.  He also reports that his hemoglobin A1c is 6.5 demonstrating excellent diabetic control.  We reviewed his most recent lipid profile.  HDL was 29 and triglycerides were 130.  He reports that he has been doing well since he has been placed on Ozempic.  He is lost a total of 48 pounds in the past 8 months and feels very good.  He is using naloxone for food cravings.  He reports that his daughter unfortunately is suffering from hypertriglyceridemia as well.    Assessment & Plan    Impressions:  Hypertension  Hyperlipidemia  Diabetes mellitus  Exogenous obesity    Recommendations:  Continuation of his current cardiovascular regimen at the present time.  Follow-up in 1 years time sooner should there be difficulties    Objective:    Vitals:  Vitals:    01/07/20 0902   BP: 130/86   Pulse: 79   SpO2: 99%   Weight: 116 kg (254 lb 12.8 oz)   Height: 182.9 cm (72\")       Physical Exam:    General: Alert, cooperative, no distress, appears stated age  Head:  Normocephalic, atraumatic, mucous membranes moist  Eyes:  Conjunctiva/corneas clear, " EOM's intact     Neck:  Supple,  no adenopathy;      Lungs: Clear to auscultation bilaterally, no wheezes rhonchi rales are noted  Chest wall: No tenderness  Heart::  Regular rate and rhythm, S1 and S2 normal, no murmur, rub or gallop  Abdomen: Soft, non-tender, nondistended bowel sounds active.  Mild obesity  Extremities: No cyanosis, clubbing, or edema  Pulses: 2+ and symmetric all extremities  Skin:  No rashes or lesions  Neuro/psych: A&O x3. CN II through XII are grossly intact with appropriate affect      Allergies:  No Known Allergies    Medication Review:     Current Outpatient Medications:   •  anastrozole (ARIMIDEX) 1 MG tablet, One half tablet M,W,F, Disp: , Rfl:   •  atorvastatin (LIPITOR) 40 MG tablet, One tab po QPM for cholesterol (Patient taking differently: Take 20 mg by mouth Daily. One tab po QPM for cholesterol), Disp: 90 tablet, Rfl: 1  •  Azilsartan-Chlorthalidone 40-25 MG tablet, Daily., Disp: , Rfl:   •  Coenzyme Q10 (COQ-10) 30 MG capsule, Daily., Disp: , Rfl:   •  Cyanocobalamin (B-12) 500 MCG sublingual tablet, Daily., Disp: , Rfl:   •  Empagliflozin (JARDIANCE) 10 MG tablet, Take 1 tablet by mouth Daily. For diabetes, Disp: 90 tablet, Rfl: 1  •  IPAMORELIN ACETATE IJ, 5 days weekly, Disp: , Rfl:   •  metFORMIN (GLUCOPHAGE) 1000 MG tablet, 1 tab po BID with meal for diabetes, Disp: 180 tablet, Rfl: 1  •  Naloxone HCl powder, Naloxone compound, Disp: , Rfl:   •  Omega-3 Fatty Acids (FISH OIL) 1000 MG capsule capsule, One OTC capsule twice daily with meals, Disp: , Rfl:   •  Semaglutide, 1 MG/DOSE, (OZEMPIC) 2 MG/1.5ML solution pen-injector, 1 (One) Time Per Week., Disp: , Rfl:   •  Testosterone Cypionate (DEPOTESTOTERONE CYPIONATE) 200 MG/ML injection, INJECT 0.5 MLS INTO MUSCLE ONCE WEEKLY, Disp: , Rfl: 5  •  VASCEPA 1 g capsule capsule, 2 tablets 2 (Two) Times a Day., Disp: , Rfl:   •  verapamil ER (VERELAN) 360 MG 24 hr capsule, Take 1 capsule by mouth Every Night., Disp: 90 capsule, Rfl:  "3  •  VITAMIN D PO, 10,000 iu daily, Disp: , Rfl:   •  amLODIPine-benazepril (LOTREL) 10-40 MG per capsule, One cap po QDay for blood pressure, Disp: 90 capsule, Rfl: 1  •  aspirin 81 MG EC tablet, One po QDay, Disp: 360 tablet, Rfl: 0  •  B-D 3CC LUER-CARLI SYR 22GX1\" 22G X 1\" 3 ML misc, USE TO INJECT TESTOSTERONE ONCE WEEKLY, Disp: , Rfl: 0  •  chlorthalidone (HYGROTEN) 50 MG tablet, One tab po QDay for blood pressure, Disp: 90 tablet, Rfl: 1  •  clindamycin (CLEOCIN T) 1 % lotion, APPLY THIN LAYER 2 TIMES DAILY TO THE AFFECTED AREA(S), Disp: , Rfl: 3  •  fluticasone (FLONASE) 50 MCG/ACT nasal spray, USE 1 SPRAY ONCE DAILY IN EACH NOSTRIL, Disp: , Rfl: 0  •  glipiZIDE (GLUCOTROL XL) 10 MG 24 hr tablet, 1 tab po QDay for diabetes, Disp: 90 tablet, Rfl: 1  •  meloxicam (MOBIC) 15 MG tablet, 1 tab po QDay for pain and inflammation, Disp: 90 tablet, Rfl: 3  •  pantoprazole (PROTONIX) 40 MG EC tablet, 1 tab po QDay for stomach acid, Disp: 90 tablet, Rfl: 1  •  SITagliptin (JANUVIA) 100 MG tablet, 1 tab po QDay for diabetes, Disp: 90 tablet, Rfl: 1    Family History:  Family History   Problem Relation Age of Onset   • Diabetes Mother    • Hypertension Mother    • Colon polyps Mother    • Cancer Paternal Uncle         colon   • Hypertension Father    • No Known Problems Brother        Past Medical History:  Past Medical History:   Diagnosis Date   • Adhesive capsulitis of right knee    • Arthritis    • Cardiomegaly    • Deep vein thrombosis (CMS/HCC)    • Diabetes mellitus (CMS/HCC)    • Diverticulosis    • GERD (gastroesophageal reflux disease)    • H/O blood clots     rt calf  following rt total knee surgery 12/2016 was on xarelto now off on aspirin only   • Heart murmur    • History of hepatitis     AS A CHILD food related   • Hyperlipidemia    • Hypertension    • Hypotestosteronism    • Hypovitaminosis D    • Knee pain, right    • Low back pain    • Osteoarthritis    • Sleep apnea        Past surgical History:  Past " Surgical History:   Procedure Laterality Date   • ADENOIDECTOMY     • BACK SURGERY     • JOINT MANIPULATION Right 2/16/2017    Procedure: MANIPULATION OF RT KNEE;  Surgeon: Anthony Andino MD;  Location: [facility];  Service:    • KNEE ARTHROSCOPY Right     X3   • IA REVISE KNEE JOINT REPLACE,1 PART Right 3/6/2017    Procedure: RIGHT TOTAL KNEE ARTHROPLASTY REVISION WITH LEFT KNEE STERIOID INJECTION;  Surgeon: Anthony Andino MD;  Location: [facility] OR;  Service: Orthopedics   • IA TOTAL KNEE ARTHROPLASTY Right 12/15/2016    Procedure: RT TOTAL KNEE ARTHROPLASTY;  Surgeon: Anthony Andino MD;  Location: [facility];  Service: Orthopedics   • TONSILLECTOMY         Social History:  Social History     Socioeconomic History   • Marital status:      Spouse name: Not on file   • Number of children: Not on file   • Years of education: Not on file   • Highest education level: Not on file   Occupational History   • Occupation: sales   Tobacco Use   • Smoking status: Never Smoker   • Smokeless tobacco: Never Used   Substance and Sexual Activity   • Alcohol use: Yes     Alcohol/week: 2.0 standard drinks     Types: 2 Shots of liquor per week     Comment: social   • Drug use: No     Comment: prescribed by MD   • Sexual activity: Defer       Review of Systems:  The following systems were reviewed as they relate to the cardiovascular system: Constitutional, Eyes, ENT, Cardiovascular, Respiratory, Gastrointestinal, Integumentary, Neurological, Psychiatric, Hematologic, Endocrine, Musculoskeletal, and Genitourinary. The pertinent cardiovascular findings are reported above with all other cardiovascular points within those systems being negative.    Diagnostic Study Review:     Current Electrocardiogram:    ECG 12 Lead  Date/Time: 1/7/2020 6:45 PM  Performed by: Joe Plaza DO  Authorized by: Joe Plaza DO   Comparison: not compared with previous ECG   Previous ECG: no previous  ECG available  Comments: Normal sinus rhythm with a ventricular rate of 79 bpm.  Consider old anteroseptal MI.  Normal QT and QTc intervals.  Borderline left axis deviation.              NOTE: The following portions of the patient's history were reviewed and updated this visit as appropriate: allergies, current medications, past family history, past medical history, past social history, past surgical history and problem list.

## 2020-02-17 ENCOUNTER — TRANSCRIBE ORDERS (OUTPATIENT)
Dept: ADMINISTRATIVE | Facility: HOSPITAL | Age: 50
End: 2020-02-17

## 2020-02-17 DIAGNOSIS — R79.89 ABNORMAL LIVER FUNCTION TEST: Primary | ICD-10-CM

## 2020-02-21 ENCOUNTER — HOSPITAL ENCOUNTER (OUTPATIENT)
Dept: ULTRASOUND IMAGING | Facility: HOSPITAL | Age: 50
Discharge: HOME | End: 2020-02-21
Admitting: INTERNAL MEDICINE

## 2020-02-21 DIAGNOSIS — R79.89 ABNORMAL LIVER FUNCTION TEST: ICD-10-CM

## 2020-02-21 PROCEDURE — 76705 ECHO EXAM OF ABDOMEN: CPT

## 2020-03-16 DIAGNOSIS — E78.5 HYPERLIPIDEMIA, UNSPECIFIED HYPERLIPIDEMIA TYPE: ICD-10-CM

## 2020-03-16 RX ORDER — ATORVASTATIN CALCIUM 40 MG/1
TABLET, FILM COATED ORAL
Qty: 90 TABLET | Refills: 1 | Status: SHIPPED | OUTPATIENT
Start: 2020-03-16 | End: 2020-09-18

## 2020-04-27 RX ORDER — AZILSARTAN KAMEDOXOMIL AND CHLORTHALIDONE 40; 25 MG/1; MG/1
TABLET ORAL
Qty: 90 TABLET | Refills: 2 | Status: SHIPPED | OUTPATIENT
Start: 2020-04-27 | End: 2021-02-25 | Stop reason: SDUPTHER

## 2020-09-17 DIAGNOSIS — E78.5 HYPERLIPIDEMIA, UNSPECIFIED HYPERLIPIDEMIA TYPE: ICD-10-CM

## 2020-09-18 RX ORDER — ATORVASTATIN CALCIUM 40 MG/1
TABLET, FILM COATED ORAL
Qty: 90 TABLET | Refills: 1 | Status: ON HOLD | OUTPATIENT
Start: 2020-09-18 | End: 2022-05-23

## 2020-09-18 RX ORDER — VERAPAMIL HYDROCHLORIDE 360 MG/1
360 CAPSULE, DELAYED RELEASE PELLETS ORAL NIGHTLY
Qty: 90 CAPSULE | Refills: 3 | Status: SHIPPED | OUTPATIENT
Start: 2020-09-18 | End: 2023-01-17 | Stop reason: ALTCHOICE

## 2021-01-07 ENCOUNTER — OFFICE VISIT (OUTPATIENT)
Dept: CARDIOLOGY | Facility: CLINIC | Age: 51
End: 2021-01-07

## 2021-01-07 VITALS
HEIGHT: 72 IN | SYSTOLIC BLOOD PRESSURE: 114 MMHG | BODY MASS INDEX: 34.13 KG/M2 | HEART RATE: 78 BPM | WEIGHT: 252 LBS | RESPIRATION RATE: 18 BRPM | OXYGEN SATURATION: 99 % | DIASTOLIC BLOOD PRESSURE: 74 MMHG

## 2021-01-07 DIAGNOSIS — I10 ESSENTIAL HYPERTENSION: Primary | ICD-10-CM

## 2021-01-07 DIAGNOSIS — E66.09 EXOGENOUS OBESITY: ICD-10-CM

## 2021-01-07 DIAGNOSIS — E78.2 MIXED HYPERLIPIDEMIA: ICD-10-CM

## 2021-01-07 DIAGNOSIS — E11.65 TYPE 2 DIABETES MELLITUS WITH HYPERGLYCEMIA, WITHOUT LONG-TERM CURRENT USE OF INSULIN (HCC): ICD-10-CM

## 2021-01-07 PROCEDURE — 99214 OFFICE O/P EST MOD 30 MIN: CPT | Performed by: INTERNAL MEDICINE

## 2021-01-07 PROCEDURE — 93000 ELECTROCARDIOGRAM COMPLETE: CPT | Performed by: INTERNAL MEDICINE

## 2021-01-07 RX ORDER — SIROLIMUS 1 MG/1
TABLET, FILM COATED ORAL
COMMUNITY
Start: 2020-11-25

## 2021-01-07 RX ORDER — BLOOD-GLUCOSE METER
EACH MISCELLANEOUS
COMMUNITY
Start: 2020-10-28

## 2021-01-07 RX ORDER — BLOOD SUGAR DIAGNOSTIC
STRIP MISCELLANEOUS
COMMUNITY
Start: 2020-10-28

## 2021-01-07 RX ORDER — SYRINGE WITH NEEDLE, 1 ML 25GX5/8"
SYRINGE, EMPTY DISPOSABLE MISCELLANEOUS
COMMUNITY
Start: 2020-12-31

## 2021-01-07 RX ORDER — ACETAMINOPHEN AND CODEINE PHOSPHATE 300; 30 MG/1; MG/1
TABLET ORAL
COMMUNITY
Start: 2020-12-31 | End: 2021-01-07

## 2021-01-07 RX ORDER — SYRING-NEEDL,DISP,INSUL,0.3 ML 31GX15/64"
SYRINGE, EMPTY DISPOSABLE MISCELLANEOUS
COMMUNITY
Start: 2020-12-07

## 2021-01-07 RX ORDER — DIAZEPAM 10 MG/1
TABLET ORAL
COMMUNITY
Start: 2020-12-22 | End: 2021-01-07

## 2021-01-07 RX ORDER — LANCETS
EACH MISCELLANEOUS
COMMUNITY
Start: 2020-10-28

## 2021-01-07 RX ORDER — DICYCLOMINE HCL 20 MG
20 TABLET ORAL
COMMUNITY
Start: 2020-08-10 | End: 2021-01-07

## 2021-01-07 NOTE — PROGRESS NOTES
"Cardiology Office Visit      Encounter Date:  01/07/2021    Patient ID:   Anthony Sol is a 50 y.o. male.    Reason For Followup:  Hypertension    Brief Clinical History:  Dear Dr. Linares, Benji Rasheed MD    I had the pleasure of seeing Anthony Sol today. As you are well aware, this is a 50 y.o. male with no known history of ischemic heart disease.  He does have a history of hypertension, hyperlipidemia, diabetes mellitus, and exogenous obesity.  He presents today for follow-up on the above conditions.    Interval History:  He denies any chest pain pressure heaviness or tightness.  He denies any shortness of breath.  He denies any PND orthopnea.  He denies any syncope or near syncope.  He reports feeling well from a cardiac perspective.    His blood pressures continue to be well controlled.  Today in the office his blood pressure is perfect.  He reports that he is continued to lose weight.  He is continuing to exercise.  He started a new peptide supplement which has helped reduce some of his weight as well.    We reviewed his most recent lipid profile.  Total cholesterol was 131, LDL 62, HDL 30, triglycerides 244.  Renal function and electrolytes were within normal limits.  CRP was borderline elevated at 3.11.  His hemoglobin A1c level was 6.4.    Assessment & Plan    Impressions:  Hypertension  Hyperlipidemia  Diabetes mellitus  Exogenous obesity    Recommendations:  Continuation of his current cardiovascular regimen at the present time.  Follow-up in 1 years time sooner should there be difficulties    Objective:    Vitals:  Vitals:    01/07/21 0823   BP: 114/74   BP Location: Left arm   Patient Position: Sitting   Cuff Size: Large Adult   Pulse: 78   Resp: 18   SpO2: 99%   Weight: 114 kg (252 lb)   Height: 182.9 cm (72\")       Physical Exam:    General: Alert, cooperative, no distress, appears stated age  Head:  Normocephalic, atraumatic, mucous membranes moist  Eyes:  Conjunctiva/corneas clear, EOM's " "intact     Neck:  Supple,  no bruit  Lungs: Clear to auscultation bilaterally, no wheezes rhonchi rales are noted  Chest wall: No tenderness  Heart::  Regular rate and rhythm, S1 and S2 normal, no murmur, rub or gallop  Abdomen: Soft, non-tender, nondistended bowel sounds active.  Obese  Extremities: No cyanosis, clubbing, or edema  Pulses: 2+ and symmetric all extremities  Skin:  No rashes or lesions  Neuro/psych: A&O x3. CN II through XII are grossly intact with appropriate affect      Allergies:  No Known Allergies    Medication Review:     Current Outpatient Medications:   •  Accu-Chek FastClix Lancets misc, , Disp: , Rfl:   •  Accu-Chek Guide test strip, , Disp: , Rfl:   •  anastrozole (ARIMIDEX) 1 MG tablet, One half tablet M,W,F, Disp: , Rfl:   •  atorvastatin (LIPITOR) 40 MG tablet, TAKE 1 TABLET EVERY DAY (Patient taking differently: Take 40 mg by mouth Daily. TAKE 1/2 TABLET EVERY DAY), Disp: 90 tablet, Rfl: 1  •  B-D 3CC LUER-CARLI SYR 22GX1\" 22G X 1\" 3 ML misc, USE TO INJECT TESTOSTERONE ONCE WEEKLY, Disp: , Rfl: 0  •  B-D 3CC LUER-CARLI SYR 25GX1\" 25G X 1\" 3 ML misc, , Disp: , Rfl:   •  BD Veo Insulin Syr U/F 1/2Unit 31G X 15/64\" 0.3 ML misc, , Disp: , Rfl:   •  Blood Glucose Monitoring Suppl (Accu-Chek Guide) w/Device kit, , Disp: , Rfl:   •  Coenzyme Q10 (COQ-10) 30 MG capsule, Daily., Disp: , Rfl:   •  Cyanocobalamin (B-12) 500 MCG sublingual tablet, Daily., Disp: , Rfl:   •  EDARBYCLOR 40-25 MG tablet, TAKE 1 TABLET EVERY DAY, Disp: 90 tablet, Rfl: 2  •  Empagliflozin (JARDIANCE) 10 MG tablet, Take 1 tablet by mouth Daily. For diabetes, Disp: 90 tablet, Rfl: 1  •  IPAMORELIN ACETATE IJ, 5 days weekly, Disp: , Rfl:   •  metFORMIN (GLUCOPHAGE) 1000 MG tablet, 1 tab po BID with meal for diabetes, Disp: 180 tablet, Rfl: 1  •  Naloxone HCl powder, Naloxone compound, Disp: , Rfl:   •  Omega-3 Fatty Acids (FISH OIL) 1000 MG capsule capsule, One OTC capsule twice daily with meals, Disp: , Rfl:   •  Semaglutide, " 1 MG/DOSE, (OZEMPIC) 2 MG/1.5ML solution pen-injector, 1 (One) Time Per Week., Disp: , Rfl:   •  sirolimus (RAPAMUNE) 1 MG tablet, TAKE 3 TABLETS BY MOUTH ONCE A WEEK, Disp: , Rfl:   •  Testosterone Cypionate (DEPOTESTOTERONE CYPIONATE) 200 MG/ML injection, INJECT 0.5 MLS INTO MUSCLE ONCE WEEKLY, Disp: , Rfl: 5  •  VASCEPA 1 g capsule capsule, 2 tablets 2 (Two) Times a Day., Disp: , Rfl:   •  verapamil ER (VERELAN) 360 MG 24 hr capsule, TAKE 1 CAPSULE BY MOUTH EVERY NIGHT., Disp: 90 capsule, Rfl: 3  •  VITAMIN D PO, 10,000 iu daily, Disp: , Rfl:     Family History:  Family History   Problem Relation Age of Onset   • Diabetes Mother    • Hypertension Mother    • Colon polyps Mother    • Cancer Paternal Uncle         colon   • Hypertension Father    • No Known Problems Brother        Past Medical History:  Past Medical History:   Diagnosis Date   • Adhesive capsulitis of right knee    • Arthritis    • Cardiomegaly    • Deep vein thrombosis (CMS/HCC)    • Diabetes mellitus (CMS/HCC)    • Diverticulosis    • GERD (gastroesophageal reflux disease)    • H/O blood clots     rt calf  following rt total knee surgery 12/2016 was on xarelto now off on aspirin only   • Heart murmur    • History of hepatitis     AS A CHILD food related   • Hyperlipidemia    • Hypertension    • Hypotestosteronism    • Hypovitaminosis D    • Knee pain, right    • Low back pain    • Osteoarthritis    • Sleep apnea        Past surgical History:  Past Surgical History:   Procedure Laterality Date   • ADENOIDECTOMY     • BACK SURGERY     • JOINT MANIPULATION Right 2/16/2017    Procedure: MANIPULATION OF RT KNEE;  Surgeon: Anthony Andino MD;  Location: [facility] OR;  Service:    • KNEE ARTHROSCOPY Right     X3   • TN REVISE KNEE JOINT REPLACE,1 PART Right 3/6/2017    Procedure: RIGHT TOTAL KNEE ARTHROPLASTY REVISION WITH LEFT KNEE STERIOID INJECTION;  Surgeon: Anthony Andino MD;  Location: [facility] OR;  Service: Orthopedics   • TN TOTAL KNEE  ARTHROPLASTY Right 12/15/2016    Procedure: RT TOTAL KNEE ARTHROPLASTY;  Surgeon: Anthony Andino MD;  Location: [facility];  Service: Orthopedics   • TONSILLECTOMY         Social History:  Social History     Socioeconomic History   • Marital status:      Spouse name: Not on file   • Number of children: Not on file   • Years of education: Not on file   • Highest education level: Not on file   Occupational History   • Occupation: sales   Tobacco Use   • Smoking status: Never Smoker   • Smokeless tobacco: Never Used   Substance and Sexual Activity   • Alcohol use: Yes     Alcohol/week: 2.0 standard drinks     Types: 2 Shots of liquor per week     Comment: social   • Drug use: No     Comment: prescribed by MD   • Sexual activity: Defer       Review of Systems:  The following systems were reviewed as they relate to the cardiovascular system: Constitutional, Eyes, ENT, Cardiovascular, Respiratory, Gastrointestinal, Integumentary, Neurological, Psychiatric, Hematologic, Endocrine, Musculoskeletal, and Genitourinary. The pertinent cardiovascular findings are reported above with all other cardiovascular points within those systems being negative.    Diagnostic Study Review:     Current Electrocardiogram:    ECG 12 Lead    Date/Time: 1/7/2021 6:38 PM  Performed by: Joe Plaza DO  Authorized by: Joe Plaza DO   Comparison: not compared with previous ECG   Previous ECG: no previous ECG available  Comments: Normal sinus rhythm with a ventricular rate of 83 bpm.  Left anterior fascicular block.  Poor R wave progression.  Normal QT and QTc intervals.              NOTE: The following portions of the patient's history were reviewed and updated this visit as appropriate: allergies, current medications, past family history, past medical history, past social history, past surgical history and problem list.

## 2021-02-25 RX ORDER — AZILSARTAN KAMEDOXOMIL AND CHLORTHALIDONE 40; 25 MG/1; MG/1
1 TABLET ORAL DAILY
Qty: 90 TABLET | Refills: 3 | Status: SHIPPED | OUTPATIENT
Start: 2021-02-25 | End: 2021-09-15

## 2021-03-26 ENCOUNTER — BULK ORDERING (OUTPATIENT)
Dept: CASE MANAGEMENT | Facility: OTHER | Age: 51
End: 2021-03-26

## 2021-03-26 DIAGNOSIS — Z23 IMMUNIZATION DUE: ICD-10-CM

## 2021-09-15 DIAGNOSIS — I10 ESSENTIAL HYPERTENSION: Primary | ICD-10-CM

## 2021-09-15 RX ORDER — AZILSARTAN KAMEDOXOMIL AND CHLORTHALIDONE 40; 12.5 MG/1; MG/1
1 TABLET ORAL DAILY
Qty: 90 TABLET | Refills: 3 | Status: SHIPPED | OUTPATIENT
Start: 2021-09-15 | End: 2021-09-15 | Stop reason: SDUPTHER

## 2021-09-15 RX ORDER — AZILSARTAN KAMEDOXOMIL AND CHLORTHALIDONE 40; 12.5 MG/1; MG/1
1 TABLET ORAL DAILY
Qty: 90 TABLET | Refills: 3 | Status: SHIPPED | OUTPATIENT
Start: 2021-09-15 | End: 2022-01-18 | Stop reason: SDUPTHER

## 2022-01-18 ENCOUNTER — OFFICE VISIT (OUTPATIENT)
Dept: CARDIOLOGY | Facility: CLINIC | Age: 52
End: 2022-01-18

## 2022-01-18 VITALS
BODY MASS INDEX: 34.72 KG/M2 | WEIGHT: 256 LBS | DIASTOLIC BLOOD PRESSURE: 90 MMHG | HEART RATE: 82 BPM | OXYGEN SATURATION: 95 % | SYSTOLIC BLOOD PRESSURE: 141 MMHG

## 2022-01-18 DIAGNOSIS — E78.2 MIXED HYPERLIPIDEMIA: ICD-10-CM

## 2022-01-18 DIAGNOSIS — I10 ESSENTIAL HYPERTENSION: Primary | ICD-10-CM

## 2022-01-18 PROCEDURE — 99214 OFFICE O/P EST MOD 30 MIN: CPT | Performed by: INTERNAL MEDICINE

## 2022-01-18 PROCEDURE — 93000 ELECTROCARDIOGRAM COMPLETE: CPT | Performed by: INTERNAL MEDICINE

## 2022-01-18 RX ORDER — AZILSARTAN KAMEDOXOMIL AND CHLORTHALIDONE 40; 12.5 MG/1; MG/1
1 TABLET ORAL DAILY
Qty: 90 TABLET | Refills: 3 | Status: SHIPPED | OUTPATIENT
Start: 2022-01-18 | End: 2022-11-29

## 2022-01-18 RX ORDER — ROSUVASTATIN CALCIUM 10 MG/1
10 TABLET, COATED ORAL 3 TIMES WEEKLY
COMMUNITY

## 2022-01-18 NOTE — PROGRESS NOTES
Cardiology Office Visit      Encounter Date:  01/18/2022    Patient ID:   Anthony Sol is a 51 y.o. male.    Reason For Followup:  Hypertension  Hyperlipidemia    Brief Clinical History:  Dear Dr. Linares, Benji Rasheed MD    I had the pleasure of seeing Anthony Sol today. As you are well aware, this is a 51 y.o. male with no known history of ischemic heart disease.  He does have a history of hypertension, hyperlipidemia, diabetes mellitus, and exogenous obesity.  He presents today for follow-up on the above conditions.    Interval History:  He denies any chest pain pressure heaviness or tightness.  He denies any shortness of breath.  He denies any PND orthopnea.  He denies any syncope or near syncope.  He reports feeling well from a cardiac perspective.    His blood pressure is a little elevated today however he reports that his blood pressures at home are quite well controlled.  He reports continued efforts at weight loss and exercise.    We were able to review his most recent laboratory data through his patient portal website.  His lipid profile demonstrates appropriate control.  His renal function is within normal limits.  His hemoglobin A1c was low 6 range.    He does report that his antihypertensive medication is quite costly.  We discussed potential alternative pharmacies and we will try [facility] to see if this will be cheaper for him.    Assessment & Plan    Impressions:  Hypertension  Hyperlipidemia  Diabetes mellitus  Exogenous obesity    Recommendations:  Continuation of his current cardiovascular regimen at the present time.     This includes antihypertensives, and statin therapy.  Request formal copy of laboratory data from your office  Routine surveillance laboratory testing as per your direction  Follow-up in 1 years time sooner should there be difficulties    Diagnoses and all orders for this visit:    1. Essential hypertension (Primary)  -     Azilsartan-Chlorthalidone (Edarbyclor) 40-12.5 MG  "tablet; Take 1 tablet by mouth Daily.  Dispense: 90 tablet; Refill: 3  -     ECG 12 Lead    2. Mixed hyperlipidemia          Objective:    Vitals:  Vitals:    01/18/22 0941   BP: 141/90   Pulse: 82   SpO2: 95%   Weight: 116 kg (256 lb)     Body mass index is 34.72 kg/m².      Physical Exam:    General: Alert, cooperative, no distress, appears stated age  Head:  Normocephalic, atraumatic, mucous membranes moist  Eyes:  Conjunctiva/corneas clear, EOM's intact     Neck:  Supple,  no bruit    Lungs: Clear to auscultation bilaterally, no wheezes rhonchi rales are noted  Chest wall: No tenderness  Heart::  Regular rate and rhythm, S1 and S2 normal, 1/6 holosystolic murmur.  No rub or gallop  Abdomen: Soft, non-tender, nondistended bowel sounds active.  Obese  Extremities: No cyanosis, clubbing, or edema  Pulses: 2+ and symmetric all extremities  Skin:  No rashes or lesions  Neuro/psych: A&O x3. CN II through XII are grossly intact with appropriate affect      Allergies:  No Known Allergies    Medication Review:     Current Outpatient Medications:   •  Accu-Chek FastClix Lancets misc, , Disp: , Rfl:   •  Accu-Chek Guide test strip, , Disp: , Rfl:   •  anastrozole (ARIMIDEX) 1 MG tablet, One half tablet M,W,F, Disp: , Rfl:   •  Azilsartan-Chlorthalidone (Edarbyclor) 40-12.5 MG tablet, Take 1 tablet by mouth Daily., Disp: 90 tablet, Rfl: 3  •  B-D 3CC LUER-CARLI SYR 22GX1\" 22G X 1\" 3 ML misc, USE TO INJECT TESTOSTERONE ONCE WEEKLY, Disp: , Rfl: 0  •  B-D 3CC LUER-CARLI SYR 25GX1\" 25G X 1\" 3 ML misc, , Disp: , Rfl:   •  BD Veo Insulin Syr U/F 1/2Unit 31G X 15/64\" 0.3 ML misc, , Disp: , Rfl:   •  Blood Glucose Monitoring Suppl (Accu-Chek Guide) w/Device kit, , Disp: , Rfl:   •  Coenzyme Q10 (COQ-10) 30 MG capsule, Daily., Disp: , Rfl:   •  Cyanocobalamin (B-12) 500 MCG sublingual tablet, Daily., Disp: , Rfl:   •  Empagliflozin (JARDIANCE) 10 MG tablet, Take 1 tablet by mouth Daily. For diabetes, Disp: 90 tablet, Rfl: 1  •  " IPAMORELIN ACETATE IJ, 5 days weekly, Disp: , Rfl:   •  metFORMIN (GLUCOPHAGE) 1000 MG tablet, 1 tab po BID with meal for diabetes, Disp: 180 tablet, Rfl: 1  •  Naloxone HCl powder, Naloxone compound, Disp: , Rfl:   •  Omega-3 Fatty Acids (FISH OIL) 1000 MG capsule capsule, One OTC capsule twice daily with meals, Disp: , Rfl:   •  rosuvastatin (CRESTOR) 10 MG tablet, Take 10 mg by mouth Daily., Disp: , Rfl:   •  Semaglutide, 1 MG/DOSE, (OZEMPIC) 2 MG/1.5ML solution pen-injector, 1 (One) Time Per Week., Disp: , Rfl:   •  sirolimus (RAPAMUNE) 1 MG tablet, TAKE 3 TABLETS BY MOUTH ONCE A WEEK, Disp: , Rfl:   •  Testosterone Cypionate (DEPOTESTOTERONE CYPIONATE) 200 MG/ML injection, INJECT 0.5 MLS INTO MUSCLE ONCE WEEKLY, Disp: , Rfl: 5  •  VASCEPA 1 g capsule capsule, 2 tablets 2 (Two) Times a Day., Disp: , Rfl:   •  verapamil ER (VERELAN) 360 MG 24 hr capsule, TAKE 1 CAPSULE BY MOUTH EVERY NIGHT. (Patient taking differently: Take 240 mg by mouth Every Night.), Disp: 90 capsule, Rfl: 3  •  VITAMIN D PO, 10,000 iu daily, Disp: , Rfl:   •  atorvastatin (LIPITOR) 40 MG tablet, TAKE 1 TABLET EVERY DAY (Patient taking differently: Take 40 mg by mouth Daily. TAKE 1/2 TABLET EVERY DAY), Disp: 90 tablet, Rfl: 1    Family History:  Family History   Problem Relation Age of Onset   • Diabetes Mother    • Hypertension Mother    • Colon polyps Mother    • Cancer Paternal Uncle         colon   • Hypertension Father    • No Known Problems Brother        Past Medical History:  Past Medical History:   Diagnosis Date   • Adhesive capsulitis of right knee    • Arthritis    • Cardiomegaly    • Deep vein thrombosis (HCC)    • Diabetes mellitus (HCC)    • Diverticulosis    • GERD (gastroesophageal reflux disease)    • H/O blood clots     rt calf  following rt total knee surgery 12/2016 was on xarelto now off on aspirin only   • Heart murmur    • History of hepatitis     AS A CHILD food related   • Hyperlipidemia    • Hypertension    •  Hypotestosteronism    • Hypovitaminosis D    • Knee pain, right    • Low back pain    • Osteoarthritis    • Sleep apnea        Past Surgical History:  Past Surgical History:   Procedure Laterality Date   • ADENOIDECTOMY     • BACK SURGERY     • JOINT MANIPULATION Right 2/16/2017    Procedure: MANIPULATION OF RT KNEE;  Surgeon: Anthony Andino MD;  Location: [facility];  Service:    • KNEE ARTHROSCOPY Right     X3   • VT REVISE KNEE JOINT REPLACE,1 PART Right 3/6/2017    Procedure: RIGHT TOTAL KNEE ARTHROPLASTY REVISION WITH LEFT KNEE STERIOID INJECTION;  Surgeon: Anthony Andino MD;  Location: [facility];  Service: Orthopedics   • VT TOTAL KNEE ARTHROPLASTY Right 12/15/2016    Procedure: RT TOTAL KNEE ARTHROPLASTY;  Surgeon: Anthony Andino MD;  Location: [facility];  Service: Orthopedics   • TONSILLECTOMY         Social History:  Social History     Socioeconomic History   • Marital status:    Tobacco Use   • Smoking status: Never Smoker   • Smokeless tobacco: Never Used   Substance and Sexual Activity   • Alcohol use: Yes     Alcohol/week: 2.0 standard drinks     Types: 2 Shots of liquor per week     Comment: social   • Drug use: No     Comment: prescribed by MD   • Sexual activity: Defer       Review of Systems:  The following systems were reviewed as they relate to the cardiovascular system: Constitutional, Eyes, ENT, Cardiovascular, Respiratory, Gastrointestinal, Integumentary, Neurological, Psychiatric, Hematologic, Endocrine, Musculoskeletal, and Genitourinary. The pertinent cardiovascular findings are reported above with all other cardiovascular points within those systems being negative.    Diagnostic Study Review:     Current Electrocardiogram:    ECG 12 Lead    Date/Time: 1/18/2022 6:36 PM  Performed by: Joe Plaza DO  Authorized by: Joe Plaza DO   Comparison: not compared with previous ECG   Previous ECG: no previous ECG available  Comments:  Normal sinus rhythm with a ventricular rate of 82 bpm.  Left axis deviation.  Old anterior septal MI.  Normal QT and QTc intervals.            Laboratory Data:  Lab Results   Component Value Date    GLUCOSE 114 (H) 07/12/2018    BUN 16 08/10/2020    CREATININE 1.0 08/10/2020    EGFRIFNONA 83 07/12/2018    EGFRIFAFRI 100 07/12/2018    BCR 16.8 08/10/2020    K 3.8 08/10/2020    CO2 28 08/10/2020    CALCIUM 9.7 08/10/2020    PROTENTOTREF 7.5 07/12/2018    ALBUMIN 4.4 08/10/2020    LABIL2 1.5 08/10/2020    AST 28 08/10/2020    ALT 37 08/10/2020     Lab Results   Component Value Date    GLUCOSE 114 (H) 07/12/2018    CALCIUM 9.7 08/10/2020     08/10/2020    K 3.8 08/10/2020    CO2 28 08/10/2020     08/10/2020    BUN 16 08/10/2020    CREATININE 1.0 08/10/2020    EGFRIFAFRI 100 07/12/2018    EGFRIFNONA 83 07/12/2018    BCR 16.8 08/10/2020    ANIONGAP 11.9 03/07/2017     Lab Results   Component Value Date    WBC 6.14 08/10/2020    HGB 16.8 08/10/2020    HCT 49.7 08/10/2020    MCV 83.2 08/10/2020     08/10/2020     Lab Results   Component Value Date    CHLPL 108 07/12/2018    TRIG 225 (H) 07/12/2018    HDL 31 (L) 07/12/2018    LDL 32 07/12/2018     Lab Results   Component Value Date    HGBA1C 6.59 (H) 07/12/2018     Lab Results   Component Value Date    INR 1.01 03/03/2017    INR 0.97 12/06/2016    PROTIME 12.9 03/03/2017    PROTIME 12.5 12/06/2016       Most Recent Echo:       Most Recent Stress Test:       Most Recent Cardiac Catheterization:   No results found for this or any previous visit.       NOTE: The following portions of the patient's note were reviewed, confirmed and/or updated this visit as appropriate: History of present illness/Interval history, physical examination, assessment & plan, allergies, current medications, past family history, past medical history, past social history, past surgical history and problem list.

## 2022-02-09 ENCOUNTER — TELEPHONE (OUTPATIENT)
Dept: SURGERY | Facility: CLINIC | Age: 52
End: 2022-02-09

## 2022-02-09 NOTE — TELEPHONE ENCOUNTER
Caller: Anthony Sol    Relationship to patient: Self    Best call back number: [phone]    Patient is needing: PT CALLING TO SCHEDULE HIS COLONOSCOPY. HIS MY CHART SHOWS THAT HE IS OVERDUE. IT LOOKS LIKE IT SHOULD BE DONE THIS YEAR ACCORDING TO THE RECALL LETTER. HE WOULD LIKE SOMEONE TO PLEASE CALL HIM SO HE CAN GO AHEAD AND GET THIS SCHEDULED.

## 2022-03-23 ENCOUNTER — TELEPHONE (OUTPATIENT)
Dept: SURGERY | Facility: CLINIC | Age: 52
End: 2022-03-23

## 2022-03-23 NOTE — TELEPHONE ENCOUNTER
"Caller: Anthony Sol    Relationship to patient: Self    Best call back number: [phone]    Patient is needing: TO SCHEDULE COLONOSCOPY. REQUESTED TO SPEAK TO \"NIRAV\" UNABLE TO WARM TRANSFER  "

## 2022-03-24 ENCOUNTER — PREP FOR SURGERY (OUTPATIENT)
Dept: SURGERY | Facility: SURGERY CENTER | Age: 52
End: 2022-03-24

## 2022-03-24 DIAGNOSIS — Z12.11 SCREENING FOR COLON CANCER: Primary | ICD-10-CM

## 2022-03-29 PROBLEM — Z12.11 SCREENING FOR COLON CANCER: Status: ACTIVE | Noted: 2022-03-29

## 2022-05-18 ENCOUNTER — TELEPHONE (OUTPATIENT)
Dept: SURGERY | Facility: CLINIC | Age: 52
End: 2022-05-18

## 2022-05-18 NOTE — TELEPHONE ENCOUNTER
Hub staff attempted to follow warm transfer process and was unsuccessful     Caller: FAZAL HENRY    Relationship to patient: SELF  Best call back number: [phone]    Patient is needing: PREP INSTRUCTIONS FOR COLONOSCOPY; HE MISPLACED THE PAPERS HE HAD.

## 2022-05-18 NOTE — TELEPHONE ENCOUNTER
Hub staff attempted to follow warm transfer process and was unsuccessful     Caller:  FAZAL HENRY    Relationship to patient: SELF    Best call back number: [phone]  Patient is needing: MISSED WHEN ARELIS  CALLED  AND WOULD LIKE CALLBACK

## 2022-05-23 ENCOUNTER — HOSPITAL ENCOUNTER (OUTPATIENT)
Facility: SURGERY CENTER | Age: 52
Setting detail: HOSPITAL OUTPATIENT SURGERY
Discharge: HOME | End: 2022-05-23
Attending: SURGERY | Admitting: SURGERY

## 2022-05-23 ENCOUNTER — ANESTHESIA (OUTPATIENT)
Dept: SURGERY | Facility: SURGERY CENTER | Age: 52
End: 2022-05-23

## 2022-05-23 ENCOUNTER — ANESTHESIA EVENT (OUTPATIENT)
Dept: SURGERY | Facility: SURGERY CENTER | Age: 52
End: 2022-05-23

## 2022-05-23 VITALS
BODY MASS INDEX: 32.75 KG/M2 | HEART RATE: 79 BPM | OXYGEN SATURATION: 95 % | HEIGHT: 72 IN | SYSTOLIC BLOOD PRESSURE: 121 MMHG | DIASTOLIC BLOOD PRESSURE: 79 MMHG | WEIGHT: 241.8 LBS | RESPIRATION RATE: 16 BRPM | TEMPERATURE: 98.4 F

## 2022-05-23 LAB — GLUCOSE BLDC GLUCOMTR-MCNC: 147 MG/DL (ref 70–130)

## 2022-05-23 PROCEDURE — S0260 H&P FOR SURGERY: HCPCS | Performed by: SURGERY

## 2022-05-23 PROCEDURE — 45378 DIAGNOSTIC COLONOSCOPY: CPT | Performed by: SURGERY

## 2022-05-23 PROCEDURE — 25010000002 PROPOFOL 10 MG/ML EMULSION: Performed by: STUDENT IN AN ORGANIZED HEALTH CARE EDUCATION/TRAINING PROGRAM

## 2022-05-23 RX ORDER — SODIUM CHLORIDE 0.9 % (FLUSH) 0.9 %
10 SYRINGE (ML) INJECTION AS NEEDED
Status: DISCONTINUED | OUTPATIENT
Start: 2022-05-23 | End: 2022-05-23 | Stop reason: HOSPADM

## 2022-05-23 RX ORDER — LIDOCAINE HYDROCHLORIDE 10 MG/ML
0.5 INJECTION, SOLUTION INFILTRATION; PERINEURAL ONCE AS NEEDED
Status: DISCONTINUED | OUTPATIENT
Start: 2022-05-23 | End: 2022-05-23 | Stop reason: HOSPADM

## 2022-05-23 RX ORDER — SODIUM CHLORIDE, SODIUM LACTATE, POTASSIUM CHLORIDE, CALCIUM CHLORIDE 600; 310; 30; 20 MG/100ML; MG/100ML; MG/100ML; MG/100ML
1000 INJECTION, SOLUTION INTRAVENOUS CONTINUOUS
Status: DISCONTINUED | OUTPATIENT
Start: 2022-05-23 | End: 2022-05-23 | Stop reason: HOSPADM

## 2022-05-23 RX ORDER — PROPOFOL 10 MG/ML
VIAL (ML) INTRAVENOUS AS NEEDED
Status: DISCONTINUED | OUTPATIENT
Start: 2022-05-23 | End: 2022-05-23 | Stop reason: SURG

## 2022-05-23 RX ORDER — PROMETHAZINE HYDROCHLORIDE 25 MG/1
25 SUPPOSITORY RECTAL ONCE AS NEEDED
Status: DISCONTINUED | OUTPATIENT
Start: 2022-05-23 | End: 2022-05-23 | Stop reason: HOSPADM

## 2022-05-23 RX ORDER — LIDOCAINE HYDROCHLORIDE 20 MG/ML
INJECTION, SOLUTION INFILTRATION; PERINEURAL AS NEEDED
Status: DISCONTINUED | OUTPATIENT
Start: 2022-05-23 | End: 2022-05-23 | Stop reason: SURG

## 2022-05-23 RX ORDER — PROMETHAZINE HYDROCHLORIDE 12.5 MG/1
25 TABLET ORAL ONCE AS NEEDED
Status: DISCONTINUED | OUTPATIENT
Start: 2022-05-23 | End: 2022-05-23 | Stop reason: HOSPADM

## 2022-05-23 RX ADMIN — SODIUM CHLORIDE, POTASSIUM CHLORIDE, SODIUM LACTATE AND CALCIUM CHLORIDE 1000 ML: 600; 310; 30; 20 INJECTION, SOLUTION INTRAVENOUS at 09:11

## 2022-05-23 RX ADMIN — PROPOFOL 200 MCG/KG/MIN: 10 INJECTION, EMULSION INTRAVENOUS at 09:27

## 2022-05-23 RX ADMIN — LIDOCAINE HYDROCHLORIDE 40 MG: 20 INJECTION, SOLUTION INFILTRATION; PERINEURAL at 09:27

## 2022-05-23 RX ADMIN — PROPOFOL 120 MG: 10 INJECTION, EMULSION INTRAVENOUS at 09:27

## 2022-05-23 NOTE — ANESTHESIA POSTPROCEDURE EVALUATION
"Patient: Anthony Sol    Procedure Summary     Date: 05/23/22 Room / Location: [facility]    Anesthesia Start: 0921 Anesthesia Stop: 0955    Procedure: COLONOSCOPY (N/A ) Diagnosis:       Screening for colon cancer      (Screening for colon cancer [Z12.11])    Surgeons: Marycruz Feliciano MD Provider: Joe Marcano MD    Anesthesia Type: MAC ASA Status: 2          Anesthesia Type: MAC    Vitals  Vitals Value Taken Time   /74 05/23/22 1003   Temp 36.9 °C (98.4 °F) 05/23/22 0955   Pulse 81 05/23/22 1003   Resp 18 05/23/22 1003   SpO2 95 % 05/23/22 1003           Post Anesthesia Care and Evaluation    Patient location during evaluation: PACU  Patient participation: complete - patient participated  Level of consciousness: awake and alert  Pain management: adequate  Airway patency: patent  Anesthetic complications: No anesthetic complications  PONV Status: controlled  Cardiovascular status: acceptable and hemodynamically stable  Respiratory status: acceptable  Hydration status: acceptable    Comments: /74 (BP Location: Left arm, Patient Position: Lying)   Pulse 81   Temp 36.9 °C (98.4 °F) (Temporal)   Resp 18   Ht 182.9 cm (72\")   Wt 110 kg (241 lb 12.8 oz)   SpO2 95%   BMI 32.79 kg/m²       "

## 2022-05-23 NOTE — OP NOTE
Operative Note:    Pre-op dx: Screening Colonoscopy, family history of polyps in both parents     Post-op dx: diverticulosis     Procedure: Colonoscopy    Surgeon: Marycruz Feliciano MD    Anesthesia: MAC    EBL: none    Specimen:  * No orders in the log *    Complications: none    Procedure:    Colonoscopy:  A digital rectal examination was performed which revealed no rectal masses.  Scope was introduced into the rectum and advanced into the sigmoid, descending colon, splenic flexure, transverse colon, hepatic flexure, ascending colon and into the cecum.  Cecum was identified by the ileocecal valve and appendiceal orifice.  Patient had large and small diverticula throughout the sigmoid and descending colon.  There was no evidence of any polyps, masses, AV malformation or inflammation.  The bowel preparation was excellent. The scope was slowly withdrawn and a second look was performed.  Upon the second look, there were no new findings.  The colon was desufflated and the procedure was terminated.   Patient was transferred to recovery room in stable condition.      Assessment/Plan:  Repeat colonoscopy in 5 years.  Family history of polyps in both parents  Diverticulosis    Marycruz Feliciano MD  General, Minimally Invasive and Endoscopic Surgery  [place] [id number] [facility] [address]   [address]    [address]  [place] [address]               [place] [address]    P: [phone]  F: [phone]    Cc:  Benji Linares MD

## 2022-05-23 NOTE — ANESTHESIA PREPROCEDURE EVALUATION
Anesthesia Evaluation     Patient summary reviewed and Nursing notes reviewed                Airway   Mallampati: II  TM distance: >3 FB  Neck ROM: limited  Large neck circumference  Dental      Pulmonary - negative pulmonary ROS   Cardiovascular     ECG reviewed  Rhythm: regular  Rate: normal    (+) hypertension, hyperlipidemia,       Neuro/Psych- negative ROS  GI/Hepatic/Renal/Endo    (+) obesity,  GERD,  diabetes mellitus,     Musculoskeletal (-) negative ROS    Abdominal    Substance History - negative use     OB/GYN negative ob/gyn ROS         Other                        Anesthesia Plan    ASA 2     MAC   (I have reviewed the patient's history with the patient and the chart, including all pertinent laboratory results and imaging. I have explained the risks of anesthesia including but not limited to dental damage, corneal abrasion, nerve injury, MI, stroke, and death. Questions asked and answered. Anesthetic plan discussed with patient and team as indicated. Patient expressed understanding of the above.  )  intravenous induction     Anesthetic plan, all risks, benefits, and alternatives have been provided, discussed and informed consent has been obtained with: patient.

## 2022-05-27 ENCOUNTER — TELEPHONE (OUTPATIENT)
Dept: SURGERY | Facility: CLINIC | Age: 52
End: 2022-05-27

## 2022-05-27 NOTE — TELEPHONE ENCOUNTER
Spoke to pt and let him know that his colonoscopy showed that there was no  evidence of any polyps, masses, AV malformation or inflammation, but pt did have  large and small diverticula throughout the sigmoid and descending colon. Pt will repeat colonoscopy in 5 years due to Family history of polyps in both parents. Pt understood and did not have any questions at this time

## 2022-11-28 DIAGNOSIS — I10 ESSENTIAL HYPERTENSION: ICD-10-CM

## 2022-11-29 RX ORDER — AZILSARTAN KAMEDOXOMIL AND CHLORTHALIDONE 40; 12.5 MG/1; MG/1
TABLET ORAL
Qty: 90 TABLET | Refills: 3 | Status: SHIPPED | OUTPATIENT
Start: 2022-11-29

## 2023-01-17 ENCOUNTER — OFFICE VISIT (OUTPATIENT)
Dept: CARDIOLOGY | Facility: CLINIC | Age: 53
End: 2023-01-17
Payer: COMMERCIAL

## 2023-01-17 ENCOUNTER — TRANSCRIBE ORDERS (OUTPATIENT)
Dept: ADMINISTRATIVE | Facility: HOSPITAL | Age: 53
End: 2023-01-17
Payer: COMMERCIAL

## 2023-01-17 VITALS
WEIGHT: 237 LBS | DIASTOLIC BLOOD PRESSURE: 90 MMHG | HEART RATE: 82 BPM | SYSTOLIC BLOOD PRESSURE: 136 MMHG | HEIGHT: 72 IN | BODY MASS INDEX: 32.1 KG/M2 | OXYGEN SATURATION: 98 %

## 2023-01-17 DIAGNOSIS — I10 ESSENTIAL HYPERTENSION: Primary | ICD-10-CM

## 2023-01-17 DIAGNOSIS — E78.2 MIXED HYPERLIPIDEMIA: ICD-10-CM

## 2023-01-17 DIAGNOSIS — Z13.6 ENCOUNTER FOR SCREENING FOR VASCULAR DISEASE: Primary | ICD-10-CM

## 2023-01-17 PROCEDURE — 93000 ELECTROCARDIOGRAM COMPLETE: CPT | Performed by: INTERNAL MEDICINE

## 2023-01-17 PROCEDURE — 99214 OFFICE O/P EST MOD 30 MIN: CPT | Performed by: INTERNAL MEDICINE

## 2023-01-17 RX ORDER — SEMAGLUTIDE 1.34 MG/ML
INJECTION, SOLUTION SUBCUTANEOUS
COMMUNITY
Start: 2022-12-23

## 2023-01-17 RX ORDER — VERAPAMIL HYDROCHLORIDE 240 MG/1
TABLET, FILM COATED, EXTENDED RELEASE ORAL
COMMUNITY
Start: 2022-12-23

## 2023-01-17 NOTE — PROGRESS NOTES
Cardiology Office Visit      Encounter Date:  01/17/2023    Patient ID:   Anthony Sol is a 52 y.o. male.    Reason For Followup:  Hypertension  Hyperlipidemia    Brief Clinical History:  Dear Dr. Linares, Benji Rasheed MD    I had the pleasure of seeing Anthony oSl today. As you are well aware, this is a 52 y.o. male with no known history of ischemic heart disease.  He does have a history of hypertension, hyperlipidemia, diabetes mellitus, and exogenous obesity.  He presents today for follow-up on the above conditions.    Interval History:  He denies any chest pain pressure heaviness or tightness.  He denies any shortness of breath.  He denies any PND orthopnea.  He denies any syncope or near syncope.  He reports feeling well from a cardiac perspective.    He reports that he feels fantastic.  He is lost 70 pounds with the help of Ozempic.    His blood pressure is a little elevated today but he reports that he did not take his Edarbi chlor this morning.  He will take this and continue his current regimen while logging values.  He will send me some values in 2 weeks.    He brought in a significant amount of laboratory work from his primary care doctor.  Labs were performed December 28, 2022..  Glucose was 114, sodium 141 potassium 4.1 chloride 101 CO2 27 BUN 15 creatinine 1.03.  AST 23 ALT 21 alkaline phosphatase 59.  Ferritin 13 TIBC 497 transferrin saturation 7%.  PSA 2.5 Free T3-4 Free T4 1.3 TSH 1.1.  High-sensitivity CRP 1.9.  Total cholesterol 100, HDL 36, triglycerides 141, LDL 42.  Hemoglobin A1c 6% White blood cell count 5.9 hemoglobin 15.3 hematocrit 51.1.  Platelets 307,000.    He did undergo a coronary calcium score about 4 years ago.  He does have an elevated CRP.  His calcium score was 481.9 suggestive of a moderate to high risk of a cardiac event in the ensuing 5 years.  This was performed in January 2019.  He will have his heart and vascular exam repeated.    Assessment &  "Plan    Impressions:  Hypertension  Hyperlipidemia  Diabetes mellitus  Exogenous obesity    Recommendations:  Continuation of his current cardiovascular regimen at the present time.     This includes antihypertensives, and statin therapy.  Routine surveillance laboratory testing as per your direction  Follow-up in 1 years time sooner should there be difficulties    Diagnoses and all orders for this visit:    1. Essential hypertension (Primary)  -     ECG 12 Lead    2. Mixed hyperlipidemia  -     ECG 12 Lead          Objective:    Vitals:  Vitals:    01/17/23 1006   BP: 136/90   BP Location: Left arm   Patient Position: Sitting   Cuff Size: Large Adult   Pulse: 82   SpO2: 98%   Weight: 108 kg (237 lb)   Height: 182.9 cm (72\")     Body mass index is 32.14 kg/m².      Physical Exam:    General: Alert, cooperative, no distress, appears stated age  Head:  Normocephalic, atraumatic, mucous membranes moist  Eyes:  Conjunctiva/corneas clear, EOM's intact     Neck:  Supple,  no bruit    Lungs: Clear to auscultation bilaterally, no wheezes rhonchi rales are noted  Chest wall: No tenderness  Heart::  Regular rate and rhythm, S1 and S2 normal, 1/6 holosystolic murmur.  No rub or gallop  Abdomen: Soft, non-tender, nondistended bowel sounds active.  Obese  Extremities: No cyanosis, clubbing, or edema  Pulses: 2+ and symmetric all extremities  Skin:  No rashes or lesions  Neuro/psych: A&O x3. CN II through XII are grossly intact with appropriate affect      Allergies:  No Known Allergies    Medication Review:     Current Outpatient Medications:   •  anastrozole (ARIMIDEX) 1 MG tablet, Take 1 mg by mouth 1 (One) Time Per Week. One half tablet M,W,F, Disp: , Rfl:   •  Coenzyme Q10 (COQ-10) 30 MG capsule, Daily., Disp: , Rfl:   •  Cyanocobalamin (B-12) 500 MCG sublingual tablet, Daily., Disp: , Rfl:   •  Edarbyclor 40-12.5 MG tablet, TAKE 1 TABLET BY MOUTH ONCE DAILY, Disp: 90 tablet, Rfl: 3  •  Empagliflozin (JARDIANCE) 10 MG " "tablet, Take 1 tablet by mouth Daily. For diabetes, Disp: 90 tablet, Rfl: 1  •  IPAMORELIN ACETATE IJ, 5 days weekly, Disp: , Rfl:   •  metFORMIN (GLUCOPHAGE) 1000 MG tablet, 1 tab po BID with meal for diabetes, Disp: 180 tablet, Rfl: 1  •  Ozempic, 1 MG/DOSE, 4 MG/3ML solution pen-injector, , Disp: , Rfl:   •  rosuvastatin (CRESTOR) 10 MG tablet, Take 10 mg by mouth 3 (Three) Times a Week., Disp: , Rfl:   •  sirolimus (RAPAMUNE) 1 MG tablet, TAKE 3 TABLETS BY MOUTH ONCE A WEEK, Disp: , Rfl:   •  Testosterone Cypionate (DEPOTESTOTERONE CYPIONATE) 200 MG/ML injection, INJECT 0.5 MLS INTO MUSCLE ONCE WEEKLY, Disp: , Rfl: 5  •  VASCEPA 1 g capsule capsule, 2 tablets 2 (Two) Times a Day., Disp: , Rfl:   •  verapamil SR (CALAN-SR) 240 MG CR tablet, , Disp: , Rfl:   •  VITAMIN D PO, 10,000 iu daily, Disp: , Rfl:   •  Accu-Chek FastClix Lancets misc, , Disp: , Rfl:   •  Accu-Chek Guide test strip, , Disp: , Rfl:   •  B-D 3CC LUER-CARLI SYR 22GX1\" 22G X 1\" 3 ML misc, USE TO INJECT TESTOSTERONE ONCE WEEKLY, Disp: , Rfl: 0  •  B-D 3CC LUER-CARLI SYR 25GX1\" 25G X 1\" 3 ML misc, , Disp: , Rfl:   •  BD Veo Insulin Syr U/F 1/2Unit 31G X 15/64\" 0.3 ML misc, , Disp: , Rfl:   •  Blood Glucose Monitoring Suppl (Accu-Chek Guide) w/Device kit, , Disp: , Rfl:     Family History:  Family History   Problem Relation Age of Onset   • Diabetes Mother    • Hypertension Mother    • Colon polyps Mother    • Cancer Paternal Uncle         colon   • Hypertension Father    • No Known Problems Brother        Past Medical History:  Past Medical History:   Diagnosis Date   • Adhesive capsulitis of right knee    • Arthritis    • Cardiomegaly    • Deep vein thrombosis (HCC)    • Diabetes mellitus (HCC)    • Diverticulosis    • GERD (gastroesophageal reflux disease)    • H/O blood clots     rt calf  following rt total knee surgery 12/2016 was on xarelto now off on aspirin only   • Heart murmur    • History of hepatitis     AS A CHILD food related   • " Hyperlipidemia    • Hypertension    • Hypotestosteronism    • Hypovitaminosis D    • Knee pain, right    • Low back pain    • Osteoarthritis        Past Surgical History:  Past Surgical History:   Procedure Laterality Date   • ADENOIDECTOMY     • BACK SURGERY     • COLONOSCOPY N/A 5/23/2022    Procedure: COLONOSCOPY;  Surgeon: Marycruz Feliciano MD;  Location: [facility] OR;  Service: Gastroenterology;  Laterality: N/A;  Diverticulosis   • JOINT MANIPULATION Right 2/16/2017    Procedure: MANIPULATION OF RT KNEE;  Surgeon: Anthony Andino MD;  Location: [facility] OR;  Service:    • KNEE ARTHROSCOPY Right     X3   • MT ARTHRP KNE CONDYLE&PLATU MEDIAL&LAT COMPARTMENTS Right 12/15/2016    Procedure: RT TOTAL KNEE ARTHROPLASTY;  Surgeon: Anthony Andino MD;  Location: [facility];  Service: Orthopedics   • MT REVJ TOTAL KNEE ARTHRP W/WO ALGRFT 1 COMPONENT Right 3/6/2017    Procedure: RIGHT TOTAL KNEE ARTHROPLASTY REVISION WITH LEFT KNEE STERIOID INJECTION;  Surgeon: Anthony Andino MD;  Location: [facility];  Service: Orthopedics   • TONSILLECTOMY         Social History:  Social History     Socioeconomic History   • Marital status:    Tobacco Use   • Smoking status: Never   • Smokeless tobacco: Never   Substance and Sexual Activity   • Alcohol use: Yes     Alcohol/week: 2.0 standard drinks     Types: 2 Shots of liquor per week     Comment: social   • Drug use: No     Comment: prescribed by MD   • Sexual activity: Defer       Review of Systems:  The following systems were reviewed as they relate to the cardiovascular system: Constitutional, Eyes, ENT, Cardiovascular, Respiratory, Gastrointestinal, Integumentary, Neurological, Psychiatric, Hematologic, Endocrine, Musculoskeletal, and Genitourinary. The pertinent cardiovascular findings are reported above with all other cardiovascular points within those systems being negative.    Diagnostic Study Review:     Current Electrocardiogram:    ECG 12  Lead    Date/Time: 1/17/2023 12:43 PM  Performed by: Joe Plaza DO  Authorized by: Joe Plaza DO   Comparison: not compared with previous ECG   Previous ECG: no previous ECG available  Comments: Normal sinus rhythm with a ventricular rate of 79 bpm.  Borderline left axis deviation.  Normal QT and QTc intervals.            Laboratory Data:  Lab Results   Component Value Date    GLUCOSE 114 (H) 07/12/2018    BUN 16 08/10/2020    CREATININE 1.0 08/10/2020    EGFRIFNONA 83 07/12/2018    EGFRIFAFRI 100 07/12/2018    BCR 16.8 08/10/2020    K 3.8 08/10/2020    CO2 28 08/10/2020    CALCIUM 9.7 08/10/2020    PROTENTOTREF 7.5 07/12/2018    ALBUMIN 4.4 08/10/2020    LABIL2 1.5 08/10/2020    AST 28 08/10/2020    ALT 37 08/10/2020     Lab Results   Component Value Date    GLUCOSE 114 (H) 07/12/2018    CALCIUM 9.7 08/10/2020     08/10/2020    K 3.8 08/10/2020    CO2 28 08/10/2020     08/10/2020    BUN 16 08/10/2020    CREATININE 1.0 08/10/2020    EGFRIFAFRI 100 07/12/2018    EGFRIFNONA 83 07/12/2018    BCR 16.8 08/10/2020    ANIONGAP 11.9 03/07/2017     Lab Results   Component Value Date    WBC 6.14 08/10/2020    HGB 16.8 08/10/2020    HCT 49.7 08/10/2020    MCV 83.2 08/10/2020     08/10/2020     Lab Results   Component Value Date    CHLPL 108 07/12/2018    TRIG 225 (H) 07/12/2018    HDL 31 (L) 07/12/2018    LDL 32 07/12/2018     Lab Results   Component Value Date    HGBA1C 6.59 (H) 07/12/2018     Lab Results   Component Value Date    INR 1.01 03/03/2017    INR 0.97 12/06/2016    PROTIME 12.9 03/03/2017    PROTIME 12.5 12/06/2016       Most Recent Echo:       Most Recent Stress Test:       Most Recent Cardiac Catheterization:   No results found for this or any previous visit.       NOTE: The following portions of the patient's note were reviewed, confirmed and/or updated this visit as appropriate: History of present illness/Interval history, physical examination, assessment &  plan, allergies, current medications, past family history, past medical history, past social history, past surgical history and problem list.

## 2023-04-17 ENCOUNTER — TELEPHONE (OUTPATIENT)
Dept: FAMILY MEDICINE CLINIC | Facility: CLINIC | Age: 53
End: 2023-04-17

## 2023-04-17 NOTE — TELEPHONE ENCOUNTER
Caller: EDEL FROM [employer]    Best call back number: [phone]    What form or medical record are you requesting: IMMUNIZATION RECORDS    Who is requesting this form or medical record from you: [facility] ([facility])    How would you like to receive the form or medical records (pick-up, mail, fax): FAX  If fax, what is the fax number: [phone]

## 2023-08-01 ENCOUNTER — HOSPITAL ENCOUNTER (OUTPATIENT)
Dept: CARDIOLOGY | Facility: HOSPITAL | Age: 53
Discharge: HOME | End: 2023-08-01
Payer: COMMERCIAL

## 2023-08-01 DIAGNOSIS — E11.65 TYPE 2 DIABETES MELLITUS WITH HYPERGLYCEMIA, WITHOUT LONG-TERM CURRENT USE OF INSULIN: ICD-10-CM

## 2023-08-01 DIAGNOSIS — I10 ESSENTIAL HYPERTENSION: ICD-10-CM

## 2023-08-01 DIAGNOSIS — R93.1 AGATSTON CORONARY ARTERY CALCIUM SCORE GREATER THAN 400: ICD-10-CM

## 2023-08-01 PROCEDURE — A9500 TC99M SESTAMIBI: HCPCS | Performed by: INTERNAL MEDICINE

## 2023-08-01 PROCEDURE — 78452 HT MUSCLE IMAGE SPECT MULT: CPT

## 2023-08-01 PROCEDURE — 0 TECHNETIUM SESTAMIBI: Performed by: INTERNAL MEDICINE

## 2023-08-01 PROCEDURE — 93017 CV STRESS TEST TRACING ONLY: CPT

## 2023-08-01 RX ADMIN — TECHNETIUM TC 99M SESTAMIBI 1 DOSE: 1 INJECTION INTRAVENOUS at 08:21

## 2023-08-01 RX ADMIN — TECHNETIUM TC 99M SESTAMIBI 1 DOSE: 1 INJECTION INTRAVENOUS at 10:35

## 2023-08-03 LAB
BH CV REST NUCLEAR ISOTOPE DOSE: 11.6 MCI
BH CV STRESS BP STAGE 1: NORMAL
BH CV STRESS BP STAGE 2: NORMAL
BH CV STRESS BP STAGE 3: NORMAL
BH CV STRESS DURATION MIN STAGE 1: 3
BH CV STRESS DURATION MIN STAGE 2: 3
BH CV STRESS DURATION MIN STAGE 3: 3
BH CV STRESS DURATION MIN STAGE 4: 2
BH CV STRESS DURATION SEC STAGE 1: 0
BH CV STRESS DURATION SEC STAGE 2: 0
BH CV STRESS DURATION SEC STAGE 3: 0
BH CV STRESS DURATION SEC STAGE 4: 0
BH CV STRESS GRADE STAGE 1: 10
BH CV STRESS GRADE STAGE 2: 12
BH CV STRESS GRADE STAGE 3: 14
BH CV STRESS GRADE STAGE 4: 16
BH CV STRESS HR STAGE 1: 91
BH CV STRESS HR STAGE 2: 103
BH CV STRESS HR STAGE 3: 115
BH CV STRESS HR STAGE 4: 141
BH CV STRESS METS STAGE 1: 5
BH CV STRESS METS STAGE 2: 7.5
BH CV STRESS METS STAGE 3: 10
BH CV STRESS METS STAGE 4: 13.5
BH CV STRESS NUCLEAR ISOTOPE DOSE: 35.2 MCI
BH CV STRESS PROTOCOL 1: NORMAL
BH CV STRESS RECOVERY BP: NORMAL MMHG
BH CV STRESS RECOVERY HR: 87 BPM
BH CV STRESS SPEED STAGE 1: 1.7
BH CV STRESS SPEED STAGE 2: 2.5
BH CV STRESS SPEED STAGE 3: 3.4
BH CV STRESS SPEED STAGE 4: 4.2
BH CV STRESS STAGE 1: 1
BH CV STRESS STAGE 2: 2
BH CV STRESS STAGE 3: 3
BH CV STRESS STAGE 4: 4
LV EF NUC BP: 54 %
MAXIMAL PREDICTED HEART RATE: 165 BPM
PERCENT MAX PREDICTED HR: 85.45 %
STRESS BASELINE BP: NORMAL MMHG
STRESS BASELINE HR: 71 BPM
STRESS PERCENT HR: 101 %
STRESS POST ESTIMATED WORKLOAD: 13.4 METS
STRESS POST EXERCISE DUR MIN: 11 MIN
STRESS POST EXERCISE DUR SEC: 0 SEC
STRESS POST PEAK BP: NORMAL MMHG
STRESS POST PEAK HR: 141 BPM
STRESS TARGET HR: 140 BPM

## 2023-10-16 ENCOUNTER — HOSPITAL ENCOUNTER (OUTPATIENT)
Dept: CT IMAGING | Facility: HOSPITAL | Age: 53
Discharge: HOME | End: 2023-10-16
Admitting: INTERNAL MEDICINE
Payer: COMMERCIAL

## 2023-10-16 VITALS
RESPIRATION RATE: 14 BRPM | DIASTOLIC BLOOD PRESSURE: 68 MMHG | OXYGEN SATURATION: 94 % | HEART RATE: 71 BPM | SYSTOLIC BLOOD PRESSURE: 107 MMHG

## 2023-10-16 DIAGNOSIS — I10 ESSENTIAL HYPERTENSION: ICD-10-CM

## 2023-10-16 DIAGNOSIS — E11.65 TYPE 2 DIABETES MELLITUS WITH HYPERGLYCEMIA, WITHOUT LONG-TERM CURRENT USE OF INSULIN: ICD-10-CM

## 2023-10-16 DIAGNOSIS — R93.1 AGATSTON CORONARY ARTERY CALCIUM SCORE GREATER THAN 400: ICD-10-CM

## 2023-10-16 LAB
CREAT BLDA-MCNC: 1.1 MG/DL (ref 0.6–1.3)
QT INTERVAL: 363 MS
QTC INTERVAL: 398 MS

## 2023-10-16 PROCEDURE — 25510000001 IOPAMIDOL PER 1 ML: Performed by: INTERNAL MEDICINE

## 2023-10-16 PROCEDURE — 82565 ASSAY OF CREATININE: CPT

## 2023-10-16 PROCEDURE — 93005 ELECTROCARDIOGRAM TRACING: CPT | Performed by: INTERNAL MEDICINE

## 2023-10-16 PROCEDURE — 75574 CT ANGIO HRT W/3D IMAGE: CPT | Performed by: INTERNAL MEDICINE

## 2023-10-16 PROCEDURE — 75574 CT ANGIO HRT W/3D IMAGE: CPT

## 2023-10-16 RX ORDER — METOPROLOL TARTRATE 5 MG/5ML
5 INJECTION INTRAVENOUS
Status: COMPLETED | OUTPATIENT
Start: 2023-10-16 | End: 2023-10-16

## 2023-10-16 RX ADMIN — METOPROLOL TARTRATE 5 MG: 1 INJECTION, SOLUTION INTRAVENOUS at 13:54

## 2023-10-16 RX ADMIN — METOPROLOL TARTRATE 5 MG: 1 INJECTION, SOLUTION INTRAVENOUS at 13:44

## 2023-10-16 RX ADMIN — METOPROLOL TARTRATE 5 MG: 1 INJECTION, SOLUTION INTRAVENOUS at 14:06

## 2023-10-16 RX ADMIN — METOPROLOL TARTRATE 5 MG: 1 INJECTION, SOLUTION INTRAVENOUS at 14:00

## 2023-10-16 RX ADMIN — METOPROLOL TARTRATE 5 MG: 1 INJECTION, SOLUTION INTRAVENOUS at 13:31

## 2023-10-16 RX ADMIN — METOPROLOL TARTRATE 5 MG: 1 INJECTION, SOLUTION INTRAVENOUS at 13:39

## 2023-10-16 RX ADMIN — IOPAMIDOL 95 ML: 755 INJECTION, SOLUTION INTRAVENOUS at 14:46

## 2023-10-18 ENCOUNTER — DOCUMENTATION (OUTPATIENT)
Dept: CARDIOLOGY | Facility: CLINIC | Age: 53
End: 2023-10-18
Payer: COMMERCIAL

## 2023-10-18 NOTE — PROGRESS NOTES
Cardiac CTA with morphology  Imaging done 10/16/23  Reason for the exam: cardiac risks.     Calcium score is 1030 Agatston units.  This is between the  percentile for men between the ages of 55-59 years old.    Heart rate 52 bpm.  Left ventricular end-diastolic volume 157 mL.  Left ventricular end-systolic volume 26 mL.  Ejection fraction 84%.  Stroke volume 131 mL.  Cardiac output 6.83 L/m    The right atrium is normal in size.  The right ventricle is normal in size.  There is grossly normal right ventricular systolic function.  The pulmonary artery is normal in size.  There are 4 pulmonary veins which enter the left atrium in their expected location.  The left atrial appendage was visualized and is without thrombus.  The intra-atrial septum appeared to be intact.  The left ventricle is normal in size with normal systolic function.  There was no evidence of a left ventricular thrombus.  The intraventricular septum appeared to be intact.  The mitral valve appeared structurally normal.  The aortic valve was trileaflet and appears structurally and functionally normal.  The pulmonic valve appeared structurally normal.  The tricuspid valve appeared structurally normal.  There was no pericardial effusion.  The pericardium appeared normal.    The left main coronary artery came off the left coronary cusp in its anticipated location.  It bifurcated into the left anterior descending artery and the circumflex coronary artery.  There was no evidence of atherosclerotic disease of the left main coronary artery.  Left anterior descending artery wraps around the apex of the heart.  There is evidence of atherosclerotic disease, <25% calcified proximal stenosis, 50% calcified mid LAD stenosis.  There are 2 small diagonal branches which are patent.  The circumflex artery is the nondominant vessel with evidence of atherosclerotic disease, <50% calcified and noncalcified concentric proximal vessel stenosis.  The distal circumflex  has >50% calcified stenosis, small caliber vessel distally.  OM1 is a small caliber vessel with <50% calcified stenosis.  The right coronary artery is the dominant vessel with evidence of atherosclerotic disease, 50% calcified and noncalcified proximal RCA stenosis, <25% calcified mid to distal RCA stenosis.    Conclusions:  1.  Normal coronary artery anatomy with evidence of atherosclerotic disease, moderate multivessel CAD.  Calcium score is 1030 Agatston units.  2.  Structurally normal heart.      Ro Steinberg MD  10/18/23

## 2023-11-14 DIAGNOSIS — I10 ESSENTIAL HYPERTENSION: ICD-10-CM

## 2023-11-14 RX ORDER — AZILSARTAN KAMEDOXOMIL AND CHLORTHALIDONE 40; 12.5 MG/1; MG/1
TABLET ORAL
Qty: 90 TABLET | Refills: 2 | Status: SHIPPED | OUTPATIENT
Start: 2023-11-14

## 2024-01-16 ENCOUNTER — OFFICE VISIT (OUTPATIENT)
Dept: CARDIOLOGY | Facility: CLINIC | Age: 54
End: 2024-01-16
Payer: COMMERCIAL

## 2024-01-16 VITALS
DIASTOLIC BLOOD PRESSURE: 75 MMHG | WEIGHT: 233 LBS | HEIGHT: 72 IN | SYSTOLIC BLOOD PRESSURE: 133 MMHG | HEART RATE: 73 BPM | BODY MASS INDEX: 31.56 KG/M2

## 2024-01-16 DIAGNOSIS — I25.10 CORONARY ARTERY DISEASE INVOLVING NATIVE CORONARY ARTERY OF NATIVE HEART WITHOUT ANGINA PECTORIS: ICD-10-CM

## 2024-01-16 DIAGNOSIS — I10 ESSENTIAL HYPERTENSION: Primary | ICD-10-CM

## 2024-01-16 DIAGNOSIS — E78.2 MIXED HYPERLIPIDEMIA: ICD-10-CM

## 2024-01-16 PROCEDURE — 99214 OFFICE O/P EST MOD 30 MIN: CPT | Performed by: INTERNAL MEDICINE

## 2024-01-16 RX ORDER — TIRZEPATIDE 5 MG/.5ML
INJECTION, SOLUTION SUBCUTANEOUS
COMMUNITY
Start: 2023-12-19

## 2024-01-16 NOTE — PROGRESS NOTES
Cardiology Office Visit      Encounter Date:  01/16/2024    Patient ID:   Anthony Sol is a 53 y.o. male.    Reason For Followup:  Hypertension  Hyperlipidemia    Brief Clinical History:  Dear Dr. Linares, Benji Rasheed MD    I had the pleasure of seeing Anthony Sol today. As you are well aware, this is a 53 y.o. male with no known history of ischemic heart disease.  He does have a history of hypertension, hyperlipidemia, diabetes mellitus, and exogenous obesity.  He presents today for follow-up on the above conditions.    Interval History:  He denies any chest pain pressure heaviness or tightness.  He denies any shortness of breath.  He denies any PND orthopnea.  He denies any syncope or near syncope.  He reports feeling well from a cardiac perspective.    He did undergo a coronary calcium score about 4 years ago.  He does have an elevated CRP.  His calcium score was 481.9 suggestive of a moderate to high risk of a cardiac event in the ensuing 5 years.  This was performed in January 2019.    He subsequently underwent a repeat calcium score in July 2023.  His value had increased to 845.8.  We discussed options at that time and ultimately he underwent a CT coronary angiogram as well as a nuclear stress test.    His most recent nuclear stress test was performed in August 2023.  Normal left ventricular systolic function with an EF of 54% was noted.  No evidence of myocardial ischemia was noted.  The patient exercised for 11 minutes on a Osmin protocol.  He achieved 13.4 METS.    CT coronary angiogram was performed in October 2023.  Moderate multivessel coronary artery disease was noted with a calcium score of 1030.  Mildly elevated left ventricular end-diastolic volume was noted at 157 cc.  Hyperdynamic LV function was noted with an EF of 84%.  Cardiac output was measured at 6.83 L/min.  25 to 50% LAD stenosis was noted, less than 50% diagonal stenosis, greater than 50% distal circumflex, small OM1 with less  "than 50%, RCA with 50% proximal stenosis.    Assessment & Plan    Impressions:  Hypertension  Hyperlipidemia  Diabetes mellitus  Exogenous obesity    Recommendations:  Continuation of his current cardiovascular regimen at the present time.     This includes antihypertensives, and statin therapy.  Routine surveillance laboratory testing as per your direction  Repeat coronary CT scan in approximately a year to 18 months to evaluate for progression.  Follow-up in 1 years time sooner should there be difficulties    Diagnoses and all orders for this visit:    1. Essential hypertension (Primary)    2. Mixed hyperlipidemia    3. Coronary artery disease involving native coronary artery of native heart without angina pectoris            Objective:    Vitals:  Vitals:    01/16/24 1031   BP: 133/75   Pulse: 73   Weight: 106 kg (233 lb)   Height: 182.9 cm (72\")       Body mass index is 31.6 kg/m².      Physical Exam:    General: Alert, cooperative, no distress, appears stated age  Head:  Normocephalic, atraumatic, mucous membranes moist  Eyes:  Conjunctiva/corneas clear, EOM's intact     Neck:  Supple,  no bruit    Lungs: Clear to auscultation bilaterally, no wheezes rhonchi rales are noted  Chest wall: No tenderness  Heart::  Regular rate and rhythm, S1 and S2 normal, 1/6 holosystolic murmur.  No rub or gallop  Abdomen: Soft, non-tender, nondistended bowel sounds active.  Extremities: No cyanosis, clubbing, or edema  Pulses: 2+ and symmetric all extremities  Skin:  No rashes or lesions  Neuro/psych: A&O x3. CN II through XII are grossly intact with appropriate affect      Allergies:  No Known Allergies    Medication Review:     Current Outpatient Medications:     Accu-Chek FastClix Lancets misc, , Disp: , Rfl:     Accu-Chek Guide test strip, , Disp: , Rfl:     anastrozole (ARIMIDEX) 1 MG tablet, Take 1 tablet by mouth 1 (One) Time Per Week. One half tablet M,W,F, Disp: , Rfl:     B-D 3CC LUER-CARLI SYR 22GX1\" 22G X 1\" 3 ML misc, " "USE TO INJECT TESTOSTERONE ONCE WEEKLY, Disp: , Rfl: 0    B-D 3CC LUER-CARLI SYR 25GX1\" 25G X 1\" 3 ML misc, , Disp: , Rfl:     BD Veo Insulin Syr U/F 1/2Unit 31G X 15/64\" 0.3 ML misc, , Disp: , Rfl:     Blood Glucose Monitoring Suppl (Accu-Chek Guide) w/Device kit, , Disp: , Rfl:     Coenzyme Q10 (COQ-10) 30 MG capsule, Daily., Disp: , Rfl:     Cyanocobalamin (B-12) 500 MCG sublingual tablet, Daily., Disp: , Rfl:     Edarbyclor 40-12.5 MG tablet, TAKE 1 TABLET BY MOUTH ONCE DAILY, Disp: 90 tablet, Rfl: 2    Empagliflozin (JARDIANCE) 10 MG tablet, Take 1 tablet by mouth Daily. For diabetes, Disp: 90 tablet, Rfl: 1    IPAMORELIN ACETATE IJ, 5 days weekly, Disp: , Rfl:     metFORMIN (GLUCOPHAGE) 1000 MG tablet, 1 tab po BID with meal for diabetes, Disp: 180 tablet, Rfl: 1    Mounjaro 5 MG/0.5ML solution pen-injector pen, , Disp: , Rfl:     rosuvastatin (CRESTOR) 10 MG tablet, Take 1 tablet by mouth 3 (Three) Times a Week., Disp: , Rfl:     sirolimus (RAPAMUNE) 1 MG tablet, TAKE 3 TABLETS BY MOUTH ONCE A WEEK, Disp: , Rfl:     Testosterone Cypionate (DEPOTESTOTERONE CYPIONATE) 200 MG/ML injection, INJECT 0.5 MLS INTO MUSCLE ONCE WEEKLY, Disp: , Rfl: 5    VASCEPA 1 g capsule capsule, 2 g 2 (Two) Times a Day., Disp: , Rfl:     verapamil SR (CALAN-SR) 240 MG CR tablet, , Disp: , Rfl:     VITAMIN D PO, 10,000 iu daily, Disp: , Rfl:     metoprolol tartrate (LOPRESSOR) 50 MG tablet, Take 1 tablet by mouth 2 (Two) Times a Day. Take one tablet 12 hours before Cat Scan and one tablet 1 hour before Cat scan. Bring other 2 tablets with you to scan., Disp: 4 tablet, Rfl: 0    Family History:  Family History   Problem Relation Age of Onset    Diabetes Mother     Hypertension Mother     Colon polyps Mother     Cancer Paternal Uncle         colon    Hypertension Father     No Known Problems Brother        Past Medical History:  Past Medical History:   Diagnosis Date    Adhesive capsulitis of right knee     Arthritis     Cardiomegaly     " Deep vein thrombosis     Diabetes mellitus     Diverticulosis     GERD (gastroesophageal reflux disease)     H/O blood clots     rt calf  following rt total knee surgery 12/2016 was on xarelto now off on aspirin only    Heart murmur     History of hepatitis     AS A CHILD food related    Hyperlipidemia     Hypertension     Hypotestosteronism     Hypovitaminosis D     Knee pain, right     Low back pain     Osteoarthritis        Past Surgical History:  Past Surgical History:   Procedure Laterality Date    ADENOIDECTOMY      BACK SURGERY      COLONOSCOPY N/A 5/23/2022    Procedure: COLONOSCOPY;  Surgeon: Marycruz Feliciano MD;  Location: [facility] MAIN OR;  Service: Gastroenterology;  Laterality: N/A;  Diverticulosis    JOINT MANIPULATION Right 2/16/2017    Procedure: MANIPULATION OF RT KNEE;  Surgeon: Anthony Andino MD;  Location: [facility] OR;  Service:     KNEE ARTHROSCOPY Right     X3    WI ARTHRP KNE CONDYLE&PLATU MEDIAL&LAT COMPARTMENTS Right 12/15/2016    Procedure: RT TOTAL KNEE ARTHROPLASTY;  Surgeon: Anthony Andino MD;  Location: [facility] MAIN OR;  Service: Orthopedics    WI REVJ TOTAL KNEE ARTHRP W/WO ALGRFT 1 COMPONENT Right 3/6/2017    Procedure: RIGHT TOTAL KNEE ARTHROPLASTY REVISION WITH LEFT KNEE STERIOID INJECTION;  Surgeon: Anthony Andino MD;  Location: [facility] OR;  Service: Orthopedics    TONSILLECTOMY         Social History:  Social History     Socioeconomic History    Marital status:    Tobacco Use    Smoking status: Never    Smokeless tobacco: Never   Substance and Sexual Activity    Alcohol use: Yes     Alcohol/week: 2.0 standard drinks of alcohol     Types: 2 Shots of liquor per week     Comment: social    Drug use: No     Comment: prescribed by MD    Sexual activity: Defer       Review of Systems:  The following systems were reviewed as they relate to the cardiovascular system: Constitutional, Eyes, ENT, Cardiovascular, Respiratory, Gastrointestinal, Integumentary, Neurological,  Psychiatric, Hematologic, Endocrine, Musculoskeletal, and Genitourinary. The pertinent cardiovascular findings are reported above with all other cardiovascular points within those systems being negative.    Diagnostic Study Review:     Current Electrocardiogram:  Procedures no new EKG.  EKG dated 16 October 2023 demonstrates sinus rhythm with a ventricular rate of 72 bpm.    Laboratory Data:  Lab Results   Component Value Date    GLUCOSE 114 (H) 07/12/2018    BUN 16 08/10/2020    CREATININE 1.10 10/16/2023    EGFRIFNONA 83 07/12/2018    EGFRIFAFRI 100 07/12/2018    BCR 16.8 08/10/2020    K 3.8 08/10/2020    CO2 28 08/10/2020    CALCIUM 9.7 08/10/2020    PROTENTOTREF 7.5 07/12/2018    ALBUMIN 4.4 08/10/2020    LABIL2 1.5 08/10/2020    AST 28 08/10/2020    ALT 37 08/10/2020     Lab Results   Component Value Date    GLUCOSE 114 (H) 07/12/2018    CALCIUM 9.7 08/10/2020     08/10/2020    K 3.8 08/10/2020    CO2 28 08/10/2020     08/10/2020    BUN 16 08/10/2020    CREATININE 1.10 10/16/2023    EGFRIFAFRI 100 07/12/2018    EGFRIFNONA 83 07/12/2018    BCR 16.8 08/10/2020    ANIONGAP 11.9 03/07/2017     Lab Results   Component Value Date    WBC 6.14 08/10/2020    HGB 16.8 08/10/2020    HCT 49.7 08/10/2020    MCV 83.2 08/10/2020     08/10/2020     Lab Results   Component Value Date    CHLPL 108 07/12/2018    TRIG 225 (H) 07/12/2018    HDL 31 (L) 07/12/2018    LDL 32 07/12/2018     Lab Results   Component Value Date    HGBA1C 6.59 (H) 07/12/2018     Lab Results   Component Value Date    INR 1.01 03/03/2017    INR 0.97 12/06/2016    PROTIME 12.9 03/03/2017    PROTIME 12.5 12/06/2016       Most Recent Echo:       Most Recent Stress Test:  Results for orders placed during the hospital encounter of 08/01/23    Stress Test With Myocardial Perfusion One Day    Interpretation Summary    Myocardial perfusion imaging indicates a normal myocardial perfusion study with no evidence of ischemia.    Left ventricular  ejection fraction is normal (Calculated EF = 54%).    Low risk for ischemic heart disease.    Impressions are consistent with a low risk study.    There is no prior study available for comparison.    Findings consistent with a normal ECG stress test.       Most Recent Cardiac Catheterization:   No results found for this or any previous visit.       NOTE: The following portions of the patient's note were reviewed, confirmed and/or updated this visit as appropriate: History of present illness/Interval history, physical examination, assessment & plan, allergies, current medications, past family history, past medical history, past social history, past surgical history and problem list.

## 2024-09-03 DIAGNOSIS — I10 ESSENTIAL HYPERTENSION: ICD-10-CM

## 2024-09-03 RX ORDER — AZILSARTAN KAMEDOXOMIL AND CHLORTHALIDONE 40; 12.5 MG/1; MG/1
TABLET ORAL
Qty: 90 TABLET | Refills: 1 | Status: SHIPPED | OUTPATIENT
Start: 2024-09-03

## 2024-09-10 ENCOUNTER — OFFICE VISIT (OUTPATIENT)
Dept: ORTHOPEDIC SURGERY | Facility: CLINIC | Age: 54
End: 2024-09-10
Payer: COMMERCIAL

## 2024-09-10 ENCOUNTER — LAB (OUTPATIENT)
Dept: LAB | Facility: HOSPITAL | Age: 54
End: 2024-09-10
Payer: COMMERCIAL

## 2024-09-10 VITALS — HEIGHT: 72 IN | TEMPERATURE: 95.3 F | WEIGHT: 239.4 LBS | BODY MASS INDEX: 32.43 KG/M2

## 2024-09-10 DIAGNOSIS — G89.29 CHRONIC PAIN OF RIGHT KNEE: ICD-10-CM

## 2024-09-10 DIAGNOSIS — R52 PAIN: Primary | ICD-10-CM

## 2024-09-10 DIAGNOSIS — M25.561 CHRONIC PAIN OF RIGHT KNEE: ICD-10-CM

## 2024-09-10 LAB
CRP SERPL-MCNC: <0.3 MG/DL (ref 0–0.5)
ERYTHROCYTE [SEDIMENTATION RATE] IN BLOOD: <1 MM/HR (ref 0–20)

## 2024-09-10 PROCEDURE — 73562 X-RAY EXAM OF KNEE 3: CPT | Performed by: NURSE PRACTITIONER

## 2024-09-10 PROCEDURE — 99204 OFFICE O/P NEW MOD 45 MIN: CPT | Performed by: NURSE PRACTITIONER

## 2024-09-10 PROCEDURE — 86140 C-REACTIVE PROTEIN: CPT

## 2024-09-10 PROCEDURE — 85652 RBC SED RATE AUTOMATED: CPT

## 2024-09-10 PROCEDURE — 36415 COLL VENOUS BLD VENIPUNCTURE: CPT

## 2024-09-10 NOTE — PROGRESS NOTES
Patient: Anthony Sol  YOB: 1970 54 y.o. male  Medical Record Number: [id number]    Chief Complaints:   Chief Complaint   Patient presents with    Right Knee - Pain, Initial Evaluation       History of Present Illness:Anthony Sol is a 54 y.o. male who presents as a new patient both myself as well as to the practice with complaints of chronic right knee pain.  The patient has quite an extensive history, 5 years ago he had right total knee replacement by Dr. Andino had unfortunately stiffness following the surgery had a manipulation which resulted in a torn quadricep.  Approximately 6 months later he was found to have lucencies about the replacement since it was press-fit, they opted to proceed with a revision, unfortunately stiffness swelling pain did not improve.  Several months later he was found to have lucencies noted on x-ray, saw Dr. Haroon Belle who ruled out infection recommended revision surgery, patient opted not to have that surgery was seen at the [facility] again was told that the replacement was loose allergy testing for bone cement was negative but they do not recall that metal testing was done.  Patient denies any fever chills or recent illness.  Denies any injury.    Allergies: No Known Allergies    Medications:   Current Outpatient Medications   Medication Sig Dispense Refill    Coenzyme Q10 (COQ-10) 30 MG capsule Daily.      Cyanocobalamin (B-12) 500 MCG sublingual tablet Daily.      Edarbyclor 40-12.5 MG tablet TAKE 1 TABLET BY MOUTH ONCE DAILY 90 tablet 1    Empagliflozin (JARDIANCE) 10 MG tablet Take 1 tablet by mouth Daily. For diabetes 90 tablet 1    IPAMORELIN ACETATE IJ 5 days weekly      metFORMIN (GLUCOPHAGE) 1000 MG tablet 1 tab po BID with meal for diabetes 180 tablet 1    Mounjaro 5 MG/0.5ML solution pen-injector pen       rosuvastatin (CRESTOR) 10 MG tablet Take 1 tablet by mouth 3 (Three) Times a Week.      sirolimus (RAPAMUNE) 1 MG tablet TAKE 3 TABLETS BY  "MOUTH ONCE A WEEK      Testosterone Cypionate (DEPOTESTOTERONE CYPIONATE) 200 MG/ML injection INJECT 0.5 MLS INTO MUSCLE ONCE WEEKLY  5    VASCEPA 1 g capsule capsule 2 g 2 (Two) Times a Day.      verapamil SR (CALAN-SR) 240 MG CR tablet       VITAMIN D PO 10,000 iu daily      metoprolol tartrate (LOPRESSOR) 50 MG tablet Take 1 tablet by mouth 2 (Two) Times a Day. Take one tablet 12 hours before Cat Scan and one tablet 1 hour before Cat scan. Bring other 2 tablets with you to scan. (Patient not taking: Reported on 9/10/2024) 4 tablet 0     No current facility-administered medications for this visit.         The following portions of the patient's history were reviewed and updated as appropriate: allergies, current medications, past family history, past medical history, past social history, past surgical history and problem list.    Review of Systems:   14 point review of systems was performed. All systems negative except pertinent positives/negatives listed in HPI above    Physical Exam:   Vitals:    09/10/24 0927   Temp: 95.3 °F (35.2 °C)   Weight: 109 kg (239 lb 6.4 oz)   Height: 182.9 cm (72\")   PainSc:   2   PainLoc: Knee       General: A and O x 3, ASA, NAD   Skin clear no unusual lesions noted  Right knee the patient has a well-healed surgical incision noted he does have some diffuse soft tissue swelling but no appreciable joint effusion, 90 degrees flexion neutral in extension no instability calf is soft and nontender       Radiology:  Xrays 3views (ap,lateral, sunrise) x-rays of the right knee show a previously replaced right knee with signs of lucencies about the femoral and tibial component as well as significant bone loss about the femur, no compared to views available    Assessment/Plan: Painful right total knee replacement    Patient and I discussed options, we will proceed with CRP, sed rate, bone scan attention to right knee and the patient will follow-up with Dr. Herminio Arteaga, " APRN    9/10/2024

## 2024-09-19 ENCOUNTER — HOSPITAL ENCOUNTER (OUTPATIENT)
Dept: NUCLEAR MEDICINE | Facility: HOSPITAL | Age: 54
Discharge: HOME | End: 2024-09-19
Payer: COMMERCIAL

## 2024-09-19 DIAGNOSIS — M25.561 CHRONIC PAIN OF RIGHT KNEE: ICD-10-CM

## 2024-09-19 DIAGNOSIS — G89.29 CHRONIC PAIN OF RIGHT KNEE: ICD-10-CM

## 2024-09-19 PROCEDURE — 0 TECHNETIUM MEDRONATE KIT: Performed by: NURSE PRACTITIONER

## 2024-09-19 PROCEDURE — 78315 BONE IMAGING 3 PHASE: CPT

## 2024-09-19 PROCEDURE — A9503 TC99M MEDRONATE: HCPCS | Performed by: NURSE PRACTITIONER

## 2024-09-19 RX ORDER — TC 99M MEDRONATE 20 MG/10ML
26 INJECTION, POWDER, LYOPHILIZED, FOR SOLUTION INTRAVENOUS
Status: COMPLETED | OUTPATIENT
Start: 2024-09-19 | End: 2024-09-19

## 2024-09-19 RX ADMIN — TC 99M MEDRONATE 26 MILLICURIE: 20 INJECTION, POWDER, LYOPHILIZED, FOR SOLUTION INTRAVENOUS at 09:13

## 2024-10-08 ENCOUNTER — OFFICE VISIT (OUTPATIENT)
Dept: ORTHOPEDIC SURGERY | Facility: CLINIC | Age: 54
End: 2024-10-08
Payer: COMMERCIAL

## 2024-10-08 VITALS — BODY MASS INDEX: 31.99 KG/M2 | HEIGHT: 72 IN | TEMPERATURE: 98 F | WEIGHT: 236.2 LBS

## 2024-10-08 DIAGNOSIS — Z96.651 STATUS POST RIGHT KNEE REPLACEMENT: Primary | ICD-10-CM

## 2024-10-08 PROCEDURE — 99214 OFFICE O/P EST MOD 30 MIN: CPT | Performed by: ORTHOPAEDIC SURGERY

## 2024-10-08 RX ORDER — FLUOCINOLONE ACETONIDE 0.11 MG/ML
OIL AURICULAR (OTIC)
COMMUNITY
Start: 2024-10-04

## 2024-10-08 RX ORDER — TIRZEPATIDE 7.5 MG/.5ML
INJECTION, SOLUTION SUBCUTANEOUS
COMMUNITY
Start: 2024-10-07

## 2024-10-08 NOTE — PROGRESS NOTES
Patient: Anthony Sol  YOB: 1970 54 y.o. male  Medical Record Number: [id number]    Chief Complaints:   Chief Complaint   Patient presents with    Right Knee - Initial Evaluation, Pain       History of Present Illness:Anthony Sol is a 54 y.o. male who presents with complaints of right knee pain he has severe pain especially start up pain takes a while to settle in and then calms down a little bit.  He then walks further and begins to develop more weightbearing associated pain which is severe.  His history is significant for right total knee replacement press-fit by Diomedes Andino in 2016 he got very stiff and had a manipulation.  He continued to have problems and it was noted that he had loosening.  There is no sign of infection allegedly at this time.  He underwent a revision with primary components cemented Vanguard knee.  He is now about 5 years out from that and unfortunately has had progressive worsening right knee pain which is limiting his walking tolerance and basic activities of daily living.    Allergies: No Known Allergies    Medications:   Current Outpatient Medications   Medication Sig Dispense Refill    Coenzyme Q10 (COQ-10) 30 MG capsule Daily.      Cyanocobalamin (B-12) 500 MCG sublingual tablet Daily.      Edarbyclor 40-12.5 MG tablet TAKE 1 TABLET BY MOUTH ONCE DAILY 90 tablet 1    Empagliflozin (JARDIANCE) 10 MG tablet Take 1 tablet by mouth Daily. For diabetes 90 tablet 1    fluocinolone acetonide (DERMOTIC) 0.01 % oil otic oil INSTILL 4 DROPS INTO BOTH EARS TWICE A DAY FOR 7 DAYS. MAY REPEAT AS NEEDED FOR ITCHING FOR 5-7 DAYS      metFORMIN (GLUCOPHAGE) 1000 MG tablet 1 tab po BID with meal for diabetes 180 tablet 1    Mounjaro 7.5 MG/0.5ML solution pen-injector pen       rosuvastatin (CRESTOR) 10 MG tablet Take 1 tablet by mouth 3 (Three) Times a Week.      Testosterone Cypionate (DEPOTESTOTERONE CYPIONATE) 200 MG/ML injection INJECT 0.5 MLS INTO MUSCLE ONCE WEEKLY  5     "VASCEPA 1 g capsule capsule 2 g 2 (Two) Times a Day.      verapamil SR (CALAN-SR) 240 MG CR tablet       VITAMIN D PO 10,000 iu daily      IPAMORELIN ACETATE IJ 5 days weekly (Patient not taking: Reported on 10/8/2024)      metoprolol tartrate (LOPRESSOR) 50 MG tablet Take 1 tablet by mouth 2 (Two) Times a Day. Take one tablet 12 hours before Cat Scan and one tablet 1 hour before Cat scan. Bring other 2 tablets with you to scan. (Patient not taking: Reported on 9/10/2024) 4 tablet 0    Mounjaro 5 MG/0.5ML solution pen-injector pen  (Patient not taking: Reported on 10/8/2024)      sirolimus (RAPAMUNE) 1 MG tablet TAKE 3 TABLETS BY MOUTH ONCE A WEEK (Patient not taking: Reported on 10/8/2024)       No current facility-administered medications for this visit.         The following portions of the patient's history were reviewed and updated as appropriate: allergies, current medications, past family history, past medical history, past social history, past surgical history and problem list.    Review of Systems:   Pertinent positives/negatives listed in HPI above    Physical Exam:   Vitals:    10/08/24 0849   Temp: 98 °F (36.7 °C)   TempSrc: Temporal   Weight: 107 kg (236 lb 3.2 oz)   Height: 182.9 cm (72\")   PainSc:   2   PainLoc: Knee       General: A and O x 3, ASA, NAD   Well-healed midline incision he has about 90 degrees of flexion lacks a few degrees of extension there is no evidence of gross instability no obvious effusion.  Calf is soft he is intact to light touch palp distal pulses there is no extensor lag he has no palpable defect of his quadricep tendon.  Patella tracks midline there is moderate crepitance        Reviewed recent sed rate and C-reactive protein from September 10 both are normal.    I reviewed recent bone scan which shows elevated uptake around the femur more so than the tibia.  He has a previous bone scan from a couple years ago which shows moderate uptake of both the femur and the " tibia.    Radiology:  Xrays 3views right knee (ap,lateral, sunrise) taken previously demonstrating cruciate retaining Biomet Vanguard knee there is evidence of lucencies at the bone cement interface which are obvious on the lateral view of the femur consistent with loosening of the femoral component.  He also has lucencies around the posterior aspect of the tibia and medial aspect of the tibia not divergent indeterminant for loosening.    Assessment/Plan: Right total knee complex history failed primary and now failed revision.  Labs and aspiration are consistent with aseptic loosening.  I am going to send off blood work to evaluate for metal or cement allergy but the plan at this point is going to be to proceed with revision.  He definitely has a loose femoral component and there is a possible loose tibial component.  He is also very stiff.  Surgery would include resection of scar tissue and revision of the femur with Biomet as plan A,  revision both sides with [employer] as plan B (although I think plan B is highly likely, possibly more likely).      There are no diagnoses linked to this encounter.     Pratik Durham MD  10/8/2024

## 2024-10-21 ENCOUNTER — TELEPHONE (OUTPATIENT)
Dept: ORTHOPEDIC SURGERY | Facility: CLINIC | Age: 54
End: 2024-10-21
Payer: COMMERCIAL

## 2024-10-31 ENCOUNTER — TELEPHONE (OUTPATIENT)
Dept: ORTHOPEDIC SURGERY | Facility: CLINIC | Age: 54
End: 2024-10-31
Payer: COMMERCIAL

## 2024-10-31 NOTE — TELEPHONE ENCOUNTER
Received metal allergy testing results back.  The results were all negative.  Have advised the patient of the results and instructed in that Dr. Durham will either call him to discuss his options or will have him come into the office to discuss in person

## 2024-11-12 ENCOUNTER — PREP FOR SURGERY (OUTPATIENT)
Dept: OTHER | Facility: HOSPITAL | Age: 54
End: 2024-11-12
Payer: COMMERCIAL

## 2024-11-12 DIAGNOSIS — Z96.651 STATUS POST RIGHT KNEE REPLACEMENT: Primary | ICD-10-CM

## 2024-11-12 PROBLEM — M17.9 OA (OSTEOARTHRITIS) OF KNEE: Status: ACTIVE | Noted: 2024-11-12

## 2024-11-12 RX ORDER — VANCOMYCIN/0.9 % SOD CHLORIDE 1.5G/250ML
1500 PLASTIC BAG, INJECTION (ML) INTRAVENOUS ONCE
OUTPATIENT
Start: 2024-11-12 | End: 2024-11-12

## 2024-11-12 RX ORDER — CHLORHEXIDINE GLUCONATE 500 MG/1
CLOTH TOPICAL 2 TIMES DAILY
OUTPATIENT
Start: 2024-11-12

## 2024-11-12 RX ORDER — PREGABALIN 75 MG/1
150 CAPSULE ORAL ONCE
OUTPATIENT
Start: 2024-11-12 | End: 2024-11-12

## 2025-01-08 ENCOUNTER — PRE-ADMISSION TESTING (OUTPATIENT)
Dept: PREADMISSION TESTING | Facility: HOSPITAL | Age: 55
End: 2025-01-08
Payer: COMMERCIAL

## 2025-01-08 VITALS
OXYGEN SATURATION: 97 % | SYSTOLIC BLOOD PRESSURE: 129 MMHG | HEART RATE: 76 BPM | HEIGHT: 71 IN | TEMPERATURE: 98.4 F | DIASTOLIC BLOOD PRESSURE: 78 MMHG | WEIGHT: 238.7 LBS | RESPIRATION RATE: 16 BRPM | BODY MASS INDEX: 33.42 KG/M2

## 2025-01-08 DIAGNOSIS — Z96.651 STATUS POST RIGHT KNEE REPLACEMENT: ICD-10-CM

## 2025-01-08 LAB
ALBUMIN SERPL-MCNC: 4.4 G/DL (ref 3.5–5.2)
ALBUMIN/GLOB SERPL: 1.6 G/DL
ALP SERPL-CCNC: 52 U/L (ref 39–117)
ALT SERPL W P-5'-P-CCNC: 28 U/L (ref 1–41)
ANION GAP SERPL CALCULATED.3IONS-SCNC: 12.6 MMOL/L (ref 5–15)
AST SERPL-CCNC: 32 U/L (ref 1–40)
BILIRUB SERPL-MCNC: 0.8 MG/DL (ref 0–1.2)
BUN SERPL-MCNC: 18 MG/DL (ref 6–20)
BUN/CREAT SERPL: 17.1 (ref 7–25)
CALCIUM SPEC-SCNC: 9.8 MG/DL (ref 8.6–10.5)
CHLORIDE SERPL-SCNC: 99 MMOL/L (ref 98–107)
CO2 SERPL-SCNC: 25.4 MMOL/L (ref 22–29)
CREAT SERPL-MCNC: 1.05 MG/DL (ref 0.76–1.27)
CRP SERPL-MCNC: 0.95 MG/DL (ref 0–0.5)
DEPRECATED RDW RBC AUTO: 40.2 FL (ref 37–54)
EGFRCR SERPLBLD CKD-EPI 2021: 83.3 ML/MIN/1.73
ERYTHROCYTE [DISTWIDTH] IN BLOOD BY AUTOMATED COUNT: 13 % (ref 12.3–15.4)
ERYTHROCYTE [SEDIMENTATION RATE] IN BLOOD: 4 MM/HR (ref 0–20)
GLOBULIN UR ELPH-MCNC: 2.8 GM/DL
GLUCOSE SERPL-MCNC: 129 MG/DL (ref 65–99)
HCT VFR BLD AUTO: 53.4 % (ref 37.5–51)
HGB BLD-MCNC: 17.8 G/DL (ref 13–17.7)
MCH RBC QN AUTO: 28.2 PG (ref 26.6–33)
MCHC RBC AUTO-ENTMCNC: 33.3 G/DL (ref 31.5–35.7)
MCV RBC AUTO: 84.5 FL (ref 79–97)
PLATELET # BLD AUTO: 208 10*3/MM3 (ref 140–450)
PMV BLD AUTO: 10.1 FL (ref 6–12)
POTASSIUM SERPL-SCNC: 3.6 MMOL/L (ref 3.5–5.2)
PROT SERPL-MCNC: 7.2 G/DL (ref 6–8.5)
RBC # BLD AUTO: 6.32 10*6/MM3 (ref 4.14–5.8)
SODIUM SERPL-SCNC: 137 MMOL/L (ref 136–145)
WBC NRBC COR # BLD AUTO: 6.91 10*3/MM3 (ref 3.4–10.8)

## 2025-01-08 PROCEDURE — 36415 COLL VENOUS BLD VENIPUNCTURE: CPT

## 2025-01-08 PROCEDURE — 80053 COMPREHEN METABOLIC PANEL: CPT

## 2025-01-08 PROCEDURE — 85652 RBC SED RATE AUTOMATED: CPT

## 2025-01-08 PROCEDURE — 93005 ELECTROCARDIOGRAM TRACING: CPT

## 2025-01-08 PROCEDURE — 85027 COMPLETE CBC AUTOMATED: CPT

## 2025-01-08 PROCEDURE — 86140 C-REACTIVE PROTEIN: CPT

## 2025-01-08 RX ORDER — TIRZEPATIDE 10 MG/.5ML
10 INJECTION, SOLUTION SUBCUTANEOUS WEEKLY
COMMUNITY
Start: 2025-01-07

## 2025-01-08 NOTE — DISCHARGE INSTRUCTIONS
Take the following medications the morning of surgery: NONE    HOLD MOUNJARO FOR 1 WEEK PRIOR TO SURGERY DATE OR IT WILL BE CANCELLED      If you are on prescription narcotic pain medication to control your pain you may also take that medication the morning of surgery.      General Instructions:     Do not eat solid food after midnight the night before surgery.  Clear liquids day of surgery are allowed but must be stopped at least two hours before your hospital arrival time.       Allowed clear liquids      Water, sodas, and tea or coffee with no cream or milk added.       12 to 20 ounces of a clear liquid that contains carbohydrates is recommended.  If non-diabetic, have Gatorade or Powerade.  If diabetic, have G2 or Powerade Zero.     Do not have liquids red in color.  Do not consume chicken, beef, pork or vegetable broth or bouillon cubes of any variety as they are not considered clear liquids and are not allowed.      Infants may have breast milk up to four hours before surgery.  Infants drinking formula may drink formula up to six hours before surgery.   Patients who avoid smoking, chewing tobacco and alcohol for 4 weeks prior to surgery have a reduced risk of post-operative complications.  Quit smoking as many days before surgery as you can.  Do not smoke, use chewing tobacco or drink alcohol the day of surgery.   If applicable bring your C-PAP/ BI-PAP machine in with you to preop day of surgery.  Bring any papers given to you in the doctor’s office.  Wear clean comfortable clothes.  Do not wear contact lenses, false eyelashes or make-up.  Bring a case for your glasses.   Bring crutches or walker if applicable.  Remove all piercings.  Leave jewelry and any other valuables at home.  Hair extensions with metal clips must be removed prior to surgery.  The Pre-Admission Testing nurse will instruct you to bring medications if unable to obtain an accurate list in Pre-Admission Testing.        If you were given a  blood bank ID arm band remember to bring it with you the day of surgery.    Preventing a Surgical Site Infection:  For 2 to 3 days before surgery, avoid shaving with a razor because the razor can irritate skin and make it easier to develop an infection.    Any areas of open skin can increase the risk of a post-operative wound infection by allowing bacteria to enter and travel throughout the body.  Notify your surgeon if you have any skin wounds / rashes even if it is not near the expected surgical site.  The area will need assessed to determine if surgery should be delayed until it is healed.  The night prior to surgery shower using a fresh bar of anti-bacterial soap (such as Dial) and clean washcloth.  Sleep in a clean bed with clean clothing.  Do not allow pets to sleep with you.  Shower on the morning of surgery using a fresh bar of anti-bacterial soap (such as Dial) and clean washcloth.  Dry with a clean towel and dress in clean clothing.  Ask your surgeon if you will be receiving antibiotics prior to surgery.  Make sure you, your family, and all healthcare providers clean their hands with soap and water or an alcohol based hand  before caring for you or your wound.    Day of surgery:  Your arrival time is approximately two hours before your scheduled surgery time.  Please note if you have an early arrival time the surgery doors do not open before 5:00 AM.  Upon arrival, a Pre-op nurse and Anesthesiologist will review your health history, obtain vital signs, and answer questions you may have.  The only belongings needed at this time will be a list of your home medications and if applicable your C-PAP/BI-PAP machine.  A Pre-op nurse will start an IV and you may receive medication in preparation for surgery, including something to help you relax.     Please be aware that surgery does come with discomfort.  We want to make every effort to control your discomfort so please discuss any uncontrolled symptoms  with your nurse.   Your doctor will most likely have prescribed pain medications.      If you are going home after surgery you will receive individualized written care instructions before being discharged.  A responsible adult must drive you to and from the hospital on the day of your surgery and ideally stay with you through the night.   .  Discharge prescriptions can be filled by the hospital pharmacy during regular pharmacy hours.  If you are having surgery late in the day/evening your prescription may be e-prescribed to your pharmacy.  Please verify your pharmacy hours or chose a 24 hour pharmacy to avoid not having access to your prescription because your pharmacy has closed for the day.    If you are staying overnight following surgery, you will be transported to your hospital room following the recovery period.  [facility] has all private rooms.    If you have any questions please call Pre-Admission Testing at [phone].  Deductibles and co-payments are collected on the day of service. Please be prepared to pay the required co-pay, deductible or deposit on the day of service as defined by your plan.    Call your surgeon immediately if you experience any of the following symptoms:  Sore Throat  Shortness of Breath or difficulty breathing  Cough  Chills  Body soreness or muscle pain  Headache  Fever  New loss of taste or smell  Do not arrive for your surgery ill.  Your procedure will need to be rescheduled to another time.  You will need to call your physician before the day of surgery to avoid any unnecessary exposure to hospital staff as well as other patients.          CHLORHEXIDINE CLOTH INSTRUCTIONS  The morning of surgery follow these instructions using the Chlorhexidine cloths you've been given.  These steps reduce bacteria on the body.  Do not use the cloths near your eyes, ears mouth, genitalia or on open wounds.  Throw the cloths away after use but do not try to flush them down a  toilet.      Open and remove one cloth at a time from the package.    Leave the cloth unfolded and begin the bathing.  Massage the skin with the cloths using gentle pressure to remove bacteria.  Do not scrub harshly.   Follow the steps below with one 2% CHG cloth per area (6 total cloths).  One cloth for neck, shoulders and chest.  One cloth for both arms, hands, fingers and underarms (do underarms last).  One cloth for the abdomen followed by groin.  One cloth for right leg and foot including between the toes.  One cloth for left leg and foot including between the toes.  The last cloth is to be used for the back of the neck, back and buttocks.    Allow the CHG to air dry 3 minutes on the skin which will give it time to work and decrease the chance of irritation.  The skin may feel sticky until it is dry.  Do not rinse with water or any other liquid or you will lose the beneficial effects of the CHG.  If mild skin irritation occurs, do rinse the skin to remove the CHG.  Report this to the nurse at time of admission.  Do not apply lotions, creams, ointments, deodorants or perfumes after using the clothes. Dress in clean clothes before coming to the hospital.

## 2025-01-09 LAB
QT INTERVAL: 365 MS
QTC INTERVAL: 414 MS

## 2025-01-16 ENCOUNTER — OFFICE VISIT (OUTPATIENT)
Dept: ORTHOPEDIC SURGERY | Facility: CLINIC | Age: 55
End: 2025-01-16
Payer: COMMERCIAL

## 2025-01-16 VITALS
HEIGHT: 72 IN | SYSTOLIC BLOOD PRESSURE: 148 MMHG | WEIGHT: 231.8 LBS | TEMPERATURE: 97.8 F | DIASTOLIC BLOOD PRESSURE: 85 MMHG | BODY MASS INDEX: 31.4 KG/M2

## 2025-01-16 DIAGNOSIS — Z96.651 STATUS POST RIGHT KNEE REPLACEMENT: Primary | ICD-10-CM

## 2025-01-16 NOTE — H&P (VIEW-ONLY)
Patient: Anthony Sol    Date of Admission: 1/22/2025    YOB: 1970    Medical Record Number: [id number]    Admitting Physician: Dr. Pratik Durham    Reason for Admission:  Right knee revision     History of Present Illness: 54 y.o. male presents with history of previous right total knee replacement with aseptic loosening.  Patient has tried and failed conservative measures would like to proceed with revision    Allergies: No Known Allergies      Current Medications:  Home Medications:    Current Outpatient Medications on File Prior to Visit   Medication Sig    Coenzyme Q10 (COQ-10) 30 MG capsule Daily. HOLDING FOR DOS    Edarbyclor 40-12.5 MG tablet TAKE 1 TABLET BY MOUTH ONCE DAILY (Patient taking differently: Take 1 tablet by mouth Daily.)    Empagliflozin (JARDIANCE) 10 MG tablet Take 1 tablet by mouth Daily. For diabetes (Patient taking differently: Take 1 tablet by mouth Daily. For diabetes)    IPAMORELIN ACETATE IJ 5 days weekly/ LEAVE ON LIST, NOT CURRENTLY TAKING    metFORMIN (GLUCOPHAGE) 1000 MG tablet 1 tab po BID with meal for diabetes (Patient taking differently: Take 1 tablet by mouth 2 (Two) Times a Day With Meals. 1 tab po BID with meal for diabetes)    Mounjaro 10 MG/0.5ML solution auto-injector Inject 10 mg under the skin into the appropriate area as directed 1 (One) Time Per Week. HOLDING FOR DOS    PLANS LAST DOSE PRIOR TO SURGERY 1-13-25    rosuvastatin (CRESTOR) 10 MG tablet Take 1 tablet by mouth 3 (Three) Times a Week.    sirolimus (RAPAMUNE) 1 MG tablet Take 1 tablet by mouth 1 (One) Time Per Week. NOT TAKING/LEAVE ON LIST    Testosterone Cypionate (DEPOTESTOTERONE CYPIONATE) 200 MG/ML injection Inject 1 mL into the appropriate muscle as directed by prescriber Every 14 (Fourteen) Days.    VASCEPA 1 g capsule capsule 2 g 2 (Two) Times a Day.    verapamil SR (CALAN-SR) 240 MG CR tablet Take 1 tablet by mouth Every Night.    VITAMIN B1-B12 IM Inject  into the appropriate muscle  as directed by prescriber 1 (One) Time Per Week.    VITAMIN D PO Take 1 tablet by mouth Daily. 10,000 iu daily       HOLDING FOR DOS     No current facility-administered medications on file prior to visit.     PRN Meds:.    PMH:     Past Medical History:   Diagnosis Date    Adhesive capsulitis of right knee     Arthritis     OSTEOARTHRITIS    Cardiomegaly     Chronic pain of right knee     Coronary artery disease     Deep vein thrombosis     Diabetes mellitus     Diverticulosis     Fatty liver     Fracture, finger rt thumb 1987    GERD (gastroesophageal reflux disease)     H/O blood clots     rt calf  following rt total knee surgery 12/2016    Heart murmur     History of hepatitis     AS A CHILD food related    Hyperlipidemia     Hypertension     Hypotestosteronism     Hypovitaminosis D     Knee swelling     Low back pain     Lumbosacral disc disease L5S1 2001    Osteoarthritis     Tear of meniscus of knee 1980 Rt       PF/Surg/Soc Hx:     Past Surgical History:   Procedure Laterality Date    ADENOIDECTOMY      BACK SURGERY      LAMINECTOMY L5-S1    COLONOSCOPY N/A 05/23/2022    Procedure: COLONOSCOPY;  Surgeon: Marycruz Feliciano MD;  Location: [facility] MAIN OR;  Service: Gastroenterology;  Laterality: N/A;  Diverticulosis    JOINT MANIPULATION Right 02/16/2017    Procedure: MANIPULATION OF RT KNEE;  Surgeon: Anthony Andino MD;  Location: [facility] OR;  Service:     JOINT REPLACEMENT  2018    KNEE ARTHROSCOPY Right     MULTIPLE    LA ARTHRP KNE CONDYLE&PLATU MEDIAL&LAT COMPARTMENTS Right 12/15/2016    Procedure: RT TOTAL KNEE ARTHROPLASTY;  Surgeon: Anthony Andino MD;  Location: [facility] OR;  Service: Orthopedics    LA REVJ TOTAL KNEE ARTHRP W/WO ALGRFT 1 COMPONENT Right 03/06/2017    Procedure: RIGHT TOTAL KNEE ARTHROPLASTY REVISION WITH LEFT KNEE STERIOID INJECTION;  Surgeon: Anthony Andino MD;  Location: [facility] OR;  Service: Orthopedics    TONSILLECTOMY          Social History     Occupational  "History    Occupation: [employer]   Tobacco Use    Smoking status: Never     Passive exposure: Never    Smokeless tobacco: Never   Vaping Use    Vaping status: Never Used   Substance and Sexual Activity    Alcohol use: Yes     Alcohol/week: 2.0 standard drinks of alcohol     Types: 2 Drinks containing 0.5 oz of alcohol per week     Comment: social    Drug use: No    Sexual activity: Yes     Partners: Female      Social History     Social History Narrative    Not on file        Family History   Problem Relation Age of Onset    Diabetes Mother     Hypertension Mother     Colon polyps Mother     Hypertension Father     No Known Problems Brother     Cancer Paternal Uncle         colon    Malig Hyperthermia Neg Hx          Review of Systems:   A 14 point review of systems was performed, pertinent positives discussed above, all other systems are negative    Physical Exam: 54 y.o. male  Vital Signs :   Vitals:    01/16/25 1314   BP: 148/85   Temp: 97.8 °F (36.6 °C)   Weight: 105 kg (231 lb 12.8 oz)   Height: 182.9 cm (72\")     General: Alert and Oriented x 3, No acute distress.  Psych: mood and affect appropriate; recent and remote memory intact  Eyes: conjunctivae clear; pupils equally round and reactive, sclerae anicteric  CV: RRR  Resp: normal respiratory effort  Skin: no rashes or wounds; normal turgor    Xrays:  Long view of the right knee was ordered and reviewed today secondary to pain and shows a previously replaced right knee with signs of lucencies compared to views are unchanged    Assessment: Failed right total knee replacement. Conservative measures have failed.      Plan:  The plan is to proceed with right knee revision the patient voiced understanding of the risks, benefits, and alternative forms of treatment that were discussed with Dr Durham at the time of scheduling.  23-hour home health, history of blood clot we will prescribe Eliquis postoperatively for 6 weeks    Joy Arteaga, APRN  1/16/2025         "

## 2025-01-16 NOTE — H&P
Patient: Anthony Sol    Date of Admission: 1/22/2025    YOB: 1970    Medical Record Number: [id number]    Admitting Physician: Dr. Pratik Durham    Reason for Admission:  Right knee revision     History of Present Illness: 54 y.o. male presents with history of previous right total knee replacement with aseptic loosening.  Patient has tried and failed conservative measures would like to proceed with revision    Allergies: No Known Allergies      Current Medications:  Home Medications:    Current Outpatient Medications on File Prior to Visit   Medication Sig    Coenzyme Q10 (COQ-10) 30 MG capsule Daily. HOLDING FOR DOS    Edarbyclor 40-12.5 MG tablet TAKE 1 TABLET BY MOUTH ONCE DAILY (Patient taking differently: Take 1 tablet by mouth Daily.)    Empagliflozin (JARDIANCE) 10 MG tablet Take 1 tablet by mouth Daily. For diabetes (Patient taking differently: Take 1 tablet by mouth Daily. For diabetes)    IPAMORELIN ACETATE IJ 5 days weekly/ LEAVE ON LIST, NOT CURRENTLY TAKING    metFORMIN (GLUCOPHAGE) 1000 MG tablet 1 tab po BID with meal for diabetes (Patient taking differently: Take 1 tablet by mouth 2 (Two) Times a Day With Meals. 1 tab po BID with meal for diabetes)    Mounjaro 10 MG/0.5ML solution auto-injector Inject 10 mg under the skin into the appropriate area as directed 1 (One) Time Per Week. HOLDING FOR DOS    PLANS LAST DOSE PRIOR TO SURGERY 1-13-25    rosuvastatin (CRESTOR) 10 MG tablet Take 1 tablet by mouth 3 (Three) Times a Week.    sirolimus (RAPAMUNE) 1 MG tablet Take 1 tablet by mouth 1 (One) Time Per Week. NOT TAKING/LEAVE ON LIST    Testosterone Cypionate (DEPOTESTOTERONE CYPIONATE) 200 MG/ML injection Inject 1 mL into the appropriate muscle as directed by prescriber Every 14 (Fourteen) Days.    VASCEPA 1 g capsule capsule 2 g 2 (Two) Times a Day.    verapamil SR (CALAN-SR) 240 MG CR tablet Take 1 tablet by mouth Every Night.    VITAMIN B1-B12 IM Inject  into the appropriate muscle  as directed by prescriber 1 (One) Time Per Week.    VITAMIN D PO Take 1 tablet by mouth Daily. 10,000 iu daily       HOLDING FOR DOS     No current facility-administered medications on file prior to visit.     PRN Meds:.    PMH:     Past Medical History:   Diagnosis Date    Adhesive capsulitis of right knee     Arthritis     OSTEOARTHRITIS    Cardiomegaly     Chronic pain of right knee     Coronary artery disease     Deep vein thrombosis     Diabetes mellitus     Diverticulosis     Fatty liver     Fracture, finger rt thumb 1987    GERD (gastroesophageal reflux disease)     H/O blood clots     rt calf  following rt total knee surgery 12/2016    Heart murmur     History of hepatitis     AS A CHILD food related    Hyperlipidemia     Hypertension     Hypotestosteronism     Hypovitaminosis D     Knee swelling     Low back pain     Lumbosacral disc disease L5S1 2001    Osteoarthritis     Tear of meniscus of knee 1980 Rt       PF/Surg/Soc Hx:     Past Surgical History:   Procedure Laterality Date    ADENOIDECTOMY      BACK SURGERY      LAMINECTOMY L5-S1    COLONOSCOPY N/A 05/23/2022    Procedure: COLONOSCOPY;  Surgeon: Marycruz Feliciano MD;  Location: [facility] [place] MAIN OR;  Service: Gastroenterology;  Laterality: N/A;  Diverticulosis    JOINT MANIPULATION Right 02/16/2017    Procedure: MANIPULATION OF RT KNEE;  Surgeon: Anthony Andino MD;  Location: [facility] OR;  Service:     JOINT REPLACEMENT  2018    KNEE ARTHROSCOPY Right     MULTIPLE    CO ARTHRP KNE CONDYLE&PLATU MEDIAL&LAT COMPARTMENTS Right 12/15/2016    Procedure: RT TOTAL KNEE ARTHROPLASTY;  Surgeon: Anthony Andino MD;  Location: [facility] OR;  Service: Orthopedics    CO REVJ TOTAL KNEE ARTHRP W/WO ALGRFT 1 COMPONENT Right 03/06/2017    Procedure: RIGHT TOTAL KNEE ARTHROPLASTY REVISION WITH LEFT KNEE STERIOID INJECTION;  Surgeon: Anthony Andino MD;  Location: [facility] OR;  Service: Orthopedics    TONSILLECTOMY          Social History     Occupational  "History    Occupation: [employer]   Tobacco Use    Smoking status: Never     Passive exposure: Never    Smokeless tobacco: Never   Vaping Use    Vaping status: Never Used   Substance and Sexual Activity    Alcohol use: Yes     Alcohol/week: 2.0 standard drinks of alcohol     Types: 2 Drinks containing 0.5 oz of alcohol per week     Comment: social    Drug use: No    Sexual activity: Yes     Partners: Female      Social History     Social History Narrative    Not on file        Family History   Problem Relation Age of Onset    Diabetes Mother     Hypertension Mother     Colon polyps Mother     Hypertension Father     No Known Problems Brother     Cancer Paternal Uncle         colon    Malig Hyperthermia Neg Hx          Review of Systems:   A 14 point review of systems was performed, pertinent positives discussed above, all other systems are negative    Physical Exam: 54 y.o. male  Vital Signs :   Vitals:    01/16/25 1314   BP: 148/85   Temp: 97.8 °F (36.6 °C)   Weight: 105 kg (231 lb 12.8 oz)   Height: 182.9 cm (72\")     General: Alert and Oriented x 3, No acute distress.  Psych: mood and affect appropriate; recent and remote memory intact  Eyes: conjunctivae clear; pupils equally round and reactive, sclerae anicteric  CV: RRR  Resp: normal respiratory effort  Skin: no rashes or wounds; normal turgor    Xrays:  Long view of the right knee was ordered and reviewed today secondary to pain and shows a previously replaced right knee with signs of lucencies compared to views are unchanged    Assessment: Failed right total knee replacement. Conservative measures have failed.      Plan:  The plan is to proceed with right knee revision the patient voiced understanding of the risks, benefits, and alternative forms of treatment that were discussed with Dr Durham at the time of scheduling.  23-hour home health, history of blood clot we will prescribe Eliquis postoperatively for 6 weeks    Joy Arteaga, APRN  1/16/2025         "

## 2025-01-21 ENCOUNTER — TELEPHONE (OUTPATIENT)
Dept: ORTHOPEDIC SURGERY | Facility: CLINIC | Age: 55
End: 2025-01-21
Payer: COMMERCIAL

## 2025-01-22 ENCOUNTER — ANESTHESIA EVENT (OUTPATIENT)
Dept: PERIOP | Facility: HOSPITAL | Age: 55
End: 2025-01-22
Payer: COMMERCIAL

## 2025-01-22 ENCOUNTER — APPOINTMENT (OUTPATIENT)
Dept: GENERAL RADIOLOGY | Facility: HOSPITAL | Age: 55
End: 2025-01-22
Payer: COMMERCIAL

## 2025-01-22 ENCOUNTER — HOSPITAL ENCOUNTER (INPATIENT)
Facility: HOSPITAL | Age: 55
LOS: 1 days | Discharge: HOME HEALTH | End: 2025-01-23
Attending: ORTHOPAEDIC SURGERY | Admitting: ORTHOPAEDIC SURGERY
Payer: COMMERCIAL

## 2025-01-22 ENCOUNTER — ANESTHESIA (OUTPATIENT)
Dept: PERIOP | Facility: HOSPITAL | Age: 55
End: 2025-01-22
Payer: COMMERCIAL

## 2025-01-22 DIAGNOSIS — Z96.651 STATUS POST RIGHT KNEE REPLACEMENT: ICD-10-CM

## 2025-01-22 DIAGNOSIS — Z96.651 S/P REVISION OF TOTAL KNEE, RIGHT: Primary | ICD-10-CM

## 2025-01-22 LAB
GLUCOSE BLDC GLUCOMTR-MCNC: 139 MG/DL (ref 70–130)
GLUCOSE BLDC GLUCOMTR-MCNC: 152 MG/DL (ref 70–130)

## 2025-01-22 PROCEDURE — 25010000002 EPINEPHRINE 1 MG/ML SOLUTION 30 ML VIAL: Performed by: ORTHOPAEDIC SURGERY

## 2025-01-22 PROCEDURE — 25810000003 SODIUM CHLORIDE 0.9 % SOLUTION: Performed by: ORTHOPAEDIC SURGERY

## 2025-01-22 PROCEDURE — 25010000002 ROPIVACAINE PER 1 MG: Performed by: STUDENT IN AN ORGANIZED HEALTH CARE EDUCATION/TRAINING PROGRAM

## 2025-01-22 PROCEDURE — 87070 CULTURE OTHR SPECIMN AEROBIC: CPT | Performed by: ORTHOPAEDIC SURGERY

## 2025-01-22 PROCEDURE — 25010000002 ESMOLOL 100 MG/10ML SOLUTION: Performed by: NURSE ANESTHETIST, CERTIFIED REGISTERED

## 2025-01-22 PROCEDURE — 25010000002 ONDANSETRON PER 1 MG: Performed by: NURSE ANESTHETIST, CERTIFIED REGISTERED

## 2025-01-22 PROCEDURE — 25010000002 HYDROMORPHONE 1 MG/ML SOLUTION: Performed by: NURSE ANESTHETIST, CERTIFIED REGISTERED

## 2025-01-22 PROCEDURE — C1713 ANCHOR/SCREW BN/BN,TIS/BN: HCPCS | Performed by: ORTHOPAEDIC SURGERY

## 2025-01-22 PROCEDURE — 25010000002 ROPIVACAINE PER 1 MG: Performed by: ORTHOPAEDIC SURGERY

## 2025-01-22 PROCEDURE — 25010000002 PROPOFOL 10 MG/ML EMULSION: Performed by: NURSE ANESTHETIST, CERTIFIED REGISTERED

## 2025-01-22 PROCEDURE — 25010000002 VANCOMYCIN 10 G RECONSTITUTED SOLUTION: Performed by: ORTHOPAEDIC SURGERY

## 2025-01-22 PROCEDURE — 25010000002 MIDAZOLAM PER 1 MG: Performed by: ANESTHESIOLOGY

## 2025-01-22 PROCEDURE — 87075 CULTR BACTERIA EXCEPT BLOOD: CPT | Performed by: ORTHOPAEDIC SURGERY

## 2025-01-22 PROCEDURE — G0378 HOSPITAL OBSERVATION PER HR: HCPCS

## 2025-01-22 PROCEDURE — 25810000003 LACTATED RINGERS SOLUTION: Performed by: ORTHOPAEDIC SURGERY

## 2025-01-22 PROCEDURE — 25010000002 CEFAZOLIN PER 500 MG: Performed by: NURSE PRACTITIONER

## 2025-01-22 PROCEDURE — C1776 JOINT DEVICE (IMPLANTABLE): HCPCS | Performed by: ORTHOPAEDIC SURGERY

## 2025-01-22 PROCEDURE — 73560 X-RAY EXAM OF KNEE 1 OR 2: CPT

## 2025-01-22 PROCEDURE — 25010000002 DEXAMETHASONE PER 1 MG: Performed by: STUDENT IN AN ORGANIZED HEALTH CARE EDUCATION/TRAINING PROGRAM

## 2025-01-22 PROCEDURE — 27487 REVISE/REPLACE KNEE JOINT: CPT | Performed by: ORTHOPAEDIC SURGERY

## 2025-01-22 PROCEDURE — 0SPC0JZ REMOVAL OF SYNTHETIC SUBSTITUTE FROM RIGHT KNEE JOINT, OPEN APPROACH: ICD-10-PCS | Performed by: ORTHOPAEDIC SURGERY

## 2025-01-22 PROCEDURE — 87015 SPECIMEN INFECT AGNT CONCNTJ: CPT | Performed by: ORTHOPAEDIC SURGERY

## 2025-01-22 PROCEDURE — 25010000002 MORPHINE PER 10 MG: Performed by: ORTHOPAEDIC SURGERY

## 2025-01-22 PROCEDURE — 25010000002 SUGAMMADEX 200 MG/2ML SOLUTION: Performed by: NURSE ANESTHETIST, CERTIFIED REGISTERED

## 2025-01-22 PROCEDURE — 25010000002 LIDOCAINE 2% SOLUTION: Performed by: NURSE ANESTHETIST, CERTIFIED REGISTERED

## 2025-01-22 PROCEDURE — 25810000003 LACTATED RINGERS PER 1000 ML: Performed by: ANESTHESIOLOGY

## 2025-01-22 PROCEDURE — 25010000002 FENTANYL CITRATE (PF) 100 MCG/2ML SOLUTION: Performed by: NURSE ANESTHETIST, CERTIFIED REGISTERED

## 2025-01-22 PROCEDURE — 82948 REAGENT STRIP/BLOOD GLUCOSE: CPT

## 2025-01-22 PROCEDURE — 25010000002 MAGNESIUM SULFATE PER 500 MG OF MAGNESIUM: Performed by: NURSE ANESTHETIST, CERTIFIED REGISTERED

## 2025-01-22 PROCEDURE — 0SRC069 REPLACEMENT OF RIGHT KNEE JOINT WITH OXIDIZED ZIRCONIUM ON POLYETHYLENE SYNTHETIC SUBSTITUTE, CEMENTED, OPEN APPROACH: ICD-10-PCS | Performed by: ORTHOPAEDIC SURGERY

## 2025-01-22 PROCEDURE — 25010000002 FENTANYL CITRATE (PF) 50 MCG/ML SOLUTION: Performed by: ANESTHESIOLOGY

## 2025-01-22 PROCEDURE — 25010000002 HYDRALAZINE PER 20 MG: Performed by: NURSE ANESTHETIST, CERTIFIED REGISTERED

## 2025-01-22 PROCEDURE — 87205 SMEAR GRAM STAIN: CPT | Performed by: ORTHOPAEDIC SURGERY

## 2025-01-22 PROCEDURE — 25010000002 HYDROMORPHONE PER 4 MG: Performed by: NURSE ANESTHETIST, CERTIFIED REGISTERED

## 2025-01-22 PROCEDURE — 25010000002 CEFAZOLIN PER 500 MG: Performed by: ORTHOPAEDIC SURGERY

## 2025-01-22 PROCEDURE — 25010000002 ACETAMINOPHEN 10 MG/ML SOLUTION: Performed by: NURSE ANESTHETIST, CERTIFIED REGISTERED

## 2025-01-22 PROCEDURE — 25010000002 KETOROLAC TROMETHAMINE PER 15 MG: Performed by: ORTHOPAEDIC SURGERY

## 2025-01-22 DEVICE — LEGION OXIN CONSTRAINED FEMORAL SZ                                    5 RT
Type: IMPLANTABLE DEVICE | Site: KNEE | Status: FUNCTIONAL
Brand: LEGION

## 2025-01-22 DEVICE — VIOLET ANTIBACTERIAL POLYDIOXANONE, KNOTLESS TISSUE CONTROL DEVICE
Type: IMPLANTABLE DEVICE | Site: KNEE | Status: FUNCTIONAL
Brand: STRATAFIX

## 2025-01-22 DEVICE — KNOTLESS TISSUE CONTROL DEVICE, UNDYED UNIDIRECTIONAL (ANTIBACTERIAL) SYNTHETIC ABSORBABLE DEVICE
Type: IMPLANTABLE DEVICE | Site: KNEE | Status: FUNCTIONAL
Brand: STRATAFIX

## 2025-01-22 DEVICE — LEGION TIB CONE ID 20 SHORT
Type: IMPLANTABLE DEVICE | Site: KNEE | Status: FUNCTIONAL
Brand: LEGION

## 2025-01-22 DEVICE — LEGION PRESSFIT STEM 14MM X 160MM
Type: IMPLANTABLE DEVICE | Site: KNEE | Status: FUNCTIONAL
Brand: LEGION

## 2025-01-22 DEVICE — GENESIS II CONSTRAINED ARTICULAR                                    INSERT SIZE 5-6 18MM
Type: IMPLANTABLE DEVICE | Site: KNEE | Status: FUNCTIONAL
Brand: GENESIS II

## 2025-01-22 DEVICE — LEGION PRESSFIT STEM 22MM X 160MM
Type: IMPLANTABLE DEVICE | Site: KNEE | Status: FUNCTIONAL
Brand: LEGION

## 2025-01-22 DEVICE — PALACOS® R+G IS A FAST SETTING POLYMER CONTAINING GENTAMICIN, FOR USE IN BONE SURGERY. MIXING OF THE TWO COMPONENT SYSTEM, CONSISTING OF A POWDER AND A LIQUID, INITIALLY PRODUCES A LIQUID AND THEN A PASTE, WHICH IS USED TO ANCHOR THE PROSTHESIS TO THE BONE. THE HARDENED BONE CEMENT ALLOWS STABLE FIXATION OF THE PROSTHESIS AND TRANSFERS ALL STRESSES PRODUCED IN A MOVEMENT TO THE BONE VIA THE LARGE INTERFACE. INSOLUBLE ZIRCONIUM DIOXIDE IS INCLUDED IN THE CEMENT POWDER AS AN X RAY CONTRAST MEDIUM. THE CHLOROPHYLL ADDITIVE SERVES AS OPTICAL MARKING OF THE BONE CEMENT AT THE SITE OF THE OPERATION.
Type: IMPLANTABLE DEVICE | Site: KNEE | Status: FUNCTIONAL
Brand: PALACOS®

## 2025-01-22 DEVICE — LEGION SCREW-ON LWEDGE 5D X 5 POS                                    SZ 5
Type: IMPLANTABLE DEVICE | Site: KNEE | Status: FUNCTIONAL
Brand: LEGION

## 2025-01-22 DEVICE — LEGION REVISION TIBIAL BASEPLATE                                    SIZE 6 RIGHT
Type: IMPLANTABLE DEVICE | Site: KNEE | Status: FUNCTIONAL
Brand: LEGION

## 2025-01-22 RX ORDER — ROPIVACAINE HYDROCHLORIDE 5 MG/ML
INJECTION, SOLUTION EPIDURAL; INFILTRATION; PERINEURAL
Status: COMPLETED | OUTPATIENT
Start: 2025-01-22 | End: 2025-01-22

## 2025-01-22 RX ORDER — SODIUM CHLORIDE, SODIUM LACTATE, POTASSIUM CHLORIDE, CALCIUM CHLORIDE 600; 310; 30; 20 MG/100ML; MG/100ML; MG/100ML; MG/100ML
9 INJECTION, SOLUTION INTRAVENOUS CONTINUOUS
Status: ACTIVE | OUTPATIENT
Start: 2025-01-22 | End: 2025-01-23

## 2025-01-22 RX ORDER — DEXAMETHASONE SODIUM PHOSPHATE 4 MG/ML
INJECTION, SOLUTION INTRA-ARTICULAR; INTRALESIONAL; INTRAMUSCULAR; INTRAVENOUS; SOFT TISSUE
Status: COMPLETED | OUTPATIENT
Start: 2025-01-22 | End: 2025-01-22

## 2025-01-22 RX ORDER — ATROPINE SULFATE 0.4 MG/ML
0.4 INJECTION, SOLUTION INTRAMUSCULAR; INTRAVENOUS; SUBCUTANEOUS ONCE AS NEEDED
Status: DISCONTINUED | OUTPATIENT
Start: 2025-01-22 | End: 2025-01-22 | Stop reason: HOSPADM

## 2025-01-22 RX ORDER — ESMOLOL HYDROCHLORIDE 10 MG/ML
INJECTION INTRAVENOUS AS NEEDED
Status: DISCONTINUED | OUTPATIENT
Start: 2025-01-22 | End: 2025-01-22 | Stop reason: SURG

## 2025-01-22 RX ORDER — NALOXONE HCL 0.4 MG/ML
0.2 VIAL (ML) INJECTION AS NEEDED
Status: DISCONTINUED | OUTPATIENT
Start: 2025-01-22 | End: 2025-01-22 | Stop reason: HOSPADM

## 2025-01-22 RX ORDER — HYDROCODONE BITARTRATE AND ACETAMINOPHEN 7.5; 325 MG/1; MG/1
1 TABLET ORAL EVERY 4 HOURS PRN
Status: DISCONTINUED | OUTPATIENT
Start: 2025-01-22 | End: 2025-01-23 | Stop reason: HOSPADM

## 2025-01-22 RX ORDER — ONDANSETRON 4 MG/1
4 TABLET, ORALLY DISINTEGRATING ORAL EVERY 6 HOURS PRN
Status: DISCONTINUED | OUTPATIENT
Start: 2025-01-22 | End: 2025-01-23 | Stop reason: HOSPADM

## 2025-01-22 RX ORDER — ROCURONIUM BROMIDE 10 MG/ML
INJECTION, SOLUTION INTRAVENOUS AS NEEDED
Status: DISCONTINUED | OUTPATIENT
Start: 2025-01-22 | End: 2025-01-22 | Stop reason: SURG

## 2025-01-22 RX ORDER — POLYETHYLENE GLYCOL 3350 17 G/17G
17 POWDER, FOR SOLUTION ORAL 2 TIMES DAILY
Qty: 238 G | Refills: 0 | Status: SHIPPED | OUTPATIENT
Start: 2025-01-22 | End: 2025-01-30

## 2025-01-22 RX ORDER — LABETALOL HYDROCHLORIDE 5 MG/ML
5 INJECTION, SOLUTION INTRAVENOUS
Status: DISCONTINUED | OUTPATIENT
Start: 2025-01-22 | End: 2025-01-22 | Stop reason: HOSPADM

## 2025-01-22 RX ORDER — METOPROLOL TARTRATE 1 MG/ML
INJECTION, SOLUTION INTRAVENOUS AS NEEDED
Status: DISCONTINUED | OUTPATIENT
Start: 2025-01-22 | End: 2025-01-22 | Stop reason: SURG

## 2025-01-22 RX ORDER — LIDOCAINE HYDROCHLORIDE 20 MG/ML
INJECTION, SOLUTION INFILTRATION; PERINEURAL AS NEEDED
Status: DISCONTINUED | OUTPATIENT
Start: 2025-01-22 | End: 2025-01-22 | Stop reason: SURG

## 2025-01-22 RX ORDER — HYDRALAZINE HYDROCHLORIDE 20 MG/ML
5 INJECTION INTRAMUSCULAR; INTRAVENOUS
Status: DISCONTINUED | OUTPATIENT
Start: 2025-01-22 | End: 2025-01-22 | Stop reason: HOSPADM

## 2025-01-22 RX ORDER — VERAPAMIL HYDROCHLORIDE 240 MG/1
240 TABLET, FILM COATED, EXTENDED RELEASE ORAL NIGHTLY
Status: DISCONTINUED | OUTPATIENT
Start: 2025-01-22 | End: 2025-01-23 | Stop reason: HOSPADM

## 2025-01-22 RX ORDER — PROPOFOL 10 MG/ML
VIAL (ML) INTRAVENOUS AS NEEDED
Status: DISCONTINUED | OUTPATIENT
Start: 2025-01-22 | End: 2025-01-22 | Stop reason: SURG

## 2025-01-22 RX ORDER — OXYCODONE AND ACETAMINOPHEN 7.5; 325 MG/1; MG/1
1 TABLET ORAL EVERY 4 HOURS PRN
Status: DISCONTINUED | OUTPATIENT
Start: 2025-01-22 | End: 2025-01-22 | Stop reason: HOSPADM

## 2025-01-22 RX ORDER — HYDROCODONE BITARTRATE AND ACETAMINOPHEN 7.5; 325 MG/1; MG/1
2 TABLET ORAL EVERY 4 HOURS PRN
Status: DISCONTINUED | OUTPATIENT
Start: 2025-01-22 | End: 2025-01-23 | Stop reason: HOSPADM

## 2025-01-22 RX ORDER — PANTOPRAZOLE SODIUM 40 MG/1
40 TABLET, DELAYED RELEASE ORAL DAILY
Qty: 14 TABLET | Refills: 0 | Status: SHIPPED | OUTPATIENT
Start: 2025-01-22 | End: 2025-02-06

## 2025-01-22 RX ORDER — IPRATROPIUM BROMIDE AND ALBUTEROL SULFATE 2.5; .5 MG/3ML; MG/3ML
3 SOLUTION RESPIRATORY (INHALATION) ONCE AS NEEDED
Status: DISCONTINUED | OUTPATIENT
Start: 2025-01-22 | End: 2025-01-22 | Stop reason: HOSPADM

## 2025-01-22 RX ORDER — ONDANSETRON 2 MG/ML
4 INJECTION INTRAMUSCULAR; INTRAVENOUS ONCE AS NEEDED
Status: DISCONTINUED | OUTPATIENT
Start: 2025-01-22 | End: 2025-01-23 | Stop reason: HOSPADM

## 2025-01-22 RX ORDER — FENTANYL CITRATE 50 UG/ML
50 INJECTION, SOLUTION INTRAMUSCULAR; INTRAVENOUS
Status: DISCONTINUED | OUTPATIENT
Start: 2025-01-22 | End: 2025-01-22 | Stop reason: HOSPADM

## 2025-01-22 RX ORDER — FENTANYL CITRATE 50 UG/ML
50 INJECTION, SOLUTION INTRAMUSCULAR; INTRAVENOUS ONCE AS NEEDED
Status: COMPLETED | OUTPATIENT
Start: 2025-01-22 | End: 2025-01-22

## 2025-01-22 RX ORDER — EPHEDRINE SULFATE 50 MG/ML
5 INJECTION, SOLUTION INTRAVENOUS ONCE AS NEEDED
Status: DISCONTINUED | OUTPATIENT
Start: 2025-01-22 | End: 2025-01-22 | Stop reason: HOSPADM

## 2025-01-22 RX ORDER — FAMOTIDINE 10 MG/ML
20 INJECTION, SOLUTION INTRAVENOUS ONCE
Status: COMPLETED | OUTPATIENT
Start: 2025-01-22 | End: 2025-01-22

## 2025-01-22 RX ORDER — ACETAMINOPHEN 10 MG/ML
INJECTION, SOLUTION INTRAVENOUS AS NEEDED
Status: DISCONTINUED | OUTPATIENT
Start: 2025-01-22 | End: 2025-01-22 | Stop reason: SURG

## 2025-01-22 RX ORDER — CEFDINIR 300 MG/1
300 CAPSULE ORAL 2 TIMES DAILY
Qty: 14 CAPSULE | Refills: 0 | Status: SHIPPED | OUTPATIENT
Start: 2025-01-22 | End: 2025-01-29

## 2025-01-22 RX ORDER — ONDANSETRON 2 MG/ML
4 INJECTION INTRAMUSCULAR; INTRAVENOUS ONCE AS NEEDED
Status: DISCONTINUED | OUTPATIENT
Start: 2025-01-22 | End: 2025-01-22 | Stop reason: HOSPADM

## 2025-01-22 RX ORDER — ONDANSETRON 4 MG/1
4 TABLET, FILM COATED ORAL EVERY 8 HOURS PRN
Qty: 10 TABLET | Refills: 0 | Status: SHIPPED | OUTPATIENT
Start: 2025-01-22

## 2025-01-22 RX ORDER — PROMETHAZINE HYDROCHLORIDE 25 MG/1
25 TABLET ORAL ONCE AS NEEDED
Status: DISCONTINUED | OUTPATIENT
Start: 2025-01-22 | End: 2025-01-22 | Stop reason: HOSPADM

## 2025-01-22 RX ORDER — FLUMAZENIL 0.1 MG/ML
0.2 INJECTION INTRAVENOUS AS NEEDED
Status: DISCONTINUED | OUTPATIENT
Start: 2025-01-22 | End: 2025-01-22 | Stop reason: HOSPADM

## 2025-01-22 RX ORDER — ICOSAPENT ETHYL 1 G/1
2 CAPSULE ORAL 2 TIMES DAILY
Status: DISCONTINUED | OUTPATIENT
Start: 2025-01-22 | End: 2025-01-22

## 2025-01-22 RX ORDER — LIDOCAINE HYDROCHLORIDE 10 MG/ML
0.5 INJECTION, SOLUTION INFILTRATION; PERINEURAL ONCE AS NEEDED
Status: DISCONTINUED | OUTPATIENT
Start: 2025-01-22 | End: 2025-01-22 | Stop reason: HOSPADM

## 2025-01-22 RX ORDER — VANCOMYCIN/0.9 % SOD CHLORIDE 1.5G/250ML
1500 PLASTIC BAG, INJECTION (ML) INTRAVENOUS ONCE
Status: COMPLETED | OUTPATIENT
Start: 2025-01-22 | End: 2025-01-22

## 2025-01-22 RX ORDER — HYDRALAZINE HYDROCHLORIDE 20 MG/ML
INJECTION INTRAMUSCULAR; INTRAVENOUS AS NEEDED
Status: DISCONTINUED | OUTPATIENT
Start: 2025-01-22 | End: 2025-01-22 | Stop reason: SURG

## 2025-01-22 RX ORDER — SODIUM CHLORIDE 0.9 % (FLUSH) 0.9 %
3 SYRINGE (ML) INJECTION EVERY 12 HOURS SCHEDULED
Status: DISCONTINUED | OUTPATIENT
Start: 2025-01-22 | End: 2025-01-22 | Stop reason: HOSPADM

## 2025-01-22 RX ORDER — MAGNESIUM HYDROXIDE 1200 MG/15ML
LIQUID ORAL AS NEEDED
Status: DISCONTINUED | OUTPATIENT
Start: 2025-01-22 | End: 2025-01-22 | Stop reason: HOSPADM

## 2025-01-22 RX ORDER — ACETAMINOPHEN 325 MG/1
650 TABLET ORAL EVERY 6 HOURS PRN
Status: DISCONTINUED | OUTPATIENT
Start: 2025-01-22 | End: 2025-01-23 | Stop reason: HOSPADM

## 2025-01-22 RX ORDER — DEXAMETHASONE SODIUM PHOSPHATE 4 MG/ML
INJECTION, SOLUTION INTRA-ARTICULAR; INTRALESIONAL; INTRAMUSCULAR; INTRAVENOUS; SOFT TISSUE AS NEEDED
Status: DISCONTINUED | OUTPATIENT
Start: 2025-01-22 | End: 2025-01-22 | Stop reason: SURG

## 2025-01-22 RX ORDER — PREGABALIN 75 MG/1
150 CAPSULE ORAL ONCE
Status: COMPLETED | OUTPATIENT
Start: 2025-01-22 | End: 2025-01-22

## 2025-01-22 RX ORDER — PROMETHAZINE HYDROCHLORIDE 25 MG/1
25 SUPPOSITORY RECTAL ONCE AS NEEDED
Status: DISCONTINUED | OUTPATIENT
Start: 2025-01-22 | End: 2025-01-22 | Stop reason: HOSPADM

## 2025-01-22 RX ORDER — HYDROMORPHONE HYDROCHLORIDE 1 MG/ML
0.5 INJECTION, SOLUTION INTRAMUSCULAR; INTRAVENOUS; SUBCUTANEOUS
Status: DISCONTINUED | OUTPATIENT
Start: 2025-01-22 | End: 2025-01-22 | Stop reason: HOSPADM

## 2025-01-22 RX ORDER — SODIUM CHLORIDE 0.9 % (FLUSH) 0.9 %
3-10 SYRINGE (ML) INJECTION AS NEEDED
Status: DISCONTINUED | OUTPATIENT
Start: 2025-01-22 | End: 2025-01-22 | Stop reason: HOSPADM

## 2025-01-22 RX ORDER — HYDROCODONE BITARTRATE AND ACETAMINOPHEN 7.5; 325 MG/1; MG/1
1 TABLET ORAL EVERY 4 HOURS PRN
Qty: 40 TABLET | Refills: 0 | Status: SHIPPED | OUTPATIENT
Start: 2025-01-22 | End: 2025-01-31 | Stop reason: SDUPTHER

## 2025-01-22 RX ORDER — DIPHENHYDRAMINE HYDROCHLORIDE 50 MG/ML
12.5 INJECTION INTRAMUSCULAR; INTRAVENOUS
Status: DISCONTINUED | OUTPATIENT
Start: 2025-01-22 | End: 2025-01-22 | Stop reason: HOSPADM

## 2025-01-22 RX ORDER — MAGNESIUM SULFATE HEPTAHYDRATE 500 MG/ML
INJECTION, SOLUTION INTRAMUSCULAR; INTRAVENOUS AS NEEDED
Status: DISCONTINUED | OUTPATIENT
Start: 2025-01-22 | End: 2025-01-22 | Stop reason: SURG

## 2025-01-22 RX ORDER — HYDROCODONE BITARTRATE AND ACETAMINOPHEN 5; 325 MG/1; MG/1
1 TABLET ORAL ONCE AS NEEDED
Status: DISCONTINUED | OUTPATIENT
Start: 2025-01-22 | End: 2025-01-22 | Stop reason: HOSPADM

## 2025-01-22 RX ORDER — PROMETHAZINE HYDROCHLORIDE 12.5 MG/1
12.5 TABLET ORAL EVERY 4 HOURS PRN
Status: DISCONTINUED | OUTPATIENT
Start: 2025-01-22 | End: 2025-01-23 | Stop reason: HOSPADM

## 2025-01-22 RX ORDER — ROSUVASTATIN CALCIUM 10 MG/1
10 TABLET, COATED ORAL 3 TIMES WEEKLY
Status: DISCONTINUED | OUTPATIENT
Start: 2025-01-22 | End: 2025-01-23 | Stop reason: HOSPADM

## 2025-01-22 RX ORDER — MELOXICAM 15 MG/1
15 TABLET ORAL ONCE
Status: COMPLETED | OUTPATIENT
Start: 2025-01-22 | End: 2025-01-22

## 2025-01-22 RX ORDER — ONDANSETRON 2 MG/ML
INJECTION INTRAMUSCULAR; INTRAVENOUS AS NEEDED
Status: DISCONTINUED | OUTPATIENT
Start: 2025-01-22 | End: 2025-01-22 | Stop reason: SURG

## 2025-01-22 RX ORDER — FENTANYL CITRATE 50 UG/ML
INJECTION, SOLUTION INTRAMUSCULAR; INTRAVENOUS AS NEEDED
Status: DISCONTINUED | OUTPATIENT
Start: 2025-01-22 | End: 2025-01-22 | Stop reason: SURG

## 2025-01-22 RX ORDER — MIDAZOLAM HYDROCHLORIDE 1 MG/ML
1 INJECTION, SOLUTION INTRAMUSCULAR; INTRAVENOUS
Status: DISCONTINUED | OUTPATIENT
Start: 2025-01-22 | End: 2025-01-22 | Stop reason: HOSPADM

## 2025-01-22 RX ADMIN — SODIUM CHLORIDE, POTASSIUM CHLORIDE, SODIUM LACTATE AND CALCIUM CHLORIDE 500 ML: 600; 310; 30; 20 INJECTION, SOLUTION INTRAVENOUS at 10:08

## 2025-01-22 RX ADMIN — HYDROMORPHONE HYDROCHLORIDE 0.5 MG: 1 INJECTION, SOLUTION INTRAMUSCULAR; INTRAVENOUS; SUBCUTANEOUS at 13:30

## 2025-01-22 RX ADMIN — METOPROLOL TARTRATE 1 MG: 1 INJECTION, SOLUTION INTRAVENOUS at 13:00

## 2025-01-22 RX ADMIN — METOPROLOL TARTRATE 1 MG: 1 INJECTION, SOLUTION INTRAVENOUS at 12:48

## 2025-01-22 RX ADMIN — VANCOMYCIN HYDROCHLORIDE 1500 MG: 10 INJECTION, POWDER, LYOPHILIZED, FOR SOLUTION INTRAVENOUS at 10:42

## 2025-01-22 RX ADMIN — METOPROLOL TARTRATE 2 MG: 1 INJECTION, SOLUTION INTRAVENOUS at 13:17

## 2025-01-22 RX ADMIN — VERAPAMIL HYDROCHLORIDE 240 MG: 240 TABLET, FILM COATED, EXTENDED RELEASE ORAL at 20:17

## 2025-01-22 RX ADMIN — METOPROLOL TARTRATE 1 MG: 1 INJECTION, SOLUTION INTRAVENOUS at 12:43

## 2025-01-22 RX ADMIN — MAGNESIUM SULFATE HEPTAHYDRATE 1 G: 500 INJECTION, SOLUTION INTRAMUSCULAR; INTRAVENOUS at 13:56

## 2025-01-22 RX ADMIN — SODIUM CHLORIDE, POTASSIUM CHLORIDE, SODIUM LACTATE AND CALCIUM CHLORIDE: 600; 310; 30; 20 INJECTION, SOLUTION INTRAVENOUS at 13:41

## 2025-01-22 RX ADMIN — DEXAMETHASONE SODIUM PHOSPHATE 4 MG: 4 INJECTION, SOLUTION INTRA-ARTICULAR; INTRALESIONAL; INTRAMUSCULAR; INTRAVENOUS; SOFT TISSUE at 11:12

## 2025-01-22 RX ADMIN — Medication 20 MG: at 12:21

## 2025-01-22 RX ADMIN — CEFAZOLIN 2 G: 2 INJECTION, POWDER, FOR SOLUTION INTRAMUSCULAR; INTRAVENOUS at 11:43

## 2025-01-22 RX ADMIN — FENTANYL CITRATE 50 MCG: 50 INJECTION, SOLUTION INTRAMUSCULAR; INTRAVENOUS at 11:08

## 2025-01-22 RX ADMIN — HYDROMORPHONE HYDROCHLORIDE 0.5 MG: 1 INJECTION, SOLUTION INTRAMUSCULAR; INTRAVENOUS; SUBCUTANEOUS at 16:31

## 2025-01-22 RX ADMIN — ROSUVASTATIN 10 MG: 10 TABLET, FILM COATED ORAL at 18:28

## 2025-01-22 RX ADMIN — HYDROCODONE BITARTRATE AND ACETAMINOPHEN 1 TABLET: 7.5; 325 TABLET ORAL at 16:29

## 2025-01-22 RX ADMIN — LIDOCAINE HYDROCHLORIDE 100 MG: 20 INJECTION, SOLUTION INFILTRATION; PERINEURAL at 11:57

## 2025-01-22 RX ADMIN — ESMOLOL HYDROCHLORIDE 20 MG: 100 INJECTION, SOLUTION INTRAVENOUS at 13:45

## 2025-01-22 RX ADMIN — PREGABALIN 150 MG: 75 CAPSULE ORAL at 10:17

## 2025-01-22 RX ADMIN — METFORMIN HYDROCHLORIDE 1000 MG: 1000 TABLET, FILM COATED ORAL at 18:28

## 2025-01-22 RX ADMIN — SUGAMMADEX 200 MG: 100 INJECTION, SOLUTION INTRAVENOUS at 14:37

## 2025-01-22 RX ADMIN — PROPOFOL 125 MCG/KG/MIN: 10 INJECTION, EMULSION INTRAVENOUS at 12:00

## 2025-01-22 RX ADMIN — Medication 20 MG: at 12:32

## 2025-01-22 RX ADMIN — EMPAGLIFLOZIN 10 MG: 10 TABLET, FILM COATED ORAL at 18:28

## 2025-01-22 RX ADMIN — PROPOFOL 200 MG: 10 INJECTION, EMULSION INTRAVENOUS at 11:57

## 2025-01-22 RX ADMIN — ROPIVACAINE HYDROCHLORIDE 15 ML: 5 INJECTION EPIDURAL; INFILTRATION; PERINEURAL at 11:12

## 2025-01-22 RX ADMIN — HYDRALAZINE HYDROCHLORIDE 10 MG: 20 INJECTION, SOLUTION INTRAMUSCULAR; INTRAVENOUS at 12:55

## 2025-01-22 RX ADMIN — DEXAMETHASONE SODIUM PHOSPHATE 10 MG: 4 INJECTION, SOLUTION INTRA-ARTICULAR; INTRALESIONAL; INTRAMUSCULAR; INTRAVENOUS; SOFT TISSUE at 12:17

## 2025-01-22 RX ADMIN — MIDAZOLAM 2 MG: 1 INJECTION INTRAMUSCULAR; INTRAVENOUS at 11:08

## 2025-01-22 RX ADMIN — Medication 10 MG: at 13:08

## 2025-01-22 RX ADMIN — HYDROMORPHONE HYDROCHLORIDE 0.5 MG: 1 INJECTION, SOLUTION INTRAMUSCULAR; INTRAVENOUS; SUBCUTANEOUS at 14:55

## 2025-01-22 RX ADMIN — HYDROMORPHONE HYDROCHLORIDE 0.5 MG: 1 INJECTION, SOLUTION INTRAMUSCULAR; INTRAVENOUS; SUBCUTANEOUS at 13:40

## 2025-01-22 RX ADMIN — ESMOLOL HYDROCHLORIDE 10 MG: 100 INJECTION, SOLUTION INTRAVENOUS at 13:20

## 2025-01-22 RX ADMIN — FENTANYL CITRATE 100 MCG: 50 INJECTION, SOLUTION INTRAMUSCULAR; INTRAVENOUS at 11:57

## 2025-01-22 RX ADMIN — ACETAMINOPHEN 1000 MG: 1000 INJECTION INTRAVENOUS at 12:05

## 2025-01-22 RX ADMIN — FAMOTIDINE 20 MG: 10 INJECTION INTRAVENOUS at 10:54

## 2025-01-22 RX ADMIN — SODIUM CHLORIDE 2000 MG: 900 INJECTION INTRAVENOUS at 20:16

## 2025-01-22 RX ADMIN — MELOXICAM 15 MG: 15 TABLET ORAL at 10:54

## 2025-01-22 RX ADMIN — MAGNESIUM SULFATE HEPTAHYDRATE 1 G: 500 INJECTION, SOLUTION INTRAMUSCULAR; INTRAVENOUS at 13:50

## 2025-01-22 RX ADMIN — ONDANSETRON 4 MG: 2 INJECTION INTRAMUSCULAR; INTRAVENOUS at 14:20

## 2025-01-22 RX ADMIN — ROCURONIUM BROMIDE 50 MG: 10 INJECTION, SOLUTION INTRAVENOUS at 11:57

## 2025-01-22 RX ADMIN — SODIUM CHLORIDE, POTASSIUM CHLORIDE, SODIUM LACTATE AND CALCIUM CHLORIDE: 600; 310; 30; 20 INJECTION, SOLUTION INTRAVENOUS at 11:15

## 2025-01-22 RX ADMIN — ESMOLOL HYDROCHLORIDE 10 MG: 100 INJECTION, SOLUTION INTRAVENOUS at 13:26

## 2025-01-22 RX ADMIN — HYDROCODONE BITARTRATE AND ACETAMINOPHEN 2 TABLET: 7.5; 325 TABLET ORAL at 20:21

## 2025-01-22 NOTE — ANESTHESIA PREPROCEDURE EVALUATION
Anesthesia Evaluation     NPO Solid Status: > 8 hours  NPO Liquid Status: > 4 hours           Airway   Mallampati: I  No difficulty expected  Dental      Pulmonary    Cardiovascular     (+) hypertension, CAD, DVT, hyperlipidemia      Neuro/Psych  GI/Hepatic/Renal/Endo    (+) obesity, GERD, renal disease-, diabetes mellitus    Musculoskeletal     Abdominal    Substance History      OB/GYN          Other   arthritis,                 Anesthesia Plan    ASA 3     general     (Regional for [facility] PSR  )  intravenous induction     Anesthetic plan, risks, benefits, and alternatives have been provided, discussed and informed consent has been obtained with: patient.    CODE STATUS:

## 2025-01-22 NOTE — ANESTHESIA PROCEDURE NOTES
Airway  Urgency: elective    Date/Time: 1/22/2025 12:00 PM  Airway not difficult    General Information and Staff    Patient location during procedure: OR  CRNA/CAA: Marilia Saucedo CRNA    Indications and Patient Condition  Indications for airway management: airway protection    Preoxygenated: yes  MILS not maintained throughout  Mask difficulty assessment: 1 - vent by mask    Final Airway Details  Final airway type: endotracheal airway      Successful airway: ETT  Cuffed: yes   Successful intubation technique: direct laryngoscopy  Endotracheal tube insertion site: oral  Blade: Hui  Blade size: 4  ETT size (mm): 7.5  Cormack-Lehane Classification: grade I - full view of glottis  Placement verified by: chest auscultation and capnometry   Measured from: lips  ETT/EBT  to lips (cm): 23  Number of attempts at approach: 1  Assessment: lips, teeth, and gum same as pre-op and atraumatic intubation    Additional Comments  Dentition intact and unchanged. CBEBS.  +ETCO2.

## 2025-01-22 NOTE — ANESTHESIA PROCEDURE NOTES
Peripheral Block    Pre-sedation assessment completed: 1/22/2025 11:10 AM    Patient reassessed immediately prior to procedure    Patient location during procedure: holding area  Start time: 1/22/2025 11:10 AM  Stop time: 1/22/2025 11:12 AM  Reason for block: at surgeon's request and post-op pain management  Performed by  Anesthesiologist: Bam Moore MD  Preanesthetic Checklist  Completed: patient identified, IV checked, site marked, risks and benefits discussed, surgical consent, monitors and equipment checked, pre-op evaluation and timeout performed  Prep:  Pt Position: supine  Sterile barriers:cap, washed/disinfected hands, gloves, mask and alcohol skin prep  Prep: ChloraPrep  Patient monitoring: blood pressure monitoring, continuous pulse oximetry and EKG  Procedure    Sedation: yes  Performed under: local infiltration  Guidance:ultrasound guided    ULTRASOUND INTERPRETATION.  Using ultrasound guidance a 21 G gauge needle was placed in close proximity to the nerve, at which point, under ultrasound guidance anesthetic was injected in the area of the nerve and spread of the anesthesia was seen on ultrasound in close proximity thereto.  There were no abnormalities seen on ultrasound; a digital image was taken; and the patient tolerated the procedure with no complications. Images:still images obtained, printed/placed on chart    Laterality:right  Block Type:adductor canal block  Injection Technique:single-shot  Needle Type:echogenic and Tuohy  Needle Gauge:21 G  Resistance on Injection: none    Medications Used: ropivacaine (NAROPIN) 0.5 % injection - Injection   15 mL - 1/22/2025 11:12:00 AM  dexamethasone (DECADRON) injection - Injection   4 mg - 1/22/2025 11:12:00 AM      Post Assessment  Injection Assessment: negative aspiration for heme, no paresthesia on injection and incremental injection  Patient Tolerance:comfortable throughout block  Complications:no  Additional Notes  Ultrasound guidance used to  visualize nerve anatomy, guide needle placement and verify local anesthetic disbursement.       Performed by: Bam Moore MD

## 2025-01-22 NOTE — OP NOTE
Name: Anthony Sol  YOB: 1970    DATE OF SURGERY:1/22/2025    PREOPERATIVE DIAGNOSIS: Right failed total knee replacement    POSTOPERATIVE DIAGNOSIS: Right failed total knee replacement    PROCEDURE PERFORMED: Right total knee revision (Femur, Tibia)    SURGEON: Pratik Durham M.D.    ASSISTANT: Arturo Bahena    A surgical assistant was integral in ensuring a successful outcome with this procedure.  The assistant was utilized to assist in positioning the patient, draping the patient, was used throughout the case to provide with retraction of tissues, suctioning of blood and body fluids for visualization, positioning of the extremity to allow for proper exposure so that I could perform the procedure.  Without the use of a surgical assistant during this procedure I feel that the outcome may have been compromised or would have been suboptimal or at risk for complications.    IMPLANTS:   Implant Name Type Inv. Item Serial No.  Lot No. LRB No. Used Action   DEV CONTRL TISS STRATAFIX SYMM PDS PLUS JANUARY CT-1 60CM - [id number] Implant DEV CONTRL TISS STRATAFIX SYMM PDS PLUS JANUARY CT-1 60CM  [employer]  DIV [employer] J [id number] Right 1 Implanted   DEV CONTRL TISS STRATAFIXSPIRALMNCRYL PLSPS2 REV3/0 45CM - [id number] Implant DEV CONTRL TISS STRATAFIXSPIRALMNCRYL PLSPS2 REV3/0 45CM  [employer]  DIV OF J AND J [id number] Right 1 Implanted   CMT BONE PALACOS RPLSG W/GENT HI/VISC 1X40 - [id number] Implant CMT BONE PALACOS RPLSG W/GENT HI/VISC 1X40  [employer] [id number] Right 1 Implanted   CMT BONE PALACOS RPLSG W/GENT HI/VISC 1X40 - [id number] Implant CMT BONE PALACOS RPLSG W/GENT HI/VISC 1X40  [employer] [id number] Right 1 Implanted   CONE TIB KN LEGION SHT 20MM - [id number] Implant CONE TIB KN LEGION SHT 20MM  [employer] [id number] Right 1 Implanted   STEM FEM/TIB KN LEGION/HK PF STR [id number] - [id number] Implant STEM FEM/TIB KN LEGION/HK PF STR [id number]  [employer] [id number] Right 1  Implanted   BASEPLT TIB REV LEGION TI SZ6 RT - [id number] Implant BASEPLT TIB REV LEGION TI SZ6 RT  [employer] [id number] Right 1 Implanted   STEM FEM/TIB KN LEGION/HK PF STR [id number] - [id number] Implant STEM FEM/TIB KN LEGION/HK PF STR [id number]  [employer] [id number] Right 1 Implanted   COMP FEM LEGION OXINIUM CNSTR SZ5 RT - [id number] Implant COMP FEM LEGION OXINIUM CNSTR SZ5 RT  [employer] [id number] Right 1 Implanted   WEDGE L FEM/KN LEGION SZ5 5X5MM - [id number] Implant WEDGE L FEM/KN LEGION SZ5 5X5MM  [employer] [id number] Right 1 Implanted   WEDGE L FEM/KN LEGION SZ5 5X5MM - [id number] Implant WEDGE L FEM/KN LEGION SZ5 5X5MM  [employer] [id number] Right 1 Implanted   INSRT TIB KN GEN2 CNSTR ART SZ5TO6 18MM - [id number] Implant INSRT TIB KN GEN2 CNSTR ART SZ5TO6 18MM  [employer] [id number] Right 1 Implanted       Estimated Blood Loss: 200 mL  Specimens : none  Complications: none    DESCRIPTION OF PROCEDURE: The patient was taken to the operating room and placed in the supine position. A sequential compression device was carefully placed on the non-operative leg. Preoperative antibiotics were administered. Surgical time out was performed. After adequate induction of anesthesia, the leg was prepped and draped in the usual sterile fashion, exsanguinated with an Esmarch bandage and the tourniquet inflated to 250 mmHg. A midline incision was performed through the previous incision followed by a medial parapatellar arthrotomy. There was a small amount of clear noninfectious appearing fluid within the joint. Cultures x 2 were taken per routine.  The medial retinaculum was then carefully released from the proximal medial tibia with electrocautery staying directly on bone.The patellar tendon scar tissue was gently released from a portion of the proximal tibia down to the level of the patellar tendon insertion to allow for patellar mobilization.  I then excised a large amount of scar  tissue staring in the medial gutter, through the suprapatellar pouch and into the lateral gutter, essentially removing a horseshoe of scar tissue. The patella was subluxed laterally. The femoral and tibial components were carefully examined.  The polyethylene liner was removed.  The tibial interface was examined.  There was some erosion of the bone medially but it appeared to be at least partially fixed perhaps about the keel and lateral joint.  At this point I turned attention to the femoral component the femoral component did appear loose and there was considerable compromise of the medial bone it appears it had migrated proximally slightly as well.  I placed a V punch tamp about the medial femur and with gentle taps were able to remove the femoral implant and there was no bone loss, no attachment of the bone to the cement which remained attached to the femur.  Underlying this there was considerable bone erosion and especially of the medial femoral condyle.  There was still supportive bone however in the lateral femoral condyle and superiorly.  We did talk to our attention to removal of the tibial component.  I had excised a large amount of scar tissue and inflammatory tissue about the posterior joint medial and lateral and remove the remnants of the PCL and scar tissue around the posterior knee.  We identified the edges of the tibial bone and placed retractors.  Using a combination of flexible osteotome and quarter inch curved osteotome I was able to remove and divide the tibial bone cement interface.  The tibial component along with keel were then removed there was some cement which still remained and we removed the cement with a combination of quarter inch curved osteotome and rongeur.  We reverse-angle nga hooks remove cement from the canal.    After the tibial component had been removed we then placed a drill hole in the center aspect of the tibia in line with the shaft.  We then progressively reamed  until there was chatter of the tibial reamer.  The alignment looked good.  We placed the tibial cutting guide and then set for a freshen up cut with at least a millimeter of bone.  The tibial cutting guide was then pinned in place and the freshen up tibial cut was performed.  We then constructed the tibial trial and the trial was placed and set down flush onto the cut surface.  At this point attention was then turned to the femur.    we then placed a drill hole at the center portion in line with the femoral canal.  We then progressively reamed until we had chatter at the appropriate depth of the femoral stem extension.  The last reamer was left in place and the anterior posterior cutting block was placed over the reamer.  The anterior posterior and chamfer cuts were then performed off of the cutting block.  We made sure that the rotation matched the epicondylar axis which had been marked with electrocautery and a marking pen.  We then reamed for the box.  The cuts had been placed such that there was excellent healthy bleeding bone that the implant would sit on.  I placed the tibial trial,  the augments were then placed onto the femoral trial and the trial impacted in place.  We then placed the trial polyethylene liner which gave full extension and excellent stability in both flexion and extension with good balance.  The patella tracked midline without tilt or subluxation.   The knee was then copiously irrigated and the final implant was constructed at the back table.  I then prepared the tibia for a tibial cone we reamed up to 20 mm and then broached and used the 20 mm short tibial cone after copious irrigation it was trialed.  It fit well with the trial tibial component.  We removed the trial tibial component and then impacted the cone and then injected the knee with anesthetic cocktail solution, soaked it with tranexamic acid and Irrisept for 3 minutes.  We irrigated and then dried the bone surface which was ideal  for cement interdigitation.  The final implant was then impacted the excess cement was removed and the knee was held in full extension with the final trial spacer in place.  After cement had completely hardened the knee was again trialed was excellent balance and stability and the patella tracked midline.  The final polyethylene insert was then impacted and locked in nicely.   The knee was then copiously irrigated.  The tissues were injected in standard fashion with the anesthetic cocktail solution.  The tourniquet was then released. There was excellent hemostasis. We placed a one-eighth inch Hemovac drain. We closed the knee in multiple layers in standard fashion. Sterile dressings were applied. At the end of the case, the sponge and needle counts were reported as being correct. There were no known complications. The patient was then transported to the recovery room.

## 2025-01-22 NOTE — ANESTHESIA POSTPROCEDURE EVALUATION
Patient: Anthony Sol    Procedure Summary       Date: 01/22/25 Room / Location: [facility] OR [address] [place] [address] [facility]    Anesthesia Start: 1147 Anesthesia Stop: 1526    Procedure: TOTAL KNEE ARTHROPLASTY REVISION (Right: Knee) Diagnosis:       Status post right knee replacement      (Status post right knee replacement [Z96.651])    Surgeons: Pratik Durham MD Provider: Marizol Adkins MD    Anesthesia Type: general ASA Status: 3            Anesthesia Type: general    Vitals  Vitals Value Taken Time   /79 01/22/25 1602   Temp 36.9 °C (98.5 °F) 01/22/25 1523   Pulse 90 01/22/25 1612   Resp 14 01/22/25 1600   SpO2 97 % 01/22/25 1612   Vitals shown include unfiled device data.        Post Anesthesia Care and Evaluation    Patient location during evaluation: PACU  Patient participation: complete - patient participated  Level of consciousness: awake  Pain management: adequate    Airway patency: patent  Anesthetic complications: No anesthetic complications  PONV Status: none  Cardiovascular status: acceptable  Respiratory status: acceptable  Hydration status: acceptable

## 2025-01-22 NOTE — PLAN OF CARE
Goal Outcome Evaluation:   Patient is POD 0. Aox4. VSS. RA. Ambulating assist x1 with a walker. Has voided since surgery. СЕРГЕЙ is CDI, flashing green. Hemovac in place. Pain has been adequately controlled with PRN [place]. Plan is to discharge home with family, likely tomorrow. All needs currently met, will continue to monitor.

## 2025-01-23 ENCOUNTER — DOCUMENTATION (OUTPATIENT)
Dept: HOME HEALTH SERVICES | Facility: HOME HEALTHCARE | Age: 55
End: 2025-01-23
Payer: COMMERCIAL

## 2025-01-23 ENCOUNTER — APPOINTMENT (OUTPATIENT)
Dept: CARDIOLOGY | Facility: HOSPITAL | Age: 55
End: 2025-01-23
Payer: COMMERCIAL

## 2025-01-23 ENCOUNTER — HOME HEALTH ADMISSION (OUTPATIENT)
Dept: HOME HEALTH SERVICES | Facility: HOME HEALTHCARE | Age: 55
End: 2025-01-23
Payer: COMMERCIAL

## 2025-01-23 VITALS
SYSTOLIC BLOOD PRESSURE: 118 MMHG | TEMPERATURE: 98.8 F | BODY MASS INDEX: 33.58 KG/M2 | HEIGHT: 71 IN | DIASTOLIC BLOOD PRESSURE: 69 MMHG | WEIGHT: 239.86 LBS | OXYGEN SATURATION: 94 % | RESPIRATION RATE: 18 BRPM | HEART RATE: 88 BPM

## 2025-01-23 LAB
BH CV LOW VAS RIGHT GASTRONEMIUS VESSEL: 1
BH CV LOWER VASCULAR LEFT COMMON FEMORAL AUGMENT: NORMAL
BH CV LOWER VASCULAR LEFT COMMON FEMORAL COMPETENT: NORMAL
BH CV LOWER VASCULAR LEFT COMMON FEMORAL COMPRESS: NORMAL
BH CV LOWER VASCULAR LEFT COMMON FEMORAL PHASIC: NORMAL
BH CV LOWER VASCULAR LEFT COMMON FEMORAL SPONT: NORMAL
BH CV LOWER VASCULAR RIGHT COMMON FEMORAL AUGMENT: NORMAL
BH CV LOWER VASCULAR RIGHT COMMON FEMORAL COMPETENT: NORMAL
BH CV LOWER VASCULAR RIGHT COMMON FEMORAL COMPRESS: NORMAL
BH CV LOWER VASCULAR RIGHT COMMON FEMORAL PHASIC: NORMAL
BH CV LOWER VASCULAR RIGHT COMMON FEMORAL SPONT: NORMAL
BH CV LOWER VASCULAR RIGHT DISTAL FEMORAL COMPRESS: NORMAL
BH CV LOWER VASCULAR RIGHT GASTRONEMIUS COMPRESS: NORMAL
BH CV LOWER VASCULAR RIGHT GASTRONEMIUS THROMBUS: NORMAL
BH CV LOWER VASCULAR RIGHT GREATER SAPH AK COMPRESS: NORMAL
BH CV LOWER VASCULAR RIGHT GREATER SAPH BK COMPRESS: NORMAL
BH CV LOWER VASCULAR RIGHT LESSER SAPH COMPRESS: NORMAL
BH CV LOWER VASCULAR RIGHT MID FEMORAL AUGMENT: NORMAL
BH CV LOWER VASCULAR RIGHT MID FEMORAL COMPETENT: NORMAL
BH CV LOWER VASCULAR RIGHT MID FEMORAL COMPRESS: NORMAL
BH CV LOWER VASCULAR RIGHT MID FEMORAL PHASIC: NORMAL
BH CV LOWER VASCULAR RIGHT MID FEMORAL SPONT: NORMAL
BH CV LOWER VASCULAR RIGHT PERONEAL COMPRESS: NORMAL
BH CV LOWER VASCULAR RIGHT POPLITEAL AUGMENT: NORMAL
BH CV LOWER VASCULAR RIGHT POPLITEAL COMPETENT: NORMAL
BH CV LOWER VASCULAR RIGHT POPLITEAL COMPRESS: NORMAL
BH CV LOWER VASCULAR RIGHT POPLITEAL PHASIC: NORMAL
BH CV LOWER VASCULAR RIGHT POPLITEAL SPONT: NORMAL
BH CV LOWER VASCULAR RIGHT POSTERIOR TIBIAL COMPRESS: NORMAL
BH CV LOWER VASCULAR RIGHT PROFUNDA FEMORAL COMPRESS: NORMAL
BH CV LOWER VASCULAR RIGHT PROXIMAL FEMORAL COMPRESS: NORMAL
BH CV LOWER VASCULAR RIGHT SAPHENOFEMORAL JUNCTION COMPRESS: NORMAL
BH CV VAS PRELIMINARY FINDINGS SCRIPTING: 1
HCT VFR BLD AUTO: 42.3 % (ref 37.5–51)
HGB BLD-MCNC: 14.1 G/DL (ref 13–17.7)

## 2025-01-23 PROCEDURE — 99024 POSTOP FOLLOW-UP VISIT: CPT | Performed by: NURSE PRACTITIONER

## 2025-01-23 PROCEDURE — 85014 HEMATOCRIT: CPT | Performed by: NURSE PRACTITIONER

## 2025-01-23 PROCEDURE — 25010000002 CEFAZOLIN PER 500 MG: Performed by: NURSE PRACTITIONER

## 2025-01-23 PROCEDURE — 97530 THERAPEUTIC ACTIVITIES: CPT

## 2025-01-23 PROCEDURE — 93971 EXTREMITY STUDY: CPT

## 2025-01-23 PROCEDURE — 85018 HEMOGLOBIN: CPT | Performed by: NURSE PRACTITIONER

## 2025-01-23 PROCEDURE — 93971 EXTREMITY STUDY: CPT | Performed by: SURGERY

## 2025-01-23 PROCEDURE — 97162 PT EVAL MOD COMPLEX 30 MIN: CPT

## 2025-01-23 RX ADMIN — HYDROCODONE BITARTRATE AND ACETAMINOPHEN 2 TABLET: 7.5; 325 TABLET ORAL at 09:02

## 2025-01-23 RX ADMIN — HYDROCODONE BITARTRATE AND ACETAMINOPHEN 2 TABLET: 7.5; 325 TABLET ORAL at 00:12

## 2025-01-23 RX ADMIN — APIXABAN 2.5 MG: 2.5 TABLET, FILM COATED ORAL at 09:02

## 2025-01-23 RX ADMIN — SODIUM CHLORIDE 2000 MG: 900 INJECTION INTRAVENOUS at 03:49

## 2025-01-23 RX ADMIN — METFORMIN HYDROCHLORIDE 1000 MG: 1000 TABLET, FILM COATED ORAL at 09:02

## 2025-01-23 RX ADMIN — EMPAGLIFLOZIN 10 MG: 10 TABLET, FILM COATED ORAL at 09:02

## 2025-01-23 RX ADMIN — HYDROCODONE BITARTRATE AND ACETAMINOPHEN 2 TABLET: 7.5; 325 TABLET ORAL at 13:07

## 2025-01-23 RX ADMIN — HYDROCODONE BITARTRATE AND ACETAMINOPHEN 2 TABLET: 7.5; 325 TABLET ORAL at 04:28

## 2025-01-23 NOTE — THERAPY EVALUATION
Patient Name: Anthony Sol  : 1970    MRN: [id number]                              Today's Date: 2025       Admit Date: 2025    Visit Dx:     ICD-10-CM ICD-9-CM   1. S/P revision of total knee, right  Z96.651 V43.65   2. Status post right knee replacement  Z96.651 V43.65     Patient Active Problem List   Diagnosis    Uncontrolled type 2 diabetes mellitus without complication, without long-term current use of insulin    Hyperlipidemia    Essential hypertension    DJD (degenerative joint disease) of knee    Type 2 diabetes mellitus with hyperglycemia    Hyponatremia    Acute kidney injury    Painful total knee replacement    Gastroesophageal reflux disease with esophagitis    Hypovitaminosis D    Exogenous obesity    Screening for colon cancer    Coronary artery disease involving native coronary artery of native heart without angina pectoris    Status post right knee replacement    OA (osteoarthritis) of knee     Past Medical History:   Diagnosis Date    Adhesive capsulitis of right knee     Arthritis     OSTEOARTHRITIS    Cardiomegaly     Chronic pain of right knee     Coronary artery disease     Deep vein thrombosis     Diabetes mellitus     Diverticulosis     Fatty liver     Fracture, finger rt thumb     GERD (gastroesophageal reflux disease)     H/O blood clots     rt calf  following rt total knee surgery 2016    Heart murmur     History of hepatitis     AS A CHILD food related    Hyperlipidemia     Hypertension     Hypotestosteronism     Hypovitaminosis D     Knee swelling     Low back pain     Lumbosacral disc disease L5S1     Osteoarthritis     Tear of meniscus of knee  Rt     Past Surgical History:   Procedure Laterality Date    ADENOIDECTOMY      BACK SURGERY      LAMINECTOMY L5-S1    COLONOSCOPY N/A 2022    Procedure: COLONOSCOPY;  Surgeon: Marycruz Feliciano MD;  [place] [facility] [place]  Service: Gastroenterology;  Laterality: N/A;  Diverticulosis    JOINT  MANIPULATION Right 02/16/2017    Procedure: MANIPULATION OF RT KNEE;  Surgeon: Anthony Andino MD;  Location: [facility] OR;  Service:     JOINT REPLACEMENT  2018    KNEE ARTHROSCOPY Right     MULTIPLE    OK ARTHRP KNE CONDYLE&PLATU MEDIAL&LAT COMPARTMENTS Right 12/15/2016    Procedure: RT TOTAL KNEE ARTHROPLASTY;  Surgeon: Anthony Andino MD;  Location: [facility] OR;  Service: Orthopedics    OK REVJ TOTAL KNEE ARTHRP W/WO ALGRFT 1 COMPONENT Right 03/06/2017    Procedure: RIGHT TOTAL KNEE ARTHROPLASTY REVISION WITH LEFT KNEE STERIOID INJECTION;  Surgeon: Anthony Andino MD;  Location: [facility] OR;  Service: Orthopedics    TONSILLECTOMY        General Information       Row Name 01/23/25 0901          Physical Therapy Time and Intention    Document Type evaluation  -CS     Mode of Treatment individual therapy;physical therapy  -CS       Row Name 01/23/25 0901          General Information    Patient Profile Reviewed yes  -CS     Existing Precautions/Restrictions other (see comments)  R TKA WBAT  -CS     Barriers to Rehab none identified  -CS       Row Name 01/23/25 0901          Living Environment    People in Home spouse  -CS       Row Name 01/23/25 0901          Home Main Entrance    Number of Stairs, Main Entrance three  -CS     Stair Railings, Main Entrance railings safe and in good condition  -CS       Row Name 01/23/25 0901          Stairs Within Home, Primary    Stairs Comment, Within Home, Primary Pt states he does not need to use stairs inside home  -CS       Row Name 01/23/25 0901          Cognition    Orientation Status (Cognition) oriented x 3  -CS       Row Name 01/23/25 0901          Safety Issues/Impairments Affecting Functional Mobility    Impairments Affecting Function (Mobility) balance;strength;pain  -CS     Comment, Safety Issues/Impairments (Mobility) R TKA pain and decreased strength, WFL for mobility  -CS               User Key  (r) = Recorded By, (t) = Taken By, (c) = Cosigned By       Initials Name Provider Type    CS Shine Ospina, PT Physical Therapist                   Mobility       Row Name 01/23/25 0904          Bed Mobility    Comment, (Bed Mobility) Pt [facility] upon entry  -       Row Name 01/23/25 0904          Sit-Stand Transfer    Sit-Stand [place] (Transfers) standby assist  -CS     Assistive Device (Sit-Stand Transfers) walker, front-wheeled  -       Row Name 01/23/25 0904          Gait/Stairs (Locomotion)    [place] Level (Gait) contact guard  -CS     Assistive Device (Gait) walker, front-wheeled  -CS     Patient was able to Ambulate yes  -CS     Distance in Feet (Gait) 275  -CS     Deviations/Abnormal Patterns (Gait) gait speed decreased;stride length decreased  -CS     Right Sided Gait Deviations decreased knee extension  -CS     [place] Level (Stairs) verbal cues;contact guard  -CS     Handrail Location (Stairs) both sides  -CS     Number of Steps (Stairs) 4 steps  -CS     Ascending Technique (Stairs) step-to-step  -CS     Descending Technique (Stairs) step-to-step  -CS     Comment, (Gait/Stairs) Pt noted w/ posterior leaning to increase hip flexion during swing phase of gait. Required verbal cueing for proper navigation of stairs, but able to demonstrate w/ one cue  -CS               User Key  (r) = Recorded By, (t) = Taken By, (c) = Cosigned By      Initials Name Provider Type    CS Shine Ospina, PT Physical Therapist                   Obj/Interventions       Row Name 01/23/25 0907          Range of Motion Comprehensive    Comment, General Range of Motion Decreased R knee ext and flex, as anticipated following TKA revision  -       Row Name 01/23/25 0907          Strength Comprehensive (MMT)    Comment, General Manual Muscle Testing (MMT) Assessment BLEs >3/5 tested through functional mobility and AROM  -       Row Name 01/23/25 0907          Motor Skills    Therapeutic Exercise knee  TKA ther ex.  -       Row Name 01/23/25 0907          Balance    Balance  Assessment standing static balance;standing dynamic balance  -CS     Static Standing Balance contact guard  -CS     Dynamic Standing Balance contact guard  -CS     Position/Device Used, Standing Balance supported;walker, front-wheeled  -CS     Balance Interventions standing;sit to stand;supported;static;dynamic;dynamic reaching;single limb stance;step overs  -CS       Row Name 01/23/25 0907          Sensory Assessment (Somatosensory)    Sensory Assessment (Somatosensory) sensation intact  -CS               User Key  (r) = Recorded By, (t) = Taken By, (c) = Cosigned By      Initials Name Provider Type    CS Shine Ospina, PT Physical Therapist                   Goals/Plan       Row Name 01/23/25 0911          Gait Training Goal 1 (PT)    Activity/Assistive Device (Gait Training Goal 1, PT) gait (walking locomotion)  -CS     [place] Level (Gait Training Goal 1, PT) independent  -CS     Distance (Gait Training Goal 1, PT) 300  -CS     Time Frame (Gait Training Goal 1, PT) short term goal (STG);10 days  -CS       Row Name 01/23/25 0911          Stairs Goal 1 (PT)    Activity/Assistive Device (Stairs Goal 1, PT) stairs, all skills  -CS     [place] Level/Cues Needed (Stairs Goal 1, PT) independent  -CS     Time Frame (Stairs Goal 1, PT) short term goal (STG);10 days  -CS               User Key  (r) = Recorded By, (t) = Taken By, (c) = Cosigned By      Initials Name Provider Type    Shine Hatch PT Physical Therapist                   Clinical Impression       Row Name 01/23/25 0909          Pain    Pain Side/Orientation right  -CS     Pain Management Interventions activity modification encouraged;diversional activity provided  -CS     Response to Pain Interventions activity level improved;activity participation with decreased pain  -CS     Pre/Posttreatment Pain Comment No pain number provided, states generalized but tolerable pain across R knee  -CS     Additional Documentation Pain Scale: FACES  Pre/Post-Treatment (Group)  -CS       Row Name 01/23/25 0909          Pain Scale: FACES Pre/Post-Treatment    Pain: FACES Scale, Pretreatment 2-->hurts little bit  -CS     Posttreatment Pain Rating 2-->hurts little bit  -CS       Row Name 01/23/25 0909          Plan of Care Review    Plan of Care Reviewed With patient;spouse  -CS     Progress improving  -CS     Outcome Evaluation Pt is a 54 y.o. male admitted to [facility] for revision of R TKA on 1/22/2025. Prior to hospital admission, pt living at home w/ his spouse and Ind w/ all ADLs, no AD at baseline. Pt seated in recliner upon entry to room. Pt SBA for STS, CGA for 275ft w/ Rwx ambulation and CGA w/ one verbal cue for navigating 3 steps w/ step-to-step pattern. Pt educated on performance of therapeutic exercise for R TKA, able to perform w/ minimal cueing. Pt with good safety and awareness of deficits, will benefit from D/C to home w/ assist and HH.  -CS       Row Name 01/23/25 0909          Therapy Assessment/Plan (PT)    Rehab Potential (PT) good  -CS     Criteria for Skilled Interventions Met (PT) yes  -CS     Therapy Frequency (PT) daily  -CS       Row Name 01/23/25 0909          Positioning and Restraints    Pre-Treatment Position sitting in chair/recliner  -CS     Post Treatment Position chair  -CS     In Chair notified nsg;reclined;with family/caregiver;exit alarm on;encouraged to call for assist;legs elevated  -CS               User Key  (r) = Recorded By, (t) = Taken By, (c) = Cosigned By      Initials Name Provider Type    CS Shine Ospina, PT Physical Therapist                   Outcome Measures       Row Name 01/23/25 0911          How much help from another person do you currently need...    Turning from your back to your side while in flat bed without using bedrails? 4  -CS     Moving from lying on back to sitting on the side of a flat bed without bedrails? 4  -CS     Moving to and from a bed to a chair (including a wheelchair)? 3  -CS     Standing up  from a chair using your arms (e.g., wheelchair, bedside chair)? 3  -CS     Climbing 3-5 steps with a railing? 3  -CS     To walk in hospital room? 3  -CS     AM-PAC 6 Clicks Score (PT) 20  -CS               User Key  (r) = Recorded By, (t) = Taken By, (c) = Cosigned By      Initials Name Provider Type    CS Shine Ospina, PT Physical Therapist                                 Physical Therapy Education       Title: PT OT SLP Therapies (Done)       Topic: Physical Therapy (Done)       Point: Mobility training (Done)       Learning Progress Summary            Patient Acceptance, E,TB, VU,DU by  at 1/23/2025 0912   Family Acceptance, E,TB, VU,DU by  at 1/23/2025 0912                      Point: Home exercise program (Done)       Learning Progress Summary            Patient Acceptance, E,TB, VU,DU by  at 1/23/2025 0912   Family Acceptance, E,TB, VU,DU by  at 1/23/2025 0912                      Point: Body mechanics (Done)       Learning Progress Summary            Patient Acceptance, E,TB, VU,DU by  at 1/23/2025 0912   Family Acceptance, E,TB, VU,DU by  at 1/23/2025 0912                      Point: Precautions (Done)       Learning Progress Summary            Patient Acceptance, E,TB, VU,DU by  at 1/23/2025 0912   Family Acceptance, E,TB, VU,DU by  at 1/23/2025 0912                                      User Key       Initials Effective Dates Name Provider Type Discipline     09/06/24 -  Shine Ospina, RODRIGO Physical Therapist PT                  PT Recommendation and Plan     Progress: improving  Outcome Evaluation: Pt is a 54 y.o. male admitted to [facility] for revision of R TKA on 1/22/2025. Prior to hospital admission, pt living at home w/ his spouse and Ind w/ all ADLs, no AD at baseline. Pt seated in recliner upon entry to room. Pt SBA for STS, CGA for 275ft w/ Rwx ambulation and CGA w/ one verbal cue for navigating 3 steps w/ step-to-step pattern. Pt educated on performance of therapeutic exercise for R TKA,  able to perform w/ minimal cueing. Pt with good safety and awareness of deficits, will benefit from D/C to home w/ assist and HH.     Time Calculation:         PT Charges       Row Name 01/23/25 0912             Time Calculation    Start Time 0839  -CS      Stop Time 0856  -CS      Time Calculation (min) 17 min  -CS      PT Received On 01/23/25  -CS      PT - Next Appointment 01/24/25  -CS         Timed Charges    73301 - PT Therapeutic Exercise Minutes 5  -CS      76190 - PT Therapeutic Activity Minutes 12  -CS         Total Minutes    Timed Charges Total Minutes 17  -CS       Total Minutes 17  -CS                User Key  (r) = Recorded By, (t) = Taken By, (c) = Cosigned By      Initials Name Provider Type    CS Shine Ospina, PT Physical Therapist                  Therapy Charges for Today       Code Description Service Date Service Provider Modifiers Qty    35179443134 HC PT THERAPEUTIC ACT EA 15 MIN 1/23/2025 Shine Ospina, PT GP 1    22537163874 HC PT EVAL MOD COMPLEXITY 3 1/23/2025 Shine Ospina, PT GP 1            PT G-Codes  AM-PAC 6 Clicks Score (PT): 20  PT Discharge Summary  Anticipated Discharge Disposition (PT): home with assist, home with home health    Shine Ospina PT  1/23/2025

## 2025-01-23 NOTE — DISCHARGE PLACEMENT REQUEST
"Anthony Sol (54 y.o. Male)       Date of Birth   1970    Social Security Number       Address   [address] [address]    Home Phone   [phone]    MRN   [id number]       Moravian   Jew    Marital Status                               Admission Date   1/22/25    Admission Type   Elective    Admitting Provider   Pratik Durham MD    Attending Provider   Pratik Durham MD    Department, Room/Bed   [address], 87/1       Discharge Date       [place] [facility] [place]ination                                 Attending Provider: Pratik Durham MD    Allergies: No Known Allergies    Isolation: None   Infection: None   Code Status: Prior    Ht: 180.3 cm (70.98\")   Wt: 109 kg (239 lb 13.8 oz)    Admission Cmt: None   Principal Problem: Status post right knee replacement [Z96.651]                   Active Insurance as of 1/22/2025       Primary Coverage       Payor Plan Insurance Group Employer/Plan Group    [employer] BLUE CROSS [employer] BLUE [employer]OSS BLUE [employer]IELD PPO [id number]       Payor Plan Address Payor Plan Phone Number Payor Plan Fax Number Effective Dates    [address] [phone]  1/1/2023 - None Entered    [facility] [id number]         Subscriber Name Subscriber Birth Date Member ID       NURY SOL VERNELL 7/2/1970 [id number]                     Emergency Contacts        (Rel.) Home Phone Work Phone Mobile Phone    SwetaMariettai (Spouse) [phone] -- --              {Documentation Categories:[id number]}  "

## 2025-01-23 NOTE — DISCHARGE SUMMARY
Patient Name: Anthony Sol  Patient YOB: 1970    Date of Admission:  1/22/2025  Date of Discharge:  1/23/2025  Discharge Diagnosis: TOTAL KNEE ARTHROPLASTY REVISION  Presenting Problem/History of Present Illness: Status post right knee replacement [Z96.651]  OA (osteoarthritis) of knee [M17.9]  Admitting Physician: Dr Pratik Durham  Consults:   Consults       No orders found for last 30 day(s).            DETAILS OF HOSPITAL STAY:  Patient is a 54 y.o. male was admitted to the floor following the above procedure and underwent an uncomplicated hospital stay.  Patient did well with physical therapy and was ambulating well without problems.  On the day of discharge the wound was clean, dry and intact and calf was soft and nontender and Homans sign was negative.  Patient was tolerating  without problems.  Patient will be discharged home.    Condition on Discharge:  Stable    Vital Signs  Temp:  [97.8 °F (36.6 °C)-99.3 °F (37.4 °C)] 98.1 °F (36.7 °C)  Heart Rate:  [] 90  Resp:  [14-18] 16  BP: ()/() 99/65    LABS:      Admission on 01/22/2025   Component Date Value Ref Range Status    Glucose 01/22/2025 139 (H)  70 - 130 mg/dL Final    Wound Culture 01/22/2025 No growth   Preliminary    Wound Culture 01/22/2025 No growth   Preliminary    Wound Culture 01/22/2025 No growth   Preliminary    Glucose 01/22/2025 152 (H)  70 - 130 mg/dL Final    Hemoglobin 01/23/2025 14.1  13.0 - 17.7 g/dL Final    Hematocrit 01/23/2025 42.3  37.5 - 51.0 % Final       No results found.    Discharge Medications     Discharge Medications        New Medications        Instructions Start Date   apixaban 2.5 MG tablet tablet  Commonly known as: ELIQUIS   2.5 mg, Oral, 2 Times Daily      cefdinir 300 MG capsule  Commonly known as: OMNICEF   300 mg, Oral, 2 Times Daily      HYDROcodone-acetaminophen 7.5-325 MG per tablet  Commonly known as: NORCO   1 tablet, Oral, Every 4 Hours PRN      ondansetron 4 MG  tablet  Commonly known as: Zofran   4 mg, Oral, Every 8 Hours PRN      pantoprazole 40 MG EC tablet  Commonly known as: PROTONIX   40 mg, Oral, Daily      polyethylene glycol 17 GM/SCOOP powder  Commonly known as: MIRALAX   17 g, Oral, 2 Times Daily, Mix 1 capful in beverage as directed and drink.             Changes to Medications        Instructions Start Date   metFORMIN 1000 MG tablet  Commonly known as: GLUCOPHAGE  What changed:   how much to take  how to take this  when to take this   1 tab po BID with meal for diabetes             Continue These Medications        Instructions Start Date   CoQ-10 30 MG capsule   Daily      Edarbyclor 40-12.5 MG tablet  Generic drug: Azilsartan-Chlorthalidone   TAKE 1 TABLET BY MOUTH ONCE DAILY      empagliflozin 10 MG tablet tablet  Commonly known as: Jardiance   1 tablet, Oral, Daily, For diabetes      IPAMORELIN ACETATE IJ   5 days weekly/ LEAVE ON LIST, NOT CURRENTLY TAKING      Mounjaro 10 MG/0.5ML solution auto-injector  Generic drug: Tirzepatide   10 mg, Weekly      rosuvastatin 10 MG tablet  Commonly known as: CRESTOR   10 mg, 3 Times Weekly      sirolimus 1 MG tablet  Commonly known as: RAPAMUNE   Take 1 tablet by mouth 1 (One) Time Per Week. NOT TAKING/LEAVE ON LIST      Testosterone Cypionate 200 MG/ML injection  Commonly known as: DEPOTESTOTERONE CYPIONATE   Inject 1 mL into the appropriate muscle as directed by prescriber Every 14 (Fourteen) Days.      Vascepa 1 g capsule capsule  Generic drug: icosapent ethyl   2 tablets, 2 Times Daily      verapamil  MG CR tablet  Commonly known as: CALAN-SR   240 mg, Nightly             Stop These Medications      VITAMIN B1-B12 IM     VITAMIN D PO              Discharge Instructions: Patient is to continue with physical therapy exercises daily and continue working with the physical therapist as ordered. Patient may weight bear as tolerated. Apply ice regularly. Patient may ice for long periods of time as long as ice is  not directly on the skin. Patient instructed on frequent calf pumping exercises.  Patient also instructed on incentive spirometer during hospitalization and encouraged to continue to use at home regularly.    Dressing: The dressing is designed to be left in place until you return to the office in 2 weeks.  The suction unit should stop functioning at 7 days and the green light will switch to yellow.  At that point the suction unit and tubing can be disconnected at the port closest to the dressing.  The suction unit and tubing may be discarded.  You may shower immediately upon return home, you will need to turn the pump off by depressing the orange button once and then you may disconnect the pump and tubing at the connection port.  After showering, shake off the excess water and reattach the tubing.  Restart the pump by depressing the orange button one time and you will notice the green light flashing again.  If the dressing becomes dislodged or saturated it should be changed. Please refer to the СЕРГЕЙ information sheet if you have any questions about the dressing.  If you have a home health nurse or therapist they can be contacted to assist with dressing change or repair. You may also call the СЕРГЕЙ dressing hotline for questions related to the dressing ([phone]). If there are still other problems or questions related to the dressing despite these measures then you can contact Amarilys at our office [phone].  If for some reason the СЕРГЕЙ dressing is removed, after 7 days the wound can be gently cleaned with antibacterial soap then allowed to dry and covered with a dry sterile dressing. The wound should be covered at all times except while showering.  Patient may change dressings daily and prn using sterile 4x4 and paper tape, and should call if any unusual drainage, redness or swelling.*  Follow up appointment in 2 weeks - patient to call the office at [phone] to schedule.  [facility]    Discharge Diagnosis:    Status post right knee replacement    OA (osteoarthritis) of knee      Follow-up Appointments  Future Appointments   Date Time Provider Department Center   2/4/2025  8:00 AM Pratik Durham MD [facility] [address] [facility]   3/12/2025  3:00 PM oJe Plaza DO [facility]   3/18/2025  3:10 PM Jin Benton APRN [facility] [address] [facility]          LAMAR Holguin  01/23/25  07:51 EST

## 2025-01-23 NOTE — PLAN OF CARE
Goal Outcome Evaluation:      POD 1 RTK revision, СЕРГЕЙ dressing, hemovac drain - removed, a/ox4, assist x1 to BR, patient ambulated in hallway with gait belt and walker, has walker at home, Takes meds whole, possible dc home today       Problem: Adult Inpatient Plan of Care  Goal: Absence of Hospital-Acquired Illness or Injury  Intervention: Identify and Manage Fall Risk  Recent Flowsheet Documentation  Taken 1/23/2025 0428 by Lucille Vasquez RN  Safety Promotion/Fall Prevention:   assistive device/personal items within reach   safety round/check completed  Taken 1/23/2025 0224 by Lucille Vasquez RN  Safety Promotion/Fall Prevention:   assistive device/personal items within reach   safety round/check completed  Taken 1/23/2025 0012 by Lucille Vasquez RN  Safety Promotion/Fall Prevention:   assistive device/personal items within reach   safety round/check completed  Taken 1/22/2025 2206 by Lucille Vasquez RN  Safety Promotion/Fall Prevention:   assistive device/personal items within reach   safety round/check completed  Taken 1/22/2025 2021 by Lucille Vasquez RN  Safety Promotion/Fall Prevention:   activity supervised   assistive device/personal items within reach   clutter free environment maintained   fall prevention program maintained   gait belt   lighting adjusted   mobility aid in reach   nonskid shoes/slippers when out of bed   safety round/check completed  Intervention: Prevent Skin Injury  Recent Flowsheet Documentation  Taken 1/23/2025 0428 by Lucille Vasquez RN  Body Position: dangle, side of bed  Taken 1/23/2025 0224 by Lucille Vasquez RN  Body Position: supine  Taken 1/23/2025 0012 by Lucille Vasquez RN  Body Position:   supine   head facing, right  Taken 1/22/2025 2206 by Lucille Vasquez RN  Body Position: dangle, side of bed  Taken 1/22/2025 2021 by Lucille Vasquez RN  Body Position: supine  Skin Protection:   incontinence pads utilized   protective footwear used  Intervention: Prevent and Manage VTE (Venous Thromboembolism)  Risk  Recent Flowsheet Documentation  Taken 1/22/2025 2021 by Lucille Vasquez RN  VTE Prevention/Management:   bilateral   SCDs (sequential compression devices) on  Goal: Optimal Comfort and Wellbeing  Intervention: Monitor Pain and Promote Comfort  Recent Flowsheet Documentation  Taken 1/23/2025 0428 by Lucille Vasquez RN  Pain Management Interventions:   pain medication given   cold applied  Taken 1/23/2025 0012 by Lucille Vasquez RN  Pain Management Interventions: pain medication given  Taken 1/22/2025 2021 by Lucille Vasquez RN  Pain Management Interventions:   cold applied   pain medication given  Intervention: Provide Person-Centered Care  Recent Flowsheet Documentation  Taken 1/22/2025 2021 by Lucille Vasquez RN  Trust Relationship/Rapport:   care explained   thoughts/feelings acknowledged     Problem: Knee Arthroplasty  Goal: Optimal Coping  Intervention: Support Psychosocial Response to Surgery and Mobility Changes  Recent Flowsheet Documentation  Taken 1/22/2025 2021 by Lucille Vasquez RN  Supportive Measures:   active listening utilized   self-care encouraged  Goal: Absence of Bleeding  Intervention: Monitor and Manage Bleeding  Recent Flowsheet Documentation  Taken 1/22/2025 2021 by Lucille Vasquez RN  Bleeding Management: dressing monitored  Goal: Effective Bowel Elimination  Intervention: Enhance Bowel Motility and Elimination  Recent Flowsheet Documentation  Taken 1/22/2025 2021 by Lucille Vasquez RN  Bowel Motility Enhancement: ambulation promoted  Goal: Optimal Functional Ability  Intervention: Promote Optimal Functional Status  Recent Flowsheet Documentation  Taken 1/22/2025 2206 by Lucille Vasquez RN  Activity Management: ambulated outside room  Assistive Device Utilized:   gait belt   walker  Taken 1/22/2025 2021 by Lucille Vasquez RN  Activity Management:   dorsiflexion/plantar flexion performed   activity encouraged  Self-Care Promotion:   independence encouraged   BADL personal objects within reach  Goal: Intact  Neurovascular Status  Intervention: Prevent or Manage Neurovascular Compromise  Recent Flowsheet Documentation  Taken 1/22/2025 2021 by Lucille Vasquez RN  Compartment Syndrome Management: active flexion/extension encouraged  Goal: Anesthesia/Sedation Recovery  Intervention: Optimize Anesthesia Recovery  Recent Flowsheet Documentation  Taken 1/23/2025 0428 by Lucille Vasquez RN  Safety Promotion/Fall Prevention:   assistive device/personal items within reach   safety round/check completed  Taken 1/23/2025 0224 by Lucille Vasquez RN  Safety Promotion/Fall Prevention:   assistive device/personal items within reach   safety round/check completed  Taken 1/23/2025 0012 by Lucille Vasquez RN  Safety Promotion/Fall Prevention:   assistive device/personal items within reach   safety round/check completed  Taken 1/22/2025 2206 by Lucille Vasquez RN  Safety Promotion/Fall Prevention:   assistive device/personal items within reach   safety round/check completed  Administration (IS):   self-administered   proper technique demonstrated  Taken 1/22/2025 2021 by Lucille Vasquez RN  Patient Tolerance (IS): good  Safety Promotion/Fall Prevention:   activity supervised   assistive device/personal items within reach   clutter free environment maintained   fall prevention program maintained   gait belt   lighting adjusted   mobility aid in reach   nonskid shoes/slippers when out of bed   safety round/check completed  Administration (IS):   proper technique demonstrated   self-administered  Level Incentive Spirometer (mL): 2000  Incentive Spirometer Predicted Level (mL): 2000  Number of Repetitions (IS): 3  Goal: Optimal Pain Control and Function  Intervention: Prevent or Manage Pain  Recent Flowsheet Documentation  Taken 1/23/2025 0428 by Lucille Vasquez RN  Pain Management Interventions:   pain medication given   cold applied  Taken 1/23/2025 0012 by Lucille Vasquez RN  Pain Management Interventions: pain medication given  Taken 1/22/2025 2021 by Pedro  GRETCHEN Adkins  Pain Management Interventions:   cold applied   pain medication given  Diversional Activities:   television   smartphone  Goal: Effective Oxygenation and Ventilation  Intervention: Optimize Oxygenation and Ventilation  Recent Flowsheet Documentation  Taken 1/23/2025 0428 by Lucille Vasquez RN  Head of Bed (HOB) Positioning: HOB elevated  Taken 1/23/2025 0224 by Lucille Vasquez RN  Head of Bed (HOB) Positioning: HOB elevated  Taken 1/23/2025 0012 by Lucille Vasquez RN  Head of Bed (HOB) Positioning: HOB lowered  Taken 1/22/2025 2206 by Lucille Vasquez RN  Activity Management: ambulated outside room  Head of Bed (HOB) Positioning: HOB elevated  Taken 1/22/2025 2021 by Lucille Vasquez RN  Activity Management:   dorsiflexion/plantar flexion performed   activity encouraged  Head of Bed (HOB) Positioning: HOB elevated     Problem: Pain Acute  Goal: Optimal Pain Control and Function  Intervention: Optimize Psychosocial Wellbeing  Recent Flowsheet Documentation  Taken 1/22/2025 2021 by Lucille Vasquez RN  Supportive Measures:   active listening utilized   self-care encouraged  Diversional Activities:   television   smartphone  Intervention: Develop Pain Management Plan  Recent Flowsheet Documentation  Taken 1/23/2025 0428 by Lucille Vasquez RN  Pain Management Interventions:   pain medication given   cold applied  Taken 1/23/2025 0012 by Lucille Vasquez RN  Pain Management Interventions: pain medication given  Taken 1/22/2025 2021 by Lucille Vasquez RN  Pain Management Interventions:   cold applied   pain medication given  Intervention: Prevent or Manage Pain  Recent Flowsheet Documentation  Taken 1/23/2025 0428 by Lucille Vasquez RN  Medication Review/Management: medications reviewed  Taken 1/23/2025 0224 by Lucille Vasquez RN  Medication Review/Management: medications reviewed  Taken 1/23/2025 0012 by Lucille Vasquez RN  Medication Review/Management: medications reviewed  Taken 1/22/2025 2206 by Lucille Vasquez RN  Medication Review/Management:  medications reviewed  Taken 1/22/2025 2021 by Lucille Vasquez, RN  Sensory Stimulation Regulation:   television on   quiet environment promoted  Sleep/Rest Enhancement:   awakenings minimized   regular sleep/rest pattern promoted  Medication Review/Management: medications reviewed

## 2025-01-23 NOTE — PROGRESS NOTES
Patient is discharging today. Spoke to patient who is agreeable to home health and has no current home health. Face sheet information is correct and orders for home health PT in [facility]. Thank you!

## 2025-01-23 NOTE — PAYOR COMM NOTE
"Anthony Sol (54 y.o. Male)    PLEASE SEE ATTACHED FOR DC NOTICE   REF#[id number]   THANK YOU  DAPHNE LEGER RN/ DEPT   [facility]  PH: [phone]  FAX:  [phone]     Date of Birth   1970    Social Security Number       Address   [address] [address]    Home Phone   [phone]    MRN   [id number]       [employer]   [employer]    Marital Status                               Admission Date   1/22/25    Admission Type   Elective    Admitting Provider   Pratik Durham MD    Attending Provider       Department, Room/Bed   [facility] [address] [place], P887/1       Discharge Date   1/23/2025    Discharge Disposition   Home-Health Care [facility]    Discharge Destination                                 Attending Provider: (none)   Allergies: No Known Allergies    Isolation: None   Infection: None   Code Status: Prior    Ht: 180.3 cm (70.98\")   Wt: 109 kg (239 lb 13.8 oz)    Admission Cmt: None   Principal Problem: Status post right knee replacement [Z96.651]                   Active Insurance as of 1/22/2025       Primary Coverage       Payor Plan Insurance Group Employer/Plan Group    [employer]HEM BLUE CROSS ANTHEM BLUE CROSS BLUE SHIELD PPO [id number]       Payor Plan Address Payor Plan Phone Number Payor Plan Fax Number Effective Dates    [address] [phone]  1/1/2023 - None Entered    [facility] [id number]         Subscriber Name Subscriber Birth Date Member ID       ALONZO SOLI D 7[birth year]2/1970 [id number]                     Emergency Contacts        (Rel.) Home Phone Work Phone Mobile Phone    Nury Sol (Spouse) [phone] -- --              [place]: [facility] [id number]  Tax ID [id number]     Discharge Summary        Jin Benton APRN at 01/23/25 0751          Patient Name: Anthony Sol  Patient YOB: 1970    Date of Admission:  1/22/2025  Date of Discharge:  1/23/2025  Discharge Diagnosis: TOTAL KNEE ARTHROPLASTY " REVISION  Presenting Problem/History of Present Illness: Status post right knee replacement [Z96.651]  OA (osteoarthritis) of knee [M17.9]  Admitting Physician: Dr Pratik Durham  Consults:   Consults       No orders found for last 30 day(s).            DETAILS OF HOSPITAL STAY:  Patient is a 54 y.o. male was admitted to the floor following the above procedure and underwent an uncomplicated hospital stay.  Patient did well with physical therapy and was ambulating well without problems.  On the day of discharge the wound was clean, dry and intact and calf was soft and nontender and Homans sign was negative.  Patient was tolerating  without problems.  Patient will be discharged home.    Condition on Discharge:  Stable    Vital Signs  Temp:  [97.8 °F (36.6 °C)-99.3 °F (37.4 °C)] 98.1 °F (36.7 °C)  Heart Rate:  [] 90  Resp:  [14-18] 16  BP: ()/() 99/65    LABS:      Admission on 01/22/2025   Component Date Value Ref Range Status    Glucose 01/22/2025 139 (H)  70 - 130 mg/dL Final    Wound Culture 01/22/2025 No growth   Preliminary    Wound Culture 01/22/2025 No growth   Preliminary    Wound Culture 01/22/2025 No growth   Preliminary    Glucose 01/22/2025 152 (H)  70 - 130 mg/dL Final    Hemoglobin 01/23/2025 14.1  13.0 - 17.7 g/dL Final    Hematocrit 01/23/2025 42.3  37.5 - 51.0 % Final       No results found.    Discharge Medications     Discharge Medications        New Medications        Instructions Start Date   apixaban 2.5 MG tablet tablet  Commonly known as: ELIQUIS   2.5 mg, Oral, 2 Times Daily      cefdinir 300 MG capsule  Commonly known as: OMNICEF   300 mg, Oral, 2 Times Daily      HYDROcodone-acetaminophen 7.5-325 MG per tablet  Commonly known as: NORCO   1 tablet, Oral, Every 4 Hours PRN      ondansetron 4 MG tablet  Commonly known as: Zofran   4 mg, Oral, Every 8 Hours PRN      pantoprazole 40 MG EC tablet  Commonly known as: PROTONIX   40 mg, Oral, Daily      polyethylene glycol 17 GM/SCOOP  powder  Commonly known as: MIRALAX   17 g, Oral, 2 Times Daily, Mix 1 capful in beverage as directed and drink.             Changes to Medications        Instructions Start Date   metFORMIN 1000 MG tablet  Commonly known as: GLUCOPHAGE  What changed:   how much to take  how to take this  when to take this   1 tab po BID with meal for diabetes             Continue These Medications        Instructions Start Date   CoQ-10 30 MG capsule   Daily      Edarbyclor 40-12.5 MG tablet  Generic drug: Azilsartan-Chlorthalidone   TAKE 1 TABLET BY MOUTH ONCE DAILY      empagliflozin 10 MG tablet tablet  Commonly known as: Jardiance   1 tablet, Oral, Daily, For diabetes      IPAMORELIN ACETATE IJ   5 days weekly/ LEAVE ON LIST, NOT CURRENTLY TAKING      Mounjaro 10 MG/0.5ML solution auto-injector  Generic drug: Tirzepatide   10 mg, Weekly      rosuvastatin 10 MG tablet  Commonly known as: CRESTOR   10 mg, 3 Times Weekly      sirolimus 1 MG tablet  Commonly known as: RAPAMUNE   Take 1 tablet by mouth 1 (One) Time Per Week. NOT TAKING/LEAVE ON LIST      Testosterone Cypionate 200 MG/ML injection  Commonly known as: DEPOTESTOTERONE CYPIONATE   Inject 1 mL into the appropriate muscle as directed by prescriber Every 14 (Fourteen) Days.      Vascepa 1 g capsule capsule  Generic drug: icosapent ethyl   2 tablets, 2 Times Daily      verapamil  MG CR tablet  Commonly known as: CALAN-SR   240 mg, Nightly             Stop These Medications      VITAMIN B1-B12 IM     VITAMIN D PO              Discharge Instructions: Patient is to continue with physical therapy exercises daily and continue working with the physical therapist as ordered. Patient may weight bear as tolerated. Apply ice regularly. Patient may ice for long periods of time as long as ice is not directly on the skin. Patient instructed on frequent calf pumping exercises.  Patient also instructed on incentive spirometer during hospitalization and encouraged to continue to use  at home regularly.    Dressing: The dressing is designed to be left in place until you return to the office in 2 weeks.  The suction unit should stop functioning at 7 days and the green light will switch to yellow.  At that point the suction unit and tubing can be disconnected at the port closest to the dressing.  The suction unit and tubing may be discarded.  You may shower immediately upon return home, you will need to turn the pump off by depressing the orange button once and then you may disconnect the pump and tubing at the connection port.  After showering, shake off the excess water and reattach the tubing.  Restart the pump by depressing the orange button one time and you will notice the green light flashing again.  If the dressing becomes dislodged or saturated it should be changed. Please refer to the СЕРГЕЙ information sheet if you have any questions about the dressing.  If you have a home health nurse or therapist they can be contacted to assist with dressing change or repair. You may also call the СЕРГЕЙ dressing hotline for questions related to the dressing ([phone]). If there are still other problems or questions related to the dressing despite these measures then you can contact Amarilys at our office [phone].  If for some reason the СЕРГЕЙ dressing is removed, after 7 days the wound can be gently cleaned with antibacterial soap then allowed to dry and covered with a dry sterile dressing. The wound should be covered at all times except while showering.  Patient may change dressings daily and prn using sterile 4x4 and paper tape, and should call if any unusual drainage, redness or swelling.*  Follow up appointment in 2 weeks - patient to call the office at [phone] to schedule.  Patient will be discharged on Eliquis as directed    Discharge Diagnosis:    Status post right knee replacement    OA (osteoarthritis) of knee      Follow-up Appointments  Future Appointments   Date Time Provider Department  [place]   2/4/2025  8:00 AM Pratik Durham MD [facility] [address]   3/12/2025  3:00 PM Joe Plaza DO [facility]   3/18/2025  3:10 PM Jin Benton APRN [facility] [address] [facility]          LAMAR Holguin  01/23/25  07:51 EST                  Electronically signed by Jin Benton APRN at 01/23/25 0754

## 2025-01-23 NOTE — CASE MANAGEMENT/SOCIAL WORK
Case Management Discharge Note      Final Note: dc home with [facility]         Selected Continued Care - Discharged on 1/23/2025 Admission date: 1/22/2025 - Discharge disposition: Home-Health Care Svc      Destination    No services have been selected for the patient.                Durable Medical Equipment    No services have been selected for the patient.                Dialysis/Infusion    No services have been selected for the patient.                Home Medical Care Coordination complete.      Service Provider Services Address Phone Fax Patient Preferred    [facility] [address] [place] [id number] [phone] --              Therapy    No services have been selected for the patient.                Community Resources    No services have been selected for the patient.                Community & DME    No services have been selected for the patient.                    Transportation Services  Private: Car    Final Discharge Disposition Code: 06 - home with home health care

## 2025-01-23 NOTE — DISCHARGE PLACEMENT REQUEST
"Anthony Sol (54 y.o. Male)       Date of Birth   1970    Social Security Number       Address   [address] [address]    Home Phone   [phone]    MRN   [id number]       Synagogue   Congregational    Marital Status                               Admission Date   1/22/25    Admission Type   Elective    Admitting Provider   Pratik Durham MD    Attending Provider   Pratik Durham MD    Department, Room/Bed   [address], 87/[address]       Discharge Date       Discharge Disposition   Home-Health Care [facility]    Discharge Destination                                 Attending Provider: Pratik Durham MD    Allergies: No Known Allergies    Isolation: None   Infection: None   Code Status: Prior    Ht: 180.3 cm (70.98\")   Wt: 109 kg (239 lb 13.8 oz)    Admission Cmt: None   Principal Problem: Status post right knee replacement [Z96.651]                   Active Insurance as of 1/22/2025       Primary Coverage       Payor Plan Insurance Group Employer/Plan Group    [employer]HE[employer] BLUE CROSS ANTHEM BLUE CROSS BLUE SHIELD PPO [id number]       Payor Plan Address Payor Plan Phone Number Payor Plan Fax Number Effective Dates    [address] [phone]  1/1/2023 - None Entered    [facility] [id number]         Subscriber Name Subscriber Birth Date Member ID       CARLALONZO LEVINEI D 7[birth year]2/1970 [id number]                     Emergency Contacts        (Rel.) Home Phone Work Phone Mobile Phone    Nury Sol (Spouse) [phone] -- --                "

## 2025-01-23 NOTE — CASE MANAGEMENT/SOCIAL WORK
Continued Stay Note  [facility]     Patient Name: Anthony Sol  MRN: [id number]  Today's Date: 1/23/2025    Admit Date: 1/22/2025        Discharge Plan       Row Name 01/23/25 1326       Plan    Plan Comments DC orders in EPIC. Spoke with patient at bedside. Introduced self and explained role. Facesheet verified. Patient has used [facility] in the past and would like to use them again. Referral placed in [facility] and they have accepted. Wife will transport home.                   Discharge Codes    No documentation.                 Expected Discharge Date and Time       Expected Discharge Date Expected Discharge Time    Jan 23, 2025               Sheila Acharya RN

## 2025-01-23 NOTE — PLAN OF CARE
Goal Outcome Evaluation:  Plan of Care Reviewed With: patient, spouse        Progress: improving  Outcome Evaluation: Pt is a 54 y.o. male admitted to [facility] for revision of R TKA on 1/22/2025. Prior to hospital admission, pt living at home w/ his spouse and Ind w/ all ADLs, no AD at baseline. Pt seated in recliner upon entry to room. Pt SBA for STS, CGA for 275ft w/ Rwx ambulation and CGA w/ one verbal cue for navigating 3 steps w/ step-to-step pattern. Pt educated on performance of therapeutic exercise for R TKA, able to perform w/ minimal cueing. Pt with good safety and awareness of deficits, will benefit from D/C to home w/ assist and HH.    Anticipated Discharge Disposition (PT): home with assist, home with home health

## 2025-01-24 ENCOUNTER — HOME CARE VISIT (OUTPATIENT)
Dept: HOME HEALTH SERVICES | Facility: HOME HEALTHCARE | Age: 55
End: 2025-01-24
Payer: COMMERCIAL

## 2025-01-24 PROCEDURE — G0151 HHCP-SERV OF PT,EA 15 MIN: HCPCS

## 2025-01-24 NOTE — Clinical Note
Skilled PT is indicated for aftercare rt TKA revision. Patient will benefit from PT for instruction and progression of HEP, transfer training, pain/edema management education, medication management, and gait training to maximize independence with ADLs and improve home safety. PT 1week1, 3week1,1week1

## 2025-01-25 VITALS
DIASTOLIC BLOOD PRESSURE: 60 MMHG | OXYGEN SATURATION: 93 % | TEMPERATURE: 98.1 F | SYSTOLIC BLOOD PRESSURE: 98 MMHG | HEART RATE: 90 BPM

## 2025-01-25 LAB
BACTERIA SPEC AEROBE CULT: NORMAL
GRAM STN SPEC: NORMAL

## 2025-01-25 NOTE — HOME HEALTH
SOC Note: 54 y.o. male admitted to [facility] for revision of Right TKA on 1/22/2025 by Dr. Durham. Patient reports that this is his second revision and he has also required a manipulation in the past. Previous surgeries were done by Dr. Andino. Patient notes pain increasd to 8/10 today. He has history of DVT after a preivous surgery but doppler was neg on 1/24/25.     Home Health ordered for: disciplines PT    Reason for Hosp/Primary Dx/Co-morbidities: Aftercare right TKA revision    PMH:  • Uncontrolled type 2 diabetes mellitus without complication, without long-term current use of insulin   • Hyperlipidemia   • Essential hypertension   • DJD (degenerative joint disease) of knee   • Type 2 diabetes mellitus with hyperglycemia   • Hyponatremia   • Acute kidney injury   • Painful total knee replacement   • Gastroesophageal reflux disease with esophagitis   • Hypovitaminosis D   • Exogenous obesity   • Coronary artery disease involving native coronary artery of native heart without angina pectoris   • Status post right knee replacement   • OA (osteoarthritis) of knee    Signficant surgery history:  • BACK SURGERY LAMINECTOMY L5-S1   • JOINT MANIPULATION Right 02/16/2017 Procedure: MANIPULATION OF RT KNEE; Surgeon: Anthony Andino MD;    • JOINT REPLACEMENT 2018   • KNEE ARTHROSCOPY Right MULTIPLE   • VT ARTHRP KNE CONDYLE&PLATU MEDIAL&LAT COMPARTMENTS Right 12/15/2016   Procedure: RT TOTAL KNEE ARTHROPLASTY; Surgeon: Anthony Andino MD;   • VT REVJ TOTAL KNEE ARTHRP W/WO ALGRFT 1 COMPONENT Right 03/06/2017   Procedure: RIGHT TOTAL KNEE ARTHROPLASTY REVISION WITH LEFT KNEE STERIOID INJECTION; Surgeon: Anthony Andino MD    Focus of Care: Skilled PT is indicated for aftercare rt TKA revision. Patient will benefit from PT for instruction and progression of HEP, transfer training, pain/edema management education, medication management, and gait training to maximize independence with ADLs and improve home safety. PT 1week1,  "3week1,1week1    Patient's goal(s):\" I want to be able to go hiking and camping again.\"    Current Functional status/mobility/DME: Patient is currently at a SBA level of assist with all functional mobilty using a FWW for safety    HB status/Living Arrangements: Patient livest lives at home w/ his spouse and Ind w/ all ADLs, no AD at baseline    Skin Integrity/wound status: СЕРГЕЙ intact. See pic in media file    Fall Risk/Safety concerns: High fall risk due to impaired functional ROM and strength rt knee, and impaired dynamic stability. Decreased compliance with AD.    Educated on Emergency Plan, steps to take prior to going to the ER and when to Call Home Health First:  Yes     Medication issues/Concerns: None identified    Additional Problems/Concerns: NA    SDOH Barriers (i.e. caregiver concerns, social isolation, transportation, food insecurity, environment, income etc.)/Need for MSW: NA"

## 2025-01-27 ENCOUNTER — HOME CARE VISIT (OUTPATIENT)
Dept: HOME HEALTH SERVICES | Facility: HOME HEALTHCARE | Age: 55
End: 2025-01-27
Payer: COMMERCIAL

## 2025-01-27 VITALS
HEART RATE: 63 BPM | DIASTOLIC BLOOD PRESSURE: 70 MMHG | SYSTOLIC BLOOD PRESSURE: 118 MMHG | OXYGEN SATURATION: 96 % | TEMPERATURE: 97 F | RESPIRATION RATE: 16 BRPM

## 2025-01-27 LAB
BACTERIA SPEC ANAEROBE CULT: NORMAL

## 2025-01-27 PROCEDURE — G0151 HHCP-SERV OF PT,EA 15 MIN: HCPCS

## 2025-01-29 ENCOUNTER — TELEPHONE (OUTPATIENT)
Dept: ORTHOPEDIC SURGERY | Facility: CLINIC | Age: 55
End: 2025-01-29
Payer: COMMERCIAL

## 2025-01-29 ENCOUNTER — HOME CARE VISIT (OUTPATIENT)
Dept: HOME HEALTH SERVICES | Facility: HOME HEALTHCARE | Age: 55
End: 2025-01-29
Payer: COMMERCIAL

## 2025-01-29 VITALS
SYSTOLIC BLOOD PRESSURE: 140 MMHG | TEMPERATURE: 97.4 F | OXYGEN SATURATION: 95 % | HEART RATE: 81 BPM | DIASTOLIC BLOOD PRESSURE: 80 MMHG | RESPIRATION RATE: 16 BRPM

## 2025-01-29 DIAGNOSIS — Z96.651 STATUS POST RIGHT KNEE REPLACEMENT: Primary | ICD-10-CM

## 2025-01-29 DIAGNOSIS — M79.89 PAIN AND SWELLING OF LOWER LEG, RIGHT: ICD-10-CM

## 2025-01-29 DIAGNOSIS — M79.661 PAIN AND SWELLING OF LOWER LEG, RIGHT: ICD-10-CM

## 2025-01-29 PROCEDURE — G0151 HHCP-SERV OF PT,EA 15 MIN: HCPCS

## 2025-01-29 NOTE — TELEPHONE ENCOUNTER
Caller: Anthony Sol    Relationship: Self    Best call back number: [phone]    What was the call regarding: PT STATES WHEN HE STANDS UP, HIS R KNEE FEELS LIKE BLOOD RUSHED INTO THE JOINT AND PT EXPERIENCES SEVERE PAIN. PT STATES THE PAIN IS ABOUT A 9-10. PT STATES THAT THE PAIN LASTS ABOUT 1 MINUTE. PLEASE CONTACT PT TO DISCUSS.

## 2025-01-30 NOTE — TELEPHONE ENCOUNTER
I called and spoke with patient and discussed techniques for edema control.  He shows no signs or symptoms of infection currently.  He will work on Ace wrap's for swelling control as well as elevation.  If that is not helping he will call back.  Otherwise plan to see him on Tuesday.

## 2025-01-31 ENCOUNTER — HOSPITAL ENCOUNTER (EMERGENCY)
Facility: HOSPITAL | Age: 55
Discharge: HOME | End: 2025-01-31
Attending: EMERGENCY MEDICINE
Payer: COMMERCIAL

## 2025-01-31 ENCOUNTER — APPOINTMENT (OUTPATIENT)
Dept: GENERAL RADIOLOGY | Facility: HOSPITAL | Age: 55
End: 2025-01-31
Payer: COMMERCIAL

## 2025-01-31 ENCOUNTER — APPOINTMENT (OUTPATIENT)
Dept: CARDIOLOGY | Facility: HOSPITAL | Age: 55
End: 2025-01-31
Payer: COMMERCIAL

## 2025-01-31 ENCOUNTER — HOME CARE VISIT (OUTPATIENT)
Dept: HOME HEALTH SERVICES | Facility: HOME HEALTHCARE | Age: 55
End: 2025-01-31
Payer: COMMERCIAL

## 2025-01-31 VITALS
SYSTOLIC BLOOD PRESSURE: 106 MMHG | HEART RATE: 83 BPM | HEIGHT: 72 IN | WEIGHT: 230 LBS | DIASTOLIC BLOOD PRESSURE: 73 MMHG | BODY MASS INDEX: 31.15 KG/M2 | RESPIRATION RATE: 18 BRPM | OXYGEN SATURATION: 93 % | TEMPERATURE: 98.3 F

## 2025-01-31 VITALS
RESPIRATION RATE: 18 BRPM | TEMPERATURE: 96.4 F | DIASTOLIC BLOOD PRESSURE: 68 MMHG | OXYGEN SATURATION: 98 % | HEART RATE: 84 BPM | SYSTOLIC BLOOD PRESSURE: 122 MMHG

## 2025-01-31 DIAGNOSIS — Z96.651 S/P REVISION OF TOTAL KNEE, RIGHT: ICD-10-CM

## 2025-01-31 DIAGNOSIS — Z96.651 STATUS POST RIGHT KNEE REPLACEMENT: ICD-10-CM

## 2025-01-31 DIAGNOSIS — G89.18 ACUTE POST-OPERATIVE PAIN: Primary | ICD-10-CM

## 2025-01-31 DIAGNOSIS — M79.89 PAIN AND SWELLING OF LOWER LEG, RIGHT: Primary | ICD-10-CM

## 2025-01-31 DIAGNOSIS — M79.89 RIGHT LEG SWELLING: ICD-10-CM

## 2025-01-31 DIAGNOSIS — M79.661 PAIN AND SWELLING OF LOWER LEG, RIGHT: Primary | ICD-10-CM

## 2025-01-31 LAB
ALBUMIN SERPL-MCNC: 4 G/DL (ref 3.5–5.2)
ALBUMIN/GLOB SERPL: 1.3 G/DL
ALP SERPL-CCNC: 66 U/L (ref 39–117)
ALT SERPL W P-5'-P-CCNC: 20 U/L (ref 1–41)
ANION GAP SERPL CALCULATED.3IONS-SCNC: 9.8 MMOL/L (ref 5–15)
APTT PPP: 32.7 SECONDS (ref 22.7–35.4)
AST SERPL-CCNC: 20 U/L (ref 1–40)
BASOPHILS # BLD AUTO: 0.04 10*3/MM3 (ref 0–0.2)
BASOPHILS NFR BLD AUTO: 0.5 % (ref 0–1.5)
BH CV LOW VAS RIGHT GASTRONEMIUS VESSEL: 1
BH CV LOWER VASCULAR LEFT COMMON FEMORAL AUGMENT: NORMAL
BH CV LOWER VASCULAR LEFT COMMON FEMORAL COMPETENT: NORMAL
BH CV LOWER VASCULAR LEFT COMMON FEMORAL COMPRESS: NORMAL
BH CV LOWER VASCULAR LEFT COMMON FEMORAL PHASIC: NORMAL
BH CV LOWER VASCULAR LEFT COMMON FEMORAL SPONT: NORMAL
BH CV LOWER VASCULAR RIGHT COMMON FEMORAL AUGMENT: NORMAL
BH CV LOWER VASCULAR RIGHT COMMON FEMORAL COMPETENT: NORMAL
BH CV LOWER VASCULAR RIGHT COMMON FEMORAL COMPRESS: NORMAL
BH CV LOWER VASCULAR RIGHT COMMON FEMORAL PHASIC: NORMAL
BH CV LOWER VASCULAR RIGHT COMMON FEMORAL SPONT: NORMAL
BH CV LOWER VASCULAR RIGHT DISTAL FEMORAL COMPRESS: NORMAL
BH CV LOWER VASCULAR RIGHT GASTRONEMIUS COMPRESS: NORMAL
BH CV LOWER VASCULAR RIGHT GASTRONEMIUS THROMBUS: NORMAL
BH CV LOWER VASCULAR RIGHT GREATER SAPH AK COMPRESS: NORMAL
BH CV LOWER VASCULAR RIGHT GREATER SAPH BK COMPRESS: NORMAL
BH CV LOWER VASCULAR RIGHT LESSER SAPH COMPRESS: NORMAL
BH CV LOWER VASCULAR RIGHT MID FEMORAL AUGMENT: NORMAL
BH CV LOWER VASCULAR RIGHT MID FEMORAL COMPETENT: NORMAL
BH CV LOWER VASCULAR RIGHT MID FEMORAL COMPRESS: NORMAL
BH CV LOWER VASCULAR RIGHT MID FEMORAL PHASIC: NORMAL
BH CV LOWER VASCULAR RIGHT MID FEMORAL SPONT: NORMAL
BH CV LOWER VASCULAR RIGHT PERONEAL COMPRESS: NORMAL
BH CV LOWER VASCULAR RIGHT POPLITEAL AUGMENT: NORMAL
BH CV LOWER VASCULAR RIGHT POPLITEAL COMPETENT: NORMAL
BH CV LOWER VASCULAR RIGHT POPLITEAL COMPRESS: NORMAL
BH CV LOWER VASCULAR RIGHT POPLITEAL PHASIC: NORMAL
BH CV LOWER VASCULAR RIGHT POPLITEAL SPONT: NORMAL
BH CV LOWER VASCULAR RIGHT POSTERIOR TIBIAL COMPRESS: NORMAL
BH CV LOWER VASCULAR RIGHT PROFUNDA FEMORAL COMPRESS: NORMAL
BH CV LOWER VASCULAR RIGHT PROXIMAL FEMORAL COMPRESS: NORMAL
BH CV LOWER VASCULAR RIGHT SAPHENOFEMORAL JUNCTION COMPRESS: NORMAL
BH CV VAS PRELIMINARY FINDINGS SCRIPTING: 1
BILIRUB SERPL-MCNC: 0.7 MG/DL (ref 0–1.2)
BUN SERPL-MCNC: 15 MG/DL (ref 6–20)
BUN/CREAT SERPL: 13.8 (ref 7–25)
CALCIUM SPEC-SCNC: 9.4 MG/DL (ref 8.6–10.5)
CHLORIDE SERPL-SCNC: 100 MMOL/L (ref 98–107)
CO2 SERPL-SCNC: 26.2 MMOL/L (ref 22–29)
CREAT SERPL-MCNC: 1.09 MG/DL (ref 0.76–1.27)
CRP SERPL-MCNC: 5.78 MG/DL (ref 0–0.5)
D-LACTATE SERPL-SCNC: 1.5 MMOL/L (ref 0.5–2)
DEPRECATED RDW RBC AUTO: 40.7 FL (ref 37–54)
EGFRCR SERPLBLD CKD-EPI 2021: 79.7 ML/MIN/1.73
EOSINOPHIL # BLD AUTO: 0.12 10*3/MM3 (ref 0–0.4)
EOSINOPHIL NFR BLD AUTO: 1.6 % (ref 0.3–6.2)
ERYTHROCYTE [DISTWIDTH] IN BLOOD BY AUTOMATED COUNT: 13.3 % (ref 12.3–15.4)
ERYTHROCYTE [SEDIMENTATION RATE] IN BLOOD: 26 MM/HR (ref 0–20)
GLOBULIN UR ELPH-MCNC: 3.1 GM/DL
GLUCOSE SERPL-MCNC: 195 MG/DL (ref 65–99)
HCT VFR BLD AUTO: 35 % (ref 37.5–51)
HGB BLD-MCNC: 11.8 G/DL (ref 13–17.7)
IMM GRANULOCYTES # BLD AUTO: 0.07 10*3/MM3 (ref 0–0.05)
IMM GRANULOCYTES NFR BLD AUTO: 0.9 % (ref 0–0.5)
INR PPP: 0.98 (ref 0.9–1.1)
LYMPHOCYTES # BLD AUTO: 1.46 10*3/MM3 (ref 0.7–3.1)
LYMPHOCYTES NFR BLD AUTO: 19.8 % (ref 19.6–45.3)
MCH RBC QN AUTO: 29.1 PG (ref 26.6–33)
MCHC RBC AUTO-ENTMCNC: 33.7 G/DL (ref 31.5–35.7)
MCV RBC AUTO: 86.2 FL (ref 79–97)
MONOCYTES # BLD AUTO: 0.63 10*3/MM3 (ref 0.1–0.9)
MONOCYTES NFR BLD AUTO: 8.5 % (ref 5–12)
NEUTROPHILS NFR BLD AUTO: 5.06 10*3/MM3 (ref 1.7–7)
NEUTROPHILS NFR BLD AUTO: 68.7 % (ref 42.7–76)
NRBC BLD AUTO-RTO: 0 /100 WBC (ref 0–0.2)
PLATELET # BLD AUTO: 343 10*3/MM3 (ref 140–450)
PMV BLD AUTO: 9.1 FL (ref 6–12)
POTASSIUM SERPL-SCNC: 3.9 MMOL/L (ref 3.5–5.2)
PROT SERPL-MCNC: 7.1 G/DL (ref 6–8.5)
PROTHROMBIN TIME: 13.2 SECONDS (ref 11.7–14.2)
RBC # BLD AUTO: 4.06 10*6/MM3 (ref 4.14–5.8)
SODIUM SERPL-SCNC: 136 MMOL/L (ref 136–145)
WBC NRBC COR # BLD AUTO: 7.38 10*3/MM3 (ref 3.4–10.8)

## 2025-01-31 PROCEDURE — 93971 EXTREMITY STUDY: CPT

## 2025-01-31 PROCEDURE — 96374 THER/PROPH/DIAG INJ IV PUSH: CPT

## 2025-01-31 PROCEDURE — 25010000002 KETOROLAC TROMETHAMINE PER 15 MG: Performed by: PHYSICIAN ASSISTANT

## 2025-01-31 PROCEDURE — 96376 TX/PRO/DX INJ SAME DRUG ADON: CPT

## 2025-01-31 PROCEDURE — 80053 COMPREHEN METABOLIC PANEL: CPT | Performed by: PHYSICIAN ASSISTANT

## 2025-01-31 PROCEDURE — 93971 EXTREMITY STUDY: CPT | Performed by: SURGERY

## 2025-01-31 PROCEDURE — 73560 X-RAY EXAM OF KNEE 1 OR 2: CPT

## 2025-01-31 PROCEDURE — 99284 EMERGENCY DEPT VISIT MOD MDM: CPT

## 2025-01-31 PROCEDURE — 86140 C-REACTIVE PROTEIN: CPT | Performed by: PHYSICIAN ASSISTANT

## 2025-01-31 PROCEDURE — 83605 ASSAY OF LACTIC ACID: CPT | Performed by: PHYSICIAN ASSISTANT

## 2025-01-31 PROCEDURE — 85025 COMPLETE CBC W/AUTO DIFF WBC: CPT | Performed by: PHYSICIAN ASSISTANT

## 2025-01-31 PROCEDURE — 96375 TX/PRO/DX INJ NEW DRUG ADDON: CPT

## 2025-01-31 PROCEDURE — 85652 RBC SED RATE AUTOMATED: CPT | Performed by: PHYSICIAN ASSISTANT

## 2025-01-31 PROCEDURE — 25010000002 MORPHINE PER 10 MG: Performed by: EMERGENCY MEDICINE

## 2025-01-31 PROCEDURE — 85730 THROMBOPLASTIN TIME PARTIAL: CPT | Performed by: EMERGENCY MEDICINE

## 2025-01-31 PROCEDURE — 85610 PROTHROMBIN TIME: CPT | Performed by: EMERGENCY MEDICINE

## 2025-01-31 PROCEDURE — G0151 HHCP-SERV OF PT,EA 15 MIN: HCPCS

## 2025-01-31 RX ORDER — MORPHINE SULFATE 2 MG/ML
4 INJECTION, SOLUTION INTRAMUSCULAR; INTRAVENOUS ONCE
Status: COMPLETED | OUTPATIENT
Start: 2025-01-31 | End: 2025-01-31

## 2025-01-31 RX ORDER — OXYCODONE AND ACETAMINOPHEN 7.5; 325 MG/1; MG/1
1 TABLET ORAL ONCE
Status: COMPLETED | OUTPATIENT
Start: 2025-01-31 | End: 2025-01-31

## 2025-01-31 RX ORDER — KETOROLAC TROMETHAMINE 30 MG/ML
30 INJECTION, SOLUTION INTRAMUSCULAR; INTRAVENOUS ONCE
Status: COMPLETED | OUTPATIENT
Start: 2025-01-31 | End: 2025-01-31

## 2025-01-31 RX ORDER — HYDROCODONE BITARTRATE AND ACETAMINOPHEN 7.5; 325 MG/1; MG/1
1 TABLET ORAL EVERY 4 HOURS PRN
Qty: 40 TABLET | Refills: 0 | Status: SHIPPED | OUTPATIENT
Start: 2025-01-31 | End: 2025-01-31 | Stop reason: ALTCHOICE

## 2025-01-31 RX ORDER — OXYCODONE AND ACETAMINOPHEN 7.5; 325 MG/1; MG/1
1 TABLET ORAL EVERY 6 HOURS PRN
Qty: 20 TABLET | Refills: 0 | Status: SHIPPED | OUTPATIENT
Start: 2025-01-31

## 2025-01-31 RX ADMIN — MORPHINE SULFATE 4 MG: 2 INJECTION, SOLUTION INTRAMUSCULAR; INTRAVENOUS at 23:10

## 2025-01-31 RX ADMIN — OXYCODONE AND ACETAMINOPHEN 1 TABLET: 7.5; 325 TABLET ORAL at 23:48

## 2025-01-31 RX ADMIN — KETOROLAC TROMETHAMINE 30 MG: 30 INJECTION, SOLUTION INTRAMUSCULAR at 21:36

## 2025-01-31 RX ADMIN — MORPHINE SULFATE 4 MG: 2 INJECTION, SOLUTION INTRAMUSCULAR; INTRAVENOUS at 18:37

## 2025-01-31 NOTE — TELEPHONE ENCOUNTER
Hub staff attempted to follow warm transfer process and was unsuccessful     Caller: Anthony Sol    Relationship to patient: Self    Best call back number: [phone]    Patient is needing: PATIENT STATES HE DID AS DR. CARTAGENA SUGGESTED, BUT HIS KNEE HAS GOTTEN WORSE TODAY. ASKING IF HE NEEDS TO BE SEEN ASAP.

## 2025-01-31 NOTE — TELEPHONE ENCOUNTER
I have attempted to get this patient scheduled for a doppler at all of the  locations and have contacted the out patient vascular office.  No one has an opening today for stat doppler.   Called [facility] and they do not have any stat openings today as well.      I have called the patient and explained this to him and the only option we have at this time is to send him to the ER and have them check him for DVT.   He understands and will head there now.     I did call the ER triage desk and let them know he is coming.  They will try to get him in expeditiously.      bsw

## 2025-01-31 NOTE — TELEPHONE ENCOUNTER
Received email responses that the outpatient lab closes at 4pm and they can not get the patient in today. Gave all info to Brissa and she stated she will get it taken care of

## 2025-01-31 NOTE — TELEPHONE ENCOUNTER
PATIENT CALLING BACK TO GET INFO ON ULTRASOUND HE IS SUPPOSED TO BE GETTING TODAY TO CHECK FOR BLOOD CLOT. PLEASE ADVISE PATIENT ASAP

## 2025-01-31 NOTE — ED TRIAGE NOTES
"Rt knee surgery on 1/22, increased pain and swelling to rt knee/calf, states \"when I stand up it feels like blood rushes down\", tender to touch  "

## 2025-01-31 NOTE — ED PROVIDER NOTES
EMERGENCY DEPARTMENT ENCOUNTER  Room Number:  06/06  PCP: Benji Linares MD  Independent Historians: Patient      HPI:  Chief Complaint: had concerns including Post-op Problem.     A complete HPI/ROS/PMH/PSH/SH/FH are unobtainable due to: None    Chronic or social conditions impacting patient care (Social Determinants of Health): None      Context: The patient is a 54 y.o. male with a medical history of type 2 diabetes, hypertension, CAD who presents to the ED c/o acute pain and swelling in the right knee and calf.  He had a right knee arthroplasty revision with Dr. Durham about a week and a half ago.  He states his pain and swelling were doing really well until 3 days ago when it suddenly became a lot more swollen and painful.  The pain is intermittent, mostly with range of motion and weightbearing but also happens at rest.  He is anticoagulated on Eliquis.  He has had some chills and sweats but no documented fever.  He did start physical therapy the day before his new symptoms began.  He was in touch with his orthopedist office yesterday and today and was supposed to be scheduled for an outpatient ultrasound today but did not hear back from them.  He denies any chest pain or shortness of breath, has not had any falls or trauma to the knee since surgery.      Review of prior external notes (non-ED) -and- Review of prior external test results outside of this encounter:  Labs performed 1/8/2025 and creatinine was 1.05, sodium 137, white blood cell count 6.91        PAST MEDICAL HISTORY  Active Ambulatory Problems     Diagnosis Date Noted    Uncontrolled type 2 diabetes mellitus without complication, without long-term current use of insulin 01/29/2016    Hyperlipidemia 01/29/2016    Essential hypertension 01/29/2016    DJD (degenerative joint disease) of knee 12/15/2016    Type 2 diabetes mellitus with hyperglycemia 12/16/2016    Hyponatremia 12/16/2016    Acute kidney injury 12/16/2016    Painful total knee  replacement 03/06/2017    Gastroesophageal reflux disease with esophagitis 02/06/2018    Hypovitaminosis D 07/12/2018    Exogenous obesity 01/07/2020    Screening for colon cancer 03/29/2022    Coronary artery disease involving native coronary artery of native heart without angina pectoris 01/16/2024    Status post right knee replacement 11/12/2024    OA (osteoarthritis) of knee 11/12/2024     Resolved Ambulatory Problems     Diagnosis Date Noted    Diverticulitis of colon 01/29/2016    Folliculitis 01/29/2016     Past Medical History:   Diagnosis Date    Adhesive capsulitis of right knee     Arthritis     Cardiomegaly     Chronic pain of right knee     Coronary artery disease     Deep vein thrombosis     Diabetes mellitus     Diverticulosis     Fatty liver     Fracture, finger rt thumb 1987    GERD (gastroesophageal reflux disease)     H/O blood clots     Heart murmur     History of hepatitis     Hypertension     Hypotestosteronism     Knee swelling     Low back pain     Lumbosacral disc disease L5S1 2001    Osteoarthritis     Tear of meniscus of knee 1980 Rt         PAST SURGICAL HISTORY  Past Surgical History:   Procedure Laterality Date    ADENOIDECTOMY      BACK SURGERY      LAMINECTOMY L5-S1    COLONOSCOPY N/A 05/23/2022    Procedure: COLONOSCOPY;  Surgeon: Marycruz Feliciano MD;  Location: [facility] MAIN OR;  Service: Gastroenterology;  Laterality: N/A;  Diverticulosis    JOINT MANIPULATION Right 02/16/2017    Procedure: MANIPULATION OF RT KNEE;  Surgeon: Anthony Andino MD;  Location: [facility] OR;  Service:     JOINT REPLACEMENT  2018    KNEE ARTHROSCOPY Right     MULTIPLE    IA ARTHRP KNE CONDYLE&PLATU MEDIAL&LAT COMPARTMENTS Right 12/15/2016    Procedure: RT TOTAL KNEE ARTHROPLASTY;  Surgeon: Anthony Andino MD;  Location: [facility] OR;  Service: Orthopedics    IA REVJ TOTAL KNEE ARTHRP W/WO ALGRFT 1 COMPONENT Right 03/06/2017    Procedure: RIGHT TOTAL KNEE ARTHROPLASTY REVISION WITH LEFT KNEE STERIOID  INJECTION;  Surgeon: Anthony Andino MD;  Location: [facility] MAIN OR;  Service: Orthopedics    TONSILLECTOMY      TOTAL KNEE ARTHROPLASTY REVISION Right 1/22/2025    Procedure: TOTAL KNEE ARTHROPLASTY REVISION;  Surgeon: Pratik Durham MD;  Location: [facility] OR [facility];  Service: Orthopedics;  Laterality: Right;         FAMILY HISTORY  Family History   Problem Relation Age of Onset    Diabetes Mother     Hypertension Mother     Colon polyps Mother     Hypertension Father     No Known Problems Brother     Cancer Paternal Uncle         colon    Malig Hyperthermia Neg Hx          SOCIAL HISTORY  Social History     Socioeconomic History    Marital status:    Tobacco Use    Smoking status: Never     Passive exposure: Never    Smokeless tobacco: Never   Vaping Use    Vaping status: Never Used   Substance and Sexual Activity    Alcohol use: Yes     Alcohol/week: 2.0 standard drinks of alcohol     Types: 2 Drinks containing 0.5 oz of alcohol per week     Comment: social    Drug use: No    Sexual activity: Yes     Partners: Female         ALLERGIES  Patient has no known allergies.      REVIEW OF SYSTEMS  Review of Systems  Included in HPI  All systems reviewed and negative except for those discussed in HPI.      PHYSICAL EXAM    I have reviewed the triage vital signs and nursing notes.    ED Triage Vitals   Temp Heart Rate Resp BP SpO2   01/31/25 1706 01/31/25 1706 01/31/25 1706 01/31/25 1731 01/31/25 1706   98.3 °F (36.8 °C) 114 16 134/99 97 %      Temp src Heart Rate Source Patient Position BP Location FiO2 (%)   01/31/25 1706 01/31/25 1706 -- -- --   Tympanic Monitor          Physical Exam  GENERAL: alert, no acute distress  SKIN: Warm, dry  HENT: Normocephalic, atraumatic  EYES: no scleral icterus  CV: regular rhythm, regular rate  RESPIRATORY: normal effort, lungs clear  ABDOMEN: nondistended  MUSCULOSKELETAL: no deformity.  There is diffuse bruising and swelling to the right lower extremity extending from the thigh  down to the distal calf.  There is swelling to the right knee.  The bandage on the anterior knee from recent surgery is intact and dry, no evidence of bleeding or drainage.  He is able to flex and extend.  DP PT pulses 2+, cap refill brisk, sensation motor function intact distally.  He does have tenderness in the proximal calf, negative Homans' sign.  NEURO: alert, moves all extremities, follows commands            LAB RESULTS  Recent Results (from the past 24 hours)   Comprehensive Metabolic Panel    Collection Time: 01/31/25  6:16 PM    Specimen: Blood   Result Value Ref Range    Glucose 195 (H) 65 - 99 mg/dL    BUN 15 6 - 20 mg/dL    Creatinine 1.09 0.76 - 1.27 mg/dL    Sodium 136 136 - 145 mmol/L    Potassium 3.9 3.5 - 5.2 mmol/L    Chloride 100 98 - 107 mmol/L    CO2 26.2 22.0 - 29.0 mmol/L    Calcium 9.4 8.6 - 10.5 mg/dL    Total Protein 7.1 6.0 - 8.5 g/dL    Albumin 4.0 3.5 - 5.2 g/dL    ALT (SGPT) 20 1 - 41 U/L    AST (SGOT) 20 1 - 40 U/L    Alkaline Phosphatase 66 39 - 117 U/L    Total Bilirubin 0.7 0.0 - 1.2 mg/dL    Globulin 3.1 gm/dL    A/G Ratio 1.3 g/dL    BUN/Creatinine Ratio 13.8 7.0 - 25.0    Anion Gap 9.8 5.0 - 15.0 mmol/L    eGFR 79.7 >60.0 mL/min/1.73   Lactic Acid, Plasma    Collection Time: 01/31/25  6:16 PM    Specimen: Blood   Result Value Ref Range    Lactate 1.5 0.5 - 2.0 mmol/L   CBC Auto Differential    Collection Time: 01/31/25  6:16 PM    Specimen: Blood   Result Value Ref Range    WBC 7.38 3.40 - 10.80 10*3/mm3    RBC 4.06 (L) 4.14 - 5.80 10*6/mm3    Hemoglobin 11.8 (L) 13.0 - 17.7 g/dL    Hematocrit 35.0 (L) 37.5 - 51.0 %    MCV 86.2 79.0 - 97.0 fL    MCH 29.1 26.6 - 33.0 pg    MCHC 33.7 31.5 - 35.7 g/dL    RDW 13.3 12.3 - 15.4 %    RDW-SD 40.7 37.0 - 54.0 fl    MPV 9.1 6.0 - 12.0 fL    Platelets 343 140 - 450 10*3/mm3    Neutrophil % 68.7 42.7 - 76.0 %    Lymphocyte % 19.8 19.6 - 45.3 %    Monocyte % 8.5 5.0 - 12.0 %    Eosinophil % 1.6 0.3 - 6.2 %    Basophil % 0.5 0.0 - 1.5 %     Immature Grans % 0.9 (H) 0.0 - 0.5 %    Neutrophils, Absolute 5.06 1.70 - 7.00 10*3/mm3    Lymphocytes, Absolute 1.46 0.70 - 3.10 10*3/mm3    Monocytes, Absolute 0.63 0.10 - 0.90 10*3/mm3    Eosinophils, Absolute 0.12 0.00 - 0.40 10*3/mm3    Basophils, Absolute 0.04 0.00 - 0.20 10*3/mm3    Immature Grans, Absolute 0.07 (H) 0.00 - 0.05 10*3/mm3    nRBC 0.0 0.0 - 0.2 /100 WBC   C-reactive Protein    Collection Time: 01/31/25  6:16 PM    Specimen: Blood   Result Value Ref Range    C-Reactive Protein 5.78 (H) 0.00 - 0.50 mg/dL   Sedimentation Rate    Collection Time: 01/31/25  6:16 PM    Specimen: Blood   Result Value Ref Range    Sed Rate 26 (H) 0 - 20 mm/hr   Protime-INR    Collection Time: 01/31/25  6:42 PM    Specimen: Blood   Result Value Ref Range    Protime 13.2 11.7 - 14.2 Seconds    INR 0.98 0.90 - 1.10   aPTT    Collection Time: 01/31/25  6:42 PM    Specimen: Blood   Result Value Ref Range    PTT 32.7 22.7 - 35.4 seconds   Duplex Venous Lower Extremity - Right    Collection Time: 01/31/25  7:35 PM   Result Value Ref Range    Right Gastronemius Vessel 1.0     Right Common Femoral Spont Y     Right Common Femoral Competent Y     Right Common Femoral Phasic Y     Right Common Femoral Compress C     Right Common Femoral Augment Y     Right Saphenofemoral Junction Compress C     Right Profunda Femoral Compress C     Right Proximal Femoral Compress C     Right Mid Femoral Spont Y     Right Mid Femoral Competent Y     Right Mid Femoral Phasic Y     Right Mid Femoral Compress C     Right Mid Femoral Augment Y     Right Distal Femoral Compress C     Right Popliteal Spont Y     Right Popliteal Competent Y     Right Popliteal Phasic Y     Right Popliteal Compress C     Right Popliteal Augment Y     Right Posterior Tibial Compress C     Right Peroneal Compress C     Right Gastronemius Compress P     Right Gastronemius Thrombus C     Right Greater Saph AK Compress C     Right Greater Saph BK Compress C     Right Lesser  Saph Compress C     Left Common Femoral Spont Y     Left Common Femoral Competent Y     Left Common Femoral Phasic Y     Left Common Femoral Compress C     Left Common Femoral Augment Y     BH CV VAS PRELIMINARY FINDINGS SCRIPTING 1.0          RADIOLOGY  XR Knee 1 or 2 View Right    Result Date: 1/31/2025  2 VIEWS RIGHT KNEE  HISTORY: Right knee pain  COMPARISON: January 22, 2025  FINDINGS: The patient is status post right knee arthroplasty. Hardware appears to be stable in position when compared to prior exam. No acute fracture or subluxation is seen. There is edema involving the right lower extremity. There is a suprapatellar effusion. Soft tissue swelling overlying the anterior aspect of the knee has worsened.      Right knee arthroplasty again noted. Hardware appears stable in position when compared to the postoperative exam.  This report was finalized on 1/31/2025 9:57 PM by Dr. Clarisse Epperson M.D on Workstation: [id number]      Duplex Venous Lower Extremity - Right    Result Date: 1/31/2025    Chronic right lower extremity deep vein thrombosis noted in the gastrocnemius.   All other right sided veins appeared normal.   No significant change when compared to the prior study.        MEDICATIONS GIVEN IN ER  Medications   oxyCODONE-acetaminophen (PERCOCET) 7.5-325 MG per tablet 1 tablet (has no administration in time range)   morphine injection 4 mg (4 mg Intravenous Given 1/31/25 1837)   ketorolac (TORADOL) injection 30 mg (30 mg Intravenous Given 1/31/25 2136)   morphine injection 4 mg (4 mg Intravenous Given 1/31/25 2310)         ORDERS PLACED DURING THIS VISIT:  Orders Placed This Encounter   Procedures    XR Knee 1 or 2 View Right    Comprehensive Metabolic Panel    Lactic Acid, Plasma    CBC Auto Differential    Protime-INR    aPTT    C-reactive Protein    Sedimentation Rate    Apply ace wrap    Ortho (on-call MD unless specified)    CBC & Differential         OUTPATIENT MEDICATION MANAGEMENT:  Current  Facility-Administered Medications Ordered in Epic   Medication Dose Route Frequency Provider Last Rate Last Admin    oxyCODONE-acetaminophen (PERCOCET) 7.5-325 MG per tablet 1 tablet  1 tablet Oral Once Hermes Fernandez MD         Current Outpatient Medications Ordered in Epic   Medication Sig Dispense Refill    apixaban (ELIQUIS) 2.5 MG tablet tablet Take 1 tablet by mouth 2 (Two) Times a Day. 60 tablet 1    Coenzyme Q10 (COQ-10) 30 MG capsule Daily. HOLDING FOR DOS  Indications: High Blood Pressure      Edarbyclor 40-12.5 MG tablet TAKE 1 TABLET BY MOUTH ONCE DAILY (Patient taking differently: Take 1 tablet by mouth Daily.) 90 tablet 1    Empagliflozin (JARDIANCE) 10 MG tablet Take 1 tablet by mouth Daily. For diabetes (Patient taking differently: Take 1 tablet by mouth Daily. For diabetes) 90 tablet 1    IPAMORELIN ACETATE IJ 5 days weekly/ LEAVE ON LIST, NOT CURRENTLY TAKING      metFORMIN (GLUCOPHAGE) 1000 MG tablet 1 tab po BID with meal for diabetes (Patient taking differently: Take 1 tablet by mouth 2 (Two) Times a Day With Meals. 1 tab po BID with meal for diabetes) 180 tablet 1    Mounjaro 10 MG/0.5ML solution auto-injector Inject 10 mg under the skin into the appropriate area as directed 1 (One) Time Per Week. HOLDING FOR DOS    PLANS LAST DOSE PRIOR TO SURGERY 1-13-25      ondansetron (Zofran) 4 MG tablet Take 1 tablet by mouth Every 8 (Eight) Hours As Needed for Nausea or Vomiting for up to 10 doses. 10 tablet 0    oxyCODONE-acetaminophen (PERCOCET) 7.5-325 MG per tablet Take 1 tablet by mouth Every 6 (Six) Hours As Needed for Moderate Pain. 20 tablet 0    pantoprazole (PROTONIX) 40 MG EC tablet Take 1 tablet by mouth Daily for 14 days. 14 tablet 0    rosuvastatin (CRESTOR) 10 MG tablet Take 1 tablet by mouth 3 (Three) Times a Week. Indications: High Amount of Fats in the Blood      sirolimus (RAPAMUNE) 1 MG tablet Take 1 tablet by mouth 1 (One) Time Per Week. NOT TAKING/LEAVE ON LIST      Testosterone  Cypionate (DEPOTESTOTERONE CYPIONATE) 200 MG/ML injection Inject 1 mL into the appropriate muscle as directed by prescriber Every 14 (Fourteen) Days. Indications: Deficient Activity of the Testis  5    VASCEPA 1 g capsule capsule 2 g 2 (Two) Times a Day. Indications: High Amount of Triglycerides in the Blood      verapamil SR (CALAN-SR) 240 MG CR tablet Take 1 tablet by mouth Every Night. Indications: High Blood Pressure           PROCEDURES  Procedures            PROGRESS, DATA ANALYSIS, CONSULTS, AND MEDICAL DECISION MAKING  All labs have been independently interpreted by me.  All radiology studies have been reviewed by me. All EKG's have been independently viewed and interpreted by me.  Discussion below represents my analysis of pertinent findings related to patient's condition, differential diagnosis, treatment plan and final disposition.    DIFFERENTIAL    My differential diagnosis includes but is not limited to DVT, postop swelling, postop edema, hemarthrosis, postop infection    Clinical Scores:                  ED Course as of 01/31/25 2345   Fri Jan 31, 2025 1920 WBC: 7.38 [KA]   1920 Hemoglobin(!): 11.8 [KA]   1920 Glucose(!): 195 [KA]   1920 Creatinine: 1.09 [KA]   1920 INR: 0.98 [KA]   1920 PTT: 32.7 [KA]   1920 Lactate: 1.5 [KA]   1931 I discussed the patient with the vascular .  Right lower extremity Doppler is negative for DVT, he does have chronic DVT in the right calf which is unchanged from prior scan. [KA]   1931 Patient's labs and ultrasound are unremarkable.  Will discuss with orthopedics. [KA]   2119 I discussed the patient with Dr. Durham, orthopedist.  He would like us to add a CRP and sed rate to the patient's workup as a baseline for him to follow, recommends a dose of IV Toradol and switching him to Percocet 7.5 mg and would like him to ice and elevate, defer PT until he is cleared by them next week and Ace wrap from his ankle to his thigh.  The knee does not appear  infected.  He would like a call back if his CRP and sed rate are elevated. [KA]   2202 Sed Rate(!): 26 [KA]   2306 I called the lab.  The machine running the CRP developed an error in the specimen needs to be rerun, should take about 15 minutes per . [KA]   2328 C-Reactive Protein(!): 5.78 [KA]   2333 I discussed patient again with Dr. Durham including his elevated sed rate and CRP.  He is okay with the patient being discharged home as originally planned on oxycodone 7.5 instead of hydrocodone 7.5. [KA]   2337 Reassessed the patient, counseled him on all of his additional lab and imaging results, recommendations from orthopedics, he is agreeable. [KA]   2340 Prescription drug monitoring program review:     JELANI query complete and reviewed. Treatment plan to include limited course of prescribed controlled substance. Risks including addiction, benefits, and alternatives presented to patient. [AB]      ED Course User Index  [AB] Reymundo Calabrese MD  [KA] Sheila Lind PA-C             AS OF 23:45 EST VITALS:    BP - 106/73  HR - 83  TEMP - 98.3 °F (36.8 °C) (Tympanic)  O2 SATS - 93%    COMPLEXITY OF CARE  Admission was considered but after careful review of the patient's presentation, physical examination, diagnostic results, and response to treatment the patient may be safely discharged with outpatient follow-up.      DIAGNOSIS  Final diagnoses:   Acute post-operative pain   Right leg swelling         DISPOSITION  ED Disposition       ED Disposition   Discharge    Condition   Good    Comment   --                  FOLLOW UP  Pratik Durham MD  [facility] [address] [address] [phone]          [facility] EMERGENCY DEPARTMENT  [address] [facility] [address] [phone]    If symptoms worsen or any concerns        Prescribed Medications     Medication List        New Prescriptions      oxyCODONE-acetaminophen 7.5-325 MG per tablet  Commonly known as:  PERCOCET  Take 1 tablet by mouth Every 6 (Six) Hours As Needed for Moderate Pain.            Changed      metFORMIN 1000 MG tablet  Commonly known as: GLUCOPHAGE  1 tab po BID with meal for diabetes  What changed:   how much to take  how to take this  when to take this            Stop      HYDROcodone-acetaminophen 7.5-325 MG per tablet  Commonly known as: NORCO     polyethylene glycol 17 GM/SCOOP powder  Commonly known as: MIRALAX               Where to Get Your Medications        These medications were sent to [facility]/pharmacy #[id number] - [place], [place] - [address] [address] AT [address] [phone] [facility] [phone]   [address] [address], [facility] [address]      Phone: [phone]   oxyCODONE-acetaminophen 7.5-325 MG per tablet                   Please note that portions of this document were completed with a voice recognition program.    Note Disclaimer: At [facility], we believe that sharing information builds trust and better relationships. You are receiving this note because you recently visited [facility]. It is possible you will see health information before a provider has talked with you about it. This kind of information can be easy to misunderstand. To help you fully understand what it means for your health, we urge you to discuss this note with your provider.         Sheila Lind PA-C  01/31/25 8227

## 2025-01-31 NOTE — TELEPHONE ENCOUNTER
I called the patient back, he reports the swelling in his right knee has improved but still has considerable amount of swelling in his leg and calf.  He was concerned because he has a history of previous DVT he is currently still on Eliquis initial venous Doppler of right lower extremity postoperatively was negative for acute clot chronic thrombosis noted.  We will send patient for stat venous Doppler right lower extremity to be done now--- please place stat order and with Colleen call to make sure this gets ordered for today.  Patient agreeable and appreciative of the returned call

## 2025-01-31 NOTE — TELEPHONE ENCOUNTER
Correction last sentence--please let Kathryn or Shivani Villalta know so they can call and get that set up for today.  Thank you

## 2025-02-01 NOTE — ED PROVIDER NOTES
SHARED VISIT: This visit was performed by BOTH a physician and an APC. The substantive portion of the medical decision making was performed by this attesting physician who made or approved the management plan and takes responsibility for patient management. All studies in the APC note (if performed) were independently interpreted by me.      The EDWARDO and I have discussed this patient's history, physical exam, and treatment plan.  I have reviewed the documentation and personally had a face to face interaction with the patient. I affirm the documentation and agree with the treatment and plan.  The attached note describes my personal findings.       I provided a substantive portion of the care of the patient.  I personally performed the physical exam in its entirety, and below are my findings.        History  54-year-old male with recent knee replacement presents with increased pain and swelling.    Physical Exam  Vital Signs reviewed  GENERAL: Alert well-appearing male no obvious distress.  Triage vitals reviewed and are fairly benign  HENT: nares patent  EYES: no scleral icterus  CV: regular rhythm, regular rate  RESPIRATORY: normal effort  ABDOMEN: soft  MUSCULOSKELETAL: Exam of the right leg shows healing surgical incision.  There is moderate diffuse swelling and subacute ecchymosis.  Distal strength sensation pulses are grossly intact  NEURO: Strength sensation and coordination are grossly intact.  Speech and mentation are unremarkable  SKIN: warm, dry      Assessment and Data Review    ED Course as of 01/31/25 2024 Fri Jan 31, 2025 1920 WBC: 7.38 [KA]   1920 Hemoglobin(!): 11.8 [KA]   1920 Glucose(!): 195 [KA]   1920 Creatinine: 1.09 [KA]   1920 INR: 0.98 [KA]   1920 PTT: 32.7 [KA]   1920 Lactate: 1.5 [KA]   1931 I discussed the patient with the vascular .  Right lower extremity Doppler is negative for DVT, he does have chronic DVT in the right calf which is unchanged from prior scan. [KA]   1931  Patient's labs and ultrasound are unremarkable.  Will discuss with orthopedics. [KA]      ED Course User Index  [KA] Sheila Lind PA-C       I did discuss treatment and evaluation of this patient with TIFF Lind.    I did independently interpret and evaluate ED testing which is notable for the following:    Doppler ultrasound shows no obvious vascular occlusion  CBC with normal white count which would go against infection.  Hemoglobin of 11.8, decreased from preoperative likely postoperative changes  Chemistries benign without evidence of electrolyte disturbance.    At this point I do not see obvious infection but think we are dealing with postoperative swelling and hematoma.  Will touch base with orthopedics to discuss disposition   infection but think we are dealing with postoperative swelling and hematoma.  Will touch base with orthopedics to discuss disposition     Hermes Fernandez MD  02/06/25 8349

## 2025-02-01 NOTE — DISCHARGE INSTRUCTIONS
Rest, ice, elevate  Discontinue hydrocodone, take the oxycodone instead  Ace wrap from ankle to thigh.  Change/rewrap Ace wrap daily  Follow-up with Dr. Durham next week  Return to the ER for fever, uncontrolled pain, any concern

## 2025-02-01 NOTE — ED NOTES
Nursing report ED to floor  Anthony Sol  54 y.o.  male    HPI :  HPI  Stated Reason for Visit: post op pain  History Obtained From: patient    Chief Complaint  Chief Complaint   Patient presents with    Post-op Problem       Admitting doctor:   No admitting provider for patient encounter.    Admitting diagnosis:       Code status:   Current Code Status       Date Active Code Status Order ID Comments User Context       Prior            Allergies:   Patient has no known allergies.    Isolation:   No active isolations    Intake and Output  No intake or output data in the 24 hours ending 01/31/25 2058    Weight:       01/31/25  1706   Weight: 104 kg (230 lb)       Most recent vitals:   Vitals:    01/31/25 1838 01/31/25 1852 01/31/25 1901 01/31/25 1931   BP:   126/75 113/83   Pulse: 93 92 89 89   Resp:    16   Temp:       TempSrc:       SpO2: 96% 92% 93% 93%   Weight:       Height:           Active LDAs/IV Access:   Lines, Drains & Airways       Active LDAs       Name Placement date Placement time Site Days    Peripheral IV 01/31/25 1830 Right Antecubital 01/31/25 1830  Antecubital  less than 1                    Labs (abnormal labs have a star):   Labs Reviewed   COMPREHENSIVE METABOLIC PANEL - Abnormal; Notable for the following components:       Result Value    Glucose 195 (*)     All other components within normal limits    Narrative:     GFR Categories in Chronic Kidney Disease (CKD)      GFR Category          GFR (mL/min/1.73)    Interpretation  G1                     90 or greater         Normal or high (1)  G2                      60-89                Mild decrease (1)  G3a                   45-59                Mild to moderate decrease  G3b                   30-44                Moderate to severe decrease  G4                    15-29                Severe decrease  G5                    14 or less           Kidney failure          (1)In the absence of evidence of kidney disease, neither GFR category G1  or G2 fulfill the criteria for CKD.    eGFR calculation 2021 CKD-EPI creatinine equation, which does not include race as a factor   CBC WITH AUTO DIFFERENTIAL - Abnormal; Notable for the following components:    RBC 4.06 (*)     Hemoglobin 11.8 (*)     Hematocrit 35.0 (*)     Immature Grans % 0.9 (*)     Immature Grans, Absolute 0.07 (*)     All other components within normal limits   LACTIC ACID, PLASMA - Normal   PROTIME-INR - Normal   APTT - Normal   CBC AND DIFFERENTIAL    Narrative:     The following orders were created for panel order CBC & Differential.  Procedure                               Abnormality         Status                     ---------                               -----------         ------                     CBC Auto Differential[[id number]]        Abnormal            Final result                 Please view results for these tests on the individual orders.       EKG:   No orders to display       Meds given in ED:   Medications   morphine injection 4 mg (4 mg Intravenous Given 1/31/25 6324)       Imaging results:  No radiology results for the last day    Ambulatory status:   - assist x 2    Social issues:   Social History     Socioeconomic History    Marital status:    Tobacco Use    Smoking status: Never     Passive exposure: Never    Smokeless tobacco: Never   Vaping Use    Vaping status: Never Used   Substance and Sexual Activity    Alcohol use: Yes     Alcohol/week: 2.0 standard drinks of alcohol     Types: 2 Drinks containing 0.5 oz of alcohol per week     Comment: social    Drug use: No    Sexual activity: Yes     Partners: Female       Peripheral Neurovascular  Peripheral Neurovascular (Adult)  Peripheral Neurovascular WDL: pulse assessment  Pulse Assessment: dorsalis pedis  LLE Neurovascular Assessment  Temperature LLE: other (see comments)  Color LLE: no discoloration  Sensation LLE: no numbness, no tenderness, no tingling  RLE Neurovascular Assessment  Temperature RLE:  warm  Color RLE: red, blotchy  Sensation RLE: tenderness present    Neuro Cognitive  Neuro Cognitive (Adult)  Cognitive/Neuro/Behavioral WDL: WDL, orientation  Orientation: oriented x 4    Learning  Learning Assessment  Learning Readiness and Ability: no barriers identified    Respiratory  Respiratory WDL  Respiratory WDL: WDL, rhythm/pattern  Rhythm/Pattern, Respiratory: depth regular, pattern regular, unlabored, no shortness of breath reported    Abdominal Pain       Pain Assessments  Pain (Adult)  (0-10) Pain Rating: Rest: 3  (0-10) Pain Rating: Activity: 6  Pain Location: knee  Pain Side/Orientation: right  Pain Management Interventions: pain medication given  Response to Pain Interventions: interventions effective per patient    NIH Stroke Scale       Magda Dover RN  01/31/25 20:58 EST

## 2025-02-03 ENCOUNTER — HOME CARE VISIT (OUTPATIENT)
Dept: HOME HEALTH SERVICES | Facility: HOME HEALTHCARE | Age: 55
End: 2025-02-03
Payer: COMMERCIAL

## 2025-02-03 NOTE — TELEPHONE ENCOUNTER
Patient's US results from the ER shows a chronic appearing DVT to gastroc, but no ACUTE signs of a new DVT

## 2025-02-04 ENCOUNTER — HOME CARE VISIT (OUTPATIENT)
Dept: HOME HEALTH SERVICES | Facility: HOME HEALTHCARE | Age: 55
End: 2025-02-04
Payer: COMMERCIAL

## 2025-02-04 ENCOUNTER — TELEPHONE (OUTPATIENT)
Dept: ORTHOPEDIC SURGERY | Facility: CLINIC | Age: 55
End: 2025-02-04

## 2025-02-04 ENCOUNTER — LAB (OUTPATIENT)
Dept: LAB | Facility: HOSPITAL | Age: 55
End: 2025-02-04
Payer: COMMERCIAL

## 2025-02-04 ENCOUNTER — OFFICE VISIT (OUTPATIENT)
Dept: ORTHOPEDIC SURGERY | Facility: CLINIC | Age: 55
End: 2025-02-04
Payer: COMMERCIAL

## 2025-02-04 VITALS — BODY MASS INDEX: 31.02 KG/M2 | HEIGHT: 72 IN | TEMPERATURE: 96.9 F | WEIGHT: 229 LBS

## 2025-02-04 DIAGNOSIS — Z96.651 STATUS POST RIGHT KNEE REPLACEMENT: Primary | ICD-10-CM

## 2025-02-04 LAB — CRP SERPL-MCNC: 4.82 MG/DL (ref 0–0.5)

## 2025-02-04 PROCEDURE — 36415 COLL VENOUS BLD VENIPUNCTURE: CPT | Performed by: ORTHOPAEDIC SURGERY

## 2025-02-04 PROCEDURE — 86140 C-REACTIVE PROTEIN: CPT | Performed by: ORTHOPAEDIC SURGERY

## 2025-02-04 PROCEDURE — 99024 POSTOP FOLLOW-UP VISIT: CPT | Performed by: ORTHOPAEDIC SURGERY

## 2025-02-04 RX ORDER — PREGABALIN 75 MG/1
150 CAPSULE ORAL ONCE
Status: CANCELLED | OUTPATIENT
Start: 2025-02-05 | End: 2025-02-04

## 2025-02-04 RX ORDER — CHLORHEXIDINE GLUCONATE 500 MG/1
CLOTH TOPICAL 2 TIMES DAILY
Status: CANCELLED | OUTPATIENT
Start: 2025-02-04

## 2025-02-04 NOTE — H&P (VIEW-ONLY)
Patient: Anthony Sol  YOB: 1970 54 y.o. male  Medical Record Number: [id number]    Chief Complaints:   Chief Complaint   Patient presents with    Right Knee - Post-op Follow-up, Pain       History of Present Illness:Anthony Sol is a 54 y.o. male who presents for follow-up of right knee revision he is now 13 days out he says the first week he was doing well he began to develop increased bruising and has had increased pain.  He states he was flexing around 90 degrees after surgery but that has declined.  He has had some low-grade temperatures .1.  Given his increased pain he was seen in the ER last week he did have an elevated C-reactive protein of 5.7.  He has not had any drainage from the wound.    Allergies: No Known Allergies    Medications:   Current Outpatient Medications   Medication Sig Dispense Refill    Coenzyme Q10 (COQ-10) 30 MG capsule Daily. HOLDING FOR DOS  Indications: High Blood Pressure      Edarbyclor 40-12.5 MG tablet TAKE 1 TABLET BY MOUTH ONCE DAILY (Patient taking differently: Take 1 tablet by mouth Daily.) 90 tablet 1    Empagliflozin (JARDIANCE) 10 MG tablet Take 1 tablet by mouth Daily. For diabetes (Patient taking differently: Take 1 tablet by mouth Daily. For diabetes) 90 tablet 1    metFORMIN (GLUCOPHAGE) 1000 MG tablet 1 tab po BID with meal for diabetes (Patient taking differently: Take 1 tablet by mouth 2 (Two) Times a Day With Meals. 1 tab po BID with meal for diabetes) 180 tablet 1    Mounjaro 10 MG/0.5ML solution auto-injector Inject 10 mg under the skin into the appropriate area as directed 1 (One) Time Per Week. HOLDING FOR DOS    PLANS LAST DOSE PRIOR TO SURGERY 1-13-25      oxyCODONE-acetaminophen (PERCOCET) 7.5-325 MG per tablet Take 1 tablet by mouth Every 6 (Six) Hours As Needed for Moderate Pain. 20 tablet 0    pantoprazole (PROTONIX) 40 MG EC tablet Take 1 tablet by mouth Daily for 14 days. 14 tablet 0    rosuvastatin (CRESTOR) 10 MG tablet  "Take 1 tablet by mouth 3 (Three) Times a Week. Indications: High Amount of Fats in the Blood      Testosterone Cypionate (DEPOTESTOTERONE CYPIONATE) 200 MG/ML injection Inject 1 mL into the appropriate muscle as directed by prescriber Every 14 (Fourteen) Days. Indications: Deficient Activity of the Testis  5    verapamil SR (CALAN-SR) 240 MG CR tablet Take 1 tablet by mouth Every Night. Indications: High Blood Pressure      apixaban (ELIQUIS) 2.5 MG tablet tablet Take 1 tablet by mouth 2 (Two) Times a Day. (Patient not taking: Reported on 2/4/2025) 60 tablet 1    IPAMORELIN ACETATE IJ 5 days weekly/ LEAVE ON LIST, NOT CURRENTLY TAKING (Patient not taking: Reported on 2/4/2025)      ondansetron (Zofran) 4 MG tablet Take 1 tablet by mouth Every 8 (Eight) Hours As Needed for Nausea or Vomiting for up to 10 doses. (Patient not taking: Reported on 2/4/2025) 10 tablet 0    sirolimus (RAPAMUNE) 1 MG tablet Take 1 tablet by mouth 1 (One) Time Per Week. NOT TAKING/LEAVE ON LIST (Patient not taking: Reported on 2/4/2025)      VASCEPA 1 g capsule capsule 2 g 2 (Two) Times a Day. Indications: High Amount of Triglycerides in the Blood (Patient not taking: Reported on 2/4/2025)       No current facility-administered medications for this visit.         The following portions of the patient's history were reviewed and updated as appropriate: allergies, current medications, past family history, past medical history, past social history, past surgical history and problem list.    Review of Systems:   Pertinent positives/negative listed in HPI above    Physical Exam:   Vitals:    02/04/25 0801   Temp: 96.9 °F (36.1 °C)   TempSrc: Temporal   Weight: 104 kg (229 lb)   Height: 182.9 cm (72\")   PainSc:   5   PainLoc: Knee       General: A and O x 3, ASA, NAD   Right knee shows pain with flexion past 65 degrees she has difficulty with any flexion past at near full extension there is no instability he has diffuse ecchymosis about the leg " from the mid thigh with some down into the calf the calf is soft and nontender with a negative Homans he does not have pain with micromotion of the knee.           Assessment/Plan:  Right knee increased pain unable to aspirate any fluid off.  I anticipate he has increased pain due to hematoma but given his elevated C-reactive protein low-grade temperature of 99- 100 cannot rule out infection.  Plan on evacuation of hematoma at that time we will take cultures and place a drain.  I will have him hold his Eliquis.      There are no diagnoses linked to this encounter.    Pratik Durham MD  2/4/2025

## 2025-02-04 NOTE — TELEPHONE ENCOUNTER
Hub staff attempted to follow warm transfer process and was unsuccessful     Caller: Nury Sol    Relationship to patient: Emergency Contact    Best call back number: [phone]*    Patient is needing: PT'S WIFE IS CALLING TO CHECK THE STATUS OF THE TEST ORDERS.. THEY ARE WAITING IN THE OFFICE LOBBY.. THEY WOULD LIKE A CALL BACK ASAP.. PLEASE ADVISE..

## 2025-02-04 NOTE — TELEPHONE ENCOUNTER
Hub staff attempted to follow warm transfer process and was unsuccessful     Caller: Nury Sol    Relationship to patient: Emergency Contact    Best call back number: [phone]    Patient is needing: PT WAS SEEN IN THE OFFICE WITH DR CARTAGENA TODAY. PT WAS ADVISED TO GO TO [facility] TO GET TESTING DONE. PT IS AT [facility] NOW AND [facility] DOES NOT HAVE THE ORDER FOR TESTING. PLEASE PLACE ORDER FOR TESTING AND CONTACT PT'S WIFE ABOVE TO RELAY ORDER IS PLACED.

## 2025-02-04 NOTE — PROGRESS NOTES
Patient: Anthony Sol  YOB: 1970 54 y.o. male  Medical Record Number: [id number]    Chief Complaints:   Chief Complaint   Patient presents with    Right Knee - Post-op Follow-up, Pain       History of Present Illness:Anthony Sol is a 54 y.o. male who presents for follow-up of right knee revision he is now 13 days out he says the first week he was doing well he began to develop increased bruising and has had increased pain.  He states he was flexing around 90 degrees after surgery but that has declined.  He has had some low-grade temperatures .1.  Given his increased pain he was seen in the ER last week he did have an elevated C-reactive protein of 5.7.  He has not had any drainage from the wound.    Allergies: No Known Allergies    Medications:   Current Outpatient Medications   Medication Sig Dispense Refill    Coenzyme Q10 (COQ-10) 30 MG capsule Daily. HOLDING FOR DOS  Indications: High Blood Pressure      Edarbyclor 40-12.5 MG tablet TAKE 1 TABLET BY MOUTH ONCE DAILY (Patient taking differently: Take 1 tablet by mouth Daily.) 90 tablet 1    Empagliflozin (JARDIANCE) 10 MG tablet Take 1 tablet by mouth Daily. For diabetes (Patient taking differently: Take 1 tablet by mouth Daily. For diabetes) 90 tablet 1    metFORMIN (GLUCOPHAGE) 1000 MG tablet 1 tab po BID with meal for diabetes (Patient taking differently: Take 1 tablet by mouth 2 (Two) Times a Day With Meals. 1 tab po BID with meal for diabetes) 180 tablet 1    Mounjaro 10 MG/0.5ML solution auto-injector Inject 10 mg under the skin into the appropriate area as directed 1 (One) Time Per Week. HOLDING FOR DOS    PLANS LAST DOSE PRIOR TO SURGERY 1-13-25      oxyCODONE-acetaminophen (PERCOCET) 7.5-325 MG per tablet Take 1 tablet by mouth Every 6 (Six) Hours As Needed for Moderate Pain. 20 tablet 0    pantoprazole (PROTONIX) 40 MG EC tablet Take 1 tablet by mouth Daily for 14 days. 14 tablet 0    rosuvastatin (CRESTOR) 10 MG tablet  "Take 1 tablet by mouth 3 (Three) Times a Week. Indications: High Amount of Fats in the Blood      Testosterone Cypionate (DEPOTESTOTERONE CYPIONATE) 200 MG/ML injection Inject 1 mL into the appropriate muscle as directed by prescriber Every 14 (Fourteen) Days. Indications: Deficient Activity of the Testis  5    verapamil SR (CALAN-SR) 240 MG CR tablet Take 1 tablet by mouth Every Night. Indications: High Blood Pressure      apixaban (ELIQUIS) 2.5 MG tablet tablet Take 1 tablet by mouth 2 (Two) Times a Day. (Patient not taking: Reported on 2/4/2025) 60 tablet 1    IPAMORELIN ACETATE IJ 5 days weekly/ LEAVE ON LIST, NOT CURRENTLY TAKING (Patient not taking: Reported on 2/4/2025)      ondansetron (Zofran) 4 MG tablet Take 1 tablet by mouth Every 8 (Eight) Hours As Needed for Nausea or Vomiting for up to 10 doses. (Patient not taking: Reported on 2/4/2025) 10 tablet 0    sirolimus (RAPAMUNE) 1 MG tablet Take 1 tablet by mouth 1 (One) Time Per Week. NOT TAKING/LEAVE ON LIST (Patient not taking: Reported on 2/4/2025)      VASCEPA 1 g capsule capsule 2 g 2 (Two) Times a Day. Indications: High Amount of Triglycerides in the Blood (Patient not taking: Reported on 2/4/2025)       No current facility-administered medications for this visit.         The following portions of the patient's history were reviewed and updated as appropriate: allergies, current medications, past family history, past medical history, past social history, past surgical history and problem list.    Review of Systems:   Pertinent positives/negative listed in HPI above    Physical Exam:   Vitals:    02/04/25 0801   Temp: 96.9 °F (36.1 °C)   TempSrc: Temporal   Weight: 104 kg (229 lb)   Height: 182.9 cm (72\")   PainSc:   5   PainLoc: Knee       General: A and O x 3, ASA, NAD   Right knee shows pain with flexion past 65 degrees she has difficulty with any flexion past at near full extension there is no instability he has diffuse ecchymosis about the leg " from the mid thigh with some down into the calf the calf is soft and nontender with a negative Homans he does not have pain with micromotion of the knee.           Assessment/Plan:  Right knee increased pain unable to aspirate any fluid off.  I anticipate he has increased pain due to hematoma but given his elevated C-reactive protein low-grade temperature of 99- 100 cannot rule out infection.  Plan on evacuation of hematoma at that time we will take cultures and place a drain.  I will have him hold his Eliquis.      There are no diagnoses linked to this encounter.    Pratik Durham MD  2/4/2025

## 2025-02-05 ENCOUNTER — HOSPITAL ENCOUNTER (INPATIENT)
Facility: HOSPITAL | Age: 55
LOS: 1 days | Discharge: HOME | DRG: 486 | End: 2025-02-08
Attending: ORTHOPAEDIC SURGERY | Admitting: ORTHOPAEDIC SURGERY
Payer: COMMERCIAL

## 2025-02-05 ENCOUNTER — DOCUMENTATION (OUTPATIENT)
Dept: HOME HEALTH SERVICES | Facility: HOME HEALTHCARE | Age: 55
End: 2025-02-05
Payer: COMMERCIAL

## 2025-02-05 ENCOUNTER — ANESTHESIA (OUTPATIENT)
Dept: PERIOP | Facility: HOSPITAL | Age: 55
DRG: 486 | End: 2025-02-05
Payer: COMMERCIAL

## 2025-02-05 ENCOUNTER — ANESTHESIA EVENT (OUTPATIENT)
Dept: PERIOP | Facility: HOSPITAL | Age: 55
DRG: 486 | End: 2025-02-05
Payer: COMMERCIAL

## 2025-02-05 DIAGNOSIS — Z96.651 STATUS POST RIGHT KNEE REPLACEMENT: ICD-10-CM

## 2025-02-05 LAB
APPEARANCE FLD: ABNORMAL
COLOR FLD: ABNORMAL
GLUCOSE BLDC GLUCOMTR-MCNC: 123 MG/DL (ref 70–130)
LYMPHOCYTES NFR FLD MANUAL: 9 %
METHOD: ABNORMAL
NEUTROPHILS NFR FLD MANUAL: 91 %
NUC CELL # FLD: 2080 /MM3
RBC # FLD AUTO: ABNORMAL /MM3

## 2025-02-05 PROCEDURE — G0378 HOSPITAL OBSERVATION PER HR: HCPCS

## 2025-02-05 PROCEDURE — 25010000002 PROPOFOL 200 MG/20ML EMULSION: Performed by: NURSE ANESTHETIST, CERTIFIED REGISTERED

## 2025-02-05 PROCEDURE — C1713 ANCHOR/SCREW BN/BN,TIS/BN: HCPCS | Performed by: ORTHOPAEDIC SURGERY

## 2025-02-05 PROCEDURE — 87801 DETECT AGNT MULT DNA AMPLI: CPT | Performed by: STUDENT IN AN ORGANIZED HEALTH CARE EDUCATION/TRAINING PROGRAM

## 2025-02-05 PROCEDURE — 25010000002 ONDANSETRON PER 1 MG: Performed by: NURSE ANESTHETIST, CERTIFIED REGISTERED

## 2025-02-05 PROCEDURE — 82948 REAGENT STRIP/BLOOD GLUCOSE: CPT

## 2025-02-05 PROCEDURE — 0SPC09Z REMOVAL OF LINER FROM RIGHT KNEE JOINT, OPEN APPROACH: ICD-10-PCS | Performed by: ORTHOPAEDIC SURGERY

## 2025-02-05 PROCEDURE — 25010000002 CEFAZOLIN PER 500 MG: Performed by: ORTHOPAEDIC SURGERY

## 2025-02-05 PROCEDURE — 25810000003 LACTATED RINGERS SOLUTION: Performed by: ORTHOPAEDIC SURGERY

## 2025-02-05 PROCEDURE — 25010000002 VANCOMYCIN 1 G RECONSTITUTED SOLUTION: Performed by: ORTHOPAEDIC SURGERY

## 2025-02-05 PROCEDURE — 87076 CULTURE ANAEROBE IDENT EACH: CPT | Performed by: ORTHOPAEDIC SURGERY

## 2025-02-05 PROCEDURE — 0SUV09Z SUPPLEMENT RIGHT KNEE JOINT, TIBIAL SURFACE WITH LINER, OPEN APPROACH: ICD-10-PCS | Performed by: ORTHOPAEDIC SURGERY

## 2025-02-05 PROCEDURE — 25010000002 TOBRAMYCIN PER 80 MG: Performed by: ORTHOPAEDIC SURGERY

## 2025-02-05 PROCEDURE — 25010000002 MIDAZOLAM PER 1 MG: Performed by: ANESTHESIOLOGY

## 2025-02-05 PROCEDURE — 87015 SPECIMEN INFECT AGNT CONCNTJ: CPT | Performed by: ORTHOPAEDIC SURGERY

## 2025-02-05 PROCEDURE — 87075 CULTR BACTERIA EXCEPT BLOOD: CPT | Performed by: ORTHOPAEDIC SURGERY

## 2025-02-05 PROCEDURE — 27486 REVISE/REPLACE KNEE JOINT: CPT | Performed by: ORTHOPAEDIC SURGERY

## 2025-02-05 PROCEDURE — 25010000002 HYDROMORPHONE PER 4 MG: Performed by: NURSE ANESTHETIST, CERTIFIED REGISTERED

## 2025-02-05 PROCEDURE — 87070 CULTURE OTHR SPECIMN AEROBIC: CPT | Performed by: ORTHOPAEDIC SURGERY

## 2025-02-05 PROCEDURE — C1776 JOINT DEVICE (IMPLANTABLE): HCPCS | Performed by: ORTHOPAEDIC SURGERY

## 2025-02-05 PROCEDURE — 89051 BODY FLUID CELL COUNT: CPT | Performed by: ORTHOPAEDIC SURGERY

## 2025-02-05 PROCEDURE — 25010000002 LIDOCAINE PF 2% 2 % SOLUTION: Performed by: NURSE ANESTHETIST, CERTIFIED REGISTERED

## 2025-02-05 PROCEDURE — 25010000002 FENTANYL CITRATE (PF) 50 MCG/ML SOLUTION: Performed by: NURSE ANESTHETIST, CERTIFIED REGISTERED

## 2025-02-05 PROCEDURE — 25010000002 PROPOFOL 10 MG/ML EMULSION: Performed by: NURSE ANESTHETIST, CERTIFIED REGISTERED

## 2025-02-05 PROCEDURE — 0SCC0ZZ EXTIRPATION OF MATTER FROM RIGHT KNEE JOINT, OPEN APPROACH: ICD-10-PCS | Performed by: ORTHOPAEDIC SURGERY

## 2025-02-05 PROCEDURE — 25010000002 ROPIVACAINE PER 1 MG: Performed by: ANESTHESIOLOGY

## 2025-02-05 PROCEDURE — 25010000002 SUGAMMADEX 200 MG/2ML SOLUTION: Performed by: NURSE ANESTHETIST, CERTIFIED REGISTERED

## 2025-02-05 PROCEDURE — 87205 SMEAR GRAM STAIN: CPT | Performed by: ORTHOPAEDIC SURGERY

## 2025-02-05 PROCEDURE — 25810000003 LACTATED RINGERS PER 1000 ML: Performed by: ANESTHESIOLOGY

## 2025-02-05 PROCEDURE — 25010000002 FENTANYL CITRATE (PF) 100 MCG/2ML SOLUTION: Performed by: NURSE ANESTHETIST, CERTIFIED REGISTERED

## 2025-02-05 PROCEDURE — 25010000002 FENTANYL CITRATE (PF) 50 MCG/ML SOLUTION: Performed by: ANESTHESIOLOGY

## 2025-02-05 PROCEDURE — 25010000002 HYDROMORPHONE 1 MG/ML SOLUTION: Performed by: NURSE ANESTHETIST, CERTIFIED REGISTERED

## 2025-02-05 DEVICE — KNOTLESS TISSUE CONTROL DEVICE, UNDYED UNIDIRECTIONAL (ANTIBACTERIAL) SYNTHETIC ABSORBABLE DEVICE
Type: IMPLANTABLE DEVICE | Site: KNEE | Status: FUNCTIONAL
Brand: STRATAFIX

## 2025-02-05 DEVICE — VIOLET ANTIBACTERIAL POLYDIOXANONE, KNOTLESS TISSUE CONTROL DEVICE
Type: IMPLANTABLE DEVICE | Site: KNEE | Status: FUNCTIONAL
Brand: STRATAFIX

## 2025-02-05 DEVICE — STIMULAN® RAPID CURE PROVIDED STERILE FOR SINGLE PATIENT USE. STIMULAN® RAPID CURE CONTAINS CALCIUM SULFATE POWDER AND MIXING SOLUTION IN PRE-MEASURED QUANTITIES SO THAT WHEN MIXED TOGETHER IN A STERILE MIXING BOWL, THE RESULTANT PASTE IS TO BE DIGITALLY PACKED INTO OPEN BONE VOID/GAP TO SET INSITU OR PLACED INTO THE MOULD PROVIDED, THE MIXTURE SETS TO FORM BEADS. THE BIODEGRADABLE, RADIOPAQUE BEADS ARE RESORBED IN APPROXIMATELY 30 – 60 DAYS WHEN USED IN ACCORDANCE WITH THE DEVICE LABELLING. STIMULAN® RAPID CURE IS MANUFACTURED FROM SYNTHETIC IMPLANT GRADE CALCIUM SULFATE DIHYDRATE(CASO4.2H2O) THAT RESORBS AND IS REPLACED WITH BONE DURING THE HEALING PROCESS. ALSO, AS THE BONE VOID FILLER BEADS ARE BIODEGRADABLE AND BIOCOMPATIBLE, THEY MAY BE USED AT AN INFECTED SITE.
Type: IMPLANTABLE DEVICE | Site: KNEE | Status: FUNCTIONAL
Brand: STIMULAN® RAPID CURE

## 2025-02-05 DEVICE — GENESIS II CONSTRAINED ARTICULAR                                    INSERT SIZE 5-6 15MM
Type: IMPLANTABLE DEVICE | Site: KNEE | Status: FUNCTIONAL
Brand: GENESIS II

## 2025-02-05 RX ORDER — LABETALOL HYDROCHLORIDE 5 MG/ML
5 INJECTION, SOLUTION INTRAVENOUS
Status: DISCONTINUED | OUTPATIENT
Start: 2025-02-05 | End: 2025-02-05 | Stop reason: HOSPADM

## 2025-02-05 RX ORDER — LIDOCAINE HYDROCHLORIDE 20 MG/ML
INJECTION, SOLUTION EPIDURAL; INFILTRATION; INTRACAUDAL; PERINEURAL AS NEEDED
Status: DISCONTINUED | OUTPATIENT
Start: 2025-02-05 | End: 2025-02-05 | Stop reason: SURG

## 2025-02-05 RX ORDER — PANTOPRAZOLE SODIUM 40 MG/1
40 TABLET, DELAYED RELEASE ORAL DAILY
Status: COMPLETED | OUTPATIENT
Start: 2025-02-05 | End: 2025-02-05

## 2025-02-05 RX ORDER — PREGABALIN 75 MG/1
150 CAPSULE ORAL ONCE
Status: COMPLETED | OUTPATIENT
Start: 2025-02-05 | End: 2025-02-05

## 2025-02-05 RX ORDER — OXYCODONE AND ACETAMINOPHEN 7.5; 325 MG/1; MG/1
1 TABLET ORAL EVERY 4 HOURS PRN
Status: DISCONTINUED | OUTPATIENT
Start: 2025-02-05 | End: 2025-02-08 | Stop reason: HOSPADM

## 2025-02-05 RX ORDER — ROCURONIUM BROMIDE 10 MG/ML
INJECTION, SOLUTION INTRAVENOUS AS NEEDED
Status: DISCONTINUED | OUTPATIENT
Start: 2025-02-05 | End: 2025-02-05 | Stop reason: SURG

## 2025-02-05 RX ORDER — ATROPINE SULFATE 0.4 MG/ML
0.4 INJECTION, SOLUTION INTRAMUSCULAR; INTRAVENOUS; SUBCUTANEOUS ONCE AS NEEDED
Status: DISCONTINUED | OUTPATIENT
Start: 2025-02-05 | End: 2025-02-05 | Stop reason: HOSPADM

## 2025-02-05 RX ORDER — ROPIVACAINE HYDROCHLORIDE 2 MG/ML
INJECTION, SOLUTION EPIDURAL; INFILTRATION; PERINEURAL
Status: COMPLETED | OUTPATIENT
Start: 2025-02-05 | End: 2025-02-05

## 2025-02-05 RX ORDER — HYDROCODONE BITARTRATE AND ACETAMINOPHEN 5; 325 MG/1; MG/1
1 TABLET ORAL ONCE AS NEEDED
Status: COMPLETED | OUTPATIENT
Start: 2025-02-05 | End: 2025-02-05

## 2025-02-05 RX ORDER — VANCOMYCIN/0.9 % SOD CHLORIDE 1.5G/250ML
15 PLASTIC BAG, INJECTION (ML) INTRAVENOUS ONCE
Status: COMPLETED | OUTPATIENT
Start: 2025-02-06 | End: 2025-02-06

## 2025-02-05 RX ORDER — IPRATROPIUM BROMIDE AND ALBUTEROL SULFATE 2.5; .5 MG/3ML; MG/3ML
3 SOLUTION RESPIRATORY (INHALATION) ONCE AS NEEDED
Status: DISCONTINUED | OUTPATIENT
Start: 2025-02-05 | End: 2025-02-05 | Stop reason: HOSPADM

## 2025-02-05 RX ORDER — CHLORTHALIDONE 25 MG/1
12.5 TABLET ORAL DAILY
Status: DISCONTINUED | OUTPATIENT
Start: 2025-02-05 | End: 2025-02-08 | Stop reason: HOSPADM

## 2025-02-05 RX ORDER — OXYCODONE AND ACETAMINOPHEN 7.5; 325 MG/1; MG/1
1 TABLET ORAL EVERY 4 HOURS PRN
Status: DISCONTINUED | OUTPATIENT
Start: 2025-02-05 | End: 2025-02-05 | Stop reason: HOSPADM

## 2025-02-05 RX ORDER — POLYETHYLENE GLYCOL 3350 17 G/17G
17 POWDER, FOR SOLUTION ORAL 2 TIMES DAILY
Status: DISCONTINUED | OUTPATIENT
Start: 2025-02-05 | End: 2025-02-08 | Stop reason: HOSPADM

## 2025-02-05 RX ORDER — HYDROMORPHONE HYDROCHLORIDE 1 MG/ML
0.5 INJECTION, SOLUTION INTRAMUSCULAR; INTRAVENOUS; SUBCUTANEOUS
Status: DISCONTINUED | OUTPATIENT
Start: 2025-02-05 | End: 2025-02-05 | Stop reason: HOSPADM

## 2025-02-05 RX ORDER — SODIUM CHLORIDE, SODIUM LACTATE, POTASSIUM CHLORIDE, CALCIUM CHLORIDE 600; 310; 30; 20 MG/100ML; MG/100ML; MG/100ML; MG/100ML
9 INJECTION, SOLUTION INTRAVENOUS CONTINUOUS
Status: DISCONTINUED | OUTPATIENT
Start: 2025-02-05 | End: 2025-02-05

## 2025-02-05 RX ORDER — ICOSAPENT ETHYL 1 G/1
2 CAPSULE ORAL 2 TIMES DAILY
Status: DISCONTINUED | OUTPATIENT
Start: 2025-02-05 | End: 2025-02-05

## 2025-02-05 RX ORDER — ONDANSETRON 2 MG/ML
4 INJECTION INTRAMUSCULAR; INTRAVENOUS EVERY 6 HOURS PRN
Status: DISCONTINUED | OUTPATIENT
Start: 2025-02-05 | End: 2025-02-08 | Stop reason: HOSPADM

## 2025-02-05 RX ORDER — PROMETHAZINE HYDROCHLORIDE 25 MG/1
25 SUPPOSITORY RECTAL ONCE AS NEEDED
Status: DISCONTINUED | OUTPATIENT
Start: 2025-02-05 | End: 2025-02-05 | Stop reason: HOSPADM

## 2025-02-05 RX ORDER — EPHEDRINE SULFATE 50 MG/ML
5 INJECTION, SOLUTION INTRAVENOUS ONCE AS NEEDED
Status: DISCONTINUED | OUTPATIENT
Start: 2025-02-05 | End: 2025-02-05 | Stop reason: HOSPADM

## 2025-02-05 RX ORDER — VANCOMYCIN/0.9 % SOD CHLORIDE 1.5G/250ML
15 PLASTIC BAG, INJECTION (ML) INTRAVENOUS ONCE
Status: COMPLETED | OUTPATIENT
Start: 2025-02-05 | End: 2025-02-05

## 2025-02-05 RX ORDER — FENTANYL CITRATE 50 UG/ML
100 INJECTION, SOLUTION INTRAMUSCULAR; INTRAVENOUS
Status: DISCONTINUED | OUTPATIENT
Start: 2025-02-05 | End: 2025-02-05 | Stop reason: HOSPADM

## 2025-02-05 RX ORDER — FENTANYL CITRATE 50 UG/ML
INJECTION, SOLUTION INTRAMUSCULAR; INTRAVENOUS AS NEEDED
Status: DISCONTINUED | OUTPATIENT
Start: 2025-02-05 | End: 2025-02-05 | Stop reason: SURG

## 2025-02-05 RX ORDER — LOSARTAN POTASSIUM 25 MG/1
25 TABLET ORAL
Status: DISCONTINUED | OUTPATIENT
Start: 2025-02-05 | End: 2025-02-08 | Stop reason: HOSPADM

## 2025-02-05 RX ORDER — MAGNESIUM HYDROXIDE 1200 MG/15ML
LIQUID ORAL AS NEEDED
Status: DISCONTINUED | OUTPATIENT
Start: 2025-02-05 | End: 2025-02-05 | Stop reason: HOSPADM

## 2025-02-05 RX ORDER — SODIUM CHLORIDE 0.9 % (FLUSH) 0.9 %
3-10 SYRINGE (ML) INJECTION AS NEEDED
Status: DISCONTINUED | OUTPATIENT
Start: 2025-02-05 | End: 2025-02-05 | Stop reason: HOSPADM

## 2025-02-05 RX ORDER — ROSUVASTATIN CALCIUM 10 MG/1
10 TABLET, COATED ORAL 3 TIMES WEEKLY
Status: DISCONTINUED | OUTPATIENT
Start: 2025-02-05 | End: 2025-02-08 | Stop reason: HOSPADM

## 2025-02-05 RX ORDER — ONDANSETRON 2 MG/ML
INJECTION INTRAMUSCULAR; INTRAVENOUS AS NEEDED
Status: DISCONTINUED | OUTPATIENT
Start: 2025-02-05 | End: 2025-02-05 | Stop reason: SURG

## 2025-02-05 RX ORDER — MIDAZOLAM HYDROCHLORIDE 1 MG/ML
2 INJECTION, SOLUTION INTRAMUSCULAR; INTRAVENOUS
Status: DISCONTINUED | OUTPATIENT
Start: 2025-02-05 | End: 2025-02-05 | Stop reason: HOSPADM

## 2025-02-05 RX ORDER — FLUMAZENIL 0.1 MG/ML
0.2 INJECTION INTRAVENOUS AS NEEDED
Status: DISCONTINUED | OUTPATIENT
Start: 2025-02-05 | End: 2025-02-05 | Stop reason: HOSPADM

## 2025-02-05 RX ORDER — METOPROLOL TARTRATE 1 MG/ML
INJECTION, SOLUTION INTRAVENOUS AS NEEDED
Status: DISCONTINUED | OUTPATIENT
Start: 2025-02-05 | End: 2025-02-05 | Stop reason: SURG

## 2025-02-05 RX ORDER — FENTANYL CITRATE 50 UG/ML
50 INJECTION, SOLUTION INTRAMUSCULAR; INTRAVENOUS
Status: DISCONTINUED | OUTPATIENT
Start: 2025-02-05 | End: 2025-02-05 | Stop reason: HOSPADM

## 2025-02-05 RX ORDER — HYDRALAZINE HYDROCHLORIDE 20 MG/ML
5 INJECTION INTRAMUSCULAR; INTRAVENOUS
Status: DISCONTINUED | OUTPATIENT
Start: 2025-02-05 | End: 2025-02-05 | Stop reason: HOSPADM

## 2025-02-05 RX ORDER — NALOXONE HCL 0.4 MG/ML
0.2 VIAL (ML) INJECTION AS NEEDED
Status: DISCONTINUED | OUTPATIENT
Start: 2025-02-05 | End: 2025-02-05 | Stop reason: HOSPADM

## 2025-02-05 RX ORDER — DIPHENHYDRAMINE HYDROCHLORIDE 50 MG/ML
12.5 INJECTION INTRAMUSCULAR; INTRAVENOUS
Status: DISCONTINUED | OUTPATIENT
Start: 2025-02-05 | End: 2025-02-05 | Stop reason: HOSPADM

## 2025-02-05 RX ORDER — PROMETHAZINE HYDROCHLORIDE 25 MG/1
25 TABLET ORAL ONCE AS NEEDED
Status: DISCONTINUED | OUTPATIENT
Start: 2025-02-05 | End: 2025-02-05 | Stop reason: HOSPADM

## 2025-02-05 RX ORDER — PROPOFOL 10 MG/ML
INJECTION, EMULSION INTRAVENOUS AS NEEDED
Status: DISCONTINUED | OUTPATIENT
Start: 2025-02-05 | End: 2025-02-05 | Stop reason: SURG

## 2025-02-05 RX ORDER — TOBRAMYCIN 1.2 G/30ML
INJECTION, POWDER, LYOPHILIZED, FOR SOLUTION INTRAVENOUS AS NEEDED
Status: DISCONTINUED | OUTPATIENT
Start: 2025-02-05 | End: 2025-02-05 | Stop reason: HOSPADM

## 2025-02-05 RX ORDER — ONDANSETRON 4 MG/1
4 TABLET, ORALLY DISINTEGRATING ORAL EVERY 6 HOURS PRN
Status: DISCONTINUED | OUTPATIENT
Start: 2025-02-05 | End: 2025-02-08 | Stop reason: HOSPADM

## 2025-02-05 RX ORDER — VERAPAMIL HYDROCHLORIDE 240 MG/1
240 TABLET, FILM COATED, EXTENDED RELEASE ORAL NIGHTLY
Status: DISCONTINUED | OUTPATIENT
Start: 2025-02-05 | End: 2025-02-08 | Stop reason: HOSPADM

## 2025-02-05 RX ORDER — VANCOMYCIN HYDROCHLORIDE 1 G/20ML
INJECTION, POWDER, LYOPHILIZED, FOR SOLUTION INTRAVENOUS AS NEEDED
Status: DISCONTINUED | OUTPATIENT
Start: 2025-02-05 | End: 2025-02-05 | Stop reason: HOSPADM

## 2025-02-05 RX ORDER — SODIUM CHLORIDE 0.9 % (FLUSH) 0.9 %
3 SYRINGE (ML) INJECTION EVERY 12 HOURS SCHEDULED
Status: DISCONTINUED | OUTPATIENT
Start: 2025-02-05 | End: 2025-02-05 | Stop reason: HOSPADM

## 2025-02-05 RX ORDER — HYDROCODONE BITARTRATE AND ACETAMINOPHEN 7.5; 325 MG/1; MG/1
1 TABLET ORAL EVERY 6 HOURS PRN
COMMUNITY
End: 2025-02-08 | Stop reason: HOSPADM

## 2025-02-05 RX ORDER — ONDANSETRON 2 MG/ML
4 INJECTION INTRAMUSCULAR; INTRAVENOUS ONCE AS NEEDED
Status: DISCONTINUED | OUTPATIENT
Start: 2025-02-05 | End: 2025-02-05 | Stop reason: HOSPADM

## 2025-02-05 RX ADMIN — SODIUM CHLORIDE, POTASSIUM CHLORIDE, SODIUM LACTATE AND CALCIUM CHLORIDE: 600; 310; 30; 20 INJECTION, SOLUTION INTRAVENOUS at 14:55

## 2025-02-05 RX ADMIN — METOPROLOL TARTRATE 1 MG: 1 INJECTION, SOLUTION INTRAVENOUS at 13:50

## 2025-02-05 RX ADMIN — HYDROMORPHONE HYDROCHLORIDE 0.5 MG: 1 INJECTION, SOLUTION INTRAMUSCULAR; INTRAVENOUS; SUBCUTANEOUS at 14:41

## 2025-02-05 RX ADMIN — OXYCODONE AND ACETAMINOPHEN 1 TABLET: 7.5; 325 TABLET ORAL at 21:54

## 2025-02-05 RX ADMIN — SODIUM CHLORIDE 2000 MG: 900 INJECTION INTRAVENOUS at 13:53

## 2025-02-05 RX ADMIN — FENTANYL CITRATE 50 MCG: 50 INJECTION INTRAMUSCULAR; INTRAVENOUS at 15:08

## 2025-02-05 RX ADMIN — PANTOPRAZOLE SODIUM 40 MG: 40 TABLET, DELAYED RELEASE ORAL at 17:54

## 2025-02-05 RX ADMIN — SODIUM CHLORIDE, POTASSIUM CHLORIDE, SODIUM LACTATE AND CALCIUM CHLORIDE 500 ML: 600; 310; 30; 20 INJECTION, SOLUTION INTRAVENOUS at 10:49

## 2025-02-05 RX ADMIN — EMPAGLIFLOZIN 10 MG: 10 TABLET, FILM COATED ORAL at 17:58

## 2025-02-05 RX ADMIN — HYDROMORPHONE HYDROCHLORIDE 0.5 MG: 1 INJECTION, SOLUTION INTRAMUSCULAR; INTRAVENOUS; SUBCUTANEOUS at 14:49

## 2025-02-05 RX ADMIN — METFORMIN HYDROCHLORIDE 1000 MG: 1000 TABLET, FILM COATED ORAL at 17:54

## 2025-02-05 RX ADMIN — FENTANYL CITRATE 50 MCG: 50 INJECTION, SOLUTION INTRAMUSCULAR; INTRAVENOUS at 11:05

## 2025-02-05 RX ADMIN — FENTANYL CITRATE 100 MCG: 50 INJECTION INTRAMUSCULAR; INTRAVENOUS at 13:23

## 2025-02-05 RX ADMIN — PREGABALIN 75 MG: 75 CAPSULE ORAL at 10:38

## 2025-02-05 RX ADMIN — ROPIVACAINE HYDROCHLORIDE 15 ML: 2 INJECTION, SOLUTION EPIDURAL; INFILTRATION at 11:28

## 2025-02-05 RX ADMIN — PROPOFOL 200 MG: 10 INJECTION, EMULSION INTRAVENOUS at 13:23

## 2025-02-05 RX ADMIN — Medication 20 MG: at 13:30

## 2025-02-05 RX ADMIN — ROCURONIUM BROMIDE 50 MG: 10 INJECTION, SOLUTION INTRAVENOUS at 13:23

## 2025-02-05 RX ADMIN — HYDROCODONE BITARTRATE AND ACETAMINOPHEN 1 TABLET: 5; 325 TABLET ORAL at 15:45

## 2025-02-05 RX ADMIN — HYDROMORPHONE HYDROCHLORIDE 0.5 MG: 1 INJECTION, SOLUTION INTRAMUSCULAR; INTRAVENOUS; SUBCUTANEOUS at 16:15

## 2025-02-05 RX ADMIN — PROPOFOL 125 MCG/KG/MIN: 10 INJECTION, EMULSION INTRAVENOUS at 13:26

## 2025-02-05 RX ADMIN — LOSARTAN POTASSIUM 25 MG: 25 TABLET, FILM COATED ORAL at 17:54

## 2025-02-05 RX ADMIN — FENTANYL CITRATE 50 MCG: 50 INJECTION, SOLUTION INTRAMUSCULAR; INTRAVENOUS at 12:37

## 2025-02-05 RX ADMIN — Medication 1500 MG: at 13:53

## 2025-02-05 RX ADMIN — ROSUVASTATIN CALCIUM 10 MG: 10 TABLET, FILM COATED ORAL at 17:54

## 2025-02-05 RX ADMIN — HYDROMORPHONE HYDROCHLORIDE 0.5 MG: 1 INJECTION, SOLUTION INTRAMUSCULAR; INTRAVENOUS; SUBCUTANEOUS at 16:01

## 2025-02-05 RX ADMIN — Medication 10 MG: at 13:35

## 2025-02-05 RX ADMIN — FENTANYL CITRATE 50 MCG: 50 INJECTION, SOLUTION INTRAMUSCULAR; INTRAVENOUS at 15:45

## 2025-02-05 RX ADMIN — OXYCODONE AND ACETAMINOPHEN 1 TABLET: 7.5; 325 TABLET ORAL at 17:55

## 2025-02-05 RX ADMIN — MIDAZOLAM 2 MG: 1 INJECTION INTRAMUSCULAR; INTRAVENOUS at 11:21

## 2025-02-05 RX ADMIN — SUGAMMADEX 400 MG: 100 INJECTION, SOLUTION INTRAVENOUS at 15:01

## 2025-02-05 RX ADMIN — ROCURONIUM BROMIDE 20 MG: 10 INJECTION, SOLUTION INTRAVENOUS at 13:40

## 2025-02-05 RX ADMIN — LIDOCAINE HYDROCHLORIDE 100 MG: 20 INJECTION, SOLUTION EPIDURAL; INFILTRATION; INTRACAUDAL; PERINEURAL at 13:23

## 2025-02-05 RX ADMIN — CHLORTHALIDONE 12.5 MG: 25 TABLET ORAL at 17:54

## 2025-02-05 RX ADMIN — VERAPAMIL HYDROCHLORIDE 240 MG: 240 TABLET, FILM COATED, EXTENDED RELEASE ORAL at 20:26

## 2025-02-05 RX ADMIN — ONDANSETRON 4 MG: 2 INJECTION INTRAMUSCULAR; INTRAVENOUS at 15:00

## 2025-02-05 NOTE — OP NOTE
Name: Anthony Sol Jr.  YOB: 1970    DATE OF SURGERY: 2/5/2025    PREOPERATIVE DIAGNOSIS: Right total knee replacement acute hematoma likely infection    POSTOPERATIVE DIAGNOSIS: Right total replacement acute hematoma likely infection PROCEDURE PERFORMED: Right total knee replacement irrigation and debridement polyethylene exchange and antibiotic bead placement    SURGEON: Pratik Durham M.D.    ASSISTANT: OLEGARIO RODAS    A surgical assistant was integral in ensuring a successful outcome with this procedure.  The assistant was utilized to assist in positioning the patient, draping the patient, was used throughout the case to provide with retraction of tissues, suctioning of blood and body fluids for visualization, positioning of the extremity to allow for proper exposure so that I could perform the procedure.  Without the use of a surgical assistant during this procedure I feel that the outcome may have been compromised or would have been suboptimal or at risk for complications.    IMPLANTS:      Implant Name Type Inv. Item Serial No.  Lot No. LRB No. Used Action   BONE FILLER VOID RAPID CURE STIMULAN 20CC - [id number] Implant BONE FILLER VOID RAPID CURE STIMULAN 20CC  [employer] [id number] Right 1 Implanted   INSRT TIB KN GEN2 CNSTR ART SZ5TO6 18MM - [id number] Implant INSRT TIB KN GEN2 CNSTR ART SZ5TO6 18MM  [employer] [id number] Right 1 Explanted   INSRT TIB GEN2 CNSTR ART SZ5TO6 15MM - [id number] Implant INSRT TIB GEN2 CNSTR ART SZ5TO6 15MM  [employer] [id number] Right 1 Implanted   DEV CONTRL TISS STRATAFIX SYMM PDS PLUS JANUARY CT-1 45CM - [id number] Implant DEV CONTRL TISS STRATAFIX SYMM PDS PLUS JANUARY CT-1 45CM  [employer]  DIV [employer] J [id number] Right 1 Implanted   DEV CONTRL TISS STRATAFIXSPIRALMNCRYL PLSPS2 REV3/0 45CM - [id number] Implant DEV CONTRL TISS STRATAFIXSPIRALMNCRYL PLSPS2 REV3/0 45CM  [employer]  DIV OF J AND J [id number] Right 1 Implanted   DEV CONTRL TISS  STRATAFIX SYMM PDS PLUS JANUARY CT-1 60CM - [id number] Implant DEV CONTRL TISS STRATAFIX SYMM PDS PLUS JANUARY CT-1 60CM  [employer]  DIV OF J AND J [id number] Right 1 Implanted       Estimated Blood Loss: 200 mL  Specimens : none  Complications: none      DESCRIPTION OF PROCEDURE: The patient was taken to the operating room and placed in the supine position. A sequential compression device was carefully placed on the non-operative leg. Preoperative antibiotics were administered. Surgical time out was performed. After adequate induction of anesthesia, the leg was prepped and draped in the usual sterile fashion, exsanguinated with an Esmarch bandage and the tourniquet inflated to 250 mmHg. A midline incision was performed through the previous incision followed by a medial parapatellar arthrotomy.  Subcutaneous hematoma cultures x 3 were taken and labeled subcutaneous right knee.  The arthrotomy was then noted to have some serous fluid which was leaking from it.  I placed a needle percutaneously through and set this off for cell count with differential.  I then open the arthrotomy there is a large amount of inflammatory tissue and fluid throughout the knee most likely consistent with infection but not definitive.  There was a large amount of infectious-appearing fluid within the joint. There was evidence of inflammatory synovitis.  The medial retinaculum was then carefully released from the proximal medial tibia with electrocautery staying directly on bone.The patellar tendon scar tissue was gently released from a portion of the proximal tibia down to the level of the patellar tendon insertion to allow for patellar mobilization. The patella was subluxed laterally. The femoral and tibial components appeared to be without damage and appeared well fixed to the bone.  There were multiple absorbable sutures within the arthrotomy and these were all removed.   I then curetted out the inflammatory tissue which had formed in the medial and  lateral gutters and suprapatellar region.  We had already done a synovectomy at the time of the procedure 2 weeks ago.  Along the medial and lateral gutters and suprapatellar pouch and then removed the polyethylene insert.  We debrided the posterior aspect of the joint.  At this point we copiously irrigated the knee with multiple cycles of pulse lavage saline followed by dilute Betadine solution.  We irrigated with 1 L of Bactisure solution.  At this point we returned to chlorhexidine dilute irrigation and dilute Betadine irrigation and this was done multiple times and the implants were also scrubbed with Betadine solution using a sterile sponge.  At this point we changed gowns, gloves,  suction tips and placed sterile drapes around the knee.  All of the contaminated instruments were then taken away and we used a sterile Hart stand with new instruments.  We inserted the new polyethylene insert into the tibial component and it locked in nicely.  Elected to use a 15 mm constrained insert rather than the previous 18 mm constrained insert as the knee was fairly tight and the soft tissues were quite tight.  We then placed 20 mL of Stimulan Rapid Cure beads with 1 g of vancomycin powder and 0.5 gm of tobramycin powder into the gutters and suprapatellar pouch.   The tourniquet was then released. There was excellent hemostasis. We placed a one-eighth inch Hemovac drain. We closed the knee in multiple layers in standard fashion. Sterile dressings were applied. At the end of the case, the sponge and needle counts were reported as being correct. There were no known complications. The patient was then transported to the recovery room.    Pratik Durham   2/5/2025

## 2025-02-05 NOTE — DISCHARGE PLACEMENT REQUEST
"Anthony Sol Jr. (54 y.o. Male)       Date of Birth   1970    Social Security Number       Address   [address] [address]    Home Phone   [phone]    MRN   [id number]       [place]   Judaism    Marital Status                               Admission Date   2/5/25    Admission Type   Elective    Admitting Provider   Pratik Durham MD    Attending Provider   Pratik Durham MD    Department, Room/Bed   [address], P782/1       Discharge Date       Discharge Disposition       Discharge Destination                                 Attending Provider: Pratik Durham MD    Allergies: No Known Allergies    Isolation: None   Infection: None   Code Status: CPR    Ht: 182.9 cm (72.01\")   Wt: 105 kg (232 lb 2.3 oz)    Admission Cmt: None   Principal Problem: Status post right knee replacement [Z96.651]                   Active Insurance as of 2/5/2025       Primary Coverage       Payor Plan Insurance Group Employer/Plan Group    [employer]HE[employer] BLUE CROSS ANTHEM BLUE CROSS BLUE SHIELD PPO [id number]       Payor Plan Address Payor Plan Phone Number Payor Plan Fax Number Effective Dates    [address] [phone]  1/1/2023 - None Entered    [facility] [id number]         Subscriber Name Subscriber Birth Date Member ID       CARLALONZOI D 7/2/1970 [id number]                     Emergency Contacts        (Rel.) Home Phone Work Phone Mobile Phone    Nury Sol (Spouse) [phone] -- --            "

## 2025-02-05 NOTE — ANESTHESIA PROCEDURE NOTES
Peripheral Block    Pre-sedation assessment completed: 2/5/2025 11:20 AM    Patient reassessed immediately prior to procedure    Patient location during procedure: pre-op  Start time: 2/5/2025 11:20 AM  Stop time: 2/5/2025 11:28 AM  Reason for block: at surgeon's request and post-op pain management  Performed by  Anesthesiologist: Kem Palacio MD  Preanesthetic Checklist  Completed: patient identified, IV checked, site marked, risks and benefits discussed, surgical consent, monitors and equipment checked, pre-op evaluation and timeout performed  Prep:  Pt Position: supine  Sterile barriers:cap, gloves, mask and sterile barriers  Prep: ChloraPrep  Patient monitoring: blood pressure monitoring, continuous pulse oximetry and EKG  Procedure    Sedation: yes  Performed under: local infiltration  Guidance:ultrasound guided and U/S used to identify structures for appropriate needle placement and to see local anesthetic disbursement    ULTRASOUND INTERPRETATION.  Using ultrasound guidance a 22 G gauge needle was placed in close proximity to the nerve, at which point, under ultrasound guidance anesthetic was injected in the area of the nerve and spread of the anesthesia was seen on ultrasound in close proximity thereto.  There were no abnormalities seen on ultrasound; a digital image was taken; and the patient tolerated the procedure with no complications. Images:still images obtained, printed/placed on chart (U/S to localize the nerve)    Laterality:right  Block Type:adductor canal block  Injection Technique:single-shot  Needle Type:echogenic  Needle Gauge:22 G  Resistance on Injection: less than 15 psi    Medications Used: ropivacaine (NAROPIN) 0.2 % injection - Injection   15 mL - 2/5/2025 11:28:00 AM      Post Assessment  Injection Assessment: negative aspiration for heme, no paresthesia on injection and incremental injection  Patient Tolerance:comfortable throughout block  Complications:no  Performed by: Kem Palacio  MD Viktor

## 2025-02-05 NOTE — ANESTHESIA PROCEDURE NOTES
Airway  Urgency: elective    Date/Time: 2/5/2025 1:26 PM  Airway not difficult    General Information and Staff    Patient location during procedure: OR  CRNA/CAA: Marilia Saucedo CRNA    Indications and Patient Condition  Indications for airway management: airway protection    Preoxygenated: yes  MILS not maintained throughout  Mask difficulty assessment: 1 - vent by mask    Final Airway Details  Final airway type: endotracheal airway      Successful airway: ETT  Cuffed: yes   Successful intubation technique: direct laryngoscopy  Endotracheal tube insertion site: oral  Blade: Hui  Blade size: 4  ETT size (mm): 8.0  Cormack-Lehane Classification: grade IIa - partial view of glottis  Placement verified by: chest auscultation and capnometry   Measured from: lips  ETT/EBT  to lips (cm): 23  Number of attempts at approach: 1  Assessment: lips, teeth, and gum same as pre-op and atraumatic intubation    Additional Comments  Dentition intact and unchanged. CBEBS.  +ETCO2.

## 2025-02-05 NOTE — PLAN OF CARE
Goal Outcome Evaluation:  Plan of Care Reviewed With: patient        Progress: no change  Outcome Evaluation: Patient arrived to floor earlier from [facility] following R knee hematoma evac, poly exchange, atb beads and washout. Able to ambulate from stretcher to bed with gait belt, assisit of 2, and walker. Patient has his own walker at home. Family at bedside. A & O. Pain 6/10. Plans to utilize meds to beds at d/c.  No s/s of distress noted.

## 2025-02-05 NOTE — ANESTHESIA PREPROCEDURE EVALUATION
Anesthesia Evaluation     NPO Solid Status: > 8 hours             Airway   Mallampati: I  No difficulty expected  Dental      Pulmonary    Cardiovascular     (+) hypertension, CAD, DVT, hyperlipidemia      Neuro/Psych  GI/Hepatic/Renal/Endo    (+) obesity, GERD, renal disease-, diabetes mellitus    Musculoskeletal     Abdominal    Substance History      OB/GYN          Other   arthritis,                 Anesthesia Plan    ASA 3     general with block     (Regional for [facility] PSR  )  intravenous induction     Anesthetic plan, risks, benefits, and alternatives have been provided, discussed and informed consent has been obtained with: patient.    CODE STATUS:

## 2025-02-05 NOTE — PROGRESS NOTES
Patient is current with [facility]. Will follow for dc plan/ home health needs.

## 2025-02-06 ENCOUNTER — APPOINTMENT (OUTPATIENT)
Dept: CARDIOLOGY | Facility: HOSPITAL | Age: 55
DRG: 486 | End: 2025-02-06
Payer: COMMERCIAL

## 2025-02-06 ENCOUNTER — DOCUMENTATION (OUTPATIENT)
Dept: HOME HEALTH SERVICES | Facility: HOME HEALTHCARE | Age: 55
End: 2025-02-06
Payer: COMMERCIAL

## 2025-02-06 LAB

## 2025-02-06 PROCEDURE — 93970 EXTREMITY STUDY: CPT

## 2025-02-06 PROCEDURE — 25810000003 SODIUM CHLORIDE 0.9 % SOLUTION: Performed by: NURSE PRACTITIONER

## 2025-02-06 PROCEDURE — 25810000003 SODIUM CHLORIDE 0.9 % SOLUTION 250 ML FLEX CONT: Performed by: STUDENT IN AN ORGANIZED HEALTH CARE EDUCATION/TRAINING PROGRAM

## 2025-02-06 PROCEDURE — G0378 HOSPITAL OBSERVATION PER HR: HCPCS

## 2025-02-06 PROCEDURE — 25010000002 VANCOMYCIN 1 G RECONSTITUTED SOLUTION 1 EACH VIAL: Performed by: STUDENT IN AN ORGANIZED HEALTH CARE EDUCATION/TRAINING PROGRAM

## 2025-02-06 PROCEDURE — 85014 HEMATOCRIT: CPT | Performed by: NURSE PRACTITIONER

## 2025-02-06 PROCEDURE — 97161 PT EVAL LOW COMPLEX 20 MIN: CPT

## 2025-02-06 PROCEDURE — 97110 THERAPEUTIC EXERCISES: CPT

## 2025-02-06 PROCEDURE — 25010000002 VANCOMYCIN 10 G RECONSTITUTED SOLUTION: Performed by: NURSE PRACTITIONER

## 2025-02-06 PROCEDURE — 93970 EXTREMITY STUDY: CPT | Performed by: STUDENT IN AN ORGANIZED HEALTH CARE EDUCATION/TRAINING PROGRAM

## 2025-02-06 PROCEDURE — 85018 HEMOGLOBIN: CPT | Performed by: NURSE PRACTITIONER

## 2025-02-06 PROCEDURE — 99222 1ST HOSP IP/OBS MODERATE 55: CPT | Performed by: STUDENT IN AN ORGANIZED HEALTH CARE EDUCATION/TRAINING PROGRAM

## 2025-02-06 RX ORDER — OXYCODONE AND ACETAMINOPHEN 7.5; 325 MG/1; MG/1
2 TABLET ORAL EVERY 4 HOURS PRN
Status: DISCONTINUED | OUTPATIENT
Start: 2025-02-06 | End: 2025-02-08 | Stop reason: HOSPADM

## 2025-02-06 RX ADMIN — OXYCODONE AND ACETAMINOPHEN 2 TABLET: 7.5; 325 TABLET ORAL at 09:17

## 2025-02-06 RX ADMIN — OXYCODONE AND ACETAMINOPHEN 1 TABLET: 7.5; 325 TABLET ORAL at 01:59

## 2025-02-06 RX ADMIN — VERAPAMIL HYDROCHLORIDE 240 MG: 240 TABLET, FILM COATED, EXTENDED RELEASE ORAL at 20:05

## 2025-02-06 RX ADMIN — METFORMIN HYDROCHLORIDE 1000 MG: 1000 TABLET, FILM COATED ORAL at 09:14

## 2025-02-06 RX ADMIN — OXYCODONE AND ACETAMINOPHEN 2 TABLET: 7.5; 325 TABLET ORAL at 22:50

## 2025-02-06 RX ADMIN — OXYCODONE AND ACETAMINOPHEN 2 TABLET: 7.5; 325 TABLET ORAL at 14:12

## 2025-02-06 RX ADMIN — CHLORTHALIDONE 12.5 MG: 25 TABLET ORAL at 09:14

## 2025-02-06 RX ADMIN — EMPAGLIFLOZIN 10 MG: 10 TABLET, FILM COATED ORAL at 09:14

## 2025-02-06 RX ADMIN — METFORMIN HYDROCHLORIDE 1000 MG: 1000 TABLET, FILM COATED ORAL at 17:12

## 2025-02-06 RX ADMIN — POLYETHYLENE GLYCOL 3350 17 G: 17 POWDER, FOR SOLUTION ORAL at 20:05

## 2025-02-06 RX ADMIN — OXYCODONE AND ACETAMINOPHEN 1 TABLET: 7.5; 325 TABLET ORAL at 05:42

## 2025-02-06 RX ADMIN — VANCOMYCIN HYDROCHLORIDE 1500 MG: 10 INJECTION, POWDER, LYOPHILIZED, FOR SOLUTION INTRAVENOUS at 01:53

## 2025-02-06 RX ADMIN — OXYCODONE AND ACETAMINOPHEN 2 TABLET: 7.5; 325 TABLET ORAL at 18:08

## 2025-02-06 RX ADMIN — LOSARTAN POTASSIUM 25 MG: 25 TABLET, FILM COATED ORAL at 09:14

## 2025-02-06 RX ADMIN — VANCOMYCIN HYDROCHLORIDE 1000 MG: 1 INJECTION, POWDER, LYOPHILIZED, FOR SOLUTION INTRAVENOUS at 14:13

## 2025-02-06 RX ADMIN — POLYETHYLENE GLYCOL 3350 17 G: 17 POWDER, FOR SOLUTION ORAL at 09:14

## 2025-02-06 NOTE — THERAPY EVALUATION
Patient Name: Anthony Sol Jr.  : 1970    MRN: [id number]                              Today's Date: 2025       Admit Date: 2025    Visit Dx:     ICD-10-CM ICD-9-CM   1. Status post right knee replacement  Z96.651 V43.65     Patient Active Problem List   Diagnosis    Uncontrolled type 2 diabetes mellitus without complication, without long-term current use of insulin    Hyperlipidemia    Essential hypertension    DJD (degenerative joint disease) of knee    Type 2 diabetes mellitus with hyperglycemia    Hyponatremia    Acute kidney injury    Painful total knee replacement    Gastroesophageal reflux disease with esophagitis    Hypovitaminosis D    Exogenous obesity    Screening for colon cancer    Coronary artery disease involving native coronary artery of native heart without angina pectoris    Status post right knee replacement    OA (osteoarthritis) of knee     Past Medical History:   Diagnosis Date    Adhesive capsulitis of right knee     Arthritis     OSTEOARTHRITIS    Cardiomegaly     Chronic pain of right knee     Coronary artery disease     Deep vein thrombosis     Diabetes mellitus     Diverticulosis     Fatty liver     Fracture, finger rt thumb     GERD (gastroesophageal reflux disease)     H/O blood clots     rt calf  following rt total knee surgery 2016    Heart murmur     History of hepatitis     AS A CHILD food related    Hyperlipidemia     Hypertension     Hypotestosteronism     Hypovitaminosis D     Knee swelling     Low back pain     Lumbosacral disc disease L5S1     Osteoarthritis     Tear of meniscus of knee  Rt     Past Surgical History:   Procedure Laterality Date    ADENOIDECTOMY      BACK SURGERY      LAMINECTOMY L5-S1    COLONOSCOPY N/A 2022    Procedure: COLONOSCOPY;  Surgeon: Marycruz Feliciano MD;  [place] [facility] [place]  Service: Gastroenterology;  Laterality: N/A;  Diverticulosis    JOINT MANIPULATION Right 2017    Procedure: MANIPULATION OF  RT KNEE;  Surgeon: Anthony Andino MD;  Location: [facility] OR;  Service:     JOINT REPLACEMENT  2018    KNEE ARTHROSCOPY Right     MULTIPLE    MI ARTHRP KNE CONDYLE&PLATU MEDIAL&LAT COMPARTMENTS Right 12/15/2016    Procedure: RT TOTAL KNEE ARTHROPLASTY;  Surgeon: Anthony Andino MD;  Location: [facility] OR;  Service: Orthopedics    MI REVJ TOTAL KNEE ARTHRP W/WO ALGRFT 1 COMPONENT Right 03/06/2017    Procedure: RIGHT TOTAL KNEE ARTHROPLASTY REVISION WITH LEFT KNEE STERIOID INJECTION;  Surgeon: Anthony Andino MD;  Location: [facility] OR;  Service: Orthopedics    TONSILLECTOMY      TOTAL KNEE ARTHROPLASTY REVISION Right 1/22/2025    Procedure: TOTAL KNEE ARTHROPLASTY REVISION;  Surgeon: Pratik Durham MD;  Location: [facility];  Service: Orthopedics;  Laterality: Right;      General Information       Row Name 02/06/25 1051          Physical Therapy Time and Intention    Document Type evaluation  Pt. is s/p Right TKR with I&D  -MS     Mode of Treatment physical therapy;individual therapy  -MS       Row Name 02/06/25 1051          General Information    Patient Profile Reviewed yes  -MS     Prior Level of Function independent:  -MS     Existing Precautions/Restrictions --   Exit alarm  -MS     Barriers to Rehab none identified  -MS       Row Name 02/06/25 1051          Cognition    Orientation Status (Cognition) oriented x 3  -MS       Row Name 02/06/25 1051          Safety Issues/Impairments Affecting Functional Mobility    Comment, Safety Issues/Impairments (Mobility) Gait belt used for safety.  -MS               User Key  (r) = Recorded By, (t) = Taken By, (c) = Cosigned By      Initials Name Provider Type    Ayan Cannon, PT Physical Therapist                   Mobility       Row Name 02/06/25 1051          Bed Mobility    Bed Mobility supine-sit;sit-supine  -MS     Supine-Sit [place] (Bed Mobility) standby assist  -MS     Sit-Supine [place] (Bed Mobility) standby assist  -MS        Row Name 02/06/25 1051          Sit-Stand Transfer    Sit-Stand [place] (Transfers) contact guard  -MS     Assistive Device (Sit-Stand Transfers) walker, front-wheeled  -MS       Row Name 02/06/25 1051          Gait/Stairs (Locomotion)    [place] Level (Gait) contact guard  -MS     Assistive Device (Gait) walker, front-wheeled  -MS     Distance in Feet (Gait) 80  -MS     Deviations/Abnormal Patterns (Gait) sunil decreased;antalgic  -MS       Row Name 02/06/25 1051          Mobility    Extremity Weight-bearing Status right lower extremity  -MS     Right Lower Extremity (Weight-bearing Status) weight-bearing as tolerated (WBAT)  -MS               User Key  (r) = Recorded By, (t) = Taken By, (c) = Cosigned By      Initials Name Provider Type    Ayan Cannon, PT Physical Therapist                   Obj/Interventions       Row Name 02/06/25 1052          Range of Motion Comprehensive    Comment, General Range of Motion BUE/LLE (WFL's)  -MS       Row Name 02/06/25 1052          Strength Comprehensive (MMT)    Comment, General Manual Muscle Testing (MMT) Assessment BUE/LLE (>/=3/5)  -MS       Row Name 02/06/25 1052          Motor Skills    Therapeutic Exercise --  Right TKR ther. ex. program x 10 reps completed  -MS               User Key  (r) = Recorded By, (t) = Taken By, (c) = Cosigned By      Initials Name Provider Type    MS StarkAyan, PT Physical Therapist                   Goals/Plan       Row Name 02/06/25 1054          Bed Mobility Goal 1 (PT)    Activity/Assistive Device (Bed Mobility Goal 1, PT) bed mobility activities, all  -MS     [place] Level/Cues Needed (Bed Mobility Goal 1, PT) independent  -MS     Time Frame (Bed Mobility Goal 1, PT) long term goal (LTG);3 days  -MS       Row Name 02/06/25 1054          Transfer Goal 1 (PT)    Activity/Assistive Device (Transfer Goal 1, PT) transfers, all;walker, rolling  -MS     [place] Level/Cues Needed (Transfer Goal 1, PT)  independent  -MS     Time Frame (Transfer Goal 1, PT) long term goal (LTG);3 days  -MS       Row Name 02/06/25 1054          Gait Training Goal 1 (PT)    Activity/Assistive Device (Gait Training Goal 1, PT) gait (walking locomotion);walker, rolling  -MS     [place] Level (Gait Training Goal 1, PT) independent  -MS     Distance (Gait Training Goal 1, PT) 150 feet  -MS     Time Frame (Gait Training Goal 1, PT) long term goal (LTG);3 days  -MS       Row Name 02/06/25 1054          ROM Goal 1 (PT)    ROM Goal 1 (PT) (5, 75)  -MS     Time Frame (ROM Goal 1, PT) long-term goal (LTG);3 days  -MS     Strategies/Barriers (ROM Goal 1, PT) AROM/PROM  -MS       Row Name 02/06/25 1054          Stairs Goal 1 (PT)    Activity/Assistive Device (Stairs Goal 1, PT) stairs, all skills  -MS     [place] Level/Cues Needed (Stairs Goal 1, PT) standby assist  -MS     Number of Stairs (Stairs Goal 1, PT) 3  -MS     Time Frame (Stairs Goal 1, PT) long term goal (LTG);3 days  -MS       Row Name 02/06/25 1054          Therapy Assessment/Plan (PT)    Planned Therapy Interventions (PT) balance training;bed mobility training;gait training;home exercise program;patient/family education;postural re-education;transfer training;strengthening;stair training;stretching;ROM (range of motion)  -MS               User Key  (r) = Recorded By, (t) = Taken By, (c) = Cosigned By      Initials Name Provider Type    Ayan Cannon, PT Physical Therapist                   Clinical Impression       Row Name 02/06/25 1053          Pain    Pretreatment Pain Rating 4/10  -MS     Posttreatment Pain Rating 4/10  -MS     Pain Location knee  -MS     Pain Side/Orientation right  -MS     Pain Management Interventions cold applied;nursing notified;premedicated for activity;positioning techniques utilized  -MS       Row Name 02/06/25 1053          Plan of Care Review    Plan of Care Reviewed With patient  -MS       Row Name 02/06/25 1053          Therapy  Assessment/Plan (PT)    Rehab Potential (PT) good  -MS     Criteria for Skilled Interventions Met (PT) skilled treatment is necessary  -MS     Therapy Frequency (PT) daily  -MS       Row Name 02/06/25 1053          Positioning and Restraints    Pre-Treatment Position in bed  -MS     Post Treatment Position bed  -MS     In Bed notified nsg;supine;call light within reach;encouraged to call for assist;exit alarm on  Ice pack to Right knee  -MS               User Key  (r) = Recorded By, (t) = Taken By, (c) = Cosigned By      Initials Name Provider Type    Ayan Cannon, PT Physical Therapist                   Outcome Measures       Row Name 02/06/25 1058          How much help from another person do you currently need...    Turning from your back to your side while in flat bed without using bedrails? 4  -MS     Moving from lying on back to sitting on the side of a flat bed without bedrails? 3  -MS     Moving to and from a bed to a chair (including a wheelchair)? 3  -MS     Standing up from a chair using your arms (e.g., wheelchair, bedside chair)? 3  -MS     Climbing 3-5 steps with a railing? 3  -MS     To walk in hospital room? 3  -MS     AM-PAC 6 Clicks Score (PT) 19  -MS     Highest Level of Mobility Goal 6 --> Walk 10 steps or more  -MS       Row Name 02/06/25 1058          Functional Assessment    Outcome Measure Options AM-PAC 6 Clicks Basic Mobility (PT)  -MS               User Key  (r) = Recorded By, (t) = Taken By, (c) = Cosigned By      Initials Name Provider Type    Ayan Cannon, PT Physical Therapist                                 Physical Therapy Education       Title: PT OT SLP Therapies (Done)       Topic: Physical Therapy (Done)       Point: Mobility training (Done)       Learning Progress Summary            Patient Acceptance, E,D, VU,NR by MS at 2/6/2025 1100                      Point: Home exercise program (Done)       Learning Progress Summary            Patient Acceptance, E,D,  VU,NR by MS at 2/6/2025 1100                      Point: Body mechanics (Done)       Learning Progress Summary            Patient Acceptance, E,D, VU,NR by MS at 2/6/2025 1100                      Point: Precautions (Done)       Learning Progress Summary            Patient Acceptance, E,D, VU,NR by MS at 2/6/2025 1100                                      User Key       Initials Effective Dates Name Provider Type Discipline    MS 06/16/21 -  Ayan Stark PT Physical Therapist PT                  PT Recommendation and Plan  Planned Therapy Interventions (PT): balance training, bed mobility training, gait training, home exercise program, patient/family education, postural re-education, transfer training, strengthening, stair training, stretching, ROM (range of motion)  Outcome Evaluation: Pt. presents with typical post op impairments related to TKR surgery that include decreased ROM, decreased strength, and decreased balance.  Pt. will benefit from skilled inpt. P.T. to address his functional deficits and to assist pt. in regaining his maximum level of independence with functional mobility.     Time Calculation:         PT Charges       Row Name 02/06/25 1107             Time Calculation    Start Time 0947  -MS      Stop Time 1005  -MS      Time Calculation (min) 18 min  -MS      PT Received On 02/06/25  -MS      PT - Next Appointment 02/07/25  -MS      PT Goal Re-Cert Due Date 02/09/25  -MS         Time Calculation- PT    Total Timed Code Minutes- PT 17 minute(s)  -MS                User Key  (r) = Recorded By, (t) = Taken By, (c) = Cosigned By      Initials Name Provider Type    MS Ayan Stark PT Physical Therapist                  Therapy Charges for Today       Code Description Service Date Service Provider Modifiers Qty    48882079103 HC PT EVAL LOW COMPLEXITY 2 2/6/2025 Ayan Stark, PT GP 1    77919202818 HC PT THER PROC EA 15 MIN 2/6/2025 Ayan Stark, PT GP 1            PT  G-Codes  Outcome Measure Options: AM-PAC 6 Clicks Basic Mobility (PT)  AM-PAC 6 Clicks Score (PT): 19  PT Discharge Summary  Anticipated Discharge Disposition (PT): home with assist, home with home health    Ayan Strak, PT  2/6/2025

## 2025-02-06 NOTE — PROGRESS NOTES
Orthopedic Progress Note      Patient: Anthony Sol Jr.  Date of Admission: 2/5/2025  YOB: 1970  Medical Record Number: [id number]    POD # :  1 Day Post-Op Procedure(s) (LRB):  HEMATOMA EVACUATION RIGHT KNEE, PLACEMENT ANTIBIOITIC BEADS, WASH OUT, POLY EXCHANGE (Right)    Systemic or Specific Complaints: Pain Control    Pain Relief: some relief    Physical Exam:  54 y.o.  male  Vitals:  Temp:  [98.1 °F (36.7 °C)-98.6 °F (37 °C)] 98.4 °F (36.9 °C)  Heart Rate:  [72-85] 72  Resp:  [15-20] 16  BP: (103-156)/(55-87) 117/68  alert and oriented  Chest: Clear to auscultation  CV: Regular Rate and Rhythm  Abd: Soft, NT, with BS +  Ext: NV intact. ROM appropriate. Calf is soft and nontender. Negative Homans sign  Skin: Incision clean dry and intact w/out signs or  symptoms of infection.    Activity: Mobilizing Per P.T.   Weight Bearing: As Tolerated    Data Review     Admission on 02/05/2025   Component Date Value Ref Range Status    Glucose 02/05/2025 123  70 - 130 mg/dL Final    Wound Culture 02/05/2025 No growth   Preliminary    Gram Stain 02/05/2025 Rare (1+) WBCs seen   Preliminary    Gram Stain 02/05/2025 No organisms seen   Preliminary    Wound Culture 02/05/2025 No growth   Preliminary    Gram Stain 02/05/2025 Rare (1+) WBCs seen   Preliminary    Gram Stain 02/05/2025 No organisms seen   Preliminary    Wound Culture 02/05/2025 No growth   Preliminary    Gram Stain 02/05/2025 Rare (1+) WBCs seen   Preliminary    Gram Stain 02/05/2025 No organisms seen   Preliminary    Wound Culture 02/05/2025 No growth   Preliminary    Gram Stain 02/05/2025 Rare (1+) WBCs seen   Preliminary    Gram Stain 02/05/2025 No organisms seen   Preliminary    Wound Culture 02/05/2025 No growth   Preliminary    Gram Stain 02/05/2025 Rare (1+) WBCs seen   Preliminary    Gram Stain 02/05/2025 No organisms seen   Preliminary    Wound Culture 02/05/2025 No growth   Preliminary    Gram Stain 02/05/2025 Rare (1+) WBCs seen    Preliminary    Gram Stain 02/05/2025 No organisms seen   Preliminary    Color, Fluid 02/05/2025 Red   Final    Appearance, Fluid 02/05/2025 Bloody (A)  Clear Final    RBC, Fluid 02/05/2025 560,000  /mm3 Final    Nucleated Cells, Fluid 02/05/2025 2,080  /mm3 Final    Method: 02/05/2025 Automated Sysmex XN Method   Final    Neutrophils, Fluid % 02/05/2025 91  % Final    Lymphocytes, Fluid % 02/05/2025 9  % Final    Hemoglobin 02/06/2025 9.6 (L)  13.0 - 17.7 g/dL Final    Hematocrit 02/06/2025 30.4 (L)  37.5 - 51.0 % Final       No results found.    Medications:  losartan, 25 mg, Oral, Q24H   And  chlorthalidone, 12.5 mg, Oral, Daily  empagliflozin, 10 mg, Oral, Daily  metFORMIN, 1,000 mg, Oral, BID With Meals  polyethylene glycol, 17 g, Oral, BID  rosuvastatin, 10 mg, Oral, Once per day on Monday Wednesday Friday  verapamil SR, 240 mg, Oral, Nightly        ondansetron ODT **OR** ondansetron    oxyCODONE-acetaminophen    oxyCODONE-acetaminophen    Assessment:  Doing well POD  # 1 Day Post-Op Procedure(s) (LRB):  HEMATOMA EVACUATION RIGHT KNEE, PLACEMENT ANTIBIOITIC BEADS, WASH OUT, POLY EXCHANGE (Right)  Problems Addressed this Visit       * (Principal) Status post right knee replacement    Relevant Medications    lactated ringers bolus 500 mL (Completed)    ethyl alcohol 62 % 2 each (Completed)    pregabalin (LYRICA) capsule 150 mg (Completed)    Other Relevant Orders    Anaerobic Culture - Surgical Site, Knee, Right    Anaerobic Culture - Surgical Site, Knee, Right    Anaerobic Culture - Surgical Site, Knee, Right    Wound Culture - Surgical Site, Knee, Right (Completed)    Wound Culture - Surgical Site, Knee, Right (Completed)    Wound Culture - Surgical Site, Knee, Right (Completed)    Body Fluid Cell Count With Differential - Body Fluid, Knee, Right (Completed)    Anaerobic Culture - Surgical Site, Knee, Right    Anaerobic Culture - Surgical Site, Knee, Right    Anaerobic Culture - Surgical Site, Knee, Right     Wound Culture - Surgical Site, Knee, Right (Completed)    Wound Culture - Surgical Site, Knee, Right (Completed)    Wound Culture - Surgical Site, Knee, Right (Completed)     Diagnoses         Codes Comments    Status post right knee replacement     ICD-10-CM: Z96.651  ICD-9-CM: V43.65             Plan:  Continue efforts to mobilize - WBAT  Continue Pain Control Measures - Orals  Continue incisional Care - СЕРГЕЙ  DVT prophylaxis - ASA 81mg BID  ABX per ID    Discharge Plan:Home when medically cleared by ID    LAMAR Holguin    Date: 2/6/2025  Time: 08:16 EST

## 2025-02-06 NOTE — PLAN OF CARE
Goal Outcome Evaluation:  Plan of Care Reviewed With: patient        Progress: no change  Outcome Evaluation: Patient A &O. Patient's pain improved from yesterday. PRN pain meds given x 2 this shift. Continues with IV atb therapy. Cultures pending. Makes needs known. No s/s of distress noted.

## 2025-02-06 NOTE — CONSULTS
"Referring Provider: Pratik Durham MD  Reason for Consultation:     Suspected infected total knee replacement         Subjective   History of present illness: Patient is a 54-year-old male status post right total knee revision for failed right TKA on 1/22 who presents with worsening pain and bruising.  ID consulted for \"suspected infection total knee replacement\".    Prior to admission patient was noted to have low-grade fevers up to 100 with increased pain and CRP peaking at 5.7.  Had not noticed any drainage but was having increased pain and bruising.  Took 7 days of cefdinir after initial procedure but has not been on antibiotics since then.    On presentation patient has been afebrile and CRP decreased to 4.82.  Status post hematoma evacuation and antibiotic bead placement with polyethylene liner exchange on 2/5.  Cultures been obtained.  Received perioperative vancomycin and cefazolin.    Past Medical History:   Diagnosis Date    Adhesive capsulitis of right knee     Arthritis     OSTEOARTHRITIS    Cardiomegaly     Chronic pain of right knee     Coronary artery disease     Deep vein thrombosis     Diabetes mellitus     Diverticulosis     Fatty liver     Fracture, finger rt thumb 1987    GERD (gastroesophageal reflux disease)     H/O blood clots     rt calf  following rt total knee surgery 12/2016    Heart murmur     History of hepatitis     AS A CHILD food related    Hyperlipidemia     Hypertension     Hypotestosteronism     Hypovitaminosis D     Knee swelling     Low back pain     Lumbosacral disc disease L5S1 2001    Osteoarthritis     Tear of meniscus of knee 1980 Rt       Past Surgical History:   Procedure Laterality Date    ADENOIDECTOMY      BACK SURGERY      LAMINECTOMY L5-S1    COLONOSCOPY N/A 05/23/2022    Procedure: COLONOSCOPY;  Surgeon: Marycruz Feliciano MD;  [place] [facility];  Service: Gastroenterology;  Laterality: N/A;  Diverticulosis    JOINT MANIPULATION Right 02/16/2017    Procedure: " MANIPULATION OF RT KNEE;  Surgeon: Anthony Andino MD;  Location: [facility] OR;  Service:     JOINT REPLACEMENT  2018    KNEE ARTHROSCOPY Right     MULTIPLE    CT ARTHRP KNE CONDYLE&PLATU MEDIAL&LAT COMPARTMENTS Right 12/15/2016    Procedure: RT TOTAL KNEE ARTHROPLASTY;  Surgeon: Anthony Andino MD;  Location: [facility] OR;  Service: Orthopedics    CT REVJ TOTAL KNEE ARTHRP W/WO ALGRFT 1 COMPONENT Right 03/06/2017    Procedure: RIGHT TOTAL KNEE ARTHROPLASTY REVISION WITH LEFT KNEE STERIOID INJECTION;  Surgeon: Anthony Andino MD;  Location: [facility] OR;  Service: Orthopedics    TONSILLECTOMY      TOTAL KNEE ARTHROPLASTY REVISION Right 1/22/2025    Procedure: TOTAL KNEE ARTHROPLASTY REVISION;  Surgeon: Pratik Durham MD;  Location: [facility];  Service: Orthopedics;  Laterality: Right;       family history includes Cancer in his paternal uncle; Colon polyps in his mother; Diabetes in his mother; Hypertension in his father and mother; No Known Problems in his brother.     reports that he has never smoked. He has never been exposed to tobacco smoke. He has never used smokeless tobacco. He reports that he does not currently use alcohol after a past usage of about 2.0 standard drinks of alcohol per week. He reports that he does not use drugs.     No Known Allergies    Medication:  Antibiotics:  Anti-Infectives (From admission, onward)      Ordered     Dose/Rate Route Frequency Start Stop    02/05/25 1712  vancomycin IVPB 1500 mg in 0.9% NaCl (Premix) 500 mL        Ordering Provider: Jin Benton APRN    15 mg/kg × 104 kg  333.3 mL/hr over 90 Minutes Intravenous Once 02/06/25 0100 02/06/25 0330    02/05/25 1421  tobramycin (NEBCIN) injection  Status:  Discontinued        Ordering Provider: Pratik Durham MD      As Needed 02/05/25 1421 02/05/25 1517    02/05/25 1420  vancomycin (VANCOCIN) injection  Status:  Discontinued        Ordering Provider: Pratik Durham MD      As Needed 02/05/25 1420 02/05/25 1517  "   02/05/25 1314  vancomycin IVPB 1500 mg in 0.9% NaCl (Premix) 500 mL        Ordering Provider: Pratik Durham MD    15 mg/kg × 104 kg  333.3 mL/hr over 90 Minutes Intravenous Once 02/05/25 1316 02/05/25 1744    02/05/25 1310  Pharmacy to dose vancomycin  Status:  Discontinued        Ordering Provider: Pratik Durham MD     Not Applicable Once 02/05/25 1312 02/05/25 1314    02/05/25 1310  ceFAZolin 2000 mg IVPB in 100 mL NS (MBP)        Ordering Provider: Pratik Durham MD    2,000 mg  over 30 Minutes Intravenous Once 02/05/25 1312 02/05/25 1746              Objective     Physical Exam:   Vital Signs   Temp:  [98.1 °F (36.7 °C)-98.6 °F (37 °C)] 98.4 °F (36.9 °C)  Heart Rate:  [72-85] 72  Resp:  [15-20] 16  BP: (103-156)/(55-87) 117/68    GENERAL: Awake and alert, in no acute distress.   HEENT: Oropharynx is clear. Hearing is grossly normal.   EYES: PERRL. No conjunctival injection. No lid lag.   LUNGS: Normal work of breathing.  SKIN: Right knee surgical site with dressing in place.  PSYCHIATRIC: Appropriate mood, affect, insight, and judgment.     Results Review:   I reviewed the patient's new clinical results.  I reviewed the patient's new imaging results and agree with the interpretation.  I reviewed the patient's other test results and agree with the interpretation    Lab Results   Component Value Date    WBC 7.38 01/31/2025    HGB 9.6 (L) 02/06/2025    HCT 30.4 (L) 02/06/2025    MCV 86.2 01/31/2025     01/31/2025       No results found for: \"VANCOPEAK\", \"VANCOTROUGH\", \"VANCORANDOM\"    Lab Results   Component Value Date    GLUCOSE 195 (H) 01/31/2025    BUN 15 01/31/2025    CREATININE 1.09 01/31/2025    EGFRIFNONA 83 07/12/2018    EGFRIFAFRI 100 07/12/2018    BCR 13.8 01/31/2025    CO2 26.2 01/31/2025    CALCIUM 9.4 01/31/2025    PROTENTOTREF 7.5 07/12/2018    ALBUMIN 4.0 01/31/2025    LABIL2 1.5 08/10/2020    AST 20 01/31/2025    ALT 20 01/31/2025         Estimated Creatinine Clearance: 94.8 mL/min (by " C-G formula based on SCr of 1.09 mg/dL).    Isolation:   No active isolations      Microbiology:  Microbiology Results (last 10 days)       Procedure Component Value - Date/Time    Wound Culture - Surgical Site, Knee, Right [[id number]] Collected: 02/05/25 1352    Lab Status: Preliminary result Specimen: Surgical Site from Knee, Right Updated: 02/06/25 0752     Wound Culture No growth     Gram Stain Rare (1+) WBCs seen      No organisms seen    Wound Culture - Surgical Site, Knee, Right [[id number]] Collected: 02/05/25 1352    Lab Status: Preliminary result Specimen: Surgical Site from Knee, Right Updated: 02/06/25 0751     Wound Culture No growth     Gram Stain Rare (1+) WBCs seen      No organisms seen    Wound Culture - Surgical Site, Knee, Right [[id number]] Collected: 02/05/25 1352    Lab Status: Preliminary result Specimen: Surgical Site from Knee, Right Updated: 02/06/25 0751     Wound Culture No growth     Gram Stain Rare (1+) WBCs seen      No organisms seen    Wound Culture - Surgical Site, Knee, Right [[id number]] Collected: 02/05/25 1350    Lab Status: Preliminary result Specimen: Surgical Site from Knee, Right Updated: 02/06/25 0752     Wound Culture No growth     Gram Stain Rare (1+) WBCs seen      No organisms seen    Wound Culture - Surgical Site, Knee, Right [[id number]] Collected: 02/05/25 1350    Lab Status: Preliminary result Specimen: Surgical Site from Knee, Right Updated: 02/06/25 0750     Wound Culture No growth     Gram Stain Rare (1+) WBCs seen      No organisms seen    Wound Culture - Surgical Site, Knee, Right [[id number]] Collected: 02/05/25 1350    Lab Status: Preliminary result Specimen: Surgical Site from Knee, Right Updated: 02/06/25 0751     Wound Culture No growth     Gram Stain Rare (1+) WBCs seen      No organisms seen             Radiology:  1/31 x-ray report reviewed with stable appearing hardware.    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      Plan to continue vancomycin goal -600 for now while following up cultures.  Appreciate pharmacy's help with dosing.  Follow vancomycin levels for therapeutic drug monitoring.    Discussed the risks and benefits of antibiotic treatment with the patient.  Discussed the standard treatment for culture-negative prosthetic joint infection.    Thank you for this consult.  We will continue to follow along and tailor antibiotics as the patient's clinical course evolves.

## 2025-02-06 NOTE — PROGRESS NOTES
Continued Stay Note  [facility]     Patient Name: Anthony Sol Jr.  MRN: [id number]  Today's Date: 2/6/2025    Admit Date: 2/5/2025    Plan: [facility]   Discharge Plan       Row Name 02/06/25 1614       Plan    Plan [facility]    Patient/Family in Agreement with Plan yes    Plan Comments Spoke with pt, verified correct information on facesheet and explained the role of CCP. Pt would like to d/c home with [facility], referral sent in Epic to [facility]. Plan will be to d/c home with [facility] and family support. No other needs identified.                   Discharge Codes    No documentation.                       Arabella Alonzo RN

## 2025-02-06 NOTE — PLAN OF CARE
Goal Outcome Evaluation:  Plan of Care Reviewed With: patient        Progress: improving  Outcome Evaluation: Pt remains stable. Up with 1 assist and walker use. СЕРГЕЙ is cdi with hv drain in place. Percocet PRN for pain. Infectious disease consult today. Cxs pending, Vancomycin IV for now. DC plan pending.

## 2025-02-06 NOTE — PROGRESS NOTES
"[facility] Clinical Pharmacy Services: Vancomycin Pharmacokinetic Initial Consult Note    Anthony Sol Jr. is a 54 y.o. male who is on day 1 of pharmacy to dose vancomycin.    Indication: Bone and/or Joint Infection  Consulting Provider: Dr. Vishnu LR  Planned Duration of Therapy: 7 days  Loading Dose Ordered or Given: 1500 mg on 2/5 at 1353    and 1500 mg on 2/6 @ 0153  ( pre-op)  MRSA PCR performed:  ; Result:    Culture/Source: 2/6 WC pending  Target: -600 mg/L.hr           Vitals/Labs  Ht: 182.9 cm (72.01\"); Wt: 105 kg (232 lb 2.3 oz)  Temp Readings from Last 1 Encounters:   02/06/25 98.5 °F (36.9 °C) (Oral)    Estimated Creatinine Clearance: 94.8 mL/min (by C-G formula based on SCr of 1.09 mg/dL).        Results from last 7 days   Lab Units 01/31/25  1816   CREATININE mg/dL 1.09   WBC 10*3/mm3 7.38     Assessment/Plan:    Vancomycin Dose:  will initiate  1000 mg IV every  12  hours  Predictive AUC level for the dose ordered is 469 mg/L.hr, which is within the target of 400-600 mg/L.hr  Vanc Trough has been ordered for 2/7 at 1330     Pharmacy will follow patient's kidney function and will adjust doses and obtain levels as necessary. Thank you for involving pharmacy in this patient's care. Please contact pharmacy with any questions or concerns.                           Mariela Dalal, [facility]  Clinical Pharmacist    "

## 2025-02-06 NOTE — PLAN OF CARE
Goal Outcome Evaluation:  Plan of Care Reviewed With: patient           Outcome Evaluation: Pt. presents with typical post op impairments related to TKR surgery that include decreased ROM, decreased strength, and decreased balance.  Pt. will benefit from skilled inpt. P.T. to address his functional deficits and to assist pt. in regaining his maximum level of independence with functional mobility.    Anticipated Discharge Disposition (PT): home with assist, home with home health

## 2025-02-07 LAB
HCT VFR BLD AUTO: 26.8 % (ref 37.5–51)
HGB BLD-MCNC: 8.9 G/DL (ref 13–17.7)
VANCOMYCIN TROUGH SERPL-MCNC: 8.5 MCG/ML (ref 5–20)

## 2025-02-07 PROCEDURE — 99024 POSTOP FOLLOW-UP VISIT: CPT | Performed by: NURSE PRACTITIONER

## 2025-02-07 PROCEDURE — 25010000002 VANCOMYCIN 1 G RECONSTITUTED SOLUTION 1 EACH VIAL: Performed by: STUDENT IN AN ORGANIZED HEALTH CARE EDUCATION/TRAINING PROGRAM

## 2025-02-07 PROCEDURE — 25810000003 SODIUM CHLORIDE 0.9 % SOLUTION 250 ML FLEX CONT: Performed by: STUDENT IN AN ORGANIZED HEALTH CARE EDUCATION/TRAINING PROGRAM

## 2025-02-07 PROCEDURE — 25010000002 VANCOMYCIN 10 G RECONSTITUTED SOLUTION: Performed by: STUDENT IN AN ORGANIZED HEALTH CARE EDUCATION/TRAINING PROGRAM

## 2025-02-07 PROCEDURE — G0545 PR INHERENT VISIT TO INPT: HCPCS | Performed by: STUDENT IN AN ORGANIZED HEALTH CARE EDUCATION/TRAINING PROGRAM

## 2025-02-07 PROCEDURE — 85018 HEMOGLOBIN: CPT | Performed by: NURSE PRACTITIONER

## 2025-02-07 PROCEDURE — 85014 HEMATOCRIT: CPT | Performed by: NURSE PRACTITIONER

## 2025-02-07 PROCEDURE — 25810000003 SODIUM CHLORIDE 0.9 % SOLUTION: Performed by: STUDENT IN AN ORGANIZED HEALTH CARE EDUCATION/TRAINING PROGRAM

## 2025-02-07 PROCEDURE — C1751 CATH, INF, PER/CENT/MIDLINE: HCPCS

## 2025-02-07 PROCEDURE — 25010000002 CEFTRIAXONE PER 250 MG: Performed by: STUDENT IN AN ORGANIZED HEALTH CARE EDUCATION/TRAINING PROGRAM

## 2025-02-07 PROCEDURE — 97530 THERAPEUTIC ACTIVITIES: CPT

## 2025-02-07 PROCEDURE — 99232 SBSQ HOSP IP/OBS MODERATE 35: CPT | Performed by: STUDENT IN AN ORGANIZED HEALTH CARE EDUCATION/TRAINING PROGRAM

## 2025-02-07 PROCEDURE — 86140 C-REACTIVE PROTEIN: CPT | Performed by: INTERNAL MEDICINE

## 2025-02-07 PROCEDURE — 80202 ASSAY OF VANCOMYCIN: CPT | Performed by: STUDENT IN AN ORGANIZED HEALTH CARE EDUCATION/TRAINING PROGRAM

## 2025-02-07 PROCEDURE — 02HV33Z INSERTION OF INFUSION DEVICE INTO SUPERIOR VENA CAVA, PERCUTANEOUS APPROACH: ICD-10-PCS | Performed by: ORTHOPAEDIC SURGERY

## 2025-02-07 RX ORDER — SODIUM CHLORIDE 0.9 % (FLUSH) 0.9 %
10 SYRINGE (ML) INJECTION EVERY 12 HOURS SCHEDULED
Status: DISCONTINUED | OUTPATIENT
Start: 2025-02-07 | End: 2025-02-08 | Stop reason: HOSPADM

## 2025-02-07 RX ORDER — SODIUM CHLORIDE 0.9 % (FLUSH) 0.9 %
20 SYRINGE (ML) INJECTION AS NEEDED
Status: DISCONTINUED | OUTPATIENT
Start: 2025-02-07 | End: 2025-02-08 | Stop reason: HOSPADM

## 2025-02-07 RX ORDER — SODIUM CHLORIDE 0.9 % (FLUSH) 0.9 %
10 SYRINGE (ML) INJECTION AS NEEDED
Status: DISCONTINUED | OUTPATIENT
Start: 2025-02-07 | End: 2025-02-08 | Stop reason: HOSPADM

## 2025-02-07 RX ORDER — VANCOMYCIN/0.9 % SOD CHLORIDE 1.5G/250ML
1500 PLASTIC BAG, INJECTION (ML) INTRAVENOUS EVERY 12 HOURS
Status: DISCONTINUED | OUTPATIENT
Start: 2025-02-07 | End: 2025-02-08 | Stop reason: HOSPADM

## 2025-02-07 RX ADMIN — VANCOMYCIN HYDROCHLORIDE 1000 MG: 1 INJECTION, POWDER, LYOPHILIZED, FOR SOLUTION INTRAVENOUS at 01:03

## 2025-02-07 RX ADMIN — OXYCODONE AND ACETAMINOPHEN 2 TABLET: 7.5; 325 TABLET ORAL at 14:13

## 2025-02-07 RX ADMIN — METFORMIN HYDROCHLORIDE 1000 MG: 1000 TABLET, FILM COATED ORAL at 17:33

## 2025-02-07 RX ADMIN — POLYETHYLENE GLYCOL 3350 17 G: 17 POWDER, FOR SOLUTION ORAL at 20:46

## 2025-02-07 RX ADMIN — ROSUVASTATIN CALCIUM 10 MG: 10 TABLET, FILM COATED ORAL at 08:04

## 2025-02-07 RX ADMIN — OXYCODONE AND ACETAMINOPHEN 2 TABLET: 7.5; 325 TABLET ORAL at 22:55

## 2025-02-07 RX ADMIN — CEFTRIAXONE 2000 MG: 2 INJECTION, POWDER, FOR SOLUTION INTRAMUSCULAR; INTRAVENOUS at 10:37

## 2025-02-07 RX ADMIN — OXYCODONE AND ACETAMINOPHEN 2 TABLET: 7.5; 325 TABLET ORAL at 06:32

## 2025-02-07 RX ADMIN — Medication 10 ML: at 20:46

## 2025-02-07 RX ADMIN — APIXABAN 2.5 MG: 2.5 TABLET, FILM COATED ORAL at 20:45

## 2025-02-07 RX ADMIN — VANCOMYCIN HYDROCHLORIDE 1500 MG: 10 INJECTION, POWDER, LYOPHILIZED, FOR SOLUTION INTRAVENOUS at 18:57

## 2025-02-07 RX ADMIN — OXYCODONE AND ACETAMINOPHEN 2 TABLET: 7.5; 325 TABLET ORAL at 18:27

## 2025-02-07 RX ADMIN — VERAPAMIL HYDROCHLORIDE 240 MG: 240 TABLET, FILM COATED, EXTENDED RELEASE ORAL at 22:15

## 2025-02-07 RX ADMIN — EMPAGLIFLOZIN 10 MG: 10 TABLET, FILM COATED ORAL at 08:04

## 2025-02-07 RX ADMIN — POLYETHYLENE GLYCOL 3350 17 G: 17 POWDER, FOR SOLUTION ORAL at 08:04

## 2025-02-07 RX ADMIN — METFORMIN HYDROCHLORIDE 1000 MG: 1000 TABLET, FILM COATED ORAL at 08:04

## 2025-02-07 RX ADMIN — OXYCODONE AND ACETAMINOPHEN 2 TABLET: 7.5; 325 TABLET ORAL at 02:27

## 2025-02-07 RX ADMIN — APIXABAN 2.5 MG: 2.5 TABLET, FILM COATED ORAL at 09:29

## 2025-02-07 RX ADMIN — OXYCODONE AND ACETAMINOPHEN 2 TABLET: 7.5; 325 TABLET ORAL at 10:36

## 2025-02-07 NOTE — PLAN OF CARE
Goal Outcome Evaluation:  Plan of Care Reviewed With: patient           Outcome Evaluation: Upon entering room, pt. supine in bed, awake/alert, and agreeable to work with P.T. this date despite c/o Right knee pain.  This AM, pt. able to ambulate 100 feet, SBA x 1, with use of Rwx.  Pt. is independent with bed mobility and requires SBA x1 for sit <-> stand transfers.  Pt. performed stair training (3 with Left sided handrail), CGA x 1.  Right TKR ther. ex. program x 10 reps completed for general strengthening.  Pt. able to increase knee flexion and perform SLR's on his own this date compared to last P.T. visit. Will continue to progress functional mobility as tolerated. Encouraged pt. to continue ther. ex. on his own throughout the day as well.    Anticipated Discharge Disposition (PT): home with assist, home with home health

## 2025-02-07 NOTE — PROGRESS NOTES
Orthopedic Progress Note      Patient: Anthony Sol Jr.  Date of Admission: 2/5/2025  YOB: 1970  Medical Record Number: [id number]    POD # :  2 Days Post-Op Procedure(s) (LRB):  HEMATOMA EVACUATION RIGHT KNEE, PLACEMENT ANTIBIOITIC BEADS, WASH OUT, POLY EXCHANGE (Right)    Systemic or Specific Complaints: No Complaints    Pain Relief: complete resolution    Physical Exam:  54 y.o.  male  Vitals:  Temp:  [98.3 °F (36.8 °C)-99 °F (37.2 °C)] 98.3 °F (36.8 °C)  Heart Rate:  [76-83] 76  Resp:  [16] 16  BP: (105-111)/(54-66) 111/54  alert and oriented  Ext: NV intact. ROM appropriate. Calf is soft and nontender. Negative Homans sign  Skin: Incision clean dry and intact w/out signs or  symptoms of infection.    Activity: Mobilizing Per P.T.   Weight Bearing: As Tolerated    Data Review     Admission on 02/05/2025   Component Date Value Ref Range Status    Glucose 02/05/2025 123  70 - 130 mg/dL Final    Wound Culture 02/05/2025 No growth at 2 days   Preliminary    Gram Stain 02/05/2025 Rare (1+) WBCs seen   Preliminary    Gram Stain 02/05/2025 No organisms seen   Preliminary    Wound Culture 02/05/2025 No growth at 2 days   Preliminary    Gram Stain 02/05/2025 Rare (1+) WBCs seen   Preliminary    Gram Stain 02/05/2025 No organisms seen   Preliminary    Wound Culture 02/05/2025 No growth at 2 days   Preliminary    Gram Stain 02/05/2025 Rare (1+) WBCs seen   Preliminary    Gram Stain 02/05/2025 No organisms seen   Preliminary    Wound Culture 02/05/2025 No growth at 2 days   Preliminary    Gram Stain 02/05/2025 Rare (1+) WBCs seen   Preliminary    Gram Stain 02/05/2025 No organisms seen   Preliminary    Wound Culture 02/05/2025 No growth at 2 days   Preliminary    Gram Stain 02/05/2025 Rare (1+) WBCs seen   Preliminary    Gram Stain 02/05/2025 No organisms seen   Preliminary    Wound Culture 02/05/2025 No growth at 2 days   Preliminary    Gram Stain 02/05/2025 Rare (1+) WBCs seen   Preliminary    Gram  Stain 02/05/2025 No organisms seen   Preliminary    Color, Fluid 02/05/2025 Red   Final    Appearance, Fluid 02/05/2025 Bloody (A)  Clear Final    RBC, Fluid 02/05/2025 560,000  /mm3 Final    Nucleated Cells, Fluid 02/05/2025 2,080  /mm3 Final    Method: 02/05/2025 Automated Sysmex XN Method   Final    Neutrophils, Fluid % 02/05/2025 91  % Final    Lymphocytes, Fluid % 02/05/2025 9  % Final    Right Common Femoral Spont 02/06/2025 Y   Final    Right Common Femoral Competent 02/06/2025 Y   Final    Right Common Femoral Phasic 02/06/2025 Y   Final    Right Common Femoral Compress 02/06/2025 C   Final    Right Common Femoral Augment 02/06/2025 Y   Final    Right Saphenofemoral Junction Comp* 02/06/2025 C   Final    Right Profunda Femoral Compress 02/06/2025 C   Final    Right Proximal Femoral Compress 02/06/2025 C   Final    Right Mid Femoral Spont 02/06/2025 Y   Final    Right Mid Femoral Competent 02/06/2025 Y   Final    Right Mid Femoral Phasic 02/06/2025 Y   Final    Right Mid Femoral Compress 02/06/2025 C   Final    Right Mid Femoral Augment 02/06/2025 Y   Final    Right Distal Femoral Compress 02/06/2025 C   Final    Right Popliteal Spont 02/06/2025 Y   Final    Right Popliteal Competent 02/06/2025 Y   Final    Right Popliteal Phasic 02/06/2025 Y   Final    Right Popliteal Compress 02/06/2025 C   Final    Right Popliteal Augment 02/06/2025 Y   Final    Right Posterior Tibial Compress 02/06/2025 C   Final    Right Peroneal Compress 02/06/2025 C   Final    Right Gastronemius Compress 02/06/2025 C   Final    Right Greater Saph AK Compress 02/06/2025 C   Final    Right Greater Saph BK Compress 02/06/2025 C   Final    Right Lesser Saph Compress 02/06/2025 C   Final    Left Common Femoral Spont 02/06/2025 Y   Final    Left Common Femoral Competent 02/06/2025 Y   Final    Left Common Femoral Phasic 02/06/2025 Y   Final    Left Common Femoral Compress 02/06/2025 C   Final    Left Common Femoral Augment 02/06/2025 Y    Final    Left Saphenofemoral Junction Compr* 02/06/2025 C   Final    Left Profunda Femoral Compress 02/06/2025 C   Final    Left Proximal Femoral Compress 02/06/2025 C   Final    Left Mid Femoral Spont 02/06/2025 Y   Final    Left Mid Femoral Competent 02/06/2025 Y   Final    Left Mid Femoral Phasic 02/06/2025 Y   Final    Left Mid Femoral Compress 02/06/2025 C   Final    Left Mid Femoral Augment 02/06/2025 Y   Final    Left Distal Femoral Compress 02/06/2025 C   Final    Left Popliteal Spont 02/06/2025 Y   Final    Left Popliteal Competent 02/06/2025 Y   Final    Left Popliteal Phasic 02/06/2025 Y   Final    Left Popliteal Compress 02/06/2025 C   Final    Left Popliteal Augment 02/06/2025 Y   Final    Left Posterior Tibial Compress 02/06/2025 C   Final    Left Peroneal Compress 02/06/2025 C   Final    Left Gastronemius Compress 02/06/2025 C   Final    Left Greater Saph AK Compress 02/06/2025 C   Final    Left Greater Saph BK Compress 02/06/2025 C   Final    Left Lesser Saph Compress 02/06/2025 C   Final    Hemoglobin 02/06/2025 9.6 (L)  13.0 - 17.7 g/dL Final    Hematocrit 02/06/2025 30.4 (L)  37.5 - 51.0 % Final    Hemoglobin 02/07/2025 8.9 (L)  13.0 - 17.7 g/dL Final    Hematocrit 02/07/2025 26.8 (L)  37.5 - 51.0 % Final       No results found.    Medications:  apixaban, 2.5 mg, Oral, Q12H  cefTRIAXone, 2,000 mg, Intravenous, Q24H  losartan, 25 mg, Oral, Q24H   And  chlorthalidone, 12.5 mg, Oral, Daily  empagliflozin, 10 mg, Oral, Daily  metFORMIN, 1,000 mg, Oral, BID With Meals  polyethylene glycol, 17 g, Oral, BID  rosuvastatin, 10 mg, Oral, Once per day on Monday Wednesday Friday  vancomycin, 1,000 mg, Intravenous, Q12H  verapamil SR, 240 mg, Oral, Nightly        ondansetron ODT **OR** ondansetron    oxyCODONE-acetaminophen    oxyCODONE-acetaminophen    Pharmacy to dose vancomycin    Assessment:  Doing well POD  # 2 Days Post-Op Procedure(s) (LRB):  HEMATOMA EVACUATION RIGHT KNEE, PLACEMENT ANTIBIOITIC BEADS,  WASH OUT, POLY EXCHANGE (Right)  Problems Addressed this Visit          Musculoskeletal and Injuries    * (Principal) Status post right knee replacement    Relevant Orders    Anaerobic Culture - Surgical Site, Knee, Right    Anaerobic Culture - Surgical Site, Knee, Right    Anaerobic Culture - Surgical Site, Knee, Right    Wound Culture - Surgical Site, Knee, Right (Completed)    Wound Culture - Surgical Site, Knee, Right (Completed)    Wound Culture - Surgical Site, Knee, Right (Completed)    Body Fluid Cell Count With Differential - Body Fluid, Knee, Right (Completed)    Anaerobic Culture - Surgical Site, Knee, Right    Anaerobic Culture - Surgical Site, Knee, Right    Anaerobic Culture - Surgical Site, Knee, Right    Wound Culture - Surgical Site, Knee, Right (Completed)    Wound Culture - Surgical Site, Knee, Right (Completed)    Wound Culture - Surgical Site, Knee, Right (Completed)     Diagnoses         Codes Comments    Status post right knee replacement     ICD-10-CM: Z96.651  ICD-9-CM: V43.65             Plan:  Continue efforts to mobilize  Continue Pain Control Measures  Continue incisional Care  DVT prophylaxis--resume Eliquis today, per Dr. Durham, RN notified  Will DC drain today, RN notified  Antibiotics per infectious disease  Will recheck hemoglobin hematocrit in a.m.    Discharge Plan:Home tomorrow if stable and if okay with ID    Joy Arteaga, APRN    Date: 2/7/2025  Time: 09:50 EST

## 2025-02-07 NOTE — PLAN OF CARE
Goal Outcome Evaluation:           Progress: improving  Outcome Evaluation: vss, nvi, PICC to RUE inserted today, ambulating ad dilia with walker, voiding per BRP, pain controlled with PO, IV abx delivered and in fridge, plan to DC home tomorrow, educated on BP meds and monitoring

## 2025-02-07 NOTE — PROGRESS NOTES
Continued Stay Note  [facility]     Patient Name: Anthony Sol Jr.  MRN: [id number]  Today's Date: 2/7/2025    Admit Date: 2/5/2025    Plan: [facility]   Discharge Plan       Row Name 02/07/25 1800       Plan    Plan Comments  Infusion is able to accept and meds have been delivered and taught, Notified [facility] also. Plan is home with [facility]/ Infusion.      Row Name 02/07/25 1721       Plan    Plan Comments [facility] following, now that patient will require IV antibiotics at d/c, referral was sent to  Infusion, awaiting to hear they are able to accept and they will need to run the costs and discuss with patient-prior to being able to d/c. Plan will be to d/c home with [facility]/ infusion, if they are able to accept and provide antibiotics. Nursing to please call CCP at d/c to make sure all is ready for d/c.                   Discharge Codes    No documentation.                       Arabella Alonzo RN

## 2025-02-07 NOTE — PROGRESS NOTES
Continued Stay Note  [facility]     Patient Name: Anthony Sol Jr.  MRN: [id number]  Today's Date: 2/7/2025    Admit Date: 2/5/2025    Plan: [facility]   Discharge Plan       Row Name 02/07/25 0051       Plan    Plan Comments [facility] following, now that patient will require IV antibiotics at d/c, referral was sent to  Infusion, awaiting to hear they are able to accept and they will need to run the costs and discuss with patient-prior to being able to d/c. Plan will be to d/c home with [facility]/ infusion, if they are able to accept and provide antibiotics. Nursing to please call CCP at d/c to make sure all is ready for d/c.                   Discharge Codes    No documentation.                       Arabella Alonzo RN

## 2025-02-07 NOTE — PROGRESS NOTES
"[facility] Clinical Pharmacy Services: Vancomycin Monitoring Note    Anthony Sol Jr. is a 54 y.o. male who is on day 4/42 of pharmacy to dose vancomycin for Bone and/or Joint Infection.    Previous Vancomycin Dose:    1000 mg IV every  12  hours  Updated Cultures and Sensitivities:   2/5 right knee cultures are pending    Results from last 7 days   Lab Units 02/07/25  1319   VANCOMYCIN TR mcg/mL 8.50     Vitals/Labs  Ht: 182.9 cm (72.01\"); Wt: 105 kg (232 lb 2.3 oz)   Temp Readings from Last 1 Encounters:   02/07/25 98.1 °F (36.7 °C) (Oral)     Estimated Creatinine Clearance: 94.8 mL/min (by C-G formula based on SCr of 1.09 mg/dL).       Results from last 7 days   Lab Units 01/31/25  1816   CREATININE mg/dL 1.09   WBC 10*3/mm3 7.38     Assessment/Plan    Current Vancomycin Dose: 1000 mg IV every  12  hours; provides a predicted  mg/L.hr  so have increased to 1500 mg IV q12h new AUC = 498  Next Level Date and Time: Vanc Trough on 2/9 at 1200  We will continue to monitor patient changes and renal function     Thank you for involving pharmacy in this patient's care. Please contact pharmacy with any questions or concerns.       Isiah Burns, [facility]  Clinical Pharmacist          "

## 2025-02-07 NOTE — NURSING NOTE
Iv team consulted to place a picc for discharge with iv abx for 6 weeks. Chart reviewed with no contraindications noted. Spoke with Shannon - she will obtain consent, education, and behavorial contract then re notify iv team. Discharge possibly tomorrow per APRN.

## 2025-02-07 NOTE — THERAPY TREATMENT NOTE
Patient Name: Anthony Sol Jr.  : 1970    MRN: [id number]                              Today's Date: 2025       Admit Date: 2025    Visit Dx:     ICD-10-CM ICD-9-CM   1. Status post right knee replacement  Z96.651 V43.65     Patient Active Problem List   Diagnosis    Uncontrolled type 2 diabetes mellitus without complication, without long-term current use of insulin    Hyperlipidemia    Essential hypertension    DJD (degenerative joint disease) of knee    Type 2 diabetes mellitus with hyperglycemia    Hyponatremia    Acute kidney injury    Painful total knee replacement    Gastroesophageal reflux disease with esophagitis    Hypovitaminosis D    Exogenous obesity    Screening for colon cancer    Coronary artery disease involving native coronary artery of native heart without angina pectoris    Status post right knee replacement    OA (osteoarthritis) of knee     Past Medical History:   Diagnosis Date    Adhesive capsulitis of right knee     Arthritis     OSTEOARTHRITIS    Cardiomegaly     Chronic pain of right knee     Coronary artery disease     Deep vein thrombosis     Diabetes mellitus     Diverticulosis     Fatty liver     Fracture, finger rt thumb     GERD (gastroesophageal reflux disease)     H/O blood clots     rt calf  following rt total knee surgery 2016    Heart murmur     History of hepatitis     AS A CHILD food related    Hyperlipidemia     Hypertension     Hypotestosteronism     Hypovitaminosis D     Knee swelling     Low back pain     Lumbosacral disc disease L5S1     Osteoarthritis     Tear of meniscus of knee  Rt     Past Surgical History:   Procedure Laterality Date    ADENOIDECTOMY      BACK SURGERY      LAMINECTOMY L5-S1    COLONOSCOPY N/A 2022    Procedure: COLONOSCOPY;  Surgeon: Marycruz Feliciano MD;  Location: [facility] MAIN OR;  Service: Gastroenterology;  Laterality: N/A;  Diverticulosis    INCISION AND DRAINAGE LEG Right 2025    Procedure: HEMATOMA  EVACUATION RIGHT KNEE, PLACEMENT ANTIBIOITIC BEADS, WASH OUT, POLY EXCHANGE;  Surgeon: Pratik Durham MD;  Location: [facility] OR [facility];  Service: Orthopedics;  Laterality: Right;    JOINT MANIPULATION Right 02/16/2017    Procedure: MANIPULATION OF RT KNEE;  Surgeon: Anthony Andino MD;  Location: [facility] MAIN OR;  Service:     JOINT REPLACEMENT  2018    KNEE ARTHROSCOPY Right     MULTIPLE    DC ARTHRP KNE CONDYLE&PLATU MEDIAL&LAT COMPARTMENTS Right 12/15/2016    Procedure: RT TOTAL KNEE ARTHROPLASTY;  Surgeon: Anthony Andino MD;  Location: [facility] MAIN OR;  Service: Orthopedics    DC REVJ TOTAL KNEE ARTHRP W/WO ALGRFT 1 COMPONENT Right 03/06/2017    Procedure: RIGHT TOTAL KNEE ARTHROPLASTY REVISION WITH LEFT KNEE STERIOID INJECTION;  Surgeon: Anthony Andino MD;  Location: [facility] MAIN OR;  Service: Orthopedics    TONSILLECTOMY      TOTAL KNEE ARTHROPLASTY REVISION Right 1/22/2025    Procedure: TOTAL KNEE ARTHROPLASTY REVISION;  Surgeon: Pratik Durham MD;  Location: [facility] [place] [facility] [place];  Service: Orthopedics;  Laterality: Right;      General Information       Row Name 02/07/25 0934          Physical Therapy Time and Intention    Document Type therapy note (daily note)  -MS     Mode of Treatment physical therapy;individual therapy  -MS       Row Name 02/07/25 0934          General Information    Patient Profile Reviewed yes  -MS     Existing Precautions/Restrictions --   Exit alarm  -MS     Barriers to Rehab none identified  -MS       Row Name 02/07/25 0934          Safety Issues/Impairments Affecting Functional Mobility    Comment, Safety Issues/Impairments (Mobility) Gait belt used for safety.  -MS               User Key  (r) = Recorded By, (t) = Taken By, (c) = Cosigned By      Initials Name Provider Type    Ayan Cannon, PT Physical Therapist                   Mobility       Row Name 02/07/25 0936          Bed Mobility    Supine-Sit [place] (Bed Mobility) independent  -MS       Row Name 02/07/25 0936           Sit-Stand Transfer    Sit-Stand [place] (Transfers) standby assist  -MS     Assistive Device (Sit-Stand Transfers) walker, front-wheeled  -MS       Row Name 02/07/25 0936          Gait/Stairs (Locomotion)    [place] Level (Gait) standby assist  -MS     Assistive Device (Gait) walker, front-wheeled  -MS     Distance in Feet (Gait) 100  -MS     Deviations/Abnormal Patterns (Gait) sunil decreased  -MS     [place] Level (Stairs) contact guard  -MS     Handrail Location (Stairs) left side (ascending)  -MS     Number of Steps (Stairs) 3  -MS     Ascending Technique (Stairs) step-to-step  -MS     Descending Technique (Stairs) step-to-step  -MS       Row Name 02/07/25 0936          Mobility    Right Lower Extremity (Weight-bearing Status) weight-bearing as tolerated (WBAT)  -MS               User Key  (r) = Recorded By, (t) = Taken By, (c) = Cosigned By      Initials Name Provider Type    Ayan Cannon, PT Physical Therapist                   Obj/Interventions       Row Name 02/07/25 0936          Motor Skills    Therapeutic Exercise --  Right TKR ther. ex. program x 10 reps completed  -MS               User Key  (r) = Recorded By, (t) = Taken By, (c) = Cosigned By      Initials Name Provider Type    Ayan Cannon, PT Physical Therapist                   Goals/Plan    No documentation.                  Clinical Impression       Row Name 02/07/25 0936          Pain    Pretreatment Pain Rating 8/10  -MS     Posttreatment Pain Rating 8/10  -MS     Pain Location knee  -MS     Pain Side/Orientation right  -MS     Pain Management Interventions cold applied;nursing notified;premedicated for activity;positioning techniques utilized  -MS       Row Name 02/07/25 0936          Positioning and Restraints    Pre-Treatment Position in bed  -MS     Post Treatment Position chair  -MS     In Chair notified nsg;reclined;sitting;call light within reach;encouraged to call for assist;with family/caregiver  -MS                User Key  (r) = Recorded By, (t) = Taken By, (c) = Cosigned By      Initials Name Provider Type    MS StarkAyan, PT Physical Therapist                   Outcome Measures       Row Name 02/07/25 0938          How much help from another person do you currently need...    Turning from your back to your side while in flat bed without using bedrails? 4  -MS     Moving from lying on back to sitting on the side of a flat bed without bedrails? 4  -MS     Moving to and from a bed to a chair (including a wheelchair)? 3  -MS     Standing up from a chair using your arms (e.g., wheelchair, bedside chair)? 3  -MS     Climbing 3-5 steps with a railing? 3  -MS     To walk in hospital room? 3  -MS     AM-PAC 6 Clicks Score (PT) 20  -MS     Highest Level of Mobility Goal 6 --> Walk 10 steps or more  -MS       Row Name 02/07/25 0938          Functional Assessment    Outcome Measure Options AM-PAC 6 Clicks Basic Mobility (PT)  -MS               User Key  (r) = Recorded By, (t) = Taken By, (c) = Cosigned By      Initials Name Provider Type    MS StarkAyan, PT Physical Therapist                                 Physical Therapy Education       Title: PT OT SLP Therapies (Done)       Topic: Physical Therapy (Done)       Point: Mobility training (Done)       Learning Progress Summary            Patient Acceptance, E,D, VU,NR by MS at 2/7/2025 0938    Acceptance, E,D, VU,NR by MS at 2/6/2025 1100                      Point: Home exercise program (Done)       Learning Progress Summary            Patient Acceptance, E,D, VU,NR by MS at 2/7/2025 0938    Acceptance, E,D, VU,NR by MS at 2/6/2025 1100                      Point: Body mechanics (Done)       Learning Progress Summary            Patient Acceptance, E,D, VU,NR by MS at 2/7/2025 0938    Acceptance, E,D, VU,NR by MS at 2/6/2025 1100                      Point: Precautions (Done)       Learning Progress Summary            Patient Acceptance, E,D, VU,NR by MS  at 2/7/2025 0938    Acceptance, E,D, VU,NR by MS at 2/6/2025 1100                                      User Key       Initials Effective Dates Name Provider Type Discipline    MS 06/16/21 -  Ayan Stark PT Physical Therapist PT                  PT Recommendation and Plan  Planned Therapy Interventions (PT): balance training, bed mobility training, gait training, home exercise program, patient/family education, postural re-education, transfer training, strengthening, stair training, stretching, ROM (range of motion)  Outcome Evaluation: Upon entering room, pt. supine in bed, awake/alert, and agreeable to work with P.T. this date despite c/o Right knee pain.  This AM, pt. able to ambulate 100 feet, SBA x 1, with use of Rwx.  Pt. is independent with bed mobility and requires SBA x1 for sit <-> stand transfers.  Pt. performed stair training (3 with Left sided handrail), CGA x 1.  Right TKR ther. ex. program x 10 reps completed for general strengthening.  Pt. able to increase knee flexion and perform SLR's on his own this date compared to last P.T. visit. Will continue to progress functional mobility as tolerated. Encouraged pt. to continue ther. ex. on his own throughout the day as well.     Time Calculation:         PT Charges       Row Name 02/07/25 0942             Time Calculation    Start Time 0845  -MS      Stop Time 0908  -MS      Time Calculation (min) 23 min  -MS      PT Received On 02/07/25  -MS      PT - Next Appointment 02/08/25  -MS         Time Calculation- PT    Total Timed Code Minutes- PT 21 minute(s)  -MS                User Key  (r) = Recorded By, (t) = Taken By, (c) = Cosigned By      Initials Name Provider Type    MS Ayan Stark PT Physical Therapist                  Therapy Charges for Today       Code Description Service Date Service Provider Modifiers Qty    61751121046 HC PT EVAL LOW COMPLEXITY 2 2/6/2025 Ayan Stark, PT GP 1    79236638759 HC PT THER PROC EA 15 MIN 2/6/2025  Ayan Stark, PT GP 1    32066833337 HC PT THERAPEUTIC ACT EA 15 MIN 2/7/2025 Ayan Stark, PT GP 1            PT G-Codes  Outcome Measure Options: AM-PAC 6 Clicks Basic Mobility (PT)  AM-PAC 6 Clicks Score (PT): 20  PT Discharge Summary  Anticipated Discharge Disposition (PT): home with assist, home with home health    Ayan Stark, PT  2/7/2025

## 2025-02-07 NOTE — PLAN OF CARE
Goal Outcome Evaluation:  Plan of Care Reviewed With: patient           Outcome Evaluation: vss. nvi. standby assist brp. christiana cdi. pain managed with po meds. hemovac present. planning d/c home with HH when cultures result.

## 2025-02-07 NOTE — PROGRESS NOTES
LOS: 0 days     Chief Complaint: Suspected right knee prosthetic joint infection    Interval History: Patient reports doing well this morning.  Does have some intermittent pains.  Tolerating antibiotics.    Vital Signs  Temp:  [98.3 °F (36.8 °C)-99 °F (37.2 °C)] 98.3 °F (36.8 °C)  Heart Rate:  [76-83] 76  Resp:  [16] 16  BP: (105-132)/(54-74) 111/54    Physical Exam:  General: In no acute distress  HEENT: Oropharynx clear, moist mucous membranes  Respiratory: Normal work of breathing  Skin: No rashes or lesions in exposed areas  Extremities: Left knee dressing in place.  Access: Peripheral IV    Antibiotics:  Anti-Infectives (From admission, onward)      Ordered     Dose/Rate Route Frequency Start Stop    02/06/25 1059  vancomycin (VANCOCIN) 1,000 mg in sodium chloride 0.9 % 250 mL IVPB-VTB        Ordering Provider: Kevin Mares,     1,000 mg  250 mL/hr over 60 Minutes Intravenous Every 12 Hours 02/06/25 1400 02/13/25 1359    02/06/25 0843  Pharmacy to dose vancomycin        Ordering Provider: Kevin Mares,      Not Applicable Continuous PRN 02/06/25 0842 02/13/25 0841    02/05/25 1712  vancomycin IVPB 1500 mg in 0.9% NaCl (Premix) 500 mL        Ordering Provider: Jin Benton APRN    15 mg/kg × 104 kg  333.3 mL/hr over 90 Minutes Intravenous Once 02/06/25 0100 02/06/25 0330    02/05/25 1421  tobramycin (NEBCIN) injection  Status:  Discontinued        Ordering Provider: Pratik Durham MD      As Needed 02/05/25 1421 02/05/25 1517    02/05/25 1420  vancomycin (VANCOCIN) injection  Status:  Discontinued        Ordering Provider: Pratik Durham MD      As Needed 02/05/25 1420 02/05/25 1517    02/05/25 1314  vancomycin IVPB 1500 mg in 0.9% NaCl (Premix) 500 mL        Ordering Provider: Pratik Durham MD    15 mg/kg × 104 kg  333.3 mL/hr over 90 Minutes Intravenous Once 02/05/25 1316 02/05/25 1744    02/05/25 1310  Pharmacy to dose vancomycin  Status:  Discontinued        Ordering Provider:  "Pratik Durham MD     Not Applicable Once 02/05/25 1312 02/05/25 1314    02/05/25 1310  ceFAZolin 2000 mg IVPB in 100 mL NS (MBP)        Ordering Provider: Pratik Durham MD    2,000 mg  over 30 Minutes Intravenous Once 02/05/25 1312 02/05/25 1746             Results Review:     I reviewed the patient's new clinical results.    Lab Results   Component Value Date    WBC 7.38 01/31/2025    HGB 8.9 (L) 02/07/2025    HCT 26.8 (L) 02/07/2025    MCV 86.2 01/31/2025     01/31/2025     Lab Results   Component Value Date    GLUCOSE 195 (H) 01/31/2025    BUN 15 01/31/2025    CREATININE 1.09 01/31/2025    EGFRIFNONA 83 07/12/2018    EGFRIFAFRI 100 07/12/2018    BCR 13.8 01/31/2025    CO2 26.2 01/31/2025    CALCIUM 9.4 01/31/2025    PROTENTOTREF 7.5 07/12/2018    ALBUMIN 4.0 01/31/2025    LABIL2 1.5 08/10/2020    AST 20 01/31/2025    ALT 20 01/31/2025       No results found for: \"VANCOPEAK\", \"VANCOTROUGH\", \"VANCORANDOM\"     Isolation:   No active isolations    Microbiology:  Microbiology Results (last 10 days)       Procedure Component Value - Date/Time    Wound Culture - Surgical Site, Knee, Right [[id number]] Collected: 02/05/25 1352    Lab Status: Preliminary result Specimen: Surgical Site from Knee, Right Updated: 02/06/25 0752     Wound Culture No growth     Gram Stain Rare (1+) WBCs seen      No organisms seen    Wound Culture - Surgical Site, Knee, Right [[id number]] Collected: 02/05/25 1352    Lab Status: Preliminary result Specimen: Surgical Site from Knee, Right Updated: 02/06/25 0751     Wound Culture No growth     Gram Stain Rare (1+) WBCs seen      No organisms seen    Wound Culture - Surgical Site, Knee, Right [[id number]] Collected: 02/05/25 1352    Lab Status: Preliminary result Specimen: Surgical Site from Knee, Right Updated: 02/06/25 0751     Wound Culture No growth     Gram Stain Rare (1+) WBCs seen      No organisms seen    Wound Culture - Surgical Site, Knee, Right [[id number]] Collected: 02/05/25 1350 "    Lab Status: Preliminary result Specimen: Surgical Site from Knee, Right Updated: 02/06/25 0752     Wound Culture No growth     Gram Stain Rare (1+) WBCs seen      No organisms seen    Wound Culture - Surgical Site, Knee, Right [[id number]] Collected: 02/05/25 1350    Lab Status: Preliminary result Specimen: Surgical Site from Knee, Right Updated: 02/06/25 0750     Wound Culture No growth     Gram Stain Rare (1+) WBCs seen      No organisms seen    Wound Culture - Surgical Site, Knee, Right [[id number]] Collected: 02/05/25 1350    Lab Status: Preliminary result Specimen: Surgical Site from Knee, Right Updated: 02/06/25 0751     Wound Culture No growth     Gram Stain Rare (1+) WBCs seen      No organisms seen             Assessment    #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  Discussed possible treatment courses with the patient and antibiotic options.    After discussion of risks and benefits we arrived on 6 weeks of therapy for possible prosthetic joint infection.  We discussed the possibility that there may not be infection versus difficult to grow bacteria given his multiple negative cultures.  We are going to move forward with sending off a send out test for [facility] broad range PCR to evaluate further for infection.  In the meantime we will plan for 6-week course of antibiotics.    Plan to continue vancomycin goal -600 today and add ceftriaxone 2 g daily.  Plan will be for the end date of 3/20.  PICC line ordered today.      While on therapy patient will need weekly CBC with differential, BMP, CRP and vancomycin level faxed to [phone].    Plan to follow-up at the 6-week yue to discuss send out testing and further steps. Will continue to follow-up  cultures and alter antibiotic plan accordingly but if remain negative then would continue with the above empiric plan.

## 2025-02-07 NOTE — SIGNIFICANT NOTE
"   02/07/25 1516   PICC Single Lumen 02/07/25 Left Basilic   Placement date: If unknown, DO NOT use \"Add Comment\" note/Placement time: If unknown, DO NOT use \"Add Comment\" note: 02/07/25 1515   Hand Hygiene Completed: Yes  Size (Fr): 4  Description (optional): power picc lot [id number], exp 11-  Length (...   Site Assessment Clean;Dry;Intact   #1 Lumen Status Blood return noted;Capped;Flushed;Normal saline locked   Length yue (cm) 45 cm   Line Care Connections checked and tightened   Dressing Type Border Dressing;Securing device;Antimicrobial dressing/disc   Dressing Status Clean;Dry;Intact   Dressing Intervention New dressing   Liquid Adhesive Applied   Dressing Change Due 02/14/25   Indication/Daily Review of Necessity long-term IV access >7 days     3 needles, 2 guidewires, and 1 scalpel accounted for and disposed of.      "

## 2025-02-07 NOTE — PAYOR COMM NOTE
"Anthony Sol Jr. (54 y.o. Male)    PLEASE SEE ATTACHED FOR INPT AUTH  OBS 2/5  INPATIENT 2/7  REF#[id number]  PLEASE CALL DAPHNE LEGER RN/ DEPT   [phone] OR FAX  DEPT [phone]  THANK YOU   DAPHNE LEGER RN  [facility]      Date of Birth   1970    Social Security Number       Address   [address] [address]    Home Phone   [phone]    MRN   [id number]       Druze   Jew    Marital Status                               Admission Date   2/5/25 OBS  2/7/25 INPATIENT     Admission Type   Elective    Admitting Provider   Pratik Durham MD    Attending Provider   Pratik Durham MD    Department, Room/Bed   [address], P782/1       Discharge Date       Discharge Disposition       Discharge Destination                                 Attending Provider: Pratik Durham MD    Allergies: No Known Allergies    Isolation: None   Infection: None   Code Status: CPR    Ht: 182.9 cm (72.01\")   Wt: 105 kg (232 lb 2.3 oz)    Admission Cmt: None   Principal Problem: Status post right knee replacement [Z96.651]                   Active Insurance as of 2/5/2025       Primary Coverage       Payor Plan Insurance Group Employer/Plan Group    ANTH BLUE CROSS [facility] BLUE CROSS BLUE SHIELD PPO [id number]       Payor Plan Address Payor Plan Phone Number Payor Plan Fax Number Effective Dates    [address] [phone]  1/1/2023 - None Entered    [place] [address]         Subscriber Name Subscriber Birth Date Member ID       NURY SOL VERNELL 7/2/1970 [id number]                     Emergency Contacts        (Rel.) Home Phone Work Phone Mobile Phone    Nury Sol (Spouse) [phone] -- --              [place] [id number]  Tax ID [id number]     History & Physical        Pratik Durham MD at 02/05/25 1220          H&P reviewed.  The patient was examined and there are no changes to the H&P  Electronically signed by Herminio, " Pratik JEONG MD at 02/05/25 1220   Source Note: H&P (View-Only)          Patient: Anthony Sol  YOB: 1970 54 y.o. male  Medical Record Number: [id number]    Chief Complaints:   Chief Complaint   Patient presents with    Right Knee - Post-op Follow-up, Pain       History of Present Illness:Anthony Sol is a 54 y.o. male who presents for follow-up of right knee revision he is now 13 days out he says the first week he was doing well he began to develop increased bruising and has had increased pain.  He states he was flexing around 90 degrees after surgery but that has declined.  He has had some low-grade temperatures .1.  Given his increased pain he was seen in the ER last week he did have an elevated C-reactive protein of 5.7.  He has not had any drainage from the wound.    Allergies: No Known Allergies    Medications:   Current Outpatient Medications   Medication Sig Dispense Refill    Coenzyme Q10 (COQ-10) 30 MG capsule Daily. HOLDING FOR DOS  Indications: High Blood Pressure      Edarbyclor 40-12.5 MG tablet TAKE 1 TABLET BY MOUTH ONCE DAILY (Patient taking differently: Take 1 tablet by mouth Daily.) 90 tablet 1    Empagliflozin (JARDIANCE) 10 MG tablet Take 1 tablet by mouth Daily. For diabetes (Patient taking differently: Take 1 tablet by mouth Daily. For diabetes) 90 tablet 1    metFORMIN (GLUCOPHAGE) 1000 MG tablet 1 tab po BID with meal for diabetes (Patient taking differently: Take 1 tablet by mouth 2 (Two) Times a Day With Meals. 1 tab po BID with meal for diabetes) 180 tablet 1    Mounjaro 10 MG/0.5ML solution auto-injector Inject 10 mg under the skin into the appropriate area as directed 1 (One) Time Per Week. HOLDING FOR DOS    PLANS LAST DOSE PRIOR TO SURGERY 1-13-25      oxyCODONE-acetaminophen (PERCOCET) 7.5-325 MG per tablet Take 1 tablet by mouth Every 6 (Six) Hours As Needed for Moderate Pain. 20 tablet 0    pantoprazole (PROTONIX) 40 MG EC tablet Take 1 tablet by mouth Daily  "for 14 days. 14 tablet 0    rosuvastatin (CRESTOR) 10 MG tablet Take 1 tablet by mouth 3 (Three) Times a Week. Indications: High Amount of Fats in the Blood      Testosterone Cypionate (DEPOTESTOTERONE CYPIONATE) 200 MG/ML injection Inject 1 mL into the appropriate muscle as directed by prescriber Every 14 (Fourteen) Days. Indications: Deficient Activity of the Testis  5    verapamil SR (CALAN-SR) 240 MG CR tablet Take 1 tablet by mouth Every Night. Indications: High Blood Pressure      apixaban (ELIQUIS) 2.5 MG tablet tablet Take 1 tablet by mouth 2 (Two) Times a Day. (Patient not taking: Reported on 2/4/2025) 60 tablet 1    IPAMORELIN ACETATE IJ 5 days weekly/ LEAVE ON LIST, NOT CURRENTLY TAKING (Patient not taking: Reported on 2/4/2025)      ondansetron (Zofran) 4 MG tablet Take 1 tablet by mouth Every 8 (Eight) Hours As Needed for Nausea or Vomiting for up to 10 doses. (Patient not taking: Reported on 2/4/2025) 10 tablet 0    sirolimus (RAPAMUNE) 1 MG tablet Take 1 tablet by mouth 1 (One) Time Per Week. NOT TAKING/LEAVE ON LIST (Patient not taking: Reported on 2/4/2025)      VASCEPA 1 g capsule capsule 2 g 2 (Two) Times a Day. Indications: High Amount of Triglycerides in the Blood (Patient not taking: Reported on 2/4/2025)       No current facility-administered medications for this visit.         The following portions of the patient's history were reviewed and updated as appropriate: allergies, current medications, past family history, past medical history, past social history, past surgical history and problem list.    Review of Systems:   Pertinent positives/negative listed in HPI above    Physical Exam:   Vitals:    02/04/25 0801   Temp: 96.9 °F (36.1 °C)   TempSrc: Temporal   Weight: 104 kg (229 lb)   Height: 182.9 cm (72\")   PainSc:   5   PainLoc: Knee       General: A and O x 3, ASA, NAD   Right knee shows pain with flexion past 65 degrees she has difficulty with any flexion past at near full extension " there is no instability he has diffuse ecchymosis about the leg from the mid thigh with some down into the calf the calf is soft and nontender with a negative Homans he does not have pain with micromotion of the knee.           Assessment/Plan:  Right knee increased pain unable to aspirate any fluid off.  I anticipate he has increased pain due to hematoma but given his elevated C-reactive protein low-grade temperature of 99- 100 cannot rule out infection.  Plan on evacuation of hematoma at that time we will take cultures and place a drain.  I will have him hold his Eliquis.      There are no diagnoses linked to this encounter.    Pratik Durham MD  2/4/2025    Electronically signed by Pratik Durham MD at 02/04/25 0841                 Pratik Durham MD at 02/05/25 1220          H&P reviewed.  The patient was examined and there are no changes to the H&P  Electronically signed by Pratik Durham MD at 02/05/25 1220   Source Note: H&P (View-Only)          Patient: Anthony Sol  YOB: 1970 54 y.o. male  Medical Record Number: [id number]    Chief Complaints:   Chief Complaint   Patient presents with    Right Knee - Post-op Follow-up, Pain       History of Present Illness:Anthony Sol is a 54 y.o. male who presents for follow-up of right knee revision he is now 13 days out he says the first week he was doing well he began to develop increased bruising and has had increased pain.  He states he was flexing around 90 degrees after surgery but that has declined.  He has had some low-grade temperatures .1.  Given his increased pain he was seen in the ER last week he did have an elevated C-reactive protein of 5.7.  He has not had any drainage from the wound.    Allergies: No Known Allergies    Medications:   Current Outpatient Medications   Medication Sig Dispense Refill    Coenzyme Q10 (COQ-10) 30 MG capsule Daily. HOLDING FOR DOS  Indications: High Blood Pressure      Edarbyclor 40-12.5 MG tablet  TAKE 1 TABLET BY MOUTH ONCE DAILY (Patient taking differently: Take 1 tablet by mouth Daily.) 90 tablet 1    Empagliflozin (JARDIANCE) 10 MG tablet Take 1 tablet by mouth Daily. For diabetes (Patient taking differently: Take 1 tablet by mouth Daily. For diabetes) 90 tablet 1    metFORMIN (GLUCOPHAGE) 1000 MG tablet 1 tab po BID with meal for diabetes (Patient taking differently: Take 1 tablet by mouth 2 (Two) Times a Day With Meals. 1 tab po BID with meal for diabetes) 180 tablet 1    Mounjaro 10 MG/0.5ML solution auto-injector Inject 10 mg under the skin into the appropriate area as directed 1 (One) Time Per Week. HOLDING FOR DOS    PLANS LAST DOSE PRIOR TO SURGERY 1-13-25      oxyCODONE-acetaminophen (PERCOCET) 7.5-325 MG per tablet Take 1 tablet by mouth Every 6 (Six) Hours As Needed for Moderate Pain. 20 tablet 0    pantoprazole (PROTONIX) 40 MG EC tablet Take 1 tablet by mouth Daily for 14 days. 14 tablet 0    rosuvastatin (CRESTOR) 10 MG tablet Take 1 tablet by mouth 3 (Three) Times a Week. Indications: High Amount of Fats in the Blood      Testosterone Cypionate (DEPOTESTOTERONE CYPIONATE) 200 MG/ML injection Inject 1 mL into the appropriate muscle as directed by prescriber Every 14 (Fourteen) Days. Indications: Deficient Activity of the Testis  5    verapamil SR (CALAN-SR) 240 MG CR tablet Take 1 tablet by mouth Every Night. Indications: High Blood Pressure      apixaban (ELIQUIS) 2.5 MG tablet tablet Take 1 tablet by mouth 2 (Two) Times a Day. (Patient not taking: Reported on 2/4/2025) 60 tablet 1    IPAMORELIN ACETATE IJ 5 days weekly/ LEAVE ON LIST, NOT CURRENTLY TAKING (Patient not taking: Reported on 2/4/2025)      ondansetron (Zofran) 4 MG tablet Take 1 tablet by mouth Every 8 (Eight) Hours As Needed for Nausea or Vomiting for up to 10 doses. (Patient not taking: Reported on 2/4/2025) 10 tablet 0    sirolimus (RAPAMUNE) 1 MG tablet Take 1 tablet by mouth 1 (One) Time Per Week. NOT TAKING/LEAVE ON LIST  "(Patient not taking: Reported on 2/4/2025)      VASCEPA 1 g capsule capsule 2 g 2 (Two) Times a Day. Indications: High Amount of Triglycerides in the Blood (Patient not taking: Reported on 2/4/2025)       No current facility-administered medications for this visit.         The following portions of the patient's history were reviewed and updated as appropriate: allergies, current medications, past family history, past medical history, past social history, past surgical history and problem list.    Review of Systems:   Pertinent positives/negative listed in HPI above    Physical Exam:   Vitals:    02/04/25 0801   Temp: 96.9 °F (36.1 °C)   TempSrc: Temporal   Weight: 104 kg (229 lb)   Height: 182.9 cm (72\")   PainSc:   5   PainLoc: Knee       General: A and O x 3, ASA, NAD   Right knee shows pain with flexion past 65 degrees she has difficulty with any flexion past at near full extension there is no instability he has diffuse ecchymosis about the leg from the mid thigh with some down into the calf the calf is soft and nontender with a negative Homans he does not have pain with micromotion of the knee.           Assessment/Plan:  Right knee increased pain unable to aspirate any fluid off.  I anticipate he has increased pain due to hematoma but given his elevated C-reactive protein low-grade temperature of 99- 100 cannot rule out infection.  Plan on evacuation of hematoma at that time we will take cultures and place a drain.  I will have him hold his Eliquis.      There are no diagnoses linked to this encounter.    Pratik Durham MD  2/4/2025    Electronically signed by Pratik Durham MD at 02/04/25 0841                 Pratik Durham MD at 02/04/25 0800          Patient: Anthony Sol  YOB: 1970 54 y.o. male  Medical Record Number: [id number]    Chief Complaints:   Chief Complaint   Patient presents with    Right Knee - Post-op Follow-up, Pain       History of Present Illness:Anthony Sol is a 54 " y.o. male who presents for follow-up of right knee revision he is now 13 days out he says the first week he was doing well he began to develop increased bruising and has had increased pain.  He states he was flexing around 90 degrees after surgery but that has declined.  He has had some low-grade temperatures .1.  Given his increased pain he was seen in the ER last week he did have an elevated C-reactive protein of 5.7.  He has not had any drainage from the wound.    Allergies: No Known Allergies    Medications:   Current Outpatient Medications   Medication Sig Dispense Refill    Coenzyme Q10 (COQ-10) 30 MG capsule Daily. HOLDING FOR DOS  Indications: High Blood Pressure      Edarbyclor 40-12.5 MG tablet TAKE 1 TABLET BY MOUTH ONCE DAILY (Patient taking differently: Take 1 tablet by mouth Daily.) 90 tablet 1    Empagliflozin (JARDIANCE) 10 MG tablet Take 1 tablet by mouth Daily. For diabetes (Patient taking differently: Take 1 tablet by mouth Daily. For diabetes) 90 tablet 1    metFORMIN (GLUCOPHAGE) 1000 MG tablet 1 tab po BID with meal for diabetes (Patient taking differently: Take 1 tablet by mouth 2 (Two) Times a Day With Meals. 1 tab po BID with meal for diabetes) 180 tablet 1    Mounjaro 10 MG/0.5ML solution auto-injector Inject 10 mg under the skin into the appropriate area as directed 1 (One) Time Per Week. HOLDING FOR DOS    PLANS LAST DOSE PRIOR TO SURGERY 1-13-25      oxyCODONE-acetaminophen (PERCOCET) 7.5-325 MG per tablet Take 1 tablet by mouth Every 6 (Six) Hours As Needed for Moderate Pain. 20 tablet 0    pantoprazole (PROTONIX) 40 MG EC tablet Take 1 tablet by mouth Daily for 14 days. 14 tablet 0    rosuvastatin (CRESTOR) 10 MG tablet Take 1 tablet by mouth 3 (Three) Times a Week. Indications: High Amount of Fats in the Blood      Testosterone Cypionate (DEPOTESTOTERONE CYPIONATE) 200 MG/ML injection Inject 1 mL into the appropriate muscle as directed by prescriber Every 14 (Fourteen) Days.  "Indications: Deficient Activity of the Testis  5    verapamil SR (CALAN-SR) 240 MG CR tablet Take 1 tablet by mouth Every Night. Indications: High Blood Pressure      apixaban (ELIQUIS) 2.5 MG tablet tablet Take 1 tablet by mouth 2 (Two) Times a Day. (Patient not taking: Reported on 2/4/2025) 60 tablet 1    IPAMORELIN ACETATE IJ 5 days weekly/ LEAVE ON LIST, NOT CURRENTLY TAKING (Patient not taking: Reported on 2/4/2025)      ondansetron (Zofran) 4 MG tablet Take 1 tablet by mouth Every 8 (Eight) Hours As Needed for Nausea or Vomiting for up to 10 doses. (Patient not taking: Reported on 2/4/2025) 10 tablet 0    sirolimus (RAPAMUNE) 1 MG tablet Take 1 tablet by mouth 1 (One) Time Per Week. NOT TAKING/LEAVE ON LIST (Patient not taking: Reported on 2/4/2025)      VASCEPA 1 g capsule capsule 2 g 2 (Two) Times a Day. Indications: High Amount of Triglycerides in the Blood (Patient not taking: Reported on 2/4/2025)       No current facility-administered medications for this visit.         The following portions of the patient's history were reviewed and updated as appropriate: allergies, current medications, past family history, past medical history, past social history, past surgical history and problem list.    Review of Systems:   Pertinent positives/negative listed in HPI above    Physical Exam:   Vitals:    02/04/25 0801   Temp: 96.9 °F (36.1 °C)   TempSrc: Temporal   Weight: 104 kg (229 lb)   Height: 182.9 cm (72\")   PainSc:   5   PainLoc: Knee       General: A and O x 3, ASA, NAD   Right knee shows pain with flexion past 65 degrees she has difficulty with any flexion past at near full extension there is no instability he has diffuse ecchymosis about the leg from the mid thigh with some down into the calf the calf is soft and nontender with a negative Homans he does not have pain with micromotion of the knee.           Assessment/Plan:  Right knee increased pain unable to aspirate any fluid off.  I anticipate he has " increased pain due to hematoma but given his elevated C-reactive protein low-grade temperature of 99- 100 cannot rule out infection.  Plan on evacuation of hematoma at that time we will take cultures and place a drain.  I will have him hold his Eliquis.      There are no diagnoses linked to this encounter.    Pratik Durham MD  2/4/2025    Electronically signed by Pratik Durham MD at 02/04/25 0841       Medication Administration Report for Sweta Anthony NEYDA Acevedo as of 2/6/25 through 2/7/25     Legend:    Given Hold Not Given Due Canceled Entry Other Actions    Time Time (Time) Time Time-Action         Discontinued     Completed     Future     MAR Hold     Linked             Medications 02/06/25 02/07/25     apixaban (ELIQUIS) tablet 2.5 mg  Dose: 2.5 mg  Freq: Every 12 Hours Scheduled Route: PO  Indications of Use: Post Surgical - Knee  Start: 02/07/25 1015     Admin Instructions:   Tablet may be crushed and suspended in 60 mL of water or D5W and immediately delivered via NG tube.  Avoid grapefruit juice.       0929-Given     2100             cefTRIAXone (ROCEPHIN) 2,000 mg in sodium chloride 0.9 % 100 mL MBP  Dose: 2,000 mg  Freq: Every 24 Hours Route: IV  Indications of Use: BONE AND/OR JOINT INFECTION  Start: 02/07/25 1015 End: 03/21/25 1014     Admin Instructions:   Caution: Look alike/sound alike drug alert       1037-New Bag     1115-Stopped              losartan (COZAAR) tablet 25 mg  Dose: 25 mg  Freq: Every 24 Hours Scheduled Route: PO  Start: 02/05/25 1830     Admin Instructions:   Hold for SBP less than 100, DBP less than 60.      0914-Given            (0807)-Not Given              And   chlorthalidone (HYGROTON) tablet 12.5 mg  Dose: 12.5 mg  Freq: Daily Route: PO  Start: 02/05/25 1830     Admin Instructions:   Hold for SBP less than 100, DBP less than 60.      0914-Given            (0808)-Not Given              empagliflozin (JARDIANCE) tablet 10 mg  Dose: 10 mg  Freq: Daily Route: PO  Start: 02/05/25  1800      0914-Given            0804-Given              metFORMIN (GLUCOPHAGE) tablet 1,000 mg  Dose: 1,000 mg  Freq: 2 Times Daily With Meals Route: PO  Start: 02/05/25 1800     Admin Instructions:   Caution: Look alike/sound alike drug alert      0914-Given     1712-Given           0804-Given     1800              ondansetron ODT (ZOFRAN-ODT) disintegrating tablet 4 mg  Dose: 4 mg  Freq: Every 6 Hours PRN Route: PO  PRN Reasons: Nausea,Vomiting  Start: 02/05/25 1712     Admin Instructions:   If BOTH ondansetron (ZOFRAN) and promethazine (PHENERGAN) are ordered use ondansetron first and THEN promethazine IF ondansetron is ineffective.  Place on tongue and allow to dissolve.          Or   ondansetron (ZOFRAN) injection 4 mg  Dose: 4 mg  Freq: Every 6 Hours PRN Route: IV  PRN Reasons: Nausea,Vomiting  Start: 02/05/25 1712     Admin Instructions:   If BOTH ondansetron (ZOFRAN) and promethazine (PHENERGAN) are ordered use ondansetron first and THEN promethazine IF ondansetron is ineffective.          oxyCODONE-acetaminophen (PERCOCET) 7.5-325 MG per tablet 1 tablet  Dose: 1 tablet  Freq: Every 4 Hours PRN Route: PO  PRN Reason: Moderate Pain  Start: 02/05/25 1712 End: 02/10/25 1711     Admin Instructions:   Based on patient request - if ordered for moderate or severe pain, provider allows for administration of a medication prescribed for a lower pain scale.  [GO]    Do not exceed 4 grams of acetaminophen in a 24 hr period. Max dose of 2gm for AST/ALT greater than 120 units/L        If given for pain, use the following pain scale:   Mild Pain = Pain Score of 1-3, CPOT 1-2  Moderate Pain = Pain Score of 4-6, CPOT 3-4  Severe Pain = Pain Score of 7-10, CPOT 5-8      0159-Given     0542-Given              oxyCODONE-acetaminophen (PERCOCET) 7.5-325 MG per tablet 2 tablet  Dose: 2 tablet  Freq: Every 4 Hours PRN Route: PO  PRN Reason: Severe Pain  Start: 02/06/25 0803 End: 02/11/25 0802     Admin Instructions:   Based on  patient request - if ordered for moderate or severe pain, provider allows for administration of a medication prescribed for a lower pain scale.  [GO]    Do not exceed 4 grams of acetaminophen in a 24 hr period. Max dose of 2gm for AST/ALT greater than 120 units/L        If given for pain, use the following pain scale:   Mild Pain = Pain Score of 1-3, CPOT 1-2  Moderate Pain = Pain Score of 4-6, CPOT 3-4  Severe Pain = Pain Score of 7-10, CPOT 5-8      0917-Given     1412-Given     1808-Given     2250-Given         0227-Given     0632-Given     1036-Given            Pharmacy to dose vancomycin  Freq: Continuous PRN Route: XX  PRN Reason: Consult  Indications of Use: BONE AND/OR JOINT INFECTION  Start: 02/06/25 0842 End: 03/20/25 0941          polyethylene glycol (MIRALAX) packet 17 g  Dose: 17 g  Freq: 2 Times Daily Route: PO  Start: 02/05/25 2100 End: 02/12/25 2059     Admin Instructions:   Mix dose in 6-8 ounces of water.  Use 4-8 ounces of water, tea, or juice for each 17 gram dose.      0914-Given     2005-Given           0804-Given     2100             rosuvastatin (CRESTOR) tablet 10 mg  Dose: 10 mg  Freq: Once per day on Monday Wednesday Friday Route: PO  Indications of Use: HYPERLIPIDEMIA  Start: 02/05/25 1800     Admin Instructions:   Avoid grapefruit juice.       0804-Given              vancomycin (VANCOCIN) 1,000 mg in sodium chloride 0.9 % 250 mL IVPB-VTB  Dose: 1,000 mg  Freq: Every 12 Hours Route: IV  Indications of Use: BONE AND/OR JOINT INFECTION  Start: 02/06/25 1400 End: 02/13/25 1359      1413-New Bag     1618-Stopped [C]           0103-New Bag     0349-Stopped     1400            verapamil SR (CALAN-SR) CR tablet 240 mg  Dose: 240 mg  Freq: Nightly Route: PO  Indications of Use: HYPERTENSION  Start: 02/05/25 2100     Admin Instructions:   Hold for SBP less than 100, DBP less than 60, or heart rate less than 50. If a dose is held, please contact the provider.  Do not crush, split, or chew. Avoid  grapefruit juice.      2005-Given            2100              Completed Medications  Medications 02/06/25 02/07/25      ceFAZolin 2000 mg IVPB in 100 mL NS (MBP)  Dose: 2,000 mg  Freq: Once Route: IV  Indications of Use: PERIOPERATIVE PHARMACOPROPHYLAXIS  Start: 02/05/25 1312 End: 02/05/25 1746     Admin Instructions:   Caution: Look alike/sound alike drug alert          ethyl alcohol 62 % 2 each  Dose: 2 Swab  Freq: Once Route: NA  Start: 02/05/25 0957 End: 02/05/25 1026     Admin Instructions:   Administer 15-60 minutes prior to surgery following these steps: Clean nostrils with a tissue, apply a 62% ethyl alcohol swab to the right nostril gently rotating the swab for 30 seconds, repeat the process with the 2nd swab in the left nostril.          HYDROcodone-acetaminophen (NORCO) 5-325 MG per tablet 1 tablet  Dose: 1 tablet  Freq: Once As Needed Route: PO  PRN Reason: Moderate Pain  Start: 02/05/25 1518 End: 02/05/25 1545     Admin Instructions:   Based on patient request - if ordered for moderate or severe pain, provider allows for administration of a medication prescribed for a lower pain scale.  [GO]    Do not exceed 4 grams of acetaminophen in a 24 hr period. Max dose of 2gm for AST/ALT greater than 120 units/L        If given for pain, use the following pain scale:   Mild Pain = Pain Score of 1-3, CPOT 1-2  Moderate Pain = Pain Score of 4-6, CPOT 3-4  Severe Pain = Pain Score of 7-10, CPOT 5-8          lactated ringers bolus 500 mL  Dose: 500 mL  Freq: Once Route: IV  Start: 02/05/25 0957 End: 02/05/25 1746          pantoprazole (PROTONIX) EC tablet 40 mg  Dose: 40 mg  Freq: Daily Route: PO  Indications of Use: GASTROESOPHAGEAL REFLUX DISEASE  Start: 02/05/25 1800 End: 02/05/25 1754     Admin Instructions:   Do not crush or chew the capsules or tablets. The drug may not work as designed if the capsule or tablet is crushed or chewed. Swallow whole.  Swallow whole; do not crush, split, or chew.           pregabalin (LYRICA) capsule 150 mg  Dose: 150 mg  Freq: Once Route: PO  Start: 02/05/25 0957 End: 02/05/25 1038     Admin Instructions:             vancomycin IVPB 1500 mg in 0.9% NaCl (Premix) 500 mL  Dose: 15 mg/kg  Weight Dosing Info: 104 kg  Freq: Once Route: IV  Indications of Use: PERIOPERATIVE PHARMACOPROPHYLAXIS  Start: 02/06/25 0100 End: 02/06/25 0330     Admin Instructions:   Give ONLY IF PCN ALLERGY, If not PC allergic please give Cefazolin as ordered. Time first dose 12 hours after pre-op dose.     Order specific questions:   Justification for use: There is documentation of beta lactam allergy      0153-New Bag     0330-Stopped              vancomycin IVPB 1500 mg in 0.9% NaCl (Premix) 500 mL  Dose: 15 mg/kg  Weight Dosing Info: 104 kg  Freq: Once Route: IV  Indications of Use: PERIOPERATIVE PHARMACOPROPHYLAXIS  Start: 02/05/25 1316 End: 02/05/25 1744          Discontinued Medications  Medications 02/06/25 02/07/25      Atropine Sulfate (PF) injection 0.4 mg  Dose: 0.4 mg  Freq: Once As Needed Route: IV  PRN Reason: Bradycardia  PRN Comment: symptomatic bradycardia (hypotension, dizziness, confusion). Notify attending anesthesiologist.  Start: 02/05/25 1518 End: 02/05/25 1653          Azilsartan-Chlorthalidone 40-12.5 MG tablet 1 tablet  Dose: 1 tablet  Freq: Daily Route: PO  Indications of Use: HYPERTENSION  Start: 02/05/25 2000 End: 02/05/25 1742     Order specific questions:   Drug Name: Edarbyclor 40-12.5 mg tablet          diphenhydrAMINE (BENADRYL) injection 12.5 mg  Dose: 12.5 mg  Freq: Every 15 Minutes PRN Route: IV  PRN Reason: Itching  PRN Comment: May repeat x 1  Start: 02/05/25 1518 End: 02/05/25 1653     Admin Instructions:   Caution: Look alike/sound alike drug alert. This med may be ordered in other forms and routes. Before giving verify the last time the drug was given by any route/form.          ePHEDrine injection 5 mg  Dose: 5 mg  Freq: Once As Needed Route: IV  PRN Comment:  symptomatic hypotension - Notify attending anesthesiologist if this needs to be given  Start: 02/05/25 1518 End: 02/05/25 1653     Admin Instructions:   Caution: Look alike/sound alike drug alert   Dilute with NS to 5-10 mg/mL.  Central line preferred, if unavailable use large bore IV access with frequent nurse monitoring of IV site.          fentaNYL citrate (PF) (SUBLIMAZE) injection 100 mcg  Dose: 100 mcg  Freq: Every 15 Minutes PRN Route: IV  PRN Reason: Mild Pain  Start: 02/05/25 1012 End: 02/05/25 1517     Admin Instructions:   Maximum total dose of fentanyl is 100 mcg.  If given for pain, use the following pain scale:  Mild Pain = Pain Score of 1-3, CPOT 1-2  Moderate Pain = Pain Score of 4-6, CPOT 3-4  Severe Pain = Pain Score of 7-10, CPOT 5-8          fentaNYL citrate (PF) (SUBLIMAZE) injection 50 mcg  Dose: 50 mcg  Freq: Every 5 Minutes PRN Route: IV  PRN Reason: Severe Pain  Start: 02/05/25 1518 End: 02/05/25 1653     Admin Instructions:   Maximum total dose of fentanyl is 200 mcg.  If given for pain, use the following pain scale:  Mild Pain = Pain Score of 1-3, CPOT 1-2  Moderate Pain = Pain Score of 4-6, CPOT 3-4  Severe Pain = Pain Score of 7-10, CPOT 5-8          fentaNYL citrate (PF) (SUBLIMAZE) injection 50 mcg  Dose: 50 mcg  Freq: Every 5 Minutes PRN Route: IV  PRN Reason: Severe Pain  PRN Comment: block  Indications of Use: PERIPROCEDURAL SEDATION  Start: 02/05/25 1012 End: 02/05/25 1517     Admin Instructions:   For procedure sedation  If given for pain, use the following pain scale:  Mild Pain = Pain Score of 1-3, CPOT 1-2  Moderate Pain = Pain Score of 4-6, CPOT 3-4  Severe Pain = Pain Score of 7-10, CPOT 5-8          flumazenil (ROMAZICON) injection 0.2 mg  Dose: 0.2 mg  Freq: As Needed Route: IV  PRN Comment: for benzodiazepine induced unresponsiveness or sedation  Start: 02/05/25 1518 End: 02/05/25 1653     Admin Instructions:   Notify Anesthesia if given  ** give IV over 15-30 seconds **           hydrALAZINE (APRESOLINE) injection 5 mg  Dose: 5 mg  Freq: Every 10 Minutes PRN Route: IV  PRN Reason: High Blood Pressure  PRN Comment: for systolic blood pressure greater than 180 mmHg or diastolic blood pressure greater than 105 mmHg  Start: 02/05/25 1518 End: 02/05/25 1653     Admin Instructions:   Up to 20 mg. If labetalol and hydralazine are both ordered, use labetalol first.  Caution: Look alike/sound alike drug alert          HYDROmorphone (DILAUDID) injection 0.5 mg  Dose: 0.5 mg  Freq: Every 5 Minutes PRN Route: IV  PRN Reason: Moderate Pain  Start: 02/05/25 1518 End: 02/05/25 1653     Admin Instructions:   Maximum total dose of hydromorphone is 2 mg.  If given for pain, use the following pain scale:  Mild Pain = Pain Score of 1-3, CPOT 1-2  Moderate Pain = Pain Score of 4-6, CPOT 3-4  Severe Pain = Pain Score of 7-10, CPOT 5-8          icosapent ethyl (VASCEPA) capsule 2 g  Dose: 2 g  Freq: 2 Times Daily Route: PO  Indications of Use: HYPERTRIGLYCERIDEMIA  Start: 02/05/25 2100 End: 02/05/25 1742     Admin Instructions:   Do not crush or chew the capsules or tablets. The drug may not work as designed if the capsule or tablet is crushed or chewed. Swallow whole.  Swallow capsule whole.  Do not break open, crush, dissolve, or chew capsule.          ipratropium-albuterol (DUO-NEB) nebulizer solution 3 mL  Dose: 3 mL  Freq: Once As Needed Route: NEBULIZATION  PRN Reasons: Wheezing,Shortness of Air  PRN Comment: bronchospasm  Start: 02/05/25 1518 End: 02/05/25 1653     Admin Instructions:   Notify Anesthesia if minineb is given          labetalol (NORMODYNE,TRANDATE) injection 5 mg  Dose: 5 mg  Freq: Every 5 Minutes PRN Route: IV  PRN Reason: High Blood Pressure  PRN Comment: for systolic blood pressure greater than 180 mmHg or diastolic blood pressure greater than 105 mmHg  Start: 02/05/25 1518 End: 02/05/25 1653     Admin Instructions:   Hold for heart rate less than 60.    If labetalol and hydralazine  are both ordered, use labetalol first. Maximum dose of labetalol is 20mg IV while in PACU, notify anesthesiologist for further orders if needed.  Give IV Push over 2 minutes.          lactated ringers infusion  Rate: 9 mL/hr Dose: 9 mL/hr  Freq: Continuous Route: IV  Start: 02/05/25 1015 End: 02/05/25 1712          midazolam (VERSED) injection 2 mg  Dose: 2 mg  Freq: Every 5 Minutes PRN Route: IV  PRN Comment: block  Indications Comment: Periprocedural Sedation  Start: 02/05/25 1012 End: 02/05/25 1517     Admin Instructions:   For procedure sedation            naloxone (NARCAN) injection 0.2 mg  Dose: 0.2 mg  Freq: As Needed Route: IV  PRN Reasons: Opioid Reversal,Respiratory Depression  PRN Comment: unresponsiveness, decrease oxygen saturation  Indications of Use: ACUTE RESPIRATORY FAILURE,OPIOID-INDUCED RESPIRATORY DEPRESSION  Start: 02/05/25 1518 End: 02/05/25 1653     Admin Instructions:   Notify Anesthesia if given          ondansetron (ZOFRAN) injection 4 mg  Dose: 4 mg  Freq: Once As Needed Route: IV  PRN Reasons: Nausea,Vomiting  Indications of Use: POSTOPERATIVE NAUSEA AND VOMITING  Start: 02/05/25 1518 End: 02/05/25 1653     Admin Instructions:   If BOTH ondansetron (ZOFRAN) and promethazine (PHENERGAN) are ordered use ondansetron first and THEN promethazine IF ondansetron is ineffective.          oxyCODONE-acetaminophen (PERCOCET) 7.5-325 MG per tablet 1 tablet  Dose: 1 tablet  Freq: Every 4 Hours PRN Route: PO  PRN Reason: Severe Pain  Start: 02/05/25 1518 End: 02/05/25 1653     Admin Instructions:   [GO]    Do not exceed 4 grams of acetaminophen in a 24 hr period. Max dose of 2gm for AST/ALT greater than 120 units/L        If given for pain, use the following pain scale:   Mild Pain = Pain Score of 1-3, CPOT 1-2  Moderate Pain = Pain Score of 4-6, CPOT 3-4  Severe Pain = Pain Score of 7-10, CPOT 5-8          Pharmacy to dose vancomycin  Freq: Once Route: XX  Indications of Use: PERIOPERATIVE  PHARMACOPROPHYLAXIS  Start: 02/05/25 1312 End: 02/05/25 1314           promethazine (PHENERGAN) suppository 25 mg  Dose: 25 mg  Freq: Once As Needed Route: RE  PRN Reasons: Nausea,Vomiting  Start: 02/05/25 1518 End: 02/05/25 1653     Admin Instructions:   If BOTH ondansetron (ZOFRAN) and promethazine (PHENERGAN) are ordered use ondansetron first and THEN promethazine IF ondansetron is ineffective.          Or   promethazine (PHENERGAN) tablet 25 mg  Dose: 25 mg  Freq: Once As Needed Route: PO  PRN Reasons: Nausea,Vomiting  Start: 02/05/25 1518 End: 02/05/25 1653     Admin Instructions:   If BOTH ondansetron (ZOFRAN) and promethazine (PHENERGAN) are ordered use ondansetron first and THEN promethazine IF ondansetron is ineffective.            sodium chloride (NS) irrigation solution  Freq: As Needed  Start: 02/05/25 1348 End: 02/05/25 1517          sodium chloride 0.9 % flush 3 mL  Dose: 3 mL  Freq: Every 12 Hours Scheduled Route: IV  Start: 02/05/25 1015 End: 02/05/25 1517          sodium chloride 0.9 % flush 3-10 mL  Dose: 3-10 mL  Freq: As Needed Route: IV  PRN Reason: Line Care  Start: 02/05/25 1012 End: 02/05/25 1517          sodium chloride 40 mL with Tranexamic Acid 2,000 mg mixture  Freq: As Needed  Start: 02/05/25 1422 End: 02/05/25 1517          tobramycin (NEBCIN) injection  Freq: As Needed  Start: 02/05/25 1421 End: 02/05/25 1517          vancomycin (VANCOCIN) injection  Freq: As Needed  Start: 02/05/25 1420 End: 02/05/25 1517              Operative/Procedure Notes (all)        Pratik Durham MD at 02/05/25 1348  Version 1 of 1         Name: Anthony Sol   YOB: 1970    DATE OF SURGERY: 2/5/2025    PREOPERATIVE DIAGNOSIS: Right total knee replacement acute hematoma likely infection    POSTOPERATIVE DIAGNOSIS: Right total replacement acute hematoma likely infection PROCEDURE PERFORMED: Right total knee replacement irrigation and debridement polyethylene exchange and antibiotic bead  placement    SURGEON: Pratik Durham M.D.    ASSISTANT: OLEGARIO RODAS    A surgical assistant was integral in ensuring a successful outcome with this procedure.  The assistant was utilized to assist in positioning the patient, draping the patient, was used throughout the case to provide with retraction of tissues, suctioning of blood and body fluids for visualization, positioning of the extremity to allow for proper exposure so that I could perform the procedure.  Without the use of a surgical assistant during this procedure I feel that the outcome may have been compromised or would have been suboptimal or at risk for complications.    IMPLANTS:      Implant Name Type Inv. Item Serial No.  Lot No. LRB No. Used Action   BONE FILLER VOID RAPID CURE STIMULAN 20CC - [id number] Implant BONE FILLER VOID RAPID CURE STIMULAN 20CC  [employer] [id number] Right 1 Implanted   INSRT TIB KN GEN2 CNSTR ART SZ5TO6 18MM - [id number] Implant INSRT TIB KN GEN2 CNSTR ART SZ5TO6 18MM  [employer] [id number] Right 1 Explanted   INSRT TIB GEN2 CNSTR ART SZ5TO6 15MM - [id number] Implant INSRT TIB GEN2 CNSTR ART SZ5TO6 15MM  [employer] [id number] Right 1 Implanted   DEV CONTRL TISS STRATAFIX SYMM PDS PLUS JANUARY CT-1 45CM - [id number] Implant DEV CONTRL TISS STRATAFIX SYMM PDS PLUS JANUARY CT-1 45CM  [employer]  DIV [employer] J [employer] J [id number] Right 1 Implanted   DEV CONTRL TISS STRATAFIXSPIRALMNCRYL PLSPS2 REV3/0 45CM - [id number] Implant DEV CONTRL TISS STRATAFIXSPIRALMNCRYL PLSPS2 REV3/0 45CM  [employer]  DIV OF J AND J [id number] Right 1 Implanted   DEV CONTRL TISS STRATAFIX SYMM PDS PLUS JANUARY CT-1 60CM - [id number] Implant DEV CONTRL TISS STRATAFIX SYMM PDS PLUS JANUARY CT-1 60CM  [employer]  DIV OF J AND J [id number] Right 1 Implanted       Estimated Blood Loss: 200 mL  Specimens : none  Complications: none      DESCRIPTION OF PROCEDURE: The patient was taken to the operating room and placed in the supine position. A sequential compression device  was carefully placed on the non-operative leg. Preoperative antibiotics were administered. Surgical time out was performed. After adequate induction of anesthesia, the leg was prepped and draped in the usual sterile fashion, exsanguinated with an Esmarch bandage and the tourniquet inflated to 250 mmHg. A midline incision was performed through the previous incision followed by a medial parapatellar arthrotomy.  Subcutaneous hematoma cultures x 3 were taken and labeled subcutaneous right knee.  The arthrotomy was then noted to have some serous fluid which was leaking from it.  I placed a needle percutaneously through and set this off for cell count with differential.  I then open the arthrotomy there is a large amount of inflammatory tissue and fluid throughout the knee most likely consistent with infection but not definitive.  There was a large amount of infectious-appearing fluid within the joint. There was evidence of inflammatory synovitis.  The medial retinaculum was then carefully released from the proximal medial tibia with electrocautery staying directly on bone.The patellar tendon scar tissue was gently released from a portion of the proximal tibia down to the level of the patellar tendon insertion to allow for patellar mobilization. The patella was subluxed laterally. The femoral and tibial components appeared to be without damage and appeared well fixed to the bone.  There were multiple absorbable sutures within the arthrotomy and these were all removed.   I then curetted out the inflammatory tissue which had formed in the medial and lateral gutters and suprapatellar region.  We had already done a synovectomy at the time of the procedure 2 weeks ago.  Along the medial and lateral gutters and suprapatellar pouch and then removed the polyethylene insert.  We debrided the posterior aspect of the joint.  At this point we copiously irrigated the knee with multiple cycles of pulse lavage saline followed by dilute  Betadine solution.  We irrigated with 1 L of Bactisure solution.  At this point we returned to chlorhexidine dilute irrigation and dilute Betadine irrigation and this was done multiple times and the implants were also scrubbed with Betadine solution using a sterile sponge.  At this point we changed gowns, gloves,  suction tips and placed sterile drapes around the knee.  All of the contaminated instruments were then taken away and we used a sterile Hart stand with new instruments.  We inserted the new polyethylene insert into the tibial component and it locked in nicely.  Elected to use a 15 mm constrained insert rather than the previous 18 mm constrained insert as the knee was fairly tight and the soft tissues were quite tight.  We then placed 20 mL of Stimulan Rapid Cure beads with 1 g of vancomycin powder and 0.5 gm of tobramycin powder into the gutters and suprapatellar pouch.   The tourniquet was then released. There was excellent hemostasis. We placed a one-eighth inch Hemovac drain. We closed the knee in multiple layers in standard fashion. Sterile dressings were applied. At the end of the case, the sponge and needle counts were reported as being correct. There were no known complications. The patient was then transported to the recovery room.    Pratik Durham   2/5/2025     Electronically signed by Pratik Durham MD at 02/05/25 1657          Physician Progress Notes (last 48 hours)        Joy Arteaga APRN at 02/07/25 0950              Orthopedic Progress Note      Patient: Anthony Sol Jr.  Date of Admission: 2/5/2025  YOB: 1970  Medical Record Number: [id number]    POD # :  2 Days Post-Op Procedure(s) (LRB):  HEMATOMA EVACUATION RIGHT KNEE, PLACEMENT ANTIBIOITIC BEADS, WASH OUT, POLY EXCHANGE (Right)    Systemic or Specific Complaints: No Complaints    Pain Relief: complete resolution    Physical Exam:  54 y.o.  male  Vitals:  Temp:  [98.3 °F (36.8 °C)-99 °F (37.2 °C)] 98.3 °F (36.8  °C)  Heart Rate:  [76-83] 76  Resp:  [16] 16  BP: (105-111)/(54-66) 111/54  alert and oriented  Ext: NV intact. ROM appropriate. Calf is soft and nontender. Negative Homans sign  Skin: Incision clean dry and intact w/out signs or  symptoms of infection.    Activity: Mobilizing Per P.T.   Weight Bearing: As Tolerated    Data Review     Admission on 02/05/2025   Component Date Value Ref Range Status    Glucose 02/05/2025 123  70 - 130 mg/dL Final    Wound Culture 02/05/2025 No growth at 2 days   Preliminary    Gram Stain 02/05/2025 Rare (1+) WBCs seen   Preliminary    Gram Stain 02/05/2025 No organisms seen   Preliminary    Wound Culture 02/05/2025 No growth at 2 days   Preliminary    Gram Stain 02/05/2025 Rare (1+) WBCs seen   Preliminary    Gram Stain 02/05/2025 No organisms seen   Preliminary    Wound Culture 02/05/2025 No growth at 2 days   Preliminary    Gram Stain 02/05/2025 Rare (1+) WBCs seen   Preliminary    Gram Stain 02/05/2025 No organisms seen   Preliminary    Wound Culture 02/05/2025 No growth at 2 days   Preliminary    Gram Stain 02/05/2025 Rare (1+) WBCs seen   Preliminary    Gram Stain 02/05/2025 No organisms seen   Preliminary    Wound Culture 02/05/2025 No growth at 2 days   Preliminary    Gram Stain 02/05/2025 Rare (1+) WBCs seen   Preliminary    Gram Stain 02/05/2025 No organisms seen   Preliminary    Wound Culture 02/05/2025 No growth at 2 days   Preliminary    Gram Stain 02/05/2025 Rare (1+) WBCs seen   Preliminary    Gram Stain 02/05/2025 No organisms seen   Preliminary    Color, Fluid 02/05/2025 Red   Final    Appearance, Fluid 02/05/2025 Bloody (A)  Clear Final    RBC, Fluid 02/05/2025 560,000  /mm3 Final    Nucleated Cells, Fluid 02/05/2025 2,080  /mm3 Final    Method: 02/05/2025 Automated Sysmex XN Method   Final    Neutrophils, Fluid % 02/05/2025 91  % Final    Lymphocytes, Fluid % 02/05/2025 9  % Final    Right Common Femoral Spont 02/06/2025 Y   Final    Right Common Femoral Competent  02/06/2025 Y   Final    Right Common Femoral Phasic 02/06/2025 Y   Final    Right Common Femoral Compress 02/06/2025 C   Final    Right Common Femoral Augment 02/06/2025 Y   Final    Right Saphenofemoral Junction Comp* 02/06/2025 C   Final    Right Profunda Femoral Compress 02/06/2025 C   Final    Right Proximal Femoral Compress 02/06/2025 C   Final    Right Mid Femoral Spont 02/06/2025 Y   Final    Right Mid Femoral Competent 02/06/2025 Y   Final    Right Mid Femoral Phasic 02/06/2025 Y   Final    Right Mid Femoral Compress 02/06/2025 C   Final    Right Mid Femoral Augment 02/06/2025 Y   Final    Right Distal Femoral Compress 02/06/2025 C   Final    Right Popliteal Spont 02/06/2025 Y   Final    Right Popliteal Competent 02/06/2025 Y   Final    Right Popliteal Phasic 02/06/2025 Y   Final    Right Popliteal Compress 02/06/2025 C   Final    Right Popliteal Augment 02/06/2025 Y   Final    Right Posterior Tibial Compress 02/06/2025 C   Final    Right Peroneal Compress 02/06/2025 C   Final    Right Gastronemius Compress 02/06/2025 C   Final    Right Greater Saph AK Compress 02/06/2025 C   Final    Right Greater Saph BK Compress 02/06/2025 C   Final    Right Lesser Saph Compress 02/06/2025 C   Final    Left Common Femoral Spont 02/06/2025 Y   Final    Left Common Femoral Competent 02/06/2025 Y   Final    Left Common Femoral Phasic 02/06/2025 Y   Final    Left Common Femoral Compress 02/06/2025 C   Final    Left Common Femoral Augment 02/06/2025 Y   Final    Left Saphenofemoral Junction Compr* 02/06/2025 C   Final    Left Profunda Femoral Compress 02/06/2025 C   Final    Left Proximal Femoral Compress 02/06/2025 C   Final    Left Mid Femoral Spont 02/06/2025 Y   Final    Left Mid Femoral Competent 02/06/2025 Y   Final    Left Mid Femoral Phasic 02/06/2025 Y   Final    Left Mid Femoral Compress 02/06/2025 C   Final    Left Mid Femoral Augment 02/06/2025 Y   Final    Left Distal Femoral Compress 02/06/2025 C   Final    Left  Popliteal Spont 02/06/2025 Y   Final    Left Popliteal Competent 02/06/2025 Y   Final    Left Popliteal Phasic 02/06/2025 Y   Final    Left Popliteal Compress 02/06/2025 C   Final    Left Popliteal Augment 02/06/2025 Y   Final    Left Posterior Tibial Compress 02/06/2025 C   Final    Left Peroneal Compress 02/06/2025 C   Final    Left Gastronemius Compress 02/06/2025 C   Final    Left Greater Saph AK Compress 02/06/2025 C   Final    Left Greater Saph BK Compress 02/06/2025 C   Final    Left Lesser Saph Compress 02/06/2025 C   Final    Hemoglobin 02/06/2025 9.6 (L)  13.0 - 17.7 g/dL Final    Hematocrit 02/06/2025 30.4 (L)  37.5 - 51.0 % Final    Hemoglobin 02/07/2025 8.9 (L)  13.0 - 17.7 g/dL Final    Hematocrit 02/07/2025 26.8 (L)  37.5 - 51.0 % Final       No results found.    Medications:  apixaban, 2.5 mg, Oral, Q12H  cefTRIAXone, 2,000 mg, Intravenous, Q24H  losartan, 25 mg, Oral, Q24H   And  chlorthalidone, 12.5 mg, Oral, Daily  empagliflozin, 10 mg, Oral, Daily  metFORMIN, 1,000 mg, Oral, BID With Meals  polyethylene glycol, 17 g, Oral, BID  rosuvastatin, 10 mg, Oral, Once per day on Monday Wednesday Friday  vancomycin, 1,000 mg, Intravenous, Q12H  verapamil SR, 240 mg, Oral, Nightly        ondansetron ODT **OR** ondansetron    oxyCODONE-acetaminophen    oxyCODONE-acetaminophen    Pharmacy to dose vancomycin    Assessment:  Doing well POD  # 2 Days Post-Op Procedure(s) (LRB):  HEMATOMA EVACUATION RIGHT KNEE, PLACEMENT ANTIBIOITIC BEADS, WASH OUT, POLY EXCHANGE (Right)  Problems Addressed this Visit          Musculoskeletal and Injuries    * (Principal) Status post right knee replacement    Relevant Orders    Anaerobic Culture - Surgical Site, Knee, Right    Anaerobic Culture - Surgical Site, Knee, Right    Anaerobic Culture - Surgical Site, Knee, Right    Wound Culture - Surgical Site, Knee, Right (Completed)    Wound Culture - Surgical Site, Knee, Right (Completed)    Wound Culture - Surgical Site, Knee,  Right (Completed)    Body Fluid Cell Count With Differential - Body Fluid, Knee, Right (Completed)    Anaerobic Culture - Surgical Site, Knee, Right    Anaerobic Culture - Surgical Site, Knee, Right    Anaerobic Culture - Surgical Site, Knee, Right    Wound Culture - Surgical Site, Knee, Right (Completed)    Wound Culture - Surgical Site, Knee, Right (Completed)    Wound Culture - Surgical Site, Knee, Right (Completed)     Diagnoses         Codes Comments    Status post right knee replacement     ICD-10-CM: Z96.651  ICD-9-CM: V43.65             Plan:  Continue efforts to mobilize  Continue Pain Control Measures  Continue incisional Care  DVT prophylaxis--resume Eliquis today, per Dr. Durham, RN notified  Will DC drain today, RN notified  Antibiotics per infectious disease  Will recheck hemoglobin hematocrit in a.m.      LAMAR Mock    Date: 2/7/2025  Time: 09:50 EST     Electronically signed by Joy Arteaga APRN at 02/07/25 0952       Kevin Mares DO at 02/07/25 0905           LOS: 0 days     Chief Complaint: Suspected right knee prosthetic joint infection    Interval History: Patient reports doing well this morning.  Does have some intermittent pains.  Tolerating antibiotics.    Vital Signs  Temp:  [98.3 °F (36.8 °C)-99 °F (37.2 °C)] 98.3 °F (36.8 °C)  Heart Rate:  [76-83] 76  Resp:  [16] 16  BP: (105-132)/(54-74) 111/54    Physical Exam:  General: In no acute distress  HEENT: Oropharynx clear, moist mucous membranes  Respiratory: Normal work of breathing  Skin: No rashes or lesions in exposed areas  Extremities: Left knee dressing in place.  Access: Peripheral IV    Antibiotics:  Anti-Infectives (From admission, onward)      Ordered     Dose/Rate Route Frequency Start Stop    02/06/25 1059  vancomycin (VANCOCIN) 1,000 mg in sodium chloride 0.9 % 250 mL IVPB-VTB        Ordering Provider: Kevin Mares DO    1,000 mg  250 mL/hr over 60 Minutes Intravenous Every 12 Hours 02/06/25  "1400 02/13/25 1359    02/06/25 0843  Pharmacy to dose vancomycin        Ordering Provider: Kevin Mares, DO     Not Applicable Continuous PRN 02/06/25 0842 02/13/25 0841    02/05/25 1712  vancomycin IVPB 1500 mg in 0.9% NaCl (Premix) 500 mL        Ordering Provider: Jin Benton APRN    15 mg/kg × 104 kg  333.3 mL/hr over 90 Minutes Intravenous Once 02/06/25 0100 02/06/25 0330    02/05/25 1421  tobramycin (NEBCIN) injection  Status:  Discontinued        Ordering Provider: Pratik Durham MD      As Needed 02/05/25 1421 02/05/25 1517    02/05/25 1420  vancomycin (VANCOCIN) injection  Status:  Discontinued        Ordering Provider: Pratik Durham MD      As Needed 02/05/25 1420 02/05/25 1517    02/05/25 1314  vancomycin IVPB 1500 mg in 0.9% NaCl (Premix) 500 mL        Ordering Provider: Pratik Durham MD    15 mg/kg × 104 kg  333.3 mL/hr over 90 Minutes Intravenous Once 02/05/25 1316 02/05/25 1744    02/05/25 1310  Pharmacy to dose vancomycin  Status:  Discontinued        Ordering Provider: Pratik Durham MD     Not Applicable Once 02/05/25 1312 02/05/25 1314    02/05/25 1310  ceFAZolin 2000 mg IVPB in 100 mL NS (MBP)        Ordering Provider: Pratik Durham MD    2,000 mg  over 30 Minutes Intravenous Once 02/05/25 1312 02/05/25 1746             Results Review:     I reviewed the patient's new clinical results.    Lab Results   Component Value Date    WBC 7.38 01/31/2025    HGB 8.9 (L) 02/07/2025    HCT 26.8 (L) 02/07/2025    MCV 86.2 01/31/2025     01/31/2025     Lab Results   Component Value Date    GLUCOSE 195 (H) 01/31/2025    BUN 15 01/31/2025    CREATININE 1.09 01/31/2025    EGFRIFNONA 83 07/12/2018    EGFRIFAFRI 100 07/12/2018    BCR 13.8 01/31/2025    CO2 26.2 01/31/2025    CALCIUM 9.4 01/31/2025    PROTENTOTREF 7.5 07/12/2018    ALBUMIN 4.0 01/31/2025    LABIL2 1.5 08/10/2020    AST 20 01/31/2025    ALT 20 01/31/2025       No results found for: \"VANCOPEAK\", \"VANCOTROUGH\", \"VANCORANDOM\" "     Isolation:   No active isolations    Microbiology:  Microbiology Results (last 10 days)       Procedure Component Value - Date/Time    Wound Culture - Surgical Site, Knee, Right [[id number]] Collected: 02/05/25 1352    Lab Status: Preliminary result Specimen: Surgical Site from Knee, Right Updated: 02/06/25 0752     Wound Culture No growth     Gram Stain Rare (1+) WBCs seen      No organisms seen    Wound Culture - Surgical Site, Knee, Right [[id number]] Collected: 02/05/25 1352    Lab Status: Preliminary result Specimen: Surgical Site from Knee, Right Updated: 02/06/25 0751     Wound Culture No growth     Gram Stain Rare (1+) WBCs seen      No organisms seen    Wound Culture - Surgical Site, Knee, Right [[id number]] Collected: 02/05/25 1352    Lab Status: Preliminary result Specimen: Surgical Site from Knee, Right Updated: 02/06/25 0751     Wound Culture No growth     Gram Stain Rare (1+) WBCs seen      No organisms seen    Wound Culture - Surgical Site, Knee, Right [[id number]] Collected: 02/05/25 1350    Lab Status: Preliminary result Specimen: Surgical Site from Knee, Right Updated: 02/06/25 0752     Wound Culture No growth     Gram Stain Rare (1+) WBCs seen      No organisms seen    Wound Culture - Surgical Site, Knee, Right [[id number]] Collected: 02/05/25 1350    Lab Status: Preliminary result Specimen: Surgical Site from Knee, Right Updated: 02/06/25 0750     Wound Culture No growth     Gram Stain Rare (1+) WBCs seen      No organisms seen    Wound Culture - Surgical Site, Knee, Right [[id number]] Collected: 02/05/25 1350    Lab Status: Preliminary result Specimen: Surgical Site from Knee, Right Updated: 02/06/25 0751     Wound Culture No growth     Gram Stain Rare (1+) WBCs seen      No organisms seen             Assessment    #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  Discussed possible treatment courses with the patient and antibiotic options.    After discussion of risks and benefits we arrived on 6 weeks of therapy for possible prosthetic joint infection.  We discussed the possibility that there may not be infection versus difficult to grow bacteria given his multiple negative cultures.  We are going to move forward with sending off a send out test for [facility] broad range PCR to evaluate further for infection.  In the meantime we will plan for 6-week course of antibiotics.    Plan to continue vancomycin goal -600 today and add ceftriaxone 2 g daily.  Plan will be for the end date of 3/20.  PICC line ordered today.      While on therapy patient will need weekly CBC with differential, BMP, CRP and vancomycin level faxed to [phone].    Plan to follow-up at the 6-week yue to discuss send out testing and further steps. Will continue to follow-up cultures and alter antibiotic plan accordingly but if remain negative then would continue with the above empiric plan.        Electronically signed by Kevin Mares DO at 02/07/25 0925       Jin Benton APRN at 02/06/25 0816              Orthopedic Progress Note      Patient: Anthony Sol Jr.  Date of Admission: 2/5/2025  YOB: 1970  Medical Record Number: [id number]    POD # :  1 Day Post-Op Procedure(s) (LRB):  HEMATOMA EVACUATION RIGHT KNEE, PLACEMENT ANTIBIOITIC BEADS, WASH OUT, POLY EXCHANGE (Right)    Systemic or Specific Complaints: Pain Control    Pain Relief: some relief    Physical Exam:  54 y.o.  male  Vitals:  Temp:  [98.1 °F (36.7 °C)-98.6 °F (37 °C)] 98.4 °F (36.9 °C)  Heart Rate:  [72-85] 72  Resp:  [15-20] 16  BP: (103-156)/(55-87) 117/68  alert and oriented  Chest: Clear to auscultation  CV: Regular Rate and Rhythm  Abd: Soft, NT, with BS +  Ext: NV intact. ROM appropriate.  Calf is soft and nontender. Negative Homans sign  Skin: Incision clean dry and intact w/out signs or  symptoms of infection.    Activity: Mobilizing Per P.T.   Weight Bearing: As Tolerated    Data Review     Admission on 02/05/2025   Component Date Value Ref Range Status    Glucose 02/05/2025 123  70 - 130 mg/dL Final    Wound Culture 02/05/2025 No growth   Preliminary    Gram Stain 02/05/2025 Rare (1+) WBCs seen   Preliminary    Gram Stain 02/05/2025 No organisms seen   Preliminary    Wound Culture 02/05/2025 No growth   Preliminary    Gram Stain 02/05/2025 Rare (1+) WBCs seen   Preliminary    Gram Stain 02/05/2025 No organisms seen   Preliminary    Wound Culture 02/05/2025 No growth   Preliminary    Gram Stain 02/05/2025 Rare (1+) WBCs seen   Preliminary    Gram Stain 02/05/2025 No organisms seen   Preliminary    Wound Culture 02/05/2025 No growth   Preliminary    Gram Stain 02/05/2025 Rare (1+) WBCs seen   Preliminary    Gram Stain 02/05/2025 No organisms seen   Preliminary    Wound Culture 02/05/2025 No growth   Preliminary    Gram Stain 02/05/2025 Rare (1+) WBCs seen   Preliminary    Gram Stain 02/05/2025 No organisms seen   Preliminary    Wound Culture 02/05/2025 No growth   Preliminary    Gram Stain 02/05/2025 Rare (1+) WBCs seen   Preliminary    Gram Stain 02/05/2025 No organisms seen   Preliminary    Color, Fluid 02/05/2025 Red   Final    Appearance, Fluid 02/05/2025 Bloody (A)  Clear Final    RBC, Fluid 02/05/2025 560,000  /mm3 Final    Nucleated Cells, Fluid 02/05/2025 2,080  /mm3 Final    Method: 02/05/2025 Automated Sysmex XN Method   Final    Neutrophils, Fluid % 02/05/2025 91  % Final    Lymphocytes, Fluid % 02/05/2025 9  % Final    Hemoglobin 02/06/2025 9.6 (L)  13.0 - 17.7 g/dL Final    Hematocrit 02/06/2025 30.4 (L)  37.5 - 51.0 % Final       No results found.    Medications:  losartan, 25 mg, Oral, Q24H   And  chlorthalidone, 12.5 mg, Oral, Daily  empagliflozin, 10 mg, Oral, Daily  metFORMIN,  1,000 mg, Oral, BID With Meals  polyethylene glycol, 17 g, Oral, BID  rosuvastatin, 10 mg, Oral, Once per day on Monday Wednesday Friday  verapamil SR, 240 mg, Oral, Nightly        ondansetron ODT **OR** ondansetron    oxyCODONE-acetaminophen    oxyCODONE-acetaminophen    Assessment:  Doing well POD  # 1 Day Post-Op Procedure(s) (LRB):  HEMATOMA EVACUATION RIGHT KNEE, PLACEMENT ANTIBIOITIC BEADS, WASH OUT, POLY EXCHANGE (Right)  Problems Addressed this Visit       * (Principal) Status post right knee replacement    Relevant Medications    lactated ringers bolus 500 mL (Completed)    ethyl alcohol 62 % 2 each (Completed)    pregabalin (LYRICA) capsule 150 mg (Completed)    Other Relevant Orders    Anaerobic Culture - Surgical Site, Knee, Right    Anaerobic Culture - Surgical Site, Knee, Right    Anaerobic Culture - Surgical Site, Knee, Right    Wound Culture - Surgical Site, Knee, Right (Completed)    Wound Culture - Surgical Site, Knee, Right (Completed)    Wound Culture - Surgical Site, Knee, Right (Completed)    Body Fluid Cell Count With Differential - Body Fluid, Knee, Right (Completed)    Anaerobic Culture - Surgical Site, Knee, Right    Anaerobic Culture - Surgical Site, Knee, Right    Anaerobic Culture - Surgical Site, Knee, Right    Wound Culture - Surgical Site, Knee, Right (Completed)    Wound Culture - Surgical Site, Knee, Right (Completed)    Wound Culture - Surgical Site, Knee, Right (Completed)     Diagnoses         Codes Comments    Status post right knee replacement     ICD-10-CM: Z96.651  ICD-9-CM: V43.65             Plan:  Continue efforts to mobilize - WBAT  Continue Pain Control Measures - Orals  Continue incisional Care - СЕРГЕЙ  DVT prophylaxis - ASA 81mg BID  ABX per ID    Discharge Plan:Home when medically cleared by ID    LAMAR Holguin    Date: 2/6/2025  Time: 08:16 EST      Electronically signed by Jin Benton APRN at 02/06/25 0817          Consult Notes (last 48 hours)        Vishnu  "Kevin Corea DO at 02/06/25 0838        Consult Orders    1. Inpatient Infectious Diseases Consult [[id number]] ordered by Jin Benton APRN at 02/05/25 1629                 Referring Provider: Pratik Durham MD  Reason for Consultation:     Suspected infected total knee replacement         Subjective   History of present illness: Patient is a 54-year-old male status post right total knee revision for failed right TKA on 1/22 who presents with worsening pain and bruising.  ID consulted for \"suspected infection total knee replacement\".    Prior to admission patient was noted to have low-grade fevers up to 100 with increased pain and CRP peaking at 5.7.  Had not noticed any drainage but was having increased pain and bruising.  Took 7 days of cefdinir after initial procedure but has not been on antibiotics since then.    On presentation patient has been afebrile and CRP decreased to 4.82.  Status post hematoma evacuation and antibiotic bead placement with polyethylene liner exchange on 2/5.  Cultures been obtained.  Received perioperative vancomycin and cefazolin.    Past Medical History:   Diagnosis Date    Adhesive capsulitis of right knee     Arthritis     OSTEOARTHRITIS    Cardiomegaly     Chronic pain of right knee     Coronary artery disease     Deep vein thrombosis     Diabetes mellitus     Diverticulosis     Fatty liver     Fracture, finger rt thumb 1987    GERD (gastroesophageal reflux disease)     H/O blood clots     rt calf  following rt total knee surgery 12/2016    Heart murmur     History of hepatitis     AS A CHILD food related    Hyperlipidemia     Hypertension     Hypotestosteronism     Hypovitaminosis D     Knee swelling     Low back pain     Lumbosacral disc disease L5S1 2001    Osteoarthritis     Tear of meniscus of knee 1980 Rt       Past Surgical History:   Procedure Laterality Date    ADENOIDECTOMY      BACK SURGERY      LAMINECTOMY L5-S1    COLONOSCOPY N/A 05/23/2022    Procedure: COLONOSCOPY; "  Surgeon: Marycruz Feliciano MD;  Location: [facility] MAIN OR;  Service: Gastroenterology;  Laterality: N/A;  Diverticulosis    JOINT MANIPULATION Right 02/16/2017    Procedure: MANIPULATION OF RT KNEE;  Surgeon: Anthony Andino MD;  Location: [facility] MAIN OR;  Service:     JOINT REPLACEMENT  2018    KNEE ARTHROSCOPY Right     MULTIPLE    PA ARTHRP KNE CONDYLE&PLATU MEDIAL&LAT COMPARTMENTS Right 12/15/2016    Procedure: RT TOTAL KNEE ARTHROPLASTY;  Surgeon: Anthony Andino MD;  Location: [facility] MAIN OR;  Service: Orthopedics    PA REVJ TOTAL KNEE ARTHRP W/WO ALGRFT 1 COMPONENT Right 03/06/2017    Procedure: RIGHT TOTAL KNEE ARTHROPLASTY REVISION WITH LEFT KNEE STERIOID INJECTION;  Surgeon: Anthony Andino MD;  Location: [facility] MAIN OR;  Service: Orthopedics    TONSILLECTOMY      TOTAL KNEE ARTHROPLASTY REVISION Right 1/22/2025    Procedure: TOTAL KNEE ARTHROPLASTY REVISION;  Surgeon: Pratik Durham MD;  Location: [facility];  Service: Orthopedics;  Laterality: Right;       family history includes Cancer in his paternal uncle; Colon polyps in his mother; Diabetes in his mother; Hypertension in his father and mother; No Known Problems in his brother.     reports that he has never smoked. He has never been exposed to tobacco smoke. He has never used smokeless tobacco. He reports that he does not currently use alcohol after a past usage of about 2.0 standard drinks of alcohol per week. He reports that he does not use drugs.     No Known Allergies    Medication:  Antibiotics:  Anti-Infectives (From admission, onward)      Ordered     Dose/Rate Route Frequency Start Stop    02/05/25 1712  vancomycin IVPB 1500 mg in 0.9% NaCl (Premix) 500 mL        Ordering Provider: Jin Benton APRN    15 mg/kg × 104 kg  333.3 mL/hr over 90 Minutes Intravenous Once 02/06/25 0100 02/06/25 0330    02/05/25 1421  tobramycin (NEBCIN) injection  Status:  Discontinued        Ordering Provider: Pratik Durham MD      As Needed 02/05/25 1421  "02/05/25 1517    02/05/25 1420  vancomycin (VANCOCIN) injection  Status:  Discontinued        Ordering Provider: Pratik Durham MD      As Needed 02/05/25 1420 02/05/25 1517    02/05/25 1314  vancomycin IVPB 1500 mg in 0.9% NaCl (Premix) 500 mL        Ordering Provider: Pratik Durham MD    15 mg/kg × 104 kg  333.3 mL/hr over 90 Minutes Intravenous Once 02/05/25 1316 02/05/25 1744    02/05/25 1310  Pharmacy to dose vancomycin  Status:  Discontinued        Ordering Provider: Pratik Durham MD     Not Applicable Once 02/05/25 1312 02/05/25 1314    02/05/25 1310  ceFAZolin 2000 mg IVPB in 100 mL NS (MBP)        Ordering Provider: Pratik Durham MD    2,000 mg  over 30 Minutes Intravenous Once 02/05/25 1312 02/05/25 1746              Objective     Physical Exam:   Vital Signs   Temp:  [98.1 °F (36.7 °C)-98.6 °F (37 °C)] 98.4 °F (36.9 °C)  Heart Rate:  [72-85] 72  Resp:  [15-20] 16  BP: (103-156)/(55-87) 117/68    GENERAL: Awake and alert, in no acute distress.   HEENT: Oropharynx is clear. Hearing is grossly normal.   EYES: PERRL. No conjunctival injection. No lid lag.   LUNGS: Normal work of breathing.  SKIN: Right knee surgical site with dressing in place.  PSYCHIATRIC: Appropriate mood, affect, insight, and judgment.     Results Review:   I reviewed the patient's new clinical results.  I reviewed the patient's new imaging results and agree with the interpretation.  I reviewed the patient's other test results and agree with the interpretation    Lab Results   Component Value Date    WBC 7.38 01/31/2025    HGB 9.6 (L) 02/06/2025    HCT 30.4 (L) 02/06/2025    MCV 86.2 01/31/2025     01/31/2025       No results found for: \"VANCOPEAK\", \"VANCOTROUGH\", \"VANCORANDOM\"    Lab Results   Component Value Date    GLUCOSE 195 (H) 01/31/2025    BUN 15 01/31/2025    CREATININE 1.09 01/31/2025    EGFRIFNONA 83 07/12/2018    EGFRIFAFRI 100 07/12/2018    BCR 13.8 01/31/2025    CO2 26.2 01/31/2025    CALCIUM 9.4 01/31/2025    " PROTENTOTREF 7.5 07/12/2018    ALBUMIN 4.0 01/31/2025    LABIL2 1.5 08/10/2020    AST 20 01/31/2025    ALT 20 01/31/2025         Estimated Creatinine Clearance: 94.8 mL/min (by C-G formula based on SCr of 1.09 mg/dL).    Isolation:   No active isolations      Microbiology:  Microbiology Results (last 10 days)       Procedure Component Value - Date/Time    Wound Culture - Surgical Site, Knee, Right [[id number]] Collected: 02/05/25 1352    Lab Status: Preliminary result Specimen: Surgical Site from Knee, Right Updated: 02/06/25 0752     Wound Culture No growth     Gram Stain Rare (1+) WBCs seen      No organisms seen    Wound Culture - Surgical Site, Knee, Right [[id number]] Collected: 02/05/25 1352    Lab Status: Preliminary result Specimen: Surgical Site from Knee, Right Updated: 02/06/25 0751     Wound Culture No growth     Gram Stain Rare (1+) WBCs seen      No organisms seen    Wound Culture - Surgical Site, Knee, Right [[id number]] Collected: 02/05/25 1352    Lab Status: Preliminary result Specimen: Surgical Site from Knee, Right Updated: 02/06/25 0751     Wound Culture No growth     Gram Stain Rare (1+) WBCs seen      No organisms seen    Wound Culture - Surgical Site, Knee, Right [[id number]] Collected: 02/05/25 1350    Lab Status: Preliminary result Specimen: Surgical Site from Knee, Right Updated: 02/06/25 0752     Wound Culture No growth     Gram Stain Rare (1+) WBCs seen      No organisms seen    Wound Culture - Surgical Site, Knee, Right [[id number]] Collected: 02/05/25 1350    Lab Status: Preliminary result Specimen: Surgical Site from Knee, Right Updated: 02/06/25 0750     Wound Culture No growth     Gram Stain Rare (1+) WBCs seen      No organisms seen    Wound Culture - Surgical Site, Knee, Right [[id number]] Collected: 02/05/25 1350    Lab Status: Preliminary result Specimen: Surgical Site from Knee, Right Updated: 02/06/25 0751     Wound Culture No growth     Gram Stain Rare (1+) WBCs seen      No  organisms seen             Radiology:  1/31 x-ray report reviewed with stable appearing hardware.    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      Plan to continue vancomycin goal -600 for now while following up cultures.  Appreciate pharmacy's help with dosing.  Follow vancomycin levels for therapeutic drug monitoring.    Discussed the risks and benefits of antibiotic treatment with the patient.  Discussed the standard treatment for culture-negative prosthetic joint infection.    Thank you for this consult.  We will continue to follow along and tailor antibiotics as the patient's clinical course evolves.      Electronically signed by Kevin Mares DO at 02/06/25 7990

## 2025-02-08 ENCOUNTER — READMISSION MANAGEMENT (OUTPATIENT)
Dept: CALL CENTER | Facility: HOSPITAL | Age: 55
End: 2025-02-08
Payer: COMMERCIAL

## 2025-02-08 ENCOUNTER — HOME CARE VISIT (OUTPATIENT)
Dept: HOME HEALTH SERVICES | Facility: HOME HEALTHCARE | Age: 55
End: 2025-02-08
Payer: COMMERCIAL

## 2025-02-08 VITALS
SYSTOLIC BLOOD PRESSURE: 124 MMHG | BODY MASS INDEX: 31.44 KG/M2 | RESPIRATION RATE: 16 BRPM | HEIGHT: 72 IN | WEIGHT: 232.14 LBS | TEMPERATURE: 98.2 F | DIASTOLIC BLOOD PRESSURE: 71 MMHG | OXYGEN SATURATION: 90 % | HEART RATE: 71 BPM

## 2025-02-08 LAB
ANION GAP SERPL CALCULATED.3IONS-SCNC: 9.4 MMOL/L (ref 5–15)
BACTERIA SPEC AEROBE CULT: NORMAL
BUN SERPL-MCNC: 15 MG/DL (ref 6–20)
BUN/CREAT SERPL: 15.8 (ref 7–25)
CALCIUM SPEC-SCNC: 9.5 MG/DL (ref 8.6–10.5)
CHLORIDE SERPL-SCNC: 100 MMOL/L (ref 98–107)
CO2 SERPL-SCNC: 27.6 MMOL/L (ref 22–29)
CREAT SERPL-MCNC: 0.95 MG/DL (ref 0.76–1.27)
CRP SERPL-MCNC: 4.71 MG/DL (ref 0–0.5)
EGFRCR SERPLBLD CKD-EPI 2021: 93.9 ML/MIN/1.73
GLUCOSE SERPL-MCNC: 144 MG/DL (ref 65–99)
GRAM STN SPEC: NORMAL
HCT VFR BLD AUTO: 27.1 % (ref 37.5–51)
HGB BLD-MCNC: 8.9 G/DL (ref 13–17.7)
POTASSIUM SERPL-SCNC: 3.6 MMOL/L (ref 3.5–5.2)
SODIUM SERPL-SCNC: 137 MMOL/L (ref 136–145)

## 2025-02-08 PROCEDURE — 85018 HEMOGLOBIN: CPT | Performed by: NURSE PRACTITIONER

## 2025-02-08 PROCEDURE — 85014 HEMATOCRIT: CPT | Performed by: NURSE PRACTITIONER

## 2025-02-08 PROCEDURE — 25810000003 SODIUM CHLORIDE 0.9 % SOLUTION: Performed by: STUDENT IN AN ORGANIZED HEALTH CARE EDUCATION/TRAINING PROGRAM

## 2025-02-08 PROCEDURE — 97530 THERAPEUTIC ACTIVITIES: CPT

## 2025-02-08 PROCEDURE — 99232 SBSQ HOSP IP/OBS MODERATE 35: CPT | Performed by: INTERNAL MEDICINE

## 2025-02-08 PROCEDURE — 25010000002 VANCOMYCIN 10 G RECONSTITUTED SOLUTION: Performed by: STUDENT IN AN ORGANIZED HEALTH CARE EDUCATION/TRAINING PROGRAM

## 2025-02-08 PROCEDURE — 80048 BASIC METABOLIC PNL TOTAL CA: CPT | Performed by: INTERNAL MEDICINE

## 2025-02-08 PROCEDURE — 99024 POSTOP FOLLOW-UP VISIT: CPT | Performed by: NURSE PRACTITIONER

## 2025-02-08 PROCEDURE — 25010000002 CEFTRIAXONE PER 250 MG: Performed by: STUDENT IN AN ORGANIZED HEALTH CARE EDUCATION/TRAINING PROGRAM

## 2025-02-08 RX ORDER — PANTOPRAZOLE SODIUM 40 MG/1
40 TABLET, DELAYED RELEASE ORAL DAILY
Qty: 14 TABLET | Refills: 0 | Status: SHIPPED | OUTPATIENT
Start: 2025-02-08 | End: 2025-02-22

## 2025-02-08 RX ORDER — VANCOMYCIN/0.9 % SOD CHLORIDE 1.5G/250ML
1500 PLASTIC BAG, INJECTION (ML) INTRAVENOUS EVERY 12 HOURS
Qty: 30000 ML | Refills: 1 | Status: SHIPPED | OUTPATIENT
Start: 2025-02-08 | End: 2025-03-21

## 2025-02-08 RX ORDER — POLYETHYLENE GLYCOL 3350 17 G/17G
17 POWDER, FOR SOLUTION ORAL 2 TIMES DAILY
Qty: 238 G | Refills: 0 | Status: SHIPPED | OUTPATIENT
Start: 2025-02-08 | End: 2025-02-15

## 2025-02-08 RX ORDER — OXYCODONE AND ACETAMINOPHEN 7.5; 325 MG/1; MG/1
1 TABLET ORAL EVERY 4 HOURS PRN
Qty: 40 TABLET | Refills: 0 | Status: SHIPPED | OUTPATIENT
Start: 2025-02-08 | End: 2025-02-13 | Stop reason: SDUPTHER

## 2025-02-08 RX ADMIN — OXYCODONE AND ACETAMINOPHEN 2 TABLET: 7.5; 325 TABLET ORAL at 14:09

## 2025-02-08 RX ADMIN — OXYCODONE AND ACETAMINOPHEN 2 TABLET: 7.5; 325 TABLET ORAL at 04:19

## 2025-02-08 RX ADMIN — VANCOMYCIN HYDROCHLORIDE 1500 MG: 10 INJECTION, POWDER, LYOPHILIZED, FOR SOLUTION INTRAVENOUS at 09:59

## 2025-02-08 RX ADMIN — APIXABAN 2.5 MG: 2.5 TABLET, FILM COATED ORAL at 10:00

## 2025-02-08 RX ADMIN — CEFTRIAXONE 2000 MG: 2 INJECTION, POWDER, FOR SOLUTION INTRAMUSCULAR; INTRAVENOUS at 12:29

## 2025-02-08 RX ADMIN — Medication 10 ML: at 10:00

## 2025-02-08 RX ADMIN — POLYETHYLENE GLYCOL 3350 17 G: 17 POWDER, FOR SOLUTION ORAL at 08:04

## 2025-02-08 RX ADMIN — EMPAGLIFLOZIN 10 MG: 10 TABLET, FILM COATED ORAL at 08:04

## 2025-02-08 RX ADMIN — CHLORTHALIDONE 12.5 MG: 25 TABLET ORAL at 08:04

## 2025-02-08 RX ADMIN — OXYCODONE AND ACETAMINOPHEN 2 TABLET: 7.5; 325 TABLET ORAL at 08:42

## 2025-02-08 RX ADMIN — METFORMIN HYDROCHLORIDE 1000 MG: 1000 TABLET, FILM COATED ORAL at 08:04

## 2025-02-08 RX ADMIN — LOSARTAN POTASSIUM 25 MG: 25 TABLET, FILM COATED ORAL at 08:04

## 2025-02-08 NOTE — PLAN OF CARE
Goal Outcome Evaluation:  Plan of Care Reviewed With: patient        Progress: improving  Outcome Evaluation: Patient seen this AM by PT. He completed STS from chair requiring Louann and then increased ambulation distance to 200ft using rwx requiring S. No LOB or unsteadiness noted. Pt completed TKA protocol and reviewed with pt. No further acute PT needs. Plan home with HHPT.    Anticipated Discharge Disposition (PT): home with assist, home with home health

## 2025-02-08 NOTE — PROGRESS NOTES
ID PROGRESS NOTE    Chief Complaint: Follow-up suspected right knee prosthetic joint infection    Interval History: Patient reports doing well this morning.  He reports slight increase in swelling in the right knee.  He is putting ice packs on it today.  His cultures remain negative to date.    Medications:    Current Facility-Administered Medications:     apixaban (ELIQUIS) tablet 2.5 mg, 2.5 mg, Oral, Q12H, Joy Arteaga, APRN, 2.5 mg at 02/07/25 2045    cefTRIAXone (ROCEPHIN) 2,000 mg in sodium chloride 0.9 % 100 mL MBP, 2,000 mg, Intravenous, Q24H, Kevin Mares DO, Stopped at 02/07/25 1115    losartan (COZAAR) tablet 25 mg, 25 mg, Oral, Q24H, 25 mg at 02/08/25 0804 **AND** chlorthalidone (HYGROTON) tablet 12.5 mg, 12.5 mg, Oral, Daily, Benton, Jin, APRN, 12.5 mg at 02/08/25 0804    empagliflozin (JARDIANCE) tablet 10 mg, 10 mg, Oral, Daily, Benton, Jin, APRN, 10 mg at 02/08/25 0804    metFORMIN (GLUCOPHAGE) tablet 1,000 mg, 1,000 mg, Oral, BID With Meals, Jin Benton, APRN, 1,000 mg at 02/08/25 0804    ondansetron ODT (ZOFRAN-ODT) disintegrating tablet 4 mg, 4 mg, Oral, Q6H PRN **OR** ondansetron (ZOFRAN) injection 4 mg, 4 mg, Intravenous, Q6H PRN, Benton, Jin, APRN    oxyCODONE-acetaminophen (PERCOCET) 7.5-325 MG per tablet 1 tablet, 1 tablet, Oral, Q4H PRN, Benton, Jin, APRN, 1 tablet at 02/06/25 0542    oxyCODONE-acetaminophen (PERCOCET) 7.5-325 MG per tablet 2 tablet, 2 tablet, Oral, Q4H PRN, Benton, Jin, APRN, 2 tablet at 02/08/25 0419    Pharmacy to dose vancomycin, , Not Applicable, Continuous PRN, Kevin Mares,     polyethylene glycol (MIRALAX) packet 17 g, 17 g, Oral, BID, Jin Benton APRN, 17 g at 02/08/25 0804    rosuvastatin (CRESTOR) tablet 10 mg, 10 mg, Oral, Once per day on Monday Wednesday Friday, Jin Benton APRN, 10 mg at 02/07/25 0804    sodium chloride 0.9 % flush 10 mL, 10 mL, Intravenous, Q12H, Kevin Mares DO, 10 mL at 02/07/25 2046     sodium chloride 0.9 % flush 10 mL, 10 mL, Intravenous, PRN, Kevin Mares,     sodium chloride 0.9 % flush 10 mL, 10 mL, Intravenous, Q12H, Kevin Mares, , 10 mL at 02/07/25 2046    sodium chloride 0.9 % flush 10 mL, 10 mL, Intravenous, PRN, Kevin Mares,     sodium chloride 0.9 % flush 20 mL, 20 mL, Intravenous, PRN, Kevin Mares,     sodium chloride 0.9 % flush 20 mL, 20 mL, Intravenous, PRN, Kevin Mares,     vancomycin IVPB 1500 mg in 0.9% NaCl (Premix) 500 mL, 1,500 mg, Intravenous, Q12H, Kevin Mares, , Last Rate: 333.3 mL/hr at 02/07/25 1857, 1,500 mg at 02/07/25 1857    verapamil SR (CALAN-SR) CR tablet 240 mg, 240 mg, Oral, Nightly, Jin Benton, APRN, 240 mg at 02/07/25 2215      Vital Signs  Temp:  [98.1 °F (36.7 °C)-98.2 °F (36.8 °C)] 98.2 °F (36.8 °C)  Heart Rate:  [71-88] 71  Resp:  [16-18] 16  BP: (109-145)/(64-83) 124/71    Physical Exam:  General: In no acute distress  Eyes: No scleral icterus  Respiratory: Normal work of breathing on RA without wheezing  Skin: No rashes or lesions in exposed areas  MSK: Right knee with dressing in place and ice packs  Access: New L UE PICC without erythema         Results Review:    Hgb, Hct, and vanco level reviewed today; STAT BMP ordered    Lab Results   Component Value Date    WBC 7.38 01/31/2025    HGB 8.9 (L) 02/08/2025    HCT 27.1 (L) 02/08/2025    MCV 86.2 01/31/2025     01/31/2025     Lab Results   Component Value Date    CREATININE 1.09 01/31/2025     Lab Results   Component Value Date    VANCOTROUGH 8.50 02/07/2025      Lab Results   Component Value Date    HGBA1C 6.59 (H) 07/12/2018         Isolation:   No active isolations    Microbiology:  Microbiology Results (last 10 days)       Procedure Component Value - Date/Time    Anaerobic Culture - Surgical Site, Knee, Right [[id number]]  (Normal) Collected: 02/05/25 1352    Lab Status: Preliminary result Specimen: Surgical Site from  Knee, Right Updated: 02/08/25 0816     Anaerobic Culture No anaerobes isolated at 3 days    Anaerobic Culture - Surgical Site, Knee, Right [[id number]]  (Normal) Collected: 02/05/25 1352    Lab Status: Preliminary result Specimen: Surgical Site from Knee, Right Updated: 02/08/25 0815     Anaerobic Culture No anaerobes isolated at 3 days    Anaerobic Culture - Surgical Site, Knee, Right [[id number]]  (Normal) Collected: 02/05/25 1352    Lab Status: Preliminary result Specimen: Surgical Site from Knee, Right Updated: 02/08/25 0815     Anaerobic Culture No anaerobes isolated at 3 days    Wound Culture - Surgical Site, Knee, Right [[id number]] Collected: 02/05/25 1352    Lab Status: Final result Specimen: Surgical Site from Knee, Right Updated: 02/08/25 0749     Wound Culture No growth at 3 days     Gram Stain Rare (1+) WBCs seen      No organisms seen    Wound Culture - Surgical Site, Knee, Right [[id number]] Collected: 02/05/25 1352    Lab Status: Final result Specimen: Surgical Site from Knee, Right Updated: 02/08/25 0749     Wound Culture No growth at 3 days     Gram Stain Rare (1+) WBCs seen      No organisms seen    Wound Culture - Surgical Site, Knee, Right [[id number]] Collected: 02/05/25 1352    Lab Status: Final result Specimen: Surgical Site from Knee, Right Updated: 02/08/25 0749     Wound Culture No growth at 3 days     Gram Stain Rare (1+) WBCs seen      No organisms seen    Anaerobic Culture - Surgical Site, Knee, Right [[id number]]  (Normal) Collected: 02/05/25 1350    Lab Status: Preliminary result Specimen: Surgical Site from Knee, Right Updated: 02/08/25 0815     Anaerobic Culture No anaerobes isolated at 3 days    Anaerobic Culture - Surgical Site, Knee, Right [[id number]]  (Normal) Collected: 02/05/25 1350    Lab Status: Preliminary result Specimen: Surgical Site from Knee, Right Updated: 02/08/25 0816     Anaerobic Culture No anaerobes isolated at 3 days    Anaerobic Culture - Surgical Site, Knee, Right  [[id number]]  (Normal) Collected: 02/05/25 1350    Lab Status: Preliminary result Specimen: Surgical Site from Knee, Right Updated: 02/08/25 0815     Anaerobic Culture No anaerobes isolated at 3 days    Wound Culture - Surgical Site, Knee, Right [[id number]] Collected: 02/05/25 1350    Lab Status: Final result Specimen: Surgical Site from Knee, Right Updated: 02/08/25 0750     Wound Culture No growth at 3 days     Gram Stain Rare (1+) WBCs seen      No organisms seen    Wound Culture - Surgical Site, Knee, Right [[id number]] Collected: 02/05/25 1350    Lab Status: Final result Specimen: Surgical Site from Knee, Right Updated: 02/08/25 0748     Wound Culture No growth at 3 days     Gram Stain Rare (1+) WBCs seen      No organisms seen    Wound Culture - Surgical Site, Knee, Right [[id number]] Collected: 02/05/25 1350    Lab Status: Final result Specimen: Surgical Site from Knee, Right Updated: 02/08/25 0748     Wound Culture No growth at 3 days     Gram Stain Rare (1+) WBCs seen      No organisms seen             Assessment    #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 Mares in the infectious diseases clinic.  He will need a weekly CBC with differential, BMP, Vanco level, and CRP faxed to [phone].    We have sent tissue to [facility] in [place] for 16S PCR testing.  The patient will be contacted once those results are available.    Thank you for allowing me to be involved in the care of this patient. Infectious diseases will sign off at this time with antibiotics plan in place, but please call me at  [phone] if any further ID questions or new ID concerns.

## 2025-02-08 NOTE — PLAN OF CARE
Goal Outcome Evaluation:  Plan of Care Reviewed With: patient        Progress: improving  Outcome Evaluation: Pt cooperative with care, wife at bedsidse attentive to pt need. PT able to ambulate with standby assist per walker. Osmar is adequately managed by PRN pain med ordered. Spoke with the on-call Physician last night - pt acetaminophen level is over the recommended daily dose. Ordered to continue giving pain med as directed. Pt PICC line flushed well with good blood return

## 2025-02-08 NOTE — THERAPY DISCHARGE NOTE
Patient Name: Anthony Sol Jr.  : 1970    MRN: [id number]                              Today's Date: 2025       Admit Date: 2025    Visit Dx:     ICD-10-CM ICD-9-CM   1. Status post right knee replacement  Z96.651 V43.65     Patient Active Problem List   Diagnosis    Uncontrolled type 2 diabetes mellitus without complication, without long-term current use of insulin    Hyperlipidemia    Essential hypertension    DJD (degenerative joint disease) of knee    Type 2 diabetes mellitus with hyperglycemia    Hyponatremia    Acute kidney injury    Painful total knee replacement    Gastroesophageal reflux disease with esophagitis    Hypovitaminosis D    Exogenous obesity    Screening for colon cancer    Coronary artery disease involving native coronary artery of native heart without angina pectoris    Status post right knee replacement    OA (osteoarthritis) of knee     Past Medical History:   Diagnosis Date    Adhesive capsulitis of right knee     Arthritis     OSTEOARTHRITIS    Cardiomegaly     Chronic pain of right knee     Coronary artery disease     Deep vein thrombosis     Diabetes mellitus     Diverticulosis     Fatty liver     Fracture, finger rt thumb     GERD (gastroesophageal reflux disease)     H/O blood clots     rt calf  following rt total knee surgery 2016    Heart murmur     History of hepatitis     AS A CHILD food related    Hyperlipidemia     Hypertension     Hypotestosteronism     Hypovitaminosis D     Knee swelling     Low back pain     Lumbosacral disc disease L5S1     Osteoarthritis     Tear of meniscus of knee  Rt     Past Surgical History:   Procedure Laterality Date    ADENOIDECTOMY      BACK SURGERY      LAMINECTOMY L5-S1    COLONOSCOPY N/A 2022    Procedure: COLONOSCOPY;  Surgeon: Marycruz Feliciano MD;  [place] [facility] [place];  Service: Gastroenterology;  Laterality: N/A;  Diverticulosis    INCISION AND DRAINAGE LEG Right 2025    Procedure: HEMATOMA  EVACUATION RIGHT KNEE, PLACEMENT ANTIBIOITIC BEADS, WASH OUT, POLY EXCHANGE;  Surgeon: Pratik Durham MD;  Location: [facility] OR [facility];  Service: Orthopedics;  Laterality: Right;    JOINT MANIPULATION Right 02/16/2017    Procedure: MANIPULATION OF RT KNEE;  Surgeon: Anthony Andino MD;  Location: [facility] MAIN OR;  Service:     JOINT REPLACEMENT  2018    KNEE ARTHROSCOPY Right     MULTIPLE    NY ARTHRP KNE CONDYLE&PLATU MEDIAL&LAT COMPARTMENTS Right 12/15/2016    Procedure: RT TOTAL KNEE ARTHROPLASTY;  Surgeon: Anthony Andino MD;  Location: [facility] MAIN OR;  Service: Orthopedics    NY REVJ TOTAL KNEE ARTHRP W/WO ALGRFT 1 COMPONENT Right 03/06/2017    Procedure: RIGHT TOTAL KNEE ARTHROPLASTY REVISION WITH LEFT KNEE STERIOID INJECTION;  Surgeon: Anthony Andino MD;  Location: [facility] MAIN OR;  Service: Orthopedics    TONSILLECTOMY      TOTAL KNEE ARTHROPLASTY REVISION Right 1/22/2025    Procedure: TOTAL KNEE ARTHROPLASTY REVISION;  Surgeon: Pratik Durham MD;  Location: [facility] OR [facility] [place] [facility];  Service: Orthopedics;  Laterality: Right;      General Information       Row Name 02/08/25 1119          Physical Therapy Time and Intention    Document Type discharge treatment  -CB     Mode of Treatment physical therapy;individual therapy  -CB       Row Name 02/08/25 1119          Cognition    Orientation Status (Cognition) oriented x 3  -CB               User Key  (r) = Recorded By, (t) = Taken By, (c) = Cosigned By      Initials Name Provider Type    CB Maribell Miranda PT Physical Therapist                   Mobility       Row Name 02/08/25 1121          Bed Mobility    Comment, (Bed Mobility) C pre and post session  -CB       Row Name 02/08/25 1121          Sit-Stand Transfer    Sit-Stand Evans (Transfers) modified independence;verbal cues  -CB     Assistive Device (Sit-Stand Transfers) walker, front-wheeled  -CB       Row Name 02/08/25 1121          Gait/Stairs (Locomotion)    Evans Level (Gait) supervision  -CB      Assistive Device (Gait) walker, front-wheeled  -CB     Distance in Feet (Gait) 200  -CB     Deviations/Abnormal Patterns (Gait) gait speed decreased;stride length decreased  -CB     Comment, (Gait/Stairs) no LOB or unsteadiness  -CB       Row Name 02/08/25 1121          Mobility    Extremity Weight-bearing Status right lower extremity  -CB     Right Lower Extremity (Weight-bearing Status) weight-bearing as tolerated (WBAT)  -CB               User Key  (r) = Recorded By, (t) = Taken By, (c) = Cosigned By      Initials Name Provider Type    Maribell Beltrán, PT Physical Therapist                   Obj/Interventions       Row Name 02/08/25 1123          Motor Skills    Therapeutic Exercise --  TKA protocol x10 reps  -CB       Row Name 02/08/25 1123          Balance    Balance Assessment standing static balance;standing dynamic balance  -CB     Static Standing Balance modified independence  -CB     Dynamic Standing Balance modified independence  -CB     Position/Device Used, Standing Balance supported;walker, front-wheeled  -CB     Balance Interventions standing;sit to stand;supported;static;dynamic;minimal challenge  -CB               User Key  (r) = Recorded By, (t) = Taken By, (c) = Cosigned By      Initials Name Provider Type    Maribell Beltrán, PT Physical Therapist                   Goals/Plan    No documentation.                  Clinical Impression       Row Name 02/08/25 1124          Pain    Pretreatment Pain Rating 3/10  -CB     Posttreatment Pain Rating 3/10  -CB     Pain Location knee  -CB     Pain Side/Orientation right  -CB       Row Name 02/08/25 1124          Plan of Care Review    Plan of Care Reviewed With patient  -CB     Progress improving  -CB     Outcome Evaluation Patient seen this AM by PT. He completed STS from chair requiring Louann and then increased ambulation distance to 200ft using rwx requiring S. No LOB or unsteadiness noted. Pt completed TKA protocol and reviewed with pt. No further  acute PT needs. Plan home with HHPT.  -CB       Row Name 02/08/25 1124          Positioning and Restraints    Pre-Treatment Position sitting in chair/recliner  -CB     Post Treatment Position chair  -CB     In Chair notified nsg;reclined;call light within reach;encouraged to call for assist;exit alarm on;with family/caregiver  -CB               User Key  (r) = Recorded By, (t) = Taken By, (c) = Cosigned By      Initials Name Provider Type    Maribell Beltrán, PT Physical Therapist                   Outcome Measures       Row Name 02/08/25 1130 02/08/25 0842       How much help from another person do you currently need...    Turning from your back to your side while in flat bed without using bedrails? 4  -CB 4  -JAMES    Moving from lying on back to sitting on the side of a flat bed without bedrails? 4  -CB 4  -JAMES    Moving to and from a bed to a chair (including a wheelchair)? 4  -CB 4  -JAMES    Standing up from a chair using your arms (e.g., wheelchair, bedside chair)? 4  -CB 4  -JAMES    Climbing 3-5 steps with a railing? 3  -CB 3  -JAMES    To walk in hospital room? 3  -CB 4  -JAMES    AM-PAC 6 Clicks Score (PT) 22  -CB 23  -JAMES    Highest Level of Mobility Goal 7 --> Walk 25 feet or more  -CB 7 --> Walk 25 feet or more  -JAMES      Row Name 02/08/25 1130          Functional Assessment    Outcome Measure Options AM-PAC 6 Clicks Basic Mobility (PT)  -CB               User Key  (r) = Recorded By, (t) = Taken By, (c) = Cosigned By      Initials Name Provider Type    Maribell Beltrán, PT Physical Therapist    Gustavo Curry, RN Registered Nurse                  Physical Therapy Education       Title: PT OT SLP Therapies (Done)       Topic: Physical Therapy (Done)       Point: Mobility training (Done)       Learning Progress Summary            Patient Acceptance, E,TB, VU by CB at 2/8/2025 1130    Acceptance, E,D, VU,NR by MS at 2/7/2025 0938    Acceptance, E,D, VU,NR by MS at 2/6/2025 1100                      Point: Home  exercise program (Done)       Learning Progress Summary            Patient Acceptance, E,TB, VU by CB at 2/8/2025 1130    Acceptance, E,D, VU,NR by MS at 2/7/2025 0938    Acceptance, E,D, VU,NR by MS at 2/6/2025 1100                      Point: Body mechanics (Done)       Learning Progress Summary            Patient Acceptance, E,TB, VU by CB at 2/8/2025 1130    Acceptance, E,D, VU,NR by MS at 2/7/2025 0938    Acceptance, E,D, VU,NR by MS at 2/6/2025 1100                      Point: Precautions (Done)       Learning Progress Summary            Patient Acceptance, E,TB, VU by CB at 2/8/2025 1130    Acceptance, E,D, VU,NR by MS at 2/7/2025 0938    Acceptance, E,D, VU,NR by MS at 2/6/2025 1100                                      User Key       Initials Effective Dates Name Provider Type Discipline    MS 06/16/21 -  Ayan Stark, PT Physical Therapist PT    CB 10/22/21 -  Maribell Miranda, PT Physical Therapist PT                  PT Recommendation and Plan     Progress: improving  Outcome Evaluation: Patient seen this AM by PT. He completed STS from chair requiring Louann and then increased ambulation distance to 200ft using rwx requiring S. No LOB or unsteadiness noted. Pt completed TKA protocol and reviewed with pt. No further acute PT needs. Plan home with HHPT.     Time Calculation:         PT Charges       Row Name 02/08/25 1130             Time Calculation    Start Time 1027  -CB      Stop Time 1044  -CB      Time Calculation (min) 17 min  -CB      PT Received On 02/08/25  -CB         Time Calculation- PT    Total Timed Code Minutes- PT 17 minute(s)  -CB         Timed Charges    69364 - PT Therapeutic Exercise Minutes 7  -CB      79019 - PT Therapeutic Activity Minutes 10  -CB         Total Minutes    Timed Charges Total Minutes 17  -CB       Total Minutes 17  -CB                User Key  (r) = Recorded By, (t) = Taken By, (c) = Cosigned By      Initials Name Provider Type    CB Maribell Miranda, PT Physical  Therapist                  Therapy Charges for Today       Code Description Service Date Service Provider Modifiers Qty    [id number]  PT THERAPEUTIC ACT EA 15 MIN 2/8/2025 Maribell Miranda, PT GP 1            PT G-Codes  Outcome Measure Options: AM-PAC 6 Clicks Basic Mobility (PT)  AM-PAC 6 Clicks Score (PT): 22    PT Discharge Summary  Anticipated Discharge Disposition (PT): home with assist, home with home health    Maribell Miranda, PT  2/8/2025

## 2025-02-08 NOTE — HOME HEALTH
Patient admitted to [facility] observation status on 2/5/25 for Right total knee replacement irrigation and debridement polyethylene exchange and antibiotic bead placement by Dr. Durham.  Pt's hospitalization status changed from observation to inpatient on 2/7/25.  Transfer [facility] completed this date.

## 2025-02-08 NOTE — PROGRESS NOTES
Orthopedic Progress Note      Patient: Anthony Sol Jr.  Date of Admission: 2/5/2025  YOB: 1970  Medical Record Number: [id number]    POD # :  3 Days Post-Op Procedure(s) (LRB):  HEMATOMA EVACUATION RIGHT KNEE, PLACEMENT ANTIBIOITIC BEADS, WASH OUT, POLY EXCHANGE (Right)    Systemic or Specific Complaints: No Complaints    Pain Relief:  Well controlled    Physical Exam:  54 y.o.  male  Vitals:  Temp:  [98.1 °F (36.7 °C)-98.2 °F (36.8 °C)] 98.2 °F (36.8 °C)  Heart Rate:  [71-88] 71  Resp:  [16-18] 16  BP: (109-145)/(64-83) 124/71  alert and oriented  Chest: Clear to auscultation  CV: Regular Rate and Rhythm  Abd: Soft, NT, with BS +  Ext: NV intact. ROM appropriate. Calf is soft and nontender. Negative Homans sign  Skin: Incision clean dry and intact w/out signs or  symptoms of infection.    Activity: Mobilizing Per P.T.   Weight Bearing: As Tolerated    Data Review     Admission on 02/05/2025   Component Date Value Ref Range Status    Glucose 02/05/2025 123  70 - 130 mg/dL Final    Anaerobic Culture 02/05/2025 No anaerobes isolated at 3 days   Preliminary    Anaerobic Culture 02/05/2025 No anaerobes isolated at 3 days   Preliminary    Anaerobic Culture 02/05/2025 No anaerobes isolated at 3 days   Preliminary    Wound Culture 02/05/2025 No growth at 3 days   Final    Gram Stain 02/05/2025 Rare (1+) WBCs seen   Final    Gram Stain 02/05/2025 No organisms seen   Final    Wound Culture 02/05/2025 No growth at 3 days   Final    Gram Stain 02/05/2025 Rare (1+) WBCs seen   Final    Gram Stain 02/05/2025 No organisms seen   Final    Wound Culture 02/05/2025 No growth at 3 days   Final    Gram Stain 02/05/2025 Rare (1+) WBCs seen   Final    Gram Stain 02/05/2025 No organisms seen   Final    Anaerobic Culture 02/05/2025 No anaerobes isolated at 3 days   Preliminary    Anaerobic Culture 02/05/2025 No anaerobes isolated at 3 days   Preliminary    Anaerobic Culture 02/05/2025 No anaerobes isolated at 3  days   Preliminary    Wound Culture 02/05/2025 No growth at 3 days   Final    Gram Stain 02/05/2025 Rare (1+) WBCs seen   Final    Gram Stain 02/05/2025 No organisms seen   Final    Wound Culture 02/05/2025 No growth at 3 days   Final    Gram Stain 02/05/2025 Rare (1+) WBCs seen   Final    Gram Stain 02/05/2025 No organisms seen   Final    Wound Culture 02/05/2025 No growth at 3 days   Final    Gram Stain 02/05/2025 Rare (1+) WBCs seen   Final    Gram Stain 02/05/2025 No organisms seen   Final    Color, Fluid 02/05/2025 Red   Final    Appearance, Fluid 02/05/2025 Bloody (A)  Clear Final    RBC, Fluid 02/05/2025 560,000  /mm3 Final    Nucleated Cells, Fluid 02/05/2025 2,080  /mm3 Final    Method: 02/05/2025 Automated Sysmex XN Method   Final    Neutrophils, Fluid % 02/05/2025 91  % Final    Lymphocytes, Fluid % 02/05/2025 9  % Final    Right Common Femoral Spont 02/06/2025 Y   Final    Right Common Femoral Competent 02/06/2025 Y   Final    Right Common Femoral Phasic 02/06/2025 Y   Final    Right Common Femoral Compress 02/06/2025 C   Final    Right Common Femoral Augment 02/06/2025 Y   Final    Right Saphenofemoral Junction Comp* 02/06/2025 C   Final    Right Profunda Femoral Compress 02/06/2025 C   Final    Right Proximal Femoral Compress 02/06/2025 C   Final    Right Mid Femoral Spont 02/06/2025 Y   Final    Right Mid Femoral Competent 02/06/2025 Y   Final    Right Mid Femoral Phasic 02/06/2025 Y   Final    Right Mid Femoral Compress 02/06/2025 C   Final    Right Mid Femoral Augment 02/06/2025 Y   Final    Right Distal Femoral Compress 02/06/2025 C   Final    Right Popliteal Spont 02/06/2025 Y   Final    Right Popliteal Competent 02/06/2025 Y   Final    Right Popliteal Phasic 02/06/2025 Y   Final    Right Popliteal Compress 02/06/2025 C   Final    Right Popliteal Augment 02/06/2025 Y   Final    Right Posterior Tibial Compress 02/06/2025 C   Final    Right Peroneal Compress 02/06/2025 C   Final    Right  Gastronemius Compress 02/06/2025 C   Final    Right Greater Saph AK Compress 02/06/2025 C   Final    Right Greater Saph BK Compress 02/06/2025 C   Final    Right Lesser Saph Compress 02/06/2025 C   Final    Left Common Femoral Spont 02/06/2025 Y   Final    Left Common Femoral Competent 02/06/2025 Y   Final    Left Common Femoral Phasic 02/06/2025 Y   Final    Left Common Femoral Compress 02/06/2025 C   Final    Left Common Femoral Augment 02/06/2025 Y   Final    Left Saphenofemoral Junction Compr* 02/06/2025 C   Final    Left Profunda Femoral Compress 02/06/2025 C   Final    Left Proximal Femoral Compress 02/06/2025 C   Final    Left Mid Femoral Spont 02/06/2025 Y   Final    Left Mid Femoral Competent 02/06/2025 Y   Final    Left Mid Femoral Phasic 02/06/2025 Y   Final    Left Mid Femoral Compress 02/06/2025 C   Final    Left Mid Femoral Augment 02/06/2025 Y   Final    Left Distal Femoral Compress 02/06/2025 C   Final    Left Popliteal Spont 02/06/2025 Y   Final    Left Popliteal Competent 02/06/2025 Y   Final    Left Popliteal Phasic 02/06/2025 Y   Final    Left Popliteal Compress 02/06/2025 C   Final    Left Popliteal Augment 02/06/2025 Y   Final    Left Posterior Tibial Compress 02/06/2025 C   Final    Left Peroneal Compress 02/06/2025 C   Final    Left Gastronemius Compress 02/06/2025 C   Final    Left Greater Saph AK Compress 02/06/2025 C   Final    Left Greater Saph BK Compress 02/06/2025 C   Final    Left Lesser Saph Compress 02/06/2025 C   Final    Hemoglobin 02/06/2025 9.6 (L)  13.0 - 17.7 g/dL Final    Hematocrit 02/06/2025 30.4 (L)  37.5 - 51.0 % Final    Hemoglobin 02/07/2025 8.9 (L)  13.0 - 17.7 g/dL Final    Hematocrit 02/07/2025 26.8 (L)  37.5 - 51.0 % Final    Vancomycin Trough 02/07/2025 8.50  5.00 - 20.00 mcg/mL Final    Hemoglobin 02/08/2025 8.9 (L)  13.0 - 17.7 g/dL Final    Hematocrit 02/08/2025 27.1 (L)  37.5 - 51.0 % Final       No results found.    Medications:  apixaban, 2.5 mg, Oral,  Q12H  cefTRIAXone, 2,000 mg, Intravenous, Q24H  losartan, 25 mg, Oral, Q24H   And  chlorthalidone, 12.5 mg, Oral, Daily  empagliflozin, 10 mg, Oral, Daily  metFORMIN, 1,000 mg, Oral, BID With Meals  polyethylene glycol, 17 g, Oral, BID  rosuvastatin, 10 mg, Oral, Once per day on Monday Wednesday Friday  sodium chloride, 10 mL, Intravenous, Q12H  sodium chloride, 10 mL, Intravenous, Q12H  vancomycin, 1,500 mg, Intravenous, Q12H  verapamil SR, 240 mg, Oral, Nightly        ondansetron ODT **OR** ondansetron    oxyCODONE-acetaminophen    oxyCODONE-acetaminophen    Pharmacy to dose vancomycin    sodium chloride    sodium chloride    sodium chloride    sodium chloride    Assessment:  Doing well POD  # 3 Days Post-Op Procedure(s) (LRB):  HEMATOMA EVACUATION RIGHT KNEE, PLACEMENT ANTIBIOITIC BEADS, WASH OUT, POLY EXCHANGE (Right)  Problems Addressed this Visit       * (Principal) Status post right knee replacement    Relevant Medications    oxyCODONE-acetaminophen (PERCOCET) 7.5-325 MG per tablet    Other Relevant Orders    Anaerobic Culture - Surgical Site, Knee, Right (Completed)    Anaerobic Culture - Surgical Site, Knee, Right (Completed)    Anaerobic Culture - Surgical Site, Knee, Right (Completed)    Wound Culture - Surgical Site, Knee, Right (Completed)    Wound Culture - Surgical Site, Knee, Right (Completed)    Wound Culture - Surgical Site, Knee, Right (Completed)    Body Fluid Cell Count With Differential - Body Fluid, Knee, Right (Completed)    Anaerobic Culture - Surgical Site, Knee, Right (Completed)    Anaerobic Culture - Surgical Site, Knee, Right (Completed)    Anaerobic Culture - Surgical Site, Knee, Right (Completed)    Wound Culture - Surgical Site, Knee, Right (Completed)    Wound Culture - Surgical Site, Knee, Right (Completed)    Wound Culture - Surgical Site, Knee, Right (Completed)     Diagnoses         Codes Comments    Status post right knee replacement     ICD-10-CM: Z96.651  ICD-9-CM: V43.65              Plan:  Continue efforts to mobilize - WBAT  Continue Pain Control Measures - Oral Percocet  Continue incisional Care - СЕРГЕЙ  DVT prophylaxis - Eliquis   ABX per ID - cleared to be d/c home    Discharge Plan:Home today    LAMAR Holguin    Date: 2/8/2025  Time: 09:28 EST

## 2025-02-08 NOTE — DISCHARGE SUMMARY
Patient Name: Anthony Sol Jr.  Patient YOB: 1970    Date of Admission:  2/5/2025  Date of Discharge:  2/8/2025  Discharge Diagnosis: MS INCISION & DRAINAGE LEG/ANKLE ABSCESS/HEMATOMA [73860] (HEMATOMA EVACUATION LOWER EXTREMITY)  Presenting Problem/History of Present Illness: Status post right knee replacement [Z96.651]  OA (osteoarthritis) of knee [M17.9]  Admitting Physician: Dr Pratik Durham  Consults:   Consults       Date and Time Order Name Status Description    2/5/2025  5:12 PM Inpatient Infectious Diseases Consult Completed             DETAILS OF HOSPITAL STAY:  Patient is a 54 y.o. male was admitted to the floor following the above procedure and underwent an uncomplicated hospital stay.  Patient did well with physical therapy and was ambulating well without problems.  On the day of discharge the wound was clean, dry and intact and calf was soft and nontender and Homans sign was negative.  Patient was tolerating  without problems.  Patient will be discharged home.    Condition on Discharge:  Stable    Vital Signs  Temp:  [98.1 °F (36.7 °C)-98.2 °F (36.8 °C)] 98.2 °F (36.8 °C)  Heart Rate:  [71-88] 71  Resp:  [16-18] 16  BP: (109-145)/(64-83) 124/71    LABS:      Admission on 02/05/2025   Component Date Value Ref Range Status    Glucose 02/05/2025 123  70 - 130 mg/dL Final    Anaerobic Culture 02/05/2025 No anaerobes isolated at 3 days   Preliminary    Anaerobic Culture 02/05/2025 No anaerobes isolated at 3 days   Preliminary    Anaerobic Culture 02/05/2025 No anaerobes isolated at 3 days   Preliminary    Wound Culture 02/05/2025 No growth at 3 days   Final    Gram Stain 02/05/2025 Rare (1+) WBCs seen   Final    Gram Stain 02/05/2025 No organisms seen   Final    Wound Culture 02/05/2025 No growth at 3 days   Final    Gram Stain 02/05/2025 Rare (1+) WBCs seen   Final    Gram Stain 02/05/2025 No organisms seen   Final    Wound Culture 02/05/2025 No growth at 3 days   Final    Gram Stain  02/05/2025 Rare (1+) WBCs seen   Final    Gram Stain 02/05/2025 No organisms seen   Final    Anaerobic Culture 02/05/2025 No anaerobes isolated at 3 days   Preliminary    Anaerobic Culture 02/05/2025 No anaerobes isolated at 3 days   Preliminary    Anaerobic Culture 02/05/2025 No anaerobes isolated at 3 days   Preliminary    Wound Culture 02/05/2025 No growth at 3 days   Final    Gram Stain 02/05/2025 Rare (1+) WBCs seen   Final    Gram Stain 02/05/2025 No organisms seen   Final    Wound Culture 02/05/2025 No growth at 3 days   Final    Gram Stain 02/05/2025 Rare (1+) WBCs seen   Final    Gram Stain 02/05/2025 No organisms seen   Final    Wound Culture 02/05/2025 No growth at 3 days   Final    Gram Stain 02/05/2025 Rare (1+) WBCs seen   Final    Gram Stain 02/05/2025 No organisms seen   Final    Color, Fluid 02/05/2025 Red   Final    Appearance, Fluid 02/05/2025 Bloody (A)  Clear Final    RBC, Fluid 02/05/2025 560,000  /mm3 Final    Nucleated Cells, Fluid 02/05/2025 2,080  /mm3 Final    Method: 02/05/2025 Automated Sysmex XN Method   Final    Neutrophils, Fluid % 02/05/2025 91  % Final    Lymphocytes, Fluid % 02/05/2025 9  % Final    Right Common Femoral Spont 02/06/2025 Y   Final    Right Common Femoral Competent 02/06/2025 Y   Final    Right Common Femoral Phasic 02/06/2025 Y   Final    Right Common Femoral Compress 02/06/2025 C   Final    Right Common Femoral Augment 02/06/2025 Y   Final    Right Saphenofemoral Junction Comp* 02/06/2025 C   Final    Right Profunda Femoral Compress 02/06/2025 C   Final    Right Proximal Femoral Compress 02/06/2025 C   Final    Right Mid Femoral Spont 02/06/2025 Y   Final    Right Mid Femoral Competent 02/06/2025 Y   Final    Right Mid Femoral Phasic 02/06/2025 Y   Final    Right Mid Femoral Compress 02/06/2025 C   Final    Right Mid Femoral Augment 02/06/2025 Y   Final    Right Distal Femoral Compress 02/06/2025 C   Final    Right Popliteal Spont 02/06/2025 Y   Final    Right  Popliteal Competent 02/06/2025 Y   Final    Right Popliteal Phasic 02/06/2025 Y   Final    Right Popliteal Compress 02/06/2025 C   Final    Right Popliteal Augment 02/06/2025 Y   Final    Right Posterior Tibial Compress 02/06/2025 C   Final    Right Peroneal Compress 02/06/2025 C   Final    Right Gastronemius Compress 02/06/2025 C   Final    Right Greater Saph AK Compress 02/06/2025 C   Final    Right Greater Saph BK Compress 02/06/2025 C   Final    Right Lesser Saph Compress 02/06/2025 C   Final    Left Common Femoral Spont 02/06/2025 Y   Final    Left Common Femoral Competent 02/06/2025 Y   Final    Left Common Femoral Phasic 02/06/2025 Y   Final    Left Common Femoral Compress 02/06/2025 C   Final    Left Common Femoral Augment 02/06/2025 Y   Final    Left Saphenofemoral Junction Compr* 02/06/2025 C   Final    Left Profunda Femoral Compress 02/06/2025 C   Final    Left Proximal Femoral Compress 02/06/2025 C   Final    Left Mid Femoral Spont 02/06/2025 Y   Final    Left Mid Femoral Competent 02/06/2025 Y   Final    Left Mid Femoral Phasic 02/06/2025 Y   Final    Left Mid Femoral Compress 02/06/2025 C   Final    Left Mid Femoral Augment 02/06/2025 Y   Final    Left Distal Femoral Compress 02/06/2025 C   Final    Left Popliteal Spont 02/06/2025 Y   Final    Left Popliteal Competent 02/06/2025 Y   Final    Left Popliteal Phasic 02/06/2025 Y   Final    Left Popliteal Compress 02/06/2025 C   Final    Left Popliteal Augment 02/06/2025 Y   Final    Left Posterior Tibial Compress 02/06/2025 C   Final    Left Peroneal Compress 02/06/2025 C   Final    Left Gastronemius Compress 02/06/2025 C   Final    Left Greater Saph AK Compress 02/06/2025 C   Final    Left Greater Saph BK Compress 02/06/2025 C   Final    Left Lesser Saph Compress 02/06/2025 C   Final    Hemoglobin 02/06/2025 9.6 (L)  13.0 - 17.7 g/dL Final    Hematocrit 02/06/2025 30.4 (L)  37.5 - 51.0 % Final    Hemoglobin 02/07/2025 8.9 (L)  13.0 - 17.7 g/dL Final     Hematocrit 02/07/2025 26.8 (L)  37.5 - 51.0 % Final    Vancomycin Trough 02/07/2025 8.50  5.00 - 20.00 mcg/mL Final    Hemoglobin 02/08/2025 8.9 (L)  13.0 - 17.7 g/dL Final    Hematocrit 02/08/2025 27.1 (L)  37.5 - 51.0 % Final       No results found.    Discharge Medications     Discharge Medications        New Medications        Instructions Start Date   cefTRIAXone 2,000 mg in sodium chloride 0.9 % 100 mL IVPB   2,000 mg, Intravenous, Every 24 Hours      pantoprazole 40 MG EC tablet  Commonly known as: PROTONIX  If you were already taking this medication, take it this way instead.   40 mg, Oral, Daily      polyethylene glycol 17 GM/SCOOP powder  Commonly known as: MIRALAX   17 g, Oral, 2 Times Daily, Dispense 7 day supply      vancomycin 1.5-0.9 GM/500ML-% IVPB   1,500 mg, Intravenous, Every 12 Hours             Changes to Medications        Instructions Start Date   empagliflozin 10 MG tablet tablet  Commonly known as: Jardiance   1 tablet, Oral, Daily, For diabetes      metFORMIN 1000 MG tablet  Commonly known as: GLUCOPHAGE  What changed:   how much to take  how to take this  when to take this   1 tab po BID with meal for diabetes      oxyCODONE-acetaminophen 7.5-325 MG per tablet  Commonly known as: PERCOCET  What changed: when to take this   1 tablet, Oral, Every 4 Hours PRN             Continue These Medications        Instructions Start Date   apixaban 2.5 MG tablet tablet  Commonly known as: ELIQUIS   2.5 mg, Oral, 2 Times Daily      CoQ-10 30 MG capsule   Daily      Edarbyclor 40-12.5 MG tablet  Generic drug: Azilsartan-Chlorthalidone   TAKE 1 TABLET BY MOUTH ONCE DAILY      Mounjaro 10 MG/0.5ML solution auto-injector  Generic drug: Tirzepatide   10 mg, Weekly      rosuvastatin 10 MG tablet  Commonly known as: CRESTOR   1 tablet, 3 Times Weekly      Testosterone Cypionate 200 MG/ML injection  Commonly known as: DEPOTESTOTERONE CYPIONATE   1 mL, Every 14 Days      verapamil  MG CR tablet  Commonly  known as: CALAN-SR   1 tablet, Nightly             Stop These Medications      HYDROcodone-acetaminophen 7.5-325 MG per tablet  Commonly known as: NORCO     IPAMORELIN ACETATE IJ     ondansetron 4 MG tablet  Commonly known as: Zofran     sirolimus 1 MG tablet  Commonly known as: RAPAMUNE     Vascepa 1 g capsule capsule  Generic drug: icosapent ethyl            ASK your doctor about these medications        Instructions Start Date   pantoprazole 40 MG EC tablet  Commonly known as: PROTONIX  Ask about: Should I take this medication?   40 mg, Oral, Daily               Discharge Instructions: Patient is to continue with physical therapy exercises daily and continue working with the physical therapist as ordered. Patient may weight bear as tolerated. Apply ice regularly. Patient may ice for long periods of time as long as ice is not directly on the skin. Patient instructed on frequent calf pumping exercises.  Patient also instructed on incentive spirometer during hospitalization and encouraged to continue to use at home regularly.    Dressing: The dressing is designed to be left in place until you return to the office in 2 weeks.  The suction unit should stop functioning at 7 days and the green light will switch to yellow.  At that point the suction unit and tubing can be disconnected at the port closest to the dressing.  The suction unit and tubing may be discarded.  You may shower immediately upon return home, you will need to turn the pump off by depressing the orange button once and then you may disconnect the pump and tubing at the connection port.  After showering, shake off the excess water and reattach the tubing.  Restart the pump by depressing the orange button one time and you will notice the green light flashing again.  If the dressing becomes dislodged or saturated it should be changed. Please refer to the СЕРГЕЙ information sheet if you have any questions about the dressing.  If you have a home health nurse  or therapist they can be contacted to assist with dressing change or repair. You may also call the СЕРГЕЙ dressing hotline for questions related to the dressing ([phone]). If there are still other problems or questions related to the dressing despite these measures then you can contact Amarilys at our office [phone].  If for some reason the СЕРГЕЙ dressing is removed, after 7 days the wound can be gently cleaned with antibacterial soap then allowed to dry and covered with a dry sterile dressing. The wound should be covered at all times except while showering.  Patient may change dressings daily and prn using sterile 4x4 and paper tape, and should call if any unusual drainage, redness or swelling.*  Follow up appointment in 2 weeks - patient to call the office at [phone] to schedule.  Patient will be discharged on Eliquis as directed. ABX per ID as directed.     Discharge Diagnosis:    Status post right knee replacement    OA (osteoarthritis) of knee      Follow-up Appointments  Future Appointments   Date Time Provider Department Center   2/20/2025 10:10 AM Pratik Durham MD [facility] [address]   3/12/2025  3:00 PM Joe Plaza, DO [facility]   3/18/2025  3:10 PM Jin Benton APRN [facility]   3/20/2025 10:50 AM Kevin Mares, DO [facility]          LAMAR Holguin  02/08/25  09:34 EST

## 2025-02-08 NOTE — Clinical Note
Patient admitted to [facility] observation status on 2/5/25 for Right total knee replacement irrigation and debridement polyethylene exchange and antibiotic bead placement by Dr. Durham.  Pt's hospitalization status changed from observation to inpatient on 2/7/25.  Plan is for DC home on 2/8/25 with SN add on for IV antibiotics.  Transfer oasis completed.

## 2025-02-09 ENCOUNTER — HOME CARE VISIT (OUTPATIENT)
Dept: HOME HEALTH SERVICES | Facility: HOME HEALTHCARE | Age: 55
End: 2025-02-09
Payer: COMMERCIAL

## 2025-02-09 PROCEDURE — G0299 HHS/HOSPICE OF RN EA 15 MIN: HCPCS

## 2025-02-09 NOTE — Clinical Note
SOC Note    RE   Anthony Sol     66    Home Health ordered for: disciplines SN, PT    Reason for Hosp/Primary Dx/Co-morbidities: Instability of internal right knee prosthesis, subsequent encounter, sp evacuation of hematoma   Focus of Care: Instability of internal right knee prosthesis, subsequent encounter, IV therapy teaching and assessment, wound assessment and teaching.   Patient's goal(s): I want to get this infection and knee healed up so I can do things again     Current Functional status/mobility/DME: Ambulate with walker for safety     HB status/Living Arrangements: Lives in clean, well kept home with spouse     Skin Integrity/wound status: Patient has incision to rt knee covered with СЕРГЕЙ, so nurse unable to visualize wound today     Code Status: Full code     Fall Risk/Safety concerns: High risk for falls     Educated on Emergency Plan, steps to take prior to going to the ER and when to Call Home Health First:  yes     Medication issues/Concerns: No issues at this time     Additional Problems/Concerns: N/A    SDOH Barriers (i.e. caregiver concerns, social isolation, transportation, food insecurity, environment, income etc.)/Need for MSW: N/A

## 2025-02-09 NOTE — OUTREACH NOTE
Prep Survey      Flowsheet Row Responses   Ten[facility] facility patient discharged from? [employer] [facility]   Is LACE score < 7 ? No   Eligibility Readm Mgmt   Discharge diagnosis HEMATOMA EVACUATION RIGHT KNEE, PLACEMENT ANTIBIOITIC BEADS, WASH OUT, POLY EXCHANGE   Does the patient have one of the following disease processes/diagnoses(primary or secondary)? General Surgery   Does the patient have Home health ordered? Yes   What is the Home health agency?   [facility]   Medication alerts for this patient CEFTRIAXONE 2000 MG IVPB   Prep survey completed? Yes            Marizol MATT - Registered Nurse

## 2025-02-09 NOTE — CASE MANAGEMENT/SOCIAL WORK
Case Management Discharge Note      Final Note: dc home with [facility] and [facility] infusion         Selected Continued Care - Discharged on 2/8/2025 Admission date: 2/5/2025 - Discharge disposition: Home or Self Care      Destination    No services have been selected for the patient.                Durable Medical Equipment    No services have been selected for the patient.                Dialysis/Infusion Coordination complete.      Service Provider Services Address Phone Fax Patient Preferred    [facility] In[facility] IV Therapy [address], [place] [id number] [phone] --              Home Medical Care Coordination complete.      Service Provider Services Address Phone Fax Patient Preferred    [facility] [address], [facility] [address] [phone] --              Therapy    No services have been selected for the patient.                Community Resources    No services have been selected for the patient.                Community & DME    No services have been selected for the patient.                    Selected Continued Care - Prior Encounters Includes continued care and service providers with selected services from prior encounters from 11/7/2024 to 2/8/2025      Discharged on 1/23/2025 Admission date: 1/22/2025 - Discharge disposition: Home-Health Care c      Home Medical Care       Service Provider Services Address Phone Fax Patient Preferred     [facility] [address] [facility] [id number] [address] [phone] --                               Final Discharge Disposition Code: 06 - home with home health care

## 2025-02-10 ENCOUNTER — LAB REQUISITION (OUTPATIENT)
Dept: LAB | Facility: HOSPITAL | Age: 55
End: 2025-02-10
Payer: COMMERCIAL

## 2025-02-10 ENCOUNTER — HOME CARE VISIT (OUTPATIENT)
Dept: HOME HEALTH SERVICES | Facility: HOME HEALTHCARE | Age: 55
End: 2025-02-10
Payer: COMMERCIAL

## 2025-02-10 VITALS
SYSTOLIC BLOOD PRESSURE: 144 MMHG | RESPIRATION RATE: 18 BRPM | HEART RATE: 83 BPM | TEMPERATURE: 97.4 F | DIASTOLIC BLOOD PRESSURE: 80 MMHG | OXYGEN SATURATION: 95 %

## 2025-02-10 DIAGNOSIS — T84.022D INSTABILITY OF INTERNAL RIGHT KNEE PROSTHESIS, SUBSEQUENT ENCOUNTER: ICD-10-CM

## 2025-02-10 LAB
ANION GAP SERPL CALCULATED.3IONS-SCNC: 11.4 MMOL/L (ref 5–15)
BACTERIA SPEC ANAEROBE CULT: NORMAL
BASOPHILS # BLD AUTO: 0.02 10*3/MM3 (ref 0–0.2)
BASOPHILS NFR BLD AUTO: 0.4 % (ref 0–1.5)
BUN SERPL-MCNC: 15 MG/DL (ref 6–20)
BUN/CREAT SERPL: 17.6 (ref 7–25)
CALCIUM SPEC-SCNC: 10.1 MG/DL (ref 8.6–10.5)
CHLORIDE SERPL-SCNC: 101 MMOL/L (ref 98–107)
CO2 SERPL-SCNC: 26.6 MMOL/L (ref 22–29)
CREAT SERPL-MCNC: 0.85 MG/DL (ref 0.76–1.27)
CRP SERPL-MCNC: 6.52 MG/DL (ref 0–0.5)
DEPRECATED RDW RBC AUTO: 41.1 FL (ref 37–54)
EGFRCR SERPLBLD CKD-EPI 2021: 102 ML/MIN/1.73
EOSINOPHIL # BLD AUTO: 0.06 10*3/MM3 (ref 0–0.4)
EOSINOPHIL NFR BLD AUTO: 1.1 % (ref 0.3–6.2)
ERYTHROCYTE [DISTWIDTH] IN BLOOD BY AUTOMATED COUNT: 13.4 % (ref 12.3–15.4)
GLUCOSE SERPL-MCNC: 110 MG/DL (ref 65–99)
HCT VFR BLD AUTO: 30 % (ref 37.5–51)
HGB BLD-MCNC: 9.3 G/DL (ref 13–17.7)
IMM GRANULOCYTES # BLD AUTO: 0.02 10*3/MM3 (ref 0–0.05)
IMM GRANULOCYTES NFR BLD AUTO: 0.4 % (ref 0–0.5)
LYMPHOCYTES # BLD AUTO: 1.01 10*3/MM3 (ref 0.7–3.1)
LYMPHOCYTES NFR BLD AUTO: 18.4 % (ref 19.6–45.3)
MCH RBC QN AUTO: 26.8 PG (ref 26.6–33)
MCHC RBC AUTO-ENTMCNC: 31 G/DL (ref 31.5–35.7)
MCV RBC AUTO: 86.5 FL (ref 79–97)
MONOCYTES # BLD AUTO: 0.44 10*3/MM3 (ref 0.1–0.9)
MONOCYTES NFR BLD AUTO: 8 % (ref 5–12)
NEUTROPHILS NFR BLD AUTO: 3.94 10*3/MM3 (ref 1.7–7)
NEUTROPHILS NFR BLD AUTO: 71.7 % (ref 42.7–76)
NRBC BLD AUTO-RTO: 0 /100 WBC (ref 0–0.2)
PLATELET # BLD AUTO: 421 10*3/MM3 (ref 140–450)
PMV BLD AUTO: 9.5 FL (ref 6–12)
POTASSIUM SERPL-SCNC: 3.8 MMOL/L (ref 3.5–5.2)
RBC # BLD AUTO: 3.47 10*6/MM3 (ref 4.14–5.8)
SODIUM SERPL-SCNC: 139 MMOL/L (ref 136–145)
VANCOMYCIN TROUGH SERPL-MCNC: 4.3 MCG/ML (ref 5–20)
WBC NRBC COR # BLD AUTO: 5.49 10*3/MM3 (ref 3.4–10.8)

## 2025-02-10 PROCEDURE — 85025 COMPLETE CBC W/AUTO DIFF WBC: CPT | Performed by: INTERNAL MEDICINE

## 2025-02-10 PROCEDURE — G0151 HHCP-SERV OF PT,EA 15 MIN: HCPCS

## 2025-02-10 PROCEDURE — 80202 ASSAY OF VANCOMYCIN: CPT | Performed by: INTERNAL MEDICINE

## 2025-02-10 PROCEDURE — G0299 HHS/HOSPICE OF RN EA 15 MIN: HCPCS

## 2025-02-10 PROCEDURE — 80048 BASIC METABOLIC PNL TOTAL CA: CPT | Performed by: INTERNAL MEDICINE

## 2025-02-10 PROCEDURE — 86140 C-REACTIVE PROTEIN: CPT | Performed by: INTERNAL MEDICINE

## 2025-02-10 NOTE — Clinical Note
Skilled PT is indicated for post op rt TKA revision 3week2. PT for instruction and progression of HEP, manual ROM, pain and edema management education, medication education, transfer training, gait training, and home safety instruction to maximize independence with ADLs and improve home safety.    02-Jun-2021 00:51

## 2025-02-11 NOTE — PAYOR COMM NOTE
"Anthony Sol Jr. (54 y.o. Male)    PLEASE SEE ATTACHED FOR DC NOTICE   REF#[id number]   THANK YOU  DAPHNE LEGER RN/ DEPT   [facility]  PH: [phone]  FAX:  [phone]     Date of Birth   1970    Social Security Number       Address   [address] [place] [address]    Home Phone   [phone]    MRN   [id number]       Sabianism   Uatsdin    Marital Status                               Admission Date   2/5/25    Admission Type   Elective    Admitting Provider   Pratik Durham MD    Attending Provider       Department, Room/Bed   [address], P782/1       Discharge Date   2/8/2025    Discharge Disposition   Home or Self Care    Discharge Destination   Home                              Attending Provider: (none)   Allergies: No Known Allergies    Isolation: None   Infection: None   Code Status: CPR    Ht: 182.9 cm (72.01\")   Wt: 105 kg (232 lb 2.3 oz)    Admission Cmt: None   Principal Problem: Status post right knee replacement [Z96.651]                   Active Insurance as of 2/5/2025       Primary Coverage       Payor Plan Insurance Group Employer/Plan Group    [employer] BLUE CROSS AN[employer] BLUE CROSS BLUE SHIELD PPO [id number]       Payor Plan Address Payor Plan Phone Number Payor Plan Fax Number Effective Dates    [address] [phone]  1/1/2023 - None Entered    [place] [address]         Subscriber Name Subscriber Birth Date Member ID       NURY SOL VERNELL 7/2/1970 [id number]                     Emergency Contacts        (Rel.) Home Phone Work Phone Mobile Phone    Nury Sol (Spouse) [phone] -- --              [place]: [facility] [id number]  Tax ID [id number]     Discharge Summary        Jin Benton APRN at 02/08/25 0934       Attestation signed by Pratik Durham MD at 02/10/25 1524    I have reviewed this documentation and agree.                  Patient Name: Anthony Sol Jr.  Patient Date of Birth: " 1970    Date of Admission:  2/5/2025  Date of Discharge:  2/8/2025  Discharge Diagnosis: WI INCISION & DRAINAGE LEG/ANKLE ABSCESS/HEMATOMA [80704] (HEMATOMA EVACUATION LOWER EXTREMITY)  Presenting Problem/History of Present Illness: Status post right knee replacement [Z96.651]  OA (osteoarthritis) of knee [M17.9]  Admitting Physician: Dr Pratik Durham  Consults:   Consults       Date and Time Order Name Status Description    2/5/2025  5:12 PM Inpatient Infectious Diseases Consult Completed             DETAILS OF HOSPITAL STAY:  Patient is a 54 y.o. male was admitted to the floor following the above procedure and underwent an uncomplicated hospital stay.  Patient did well with physical therapy and was ambulating well without problems.  On the day of discharge the wound was clean, dry and intact and calf was soft and nontender and Homans sign was negative.  Patient was tolerating  without problems.  Patient will be discharged home.    Condition on Discharge:  Stable    Vital Signs  Temp:  [98.1 °F (36.7 °C)-98.2 °F (36.8 °C)] 98.2 °F (36.8 °C)  Heart Rate:  [71-88] 71  Resp:  [16-18] 16  BP: (109-145)/(64-83) 124/71    LABS:      Admission on 02/05/2025   Component Date Value Ref Range Status    Glucose 02/05/2025 123  70 - 130 mg/dL Final    Anaerobic Culture 02/05/2025 No anaerobes isolated at 3 days   Preliminary    Anaerobic Culture 02/05/2025 No anaerobes isolated at 3 days   Preliminary    Anaerobic Culture 02/05/2025 No anaerobes isolated at 3 days   Preliminary    Wound Culture 02/05/2025 No growth at 3 days   Final    Gram Stain 02/05/2025 Rare (1+) WBCs seen   Final    Gram Stain 02/05/2025 No organisms seen   Final    Wound Culture 02/05/2025 No growth at 3 days   Final    Gram Stain 02/05/2025 Rare (1+) WBCs seen   Final    Gram Stain 02/05/2025 No organisms seen   Final    Wound Culture 02/05/2025 No growth at 3 days   Final    Gram Stain 02/05/2025 Rare (1+) WBCs seen   Final    Gram Stain 02/05/2025  No organisms seen   Final    Anaerobic Culture 02/05/2025 No anaerobes isolated at 3 days   Preliminary    Anaerobic Culture 02/05/2025 No anaerobes isolated at 3 days   Preliminary    Anaerobic Culture 02/05/2025 No anaerobes isolated at 3 days   Preliminary    Wound Culture 02/05/2025 No growth at 3 days   Final    Gram Stain 02/05/2025 Rare (1+) WBCs seen   Final    Gram Stain 02/05/2025 No organisms seen   Final    Wound Culture 02/05/2025 No growth at 3 days   Final    Gram Stain 02/05/2025 Rare (1+) WBCs seen   Final    Gram Stain 02/05/2025 No organisms seen   Final    Wound Culture 02/05/2025 No growth at 3 days   Final    Gram Stain 02/05/2025 Rare (1+) WBCs seen   Final    Gram Stain 02/05/2025 No organisms seen   Final    Color, Fluid 02/05/2025 Red   Final    Appearance, Fluid 02/05/2025 Bloody (A)  Clear Final    RBC, Fluid 02/05/2025 560,000  /mm3 Final    Nucleated Cells, Fluid 02/05/2025 2,080  /mm3 Final    Method: 02/05/2025 Automated Sysmex XN Method   Final    Neutrophils, Fluid % 02/05/2025 91  % Final    Lymphocytes, Fluid % 02/05/2025 9  % Final    Right Common Femoral Spont 02/06/2025 Y   Final    Right Common Femoral Competent 02/06/2025 Y   Final    Right Common Femoral Phasic 02/06/2025 Y   Final    Right Common Femoral Compress 02/06/2025 C   Final    Right Common Femoral Augment 02/06/2025 Y   Final    Right Saphenofemoral Junction Comp* 02/06/2025 C   Final    Right Profunda Femoral Compress 02/06/2025 C   Final    Right Proximal Femoral Compress 02/06/2025 C   Final    Right Mid Femoral Spont 02/06/2025 Y   Final    Right Mid Femoral Competent 02/06/2025 Y   Final    Right Mid Femoral Phasic 02/06/2025 Y   Final    Right Mid Femoral Compress 02/06/2025 C   Final    Right Mid Femoral Augment 02/06/2025 Y   Final    Right Distal Femoral Compress 02/06/2025 C   Final    Right Popliteal Spont 02/06/2025 Y   Final    Right Popliteal Competent 02/06/2025 Y   Final    Right Popliteal Phasic  02/06/2025 Y   Final    Right Popliteal Compress 02/06/2025 C   Final    Right Popliteal Augment 02/06/2025 Y   Final    Right Posterior Tibial Compress 02/06/2025 C   Final    Right Peroneal Compress 02/06/2025 C   Final    Right Gastronemius Compress 02/06/2025 C   Final    Right Greater Saph AK Compress 02/06/2025 C   Final    Right Greater Saph BK Compress 02/06/2025 C   Final    Right Lesser Saph Compress 02/06/2025 C   Final    Left Common Femoral Spont 02/06/2025 Y   Final    Left Common Femoral Competent 02/06/2025 Y   Final    Left Common Femoral Phasic 02/06/2025 Y   Final    Left Common Femoral Compress 02/06/2025 C   Final    Left Common Femoral Augment 02/06/2025 Y   Final    Left Saphenofemoral Junction Compr* 02/06/2025 C   Final    Left Profunda Femoral Compress 02/06/2025 C   Final    Left Proximal Femoral Compress 02/06/2025 C   Final    Left Mid Femoral Spont 02/06/2025 Y   Final    Left Mid Femoral Competent 02/06/2025 Y   Final    Left Mid Femoral Phasic 02/06/2025 Y   Final    Left Mid Femoral Compress 02/06/2025 C   Final    Left Mid Femoral Augment 02/06/2025 Y   Final    Left Distal Femoral Compress 02/06/2025 C   Final    Left Popliteal Spont 02/06/2025 Y   Final    Left Popliteal Competent 02/06/2025 Y   Final    Left Popliteal Phasic 02/06/2025 Y   Final    Left Popliteal Compress 02/06/2025 C   Final    Left Popliteal Augment 02/06/2025 Y   Final    Left Posterior Tibial Compress 02/06/2025 C   Final    Left Peroneal Compress 02/06/2025 C   Final    Left Gastronemius Compress 02/06/2025 C   Final    Left Greater Saph AK Compress 02/06/2025 C   Final    Left Greater Saph BK Compress 02/06/2025 C   Final    Left Lesser Saph Compress 02/06/2025 C   Final    Hemoglobin 02/06/2025 9.6 (L)  13.0 - 17.7 g/dL Final    Hematocrit 02/06/2025 30.4 (L)  37.5 - 51.0 % Final    Hemoglobin 02/07/2025 8.9 (L)  13.0 - 17.7 g/dL Final    Hematocrit 02/07/2025 26.8 (L)  37.5 - 51.0 % Final    Vancomycin  Trough 02/07/2025 8.50  5.00 - 20.00 mcg/mL Final    Hemoglobin 02/08/2025 8.9 (L)  13.0 - 17.7 g/dL Final    Hematocrit 02/08/2025 27.1 (L)  37.5 - 51.0 % Final       No results found.    Discharge Medications     Discharge Medications        New Medications        Instructions Start Date   cefTRIAXone 2,000 mg in sodium chloride 0.9 % 100 mL IVPB   2,000 mg, Intravenous, Every 24 Hours      pantoprazole 40 MG EC tablet  Commonly known as: PROTONIX  If you were already taking this medication, take it this way instead.   40 mg, Oral, Daily      polyethylene glycol 17 GM/SCOOP powder  Commonly known as: MIRALAX   17 g, Oral, 2 Times Daily, Dispense 7 day supply      vancomycin 1.5-0.9 GM/500ML-% IVPB   1,500 mg, Intravenous, Every 12 Hours             Changes to Medications        Instructions Start Date   empagliflozin 10 MG tablet tablet  Commonly known as: Jardiance   1 tablet, Oral, Daily, For diabetes      metFORMIN 1000 MG tablet  Commonly known as: GLUCOPHAGE  What changed:   how much to take  how to take this  when to take this   1 tab po BID with meal for diabetes      oxyCODONE-acetaminophen 7.5-325 MG per tablet  Commonly known as: PERCOCET  What changed: when to take this   1 tablet, Oral, Every 4 Hours PRN             Continue These Medications        Instructions Start Date   apixaban 2.5 MG tablet tablet  Commonly known as: ELIQUIS   2.5 mg, Oral, 2 Times Daily      CoQ-10 30 MG capsule   Daily      Edarbyclor 40-12.5 MG tablet  Generic drug: Azilsartan-Chlorthalidone   TAKE 1 TABLET BY MOUTH ONCE DAILY      Mounjaro 10 MG/0.5ML solution auto-injector  Generic drug: Tirzepatide   10 mg, Weekly      rosuvastatin 10 MG tablet  Commonly known as: CRESTOR   1 tablet, 3 Times Weekly      Testosterone Cypionate 200 MG/ML injection  Commonly known as: DEPOTESTOTERONE CYPIONATE   1 mL, Every 14 Days      verapamil  MG CR tablet  Commonly known as: CALAN-SR   1 tablet, Nightly             Stop These  Medications      HYDROcodone-acetaminophen 7.5-325 MG per tablet  Commonly known as: NORCO     IPAMORELIN ACETATE IJ     ondansetron 4 MG tablet  Commonly known as: Zofran     sirolimus 1 MG tablet  Commonly known as: RAPAMUNE     Vascepa 1 g capsule capsule  Generic drug: icosapent ethyl            ASK your doctor about these medications        Instructions Start Date   pantoprazole 40 MG EC tablet  Commonly known as: PROTONIX  Ask about: Should I take this medication?   40 mg, Oral, Daily               Discharge Instructions: Patient is to continue with physical therapy exercises daily and continue working with the physical therapist as ordered. Patient may weight bear as tolerated. Apply ice regularly. Patient may ice for long periods of time as long as ice is not directly on the skin. Patient instructed on frequent calf pumping exercises.  Patient also instructed on incentive spirometer during hospitalization and encouraged to continue to use at home regularly.    Dressing: The dressing is designed to be left in place until you return to the office in 2 weeks.  The suction unit should stop functioning at 7 days and the green light will switch to yellow.  At that point the suction unit and tubing can be disconnected at the port closest to the dressing.  The suction unit and tubing may be discarded.  You may shower immediately upon return home, you will need to turn the pump off by depressing the orange button once and then you may disconnect the pump and tubing at the connection port.  After showering, shake off the excess water and reattach the tubing.  Restart the pump by depressing the orange button one time and you will notice the green light flashing again.  If the dressing becomes dislodged or saturated it should be changed. Please refer to the СЕРГЕЙ information sheet if you have any questions about the dressing.  If you have a home health nurse or therapist they can be contacted to assist with dressing  change or repair. You may also call the СЕРГЕЙ dressing hotline for questions related to the dressing ([phone]). If there are still other problems or questions related to the dressing despite these measures then you can contact Amarilys at our office [phone].  If for some reason the СЕРГЕЙ dressing is removed, after 7 days the wound can be gently cleaned with antibacterial soap then allowed to dry and covered with a dry sterile dressing. The wound should be covered at all times except while showering.  Patient may change dressings daily and prn using sterile 4x4 and paper tape, and should call if any unusual drainage, redness or swelling.*  Follow up appointment in 2 weeks - patient to call the office at [phone] to schedule.  Patient will be discharged on Eliquis as directed. ABX per ID as directed.     Discharge Diagnosis:    Status post right knee replacement    OA (osteoarthritis) of knee      Follow-up Appointments  Future Appointments   Date Time Provider Department Center   2/20/2025 10:10 AM Pratik Durham MD [facility]   3/12/2025  3:00 PM Joe Plaza, DO [facility]   3/18/2025  3:10 PM Jin Benton APRN [facility]   3/20/2025 10:50 AM Kevin Mares, DO [facility]          LAMAR Holguin  02/08/25  09:34 EST                  Electronically signed by Pratik Durham MD at 02/10/25 8334

## 2025-02-12 ENCOUNTER — READMISSION MANAGEMENT (OUTPATIENT)
Dept: CALL CENTER | Facility: HOSPITAL | Age: 55
End: 2025-02-12
Payer: COMMERCIAL

## 2025-02-12 ENCOUNTER — HOME CARE VISIT (OUTPATIENT)
Dept: HOME HEALTH SERVICES | Facility: HOME HEALTHCARE | Age: 55
End: 2025-02-12
Payer: COMMERCIAL

## 2025-02-12 LAB — BACTERIA SPEC ANAEROBE CULT: ABNORMAL

## 2025-02-12 PROCEDURE — G0151 HHCP-SERV OF PT,EA 15 MIN: HCPCS

## 2025-02-12 NOTE — HOME HEALTH
"Patient was readmitted to hospital 2/5/25 to 2/8/25 for Right total knee replacement irrigation and debridement polyethylene exchange and antibiotic bead placement.secondary to acute hematoma likely infection. Patient was discharged home with RN and PT. He is receiving IV antibiotics at home. He is WBAT.    Follow up with Dr. Durham 2/20/25.    No falls or medication changes.    Patient rates current pain 4-5 out of 10 and was due for a pain pill at start of session. He complains of increased \"stiffness\" in rt knee today.    Skilled PT is indicated for post op rt TKA revision 3week2. PT for instruction and progression of HEP, manual ROM, pain and edema management education, medication education, transfer training, gait training, and home safety instruction to maximize independence with ADLs and improve home safety."

## 2025-02-12 NOTE — OUTREACH NOTE
General Surgery Week 1 Survey      Flowsheet Row Responses   [facility] patient discharged from? Check   Does the patient have one of the following disease processes/diagnoses(primary or secondary)? General Surgery   Week 1 attempt successful? Yes   Call start time 1548   Call end time 1552   Discharge diagnosis HEMATOMA EVACUATION RIGHT KNEE, PLACEMENT ANTIBIOITIC BEADS, WASH OUT, POLY EXCHANGE   Medication alerts for this patient CEFTRIAXONE 2000 MG IVPB   Meds reviewed with patient/caregiver? Yes   Is the patient having any side effects they believe may be caused by any medication additions or changes? No   Does the patient have all medications related to this admission filled (includes all antibiotics, pain medications, etc.) Yes   Is the patient taking all medications as directed (includes completed medication regime)? Yes   Medication comments pt using ice as well   Does the patient have a follow up appointment scheduled with their surgeon? Yes  [2/20/25 post op apt]   Has the patient kept scheduled appointments due by today? N/A   What is the Home health agency?  [facility]   Home health comments PT visited today (2/12/25)   Did the patient receive a copy of their discharge instructions? Yes   Nursing interventions Reviewed instructions with patient   What is the patient's perception of their health status since discharge? Improving  [still having pain]   Nursing interventions Nurse provided patient education   Is the patient /caregiver able to teach back basic post-op care? Take showers only when approved by MD-sponge bathe until then, No tub bath, swimming, or hot tub until instructed by MD, Keep incision areas clean,dry and protected, Drive as instructed by MD in discharge instructions, Lifting as instructed by MD in discharge instructions   Is the patient/caregiver able to teach back signs and symptoms of incisional infection? Increased redness, swelling or pain at the incisonal site,  Increased drainage or bleeding, Incisional warmth, Pus or odor from incision, Fever   Is the patient/caregiver able to teach back steps to recovery at home? Set small, achievable goals for return to baseline health, Practice good oral hygiene, Eat a well-balance diet, Make a list of questions for surgeon's appointment, Rest and rebuild strength, gradually increase activity   If the patient is a current smoker, are they able to teach back resources for cessation? Not a smoker   Is the patient/caregiver able to teach back the hierarchy of who to call/visit for symptoms/problems? PCP, Specialist, Home health nurse, Urgent Care, ED, 911 Yes   Week 1 call completed? Yes   Call end time 5008            Courtney H - Registered Nurse

## 2025-02-13 ENCOUNTER — HOME CARE VISIT (OUTPATIENT)
Dept: HOME HEALTH SERVICES | Facility: HOME HEALTHCARE | Age: 55
End: 2025-02-13
Payer: COMMERCIAL

## 2025-02-13 VITALS
HEART RATE: 72 BPM | SYSTOLIC BLOOD PRESSURE: 124 MMHG | RESPIRATION RATE: 16 BRPM | DIASTOLIC BLOOD PRESSURE: 70 MMHG | TEMPERATURE: 97.2 F | OXYGEN SATURATION: 98 %

## 2025-02-13 VITALS
OXYGEN SATURATION: 95 % | HEART RATE: 74 BPM | RESPIRATION RATE: 18 BRPM | SYSTOLIC BLOOD PRESSURE: 146 MMHG | DIASTOLIC BLOOD PRESSURE: 86 MMHG | TEMPERATURE: 97.4 F

## 2025-02-13 DIAGNOSIS — Z96.651 STATUS POST RIGHT KNEE REPLACEMENT: ICD-10-CM

## 2025-02-13 PROCEDURE — G0495 RN CARE TRAIN/EDU IN HH: HCPCS

## 2025-02-13 PROCEDURE — G0299 HHS/HOSPICE OF RN EA 15 MIN: HCPCS

## 2025-02-13 RX ORDER — OXYCODONE AND ACETAMINOPHEN 7.5; 325 MG/1; MG/1
1 TABLET ORAL EVERY 4 HOURS PRN
Qty: 50 TABLET | Refills: 0 | Status: SHIPPED | OUTPATIENT
Start: 2025-02-13 | End: 2025-02-20 | Stop reason: SDUPTHER

## 2025-02-14 LAB
BACTERIA SPEC ANAEROBE CULT: ABNORMAL
REF LAB TEST RESULTS: NORMAL

## 2025-02-17 ENCOUNTER — HOME CARE VISIT (OUTPATIENT)
Dept: HOME HEALTH SERVICES | Facility: HOME HEALTHCARE | Age: 55
End: 2025-02-17
Payer: COMMERCIAL

## 2025-02-17 ENCOUNTER — TELEPHONE (OUTPATIENT)
Dept: INFECTIOUS DISEASES | Facility: CLINIC | Age: 55
End: 2025-02-17
Payer: COMMERCIAL

## 2025-02-17 ENCOUNTER — LAB REQUISITION (OUTPATIENT)
Dept: LAB | Facility: HOSPITAL | Age: 55
End: 2025-02-17
Payer: COMMERCIAL

## 2025-02-17 VITALS
OXYGEN SATURATION: 98 % | HEART RATE: 72 BPM | DIASTOLIC BLOOD PRESSURE: 82 MMHG | SYSTOLIC BLOOD PRESSURE: 150 MMHG | TEMPERATURE: 98.3 F

## 2025-02-17 VITALS
SYSTOLIC BLOOD PRESSURE: 124 MMHG | DIASTOLIC BLOOD PRESSURE: 70 MMHG | OXYGEN SATURATION: 99 % | RESPIRATION RATE: 18 BRPM | TEMPERATURE: 97.1 F | HEART RATE: 71 BPM

## 2025-02-17 DIAGNOSIS — M17.9 OSTEOARTHRITIS OF KNEE, UNSPECIFIED: ICD-10-CM

## 2025-02-17 DIAGNOSIS — Z96.651 PRESENCE OF RIGHT ARTIFICIAL KNEE JOINT: ICD-10-CM

## 2025-02-17 LAB
ANION GAP SERPL CALCULATED.3IONS-SCNC: 10.7 MMOL/L (ref 5–15)
BASOPHILS # BLD AUTO: 0.01 10*3/MM3 (ref 0–0.2)
BASOPHILS NFR BLD AUTO: 0.2 % (ref 0–1.5)
BUN SERPL-MCNC: 14 MG/DL (ref 6–20)
BUN/CREAT SERPL: 17.3 (ref 7–25)
CALCIUM SPEC-SCNC: 9.4 MG/DL (ref 8.6–10.5)
CHLORIDE SERPL-SCNC: 104 MMOL/L (ref 98–107)
CO2 SERPL-SCNC: 25.3 MMOL/L (ref 22–29)
CREAT SERPL-MCNC: 0.81 MG/DL (ref 0.76–1.27)
CRP SERPL-MCNC: 0.6 MG/DL (ref 0–0.5)
DEPRECATED RDW RBC AUTO: 41.9 FL (ref 37–54)
EGFRCR SERPLBLD CKD-EPI 2021: 102.8 ML/MIN/1.73
EOSINOPHIL # BLD AUTO: 0.1 10*3/MM3 (ref 0–0.4)
EOSINOPHIL NFR BLD AUTO: 2.5 % (ref 0.3–6.2)
ERYTHROCYTE [DISTWIDTH] IN BLOOD BY AUTOMATED COUNT: 13.8 % (ref 12.3–15.4)
GLUCOSE SERPL-MCNC: 111 MG/DL (ref 65–99)
HCT VFR BLD AUTO: 29.5 % (ref 37.5–51)
HGB BLD-MCNC: 9.3 G/DL (ref 13–17.7)
IMM GRANULOCYTES # BLD AUTO: 0.01 10*3/MM3 (ref 0–0.05)
IMM GRANULOCYTES NFR BLD AUTO: 0.2 % (ref 0–0.5)
LYMPHOCYTES # BLD AUTO: 1.06 10*3/MM3 (ref 0.7–3.1)
LYMPHOCYTES NFR BLD AUTO: 26.4 % (ref 19.6–45.3)
MCH RBC QN AUTO: 26.1 PG (ref 26.6–33)
MCHC RBC AUTO-ENTMCNC: 31.5 G/DL (ref 31.5–35.7)
MCV RBC AUTO: 82.9 FL (ref 79–97)
MONOCYTES # BLD AUTO: 0.3 10*3/MM3 (ref 0.1–0.9)
MONOCYTES NFR BLD AUTO: 7.5 % (ref 5–12)
NEUTROPHILS NFR BLD AUTO: 2.53 10*3/MM3 (ref 1.7–7)
NEUTROPHILS NFR BLD AUTO: 63.2 % (ref 42.7–76)
NRBC BLD AUTO-RTO: 0 /100 WBC (ref 0–0.2)
PLATELET # BLD AUTO: 416 10*3/MM3 (ref 140–450)
PMV BLD AUTO: 9.4 FL (ref 6–12)
POTASSIUM SERPL-SCNC: 3.9 MMOL/L (ref 3.5–5.2)
RBC # BLD AUTO: 3.56 10*6/MM3 (ref 4.14–5.8)
SODIUM SERPL-SCNC: 140 MMOL/L (ref 136–145)
VANCOMYCIN TROUGH SERPL-MCNC: 12.8 MCG/ML (ref 5–20)
WBC NRBC COR # BLD AUTO: 4.01 10*3/MM3 (ref 3.4–10.8)

## 2025-02-17 PROCEDURE — 80048 BASIC METABOLIC PNL TOTAL CA: CPT | Performed by: STUDENT IN AN ORGANIZED HEALTH CARE EDUCATION/TRAINING PROGRAM

## 2025-02-17 PROCEDURE — 86140 C-REACTIVE PROTEIN: CPT | Performed by: STUDENT IN AN ORGANIZED HEALTH CARE EDUCATION/TRAINING PROGRAM

## 2025-02-17 PROCEDURE — G0151 HHCP-SERV OF PT,EA 15 MIN: HCPCS

## 2025-02-17 PROCEDURE — G0299 HHS/HOSPICE OF RN EA 15 MIN: HCPCS

## 2025-02-17 PROCEDURE — 80202 ASSAY OF VANCOMYCIN: CPT | Performed by: STUDENT IN AN ORGANIZED HEALTH CARE EDUCATION/TRAINING PROGRAM

## 2025-02-17 PROCEDURE — 85025 COMPLETE CBC W/AUTO DIFF WBC: CPT | Performed by: STUDENT IN AN ORGANIZED HEALTH CARE EDUCATION/TRAINING PROGRAM

## 2025-02-17 NOTE — TELEPHONE ENCOUNTER
Received call from [facility] home health RN Renae to inform Dr. Mares that she was not able to obtain the labs from patient's PICC but was able to obtain per venipuncture. She stated that patient is not a difficult stick for labs drawn from peripheral veins. She stated the PICC line flushed well and IV Abx flows well thru the PICC line. I informed her that my co-worker Kallie is off on Mondays and would return on Tuesday and I would inform her to see if there is any need to have PICC flushed w/ Activase or if possibly the line is just positional.   I then called patient. He stated the IV Antibiotics are infusing without difficulty. I informed him that I would inform my co-worker Kallie tomorrow that home RN was unable to obtain labs from PICC this morning but that the IV Abx were infusing well and see if she feels that the PICC is possibly positional or if it needs to be flushed w/ Activase at outpatient. Patient stated understanding.

## 2025-02-17 NOTE — ANESTHESIA POSTPROCEDURE EVALUATION
Patient: Anthony Sol Jr.    Procedure Summary       Date: 02/05/25 Room / Location:  [facility] [address] [place] [address] [facility]    Anesthesia Start: 1315 Anesthesia Stop: 1528    Procedure: HEMATOMA EVACUATION RIGHT KNEE, PLACEMENT ANTIBIOITIC BEADS, WASH OUT, POLY EXCHANGE (Right) Diagnosis:       Status post right knee replacement      (Status post right knee replacement [Z96.651])    Surgeons: Pratik Durham MD Provider: Kem Palacio MD    Anesthesia Type: general with block ASA Status: 3            Anesthesia Type: general with block    Vitals  Vitals Value Taken Time   /82 02/05/25 1630   Temp 37 °C (98.6 °F) 02/05/25 1630   Pulse 78 02/05/25 1630   Resp 17 02/05/25 1630   SpO2 94 % 02/05/25 1630           Post Anesthesia Care and Evaluation    Anesthetic complications: No anesthetic complications

## 2025-02-17 NOTE — TELEPHONE ENCOUNTER
Received call from patient stating he'd like to go w/ Dr. Mares's plan to stop the IV therapy & transition to oral Abx. He requests the oral abx be sent to his [facility]. I informed patient Dr. Mares had already left the office and would not be receiving his message until tomorrow. I told him either myself or my coworker Kallie would update him tomorrow as to Dr. Mares's orders and that the order for the oral abx had been sent in to his pharmacy. I told him that the home health could be contacted to remove the PICC line.    ----- Message -----  From: Deandra Escamilla, GRETCHEN  Sent: 2/17/2025  10:46 AM EST  To: Kevin Mares DO    Patient is excited to stop the abx but wants to talk w/ his wife and then wants to call Dr. Durham before deciding. States he will call me back later today.  Thank you  ----- Message -----  From: Kevin Mares DO  Sent: 2/17/2025   8:51 AM EST  To: Kallie Desir, GRETCHEN; Deandra Escamilla, GRETCHEN    This patient has currently on IV therapy with vancomycin and ceftriaxone.  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  His current regimen would be effective however to make it easier on the patient I would propose that we stop his IV therapy and transition him to oral amoxicillin 1 g 3 times a day.  The duration of therapy would remain the same but the patient could have his PICC line removed and discontinue the IV therapy.  We would still follow-up in the office to discuss what to do in the future.  Please let me know if he is agreeable to this plan and I will send in the oral antibiotic.  Thank you.

## 2025-02-18 ENCOUNTER — TELEPHONE (OUTPATIENT)
Dept: INFECTIOUS DISEASES | Facility: CLINIC | Age: 55
End: 2025-02-18
Payer: COMMERCIAL

## 2025-02-18 RX ORDER — AMOXICILLIN 500 MG/1
1000 CAPSULE ORAL 3 TIMES DAILY
Qty: 180 CAPSULE | Refills: 0 | Status: SHIPPED | OUTPATIENT
Start: 2025-02-18 | End: 2025-03-20

## 2025-02-18 NOTE — TELEPHONE ENCOUNTER
----- Message from Deandra ROBERT sent at 2/17/2025  4:07 PM EST -----  Regarding: Transition from IV Abx to PO  Received call back from pt who said he'd like to go w/ your plan to stop the IV therapy & transition to oral Abx. He requests the PO abx be sent to his [facility]. He's aware you may not receive this info until 2/18/25. Kallie or myself can update pt as to when he's to stop IV Antibiotics & start PO abx.  Thank you.  ----- Message -----  From: Deandra Escamilla, GRETCHEN  Sent: 2/17/2025  10:46 AM EST  To: Kevin Mares DO    Patient is excited to stop the abx but wants to talk w/ his wife and then wants to call Dr. Durham before deciding. States he will call me back later today.  Thank you  ----- Message -----  From: Kevin Mares DO  Sent: 2/17/2025   8:51 AM EST  To: Kallie Desir, GRETCHEN; Deandra Escamilla, RN    This patient has currently on IV therapy with vancomycin and ceftriaxone.  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  His current regimen would be effective however to make it easier on the patient I would propose that we stop his IV therapy and transition him to oral amoxicillin 1 g 3 times a day.  The duration of therapy would remain the same but the patient could have his PICC line removed and discontinue the IV therapy.  We would still follow-up in the office to discuss what to do in the future.  Please let me know if he is agreeable to this plan and I will send in the oral antibiotic.  Thank you.

## 2025-02-18 NOTE — TELEPHONE ENCOUNTER
----- Message from Deandra ROBERT sent at 2/17/2025  4:07 PM EST -----  Regarding: Transition from IV Abx to PO  Received call back from pt who said he'd like to go w/ your plan to stop the IV therapy & transition to oral Abx. He requests the PO abx be sent to his [facility]. He's aware you may not receive this info until 2/18/25. Kallie or myself can update pt as to when he's to stop IV Antibiotics & start PO abx.  Thank you.  ----- Message -----  From: Deandra Escamilla, GRETCHEN  Sent: 2/17/2025  10:46 AM EST  To: eKvin Mares DO    Patient is excited to stop the abx but wants to talk w/ his wife and then wants to call Dr. Durham before deciding. States he will call me back later today.  Thank you  ----- Message -----  From: Kevin Mares DO  Sent: 2/17/2025   8:51 AM EST  To: Kallie Desir, GRETCHEN; Deandra Escamilla, RN    This patient has currently on IV therapy with vancomycin and ceftriaxone.  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  His current regimen would be effective however to make it easier on the patient I would propose that we stop his IV therapy and transition him to oral amoxicillin 1 g 3 times a day.  The duration of therapy would remain the same but the patient could have his PICC line removed and discontinue the IV therapy.  We would still follow-up in the office to discuss what to do in the future.  Please let me know if he is agreeable to this plan and I will send in the oral antibiotic.  Thank you.

## 2025-02-18 NOTE — TELEPHONE ENCOUNTER
Patient informed his new oral ABX have been sent to his pharmacy and he will pick them up later today. I have also notified REBECA and Viviane with [facility] so his nurse can go out and remove his PICC line today. Patient will keep his follow up appt on 3/20/25 as scheduled. GRETCHEN MELLO

## 2025-02-19 ENCOUNTER — HOME CARE VISIT (OUTPATIENT)
Dept: HOME HEALTH SERVICES | Facility: HOME HEALTHCARE | Age: 55
End: 2025-02-19
Payer: COMMERCIAL

## 2025-02-19 VITALS
SYSTOLIC BLOOD PRESSURE: 152 MMHG | OXYGEN SATURATION: 94 % | RESPIRATION RATE: 18 BRPM | TEMPERATURE: 97.6 F | DIASTOLIC BLOOD PRESSURE: 90 MMHG | HEART RATE: 86 BPM

## 2025-02-19 PROCEDURE — G0151 HHCP-SERV OF PT,EA 15 MIN: HCPCS

## 2025-02-19 NOTE — Clinical Note
Patient has been discharged from PT secondary to starting outpatient PT on Friday. He started oral antibiotics yesterday and will continue for 30 days. Patient's functional mobility and pain control is improving but he is make poor progress with range of motion rt knee, -12 to 62 deg today, Patient wants to avoid manipulation so recommend aggessive ROM in outpatient PT. RN is still involved and has orders to remove pic line.

## 2025-02-20 ENCOUNTER — HOME CARE VISIT (OUTPATIENT)
Dept: HOME HEALTH SERVICES | Facility: HOME HEALTHCARE | Age: 55
End: 2025-02-20
Payer: COMMERCIAL

## 2025-02-20 ENCOUNTER — OFFICE VISIT (OUTPATIENT)
Dept: ORTHOPEDIC SURGERY | Facility: CLINIC | Age: 55
End: 2025-02-20
Payer: COMMERCIAL

## 2025-02-20 VITALS
DIASTOLIC BLOOD PRESSURE: 72 MMHG | RESPIRATION RATE: 18 BRPM | TEMPERATURE: 97.3 F | HEART RATE: 71 BPM | OXYGEN SATURATION: 99 % | SYSTOLIC BLOOD PRESSURE: 126 MMHG

## 2025-02-20 VITALS — HEIGHT: 72 IN | WEIGHT: 233 LBS | BODY MASS INDEX: 31.56 KG/M2 | TEMPERATURE: 98.7 F

## 2025-02-20 DIAGNOSIS — Z96.651 STATUS POST RIGHT KNEE REPLACEMENT: ICD-10-CM

## 2025-02-20 PROCEDURE — G0495 RN CARE TRAIN/EDU IN HH: HCPCS

## 2025-02-20 PROCEDURE — 99024 POSTOP FOLLOW-UP VISIT: CPT | Performed by: ORTHOPAEDIC SURGERY

## 2025-02-20 RX ORDER — OXYCODONE AND ACETAMINOPHEN 7.5; 325 MG/1; MG/1
1 TABLET ORAL EVERY 4 HOURS PRN
Qty: 50 TABLET | Refills: 0 | Status: SHIPPED | OUTPATIENT
Start: 2025-02-20

## 2025-02-20 NOTE — HOME HEALTH
"Patient reports he had a \"good day\" yesterday and did a lot of exercise. He states \"i think my knee was bending to around 80 deg yesterday\" but he notes increased swelling and stiffness today. Patient does not want to undergo a manipulation so notes that he will continue to worki hard on his home exercises. Patient has a follow up with Dr. Durham tomorrow and then starts outpatient PT. Patient is anxious to get his pic line out.     Medication changes. He has been started on amoxicillian 500 mg, 2 tabs 3x per day for 30 days on 2/18/25 and IV antibiotics have been discontinued.     No falls."

## 2025-02-20 NOTE — PROGRESS NOTES
Follows up now 4 weeks out from revision right total knee 2 weeks out from I&D poly change.  His cultures ended up growing Clostridium he is on amoxicillin orally at this point he has a very stiff soft tissue envelope and I can only flex and about 95 degrees the incision is well-healed there is no erythema or fluctuance he denies any fever chills or constitutional symptoms.  His last CRP was 0.6 which was a marked reduction.  He will continue on amoxicillin per infectious disease and likely will decrease at the end of 6 weeks to a low-dose maintenance chronically.  He will continue to work on stretching I explained to him and he understands that this will likely be a very stiff knee but there is little that we can do other than physical therapy to change that.  He will return in 6 weeks repeat x-rays sooner should he have worsening symptoms increased redness fever chills or drainage.  On Eliquis for 2 more weeks and then transition to aspirin 81 mg twice daily.

## 2025-02-21 ENCOUNTER — READMISSION MANAGEMENT (OUTPATIENT)
Dept: CALL CENTER | Facility: HOSPITAL | Age: 55
End: 2025-02-21
Payer: COMMERCIAL

## 2025-02-21 ENCOUNTER — TREATMENT (OUTPATIENT)
Dept: PHYSICAL THERAPY | Facility: CLINIC | Age: 55
End: 2025-02-21
Payer: COMMERCIAL

## 2025-02-21 VITALS
OXYGEN SATURATION: 99 % | SYSTOLIC BLOOD PRESSURE: 122 MMHG | RESPIRATION RATE: 18 BRPM | DIASTOLIC BLOOD PRESSURE: 68 MMHG | TEMPERATURE: 98.2 F | HEART RATE: 71 BPM

## 2025-02-21 DIAGNOSIS — R29.898 WEAKNESS OF RIGHT LOWER EXTREMITY: ICD-10-CM

## 2025-02-21 DIAGNOSIS — M25.661 STIFFNESS OF RIGHT KNEE: ICD-10-CM

## 2025-02-21 DIAGNOSIS — Z74.09 IMPAIRED FUNCTIONAL MOBILITY, BALANCE, GAIT, AND ENDURANCE: ICD-10-CM

## 2025-02-21 DIAGNOSIS — Z96.651 STATUS POST TOTAL RIGHT KNEE REPLACEMENT: Primary | ICD-10-CM

## 2025-02-21 NOTE — PROGRESS NOTES
Physical Therapy Initial Evaluation and Plan of Care    Patient: Anthony Sol Jr.   : 1970  Diagnosis/ICD-10 Code:  Status post total right knee replacement [Z96.651]  Referring practitioner: LAMAR Holguin  Date of Initial Visit: 2025  Today's Date: 2025  Patient seen for 1 session         Visit Diagnoses:    ICD-10-CM ICD-9-CM   1. Status post total right knee replacement  Z96.651 V43.65   2. Stiffness of right knee  M25.661 719.56   3. Weakness of right lower extremity  R29.898 729.89   4. Impaired functional mobility, balance, gait, and endurance  Z74.09 V49.89         Subjective Questionnaire: LEFS:       Subjective Evaluation    History of Present Illness  Mechanism of injury: Right TKA revision on 2025. Doing well, then sudden onset of severe pain on 2025. Seen in ER with suspected hematoma, Using a walker right now, ordered a cane    Quality of life: good    Pain  Current pain rating: 3  At best pain rating: 3  At worst pain ratin  Location: right knee  Relieving factors: ice and medications  Aggravating factors: movement, ambulation and standing  Progression: improved    Social Support  Lives in: multiple-level home ([address])  Lives with: spouse    Patient Goals  Patient goals for therapy: decreased pain, increased strength and increased motion             Objective          Observations     Right Knee   Positive for edema and effusion. Negative for deformity and drainage.     Additional Knee Observation Details  Steri strips in place, no erythema or drainage    Neurological Testing     Sensation     Knee   Left Knee   Intact: Light touch    Right Knee   Intact: light touch     Active Range of Motion   Left Knee   Normal active range of motion    Right Knee   Flexion: 50 degrees   Extension: 10 degrees     Strength/Myotome Testing     Left Knee   Flexion: 5  Extension: 5    Right Knee   Flexion: 3-  Extension: 3-    Tests     Additional Tests Details  Special  "tests deferred secondary to post op status    Ambulation   Weight-Bearing Status   Weight-Bearing Status (Right): weight-bearing as tolerated    Assistive device used: wheeled walker    Observational Gait   Gait: antalgic and asymmetric   Decreased walking speed and stride length.           Assessment & Plan       Assessment  Impairments: abnormal gait, abnormal or restricted ROM, activity intolerance, impaired balance, impaired physical strength, lacks appropriate home exercise program and pain with function   Functional limitations: walking, uncomfortable because of pain and standing   Assessment details: Pt presents with limitations in ROM, strength, balance and gait consistent with post-op status.  Pt will benefit from PT to address impairments to allow pt to return to baseline performance of daily activities without limitation.  Prognosis: good    Goals  Plan Goals: Short Term Goals: 6 weeks. Patient will:    1. Patient to be adherent with HEP for ROM and strength.  2. Demonstrate right knee ROM 5 degrees - 80 degrees for improved functional mobility, gait and stair climbing.   3. Demonstrate adequate quad strength to perform SLR with no extensor lag.     Long Term Goals: 12 weeks. Patient will:    1. Report improved ADL's and functional activites as evidenced by LEFS 60/80 or better.  2. Exhibit improved right knee AROM 0 degrees - 90 degrees to allow for improved gait, stair climbing, bending, and squatting as is necessary for daily tasks.    3. Demonstrate increased balance and stability of the knee by stepping over 6\" nik without UE assist to traverse uneven terrain.  4. Demonstrate LE strength 4+/5 or better for improved gait, functional mobility and ADL's.      Plan  Therapy options: will be seen for skilled therapy services  Planned modality interventions: cryotherapy, electrical stimulation/Russian stimulation, TENS, thermotherapy (hydrocollator packs) and dry needling  Planned therapy interventions: " abdominal trunk stabilization, manual therapy, soft tissue mobilization, strengthening, stretching, therapeutic activities, home exercise program, gait training, functional ROM exercises, flexibility, neuromuscular re-education and joint mobilization  Frequency: 3x week  Duration in weeks: 12  Treatment plan discussed with: patient        History # of Personal Factors and/or Comorbidities: MODERATE (1-2)  Examination of Body System(s): # of elements: LOW (1-2)  Clinical Presentation: EVOLVING  Clinical Decision Making: LOW       Timed:         Manual Therapy:    0     mins  91792;     Therapeutic Exercise:    15     mins  27514;     Neuromuscular Zach:    0    mins  12709;    Therapeutic Activity:     10     mins  93507;     Gait Trainin     mins  45422;     Ultrasound:     0     mins  43224;    Ionto                               0    mins   44035  Self Care                       0     mins   00642      Un-Timed:  Electrical Stimulation:    0     mins  77958 ( );  Dry Needling     0     mins self-pay  Traction     0     mins 82234  Low Eval     20     Mins 01007  Mod Eval     0     Mins 91303  High Eval                       0     Mins 61641  Re-Eval     0     Mins 43942  Canalith Repos    0     mins 84087      Timed Treatment:   25   mins   Total Treatment:     45   mins          PT: Yissel Flannery PT     License Number: [id number]  Electronically signed by Yissel Flannery PT, 25, 1:12 PM EST    Certification Period: 2025 thru 2025  I certify that the therapy services are furnished while this patient is under my care.  The services outlined above are required by this patient, and will be reviewed every 90 days.         Physician Signature:__________________________________________________    PHYSICIAN: Jin Benton APRN  NPI: [id number]                                      DATE:      Please sign and return via fax to [email or web address] . Thank you, [facility].

## 2025-02-21 NOTE — OUTREACH NOTE
General Surgery Week 2 Survey      Flowsheet Row Responses   [facility] patient discharged from? [place]   Does the patient have one of the following disease processes/diagnoses(primary or secondary)? General Surgery   Week 2 attempt successful? Yes   Call end time 1351   Discharge diagnosis HEMATOMA EVACUATION RIGHT KNEE, PLACEMENT ANTIBIOITIC BEADS, WASH OUT, POLY EXCHANGE   Person spoke with today (if not patient) and relationship Spouse- Nury   Prescription comments Picc line removed yesterday   Comments Surgeon fu yesterday   Psychosocial issues? No   What is the patient's perception of their health status since discharge? Improving   Is the patient/caregiver able to teach back signs and symptoms of incisional infection? Increased redness, swelling or pain at the incisonal site, Increased drainage or bleeding, Incisional warmth, Pus or odor from incision, Fever   Is the patient/caregiver able to teach back the hierarchy of who to call/visit for symptoms/problems? PCP, Specialist, Home health nurse, Urgent Care, ED, 911 Yes   Week 2 call completed? Yes   Graduated Yes   Wrap up additional comments Per spouse patient is doing very well. No concerns or questions noted.   Call end time 1351            Marizol MATT - Registered Nurse

## 2025-02-24 ENCOUNTER — TREATMENT (OUTPATIENT)
Dept: PHYSICAL THERAPY | Facility: CLINIC | Age: 55
End: 2025-02-24
Payer: COMMERCIAL

## 2025-02-24 DIAGNOSIS — M25.661 STIFFNESS OF RIGHT KNEE: ICD-10-CM

## 2025-02-24 DIAGNOSIS — Z96.651 STATUS POST TOTAL RIGHT KNEE REPLACEMENT: Primary | ICD-10-CM

## 2025-02-24 DIAGNOSIS — R29.898 WEAKNESS OF RIGHT LOWER EXTREMITY: ICD-10-CM

## 2025-02-24 DIAGNOSIS — Z74.09 IMPAIRED FUNCTIONAL MOBILITY, BALANCE, GAIT, AND ENDURANCE: ICD-10-CM

## 2025-02-24 NOTE — PROGRESS NOTES
Physical Therapy Daily Treatment Note      Patient: Anthony Sol Jr.   : 1970  Referring practitioner: LAMAR Holguin  Date of Initial Visit: Type: THERAPY  Noted: 2025  Today's Date: 2025  Patient seen for 2 sessions       Visit Diagnoses:    ICD-10-CM ICD-9-CM   1. Status post total right knee replacement  Z96.651 V43.65   2. Stiffness of right knee  M25.661 719.56   3. Weakness of right lower extremity  R29.898 729.89   4. Impaired functional mobility, balance, gait, and endurance  Z74.09 V49.89       Subjective   Pt brought a straight cane today to practice with.     Objective   See Exercise, Manual, and Modality Logs for complete treatment.       Assessment/Plan    Sized cane to patient and trained on pattern. Walking with good pattern by end of session.        Timed:         Manual Therapy:    0     mins  69559;     Therapeutic Exercise:    23     mins  43136;     Neuromuscular Zach:    0    mins  01984;    Therapeutic Activity:     8     mins  24233;     Gait Trainin     mins  49642;     Ultrasound:     0     mins  29440;    Ionto                               0    mins   35724  Self Care                       0     mins   96248  Canalith Repos    0     mins  02839      Un-Timed:  Electrical Stimulation:    0     mins  58131 ( );  Dry Needling     0     mins self-pay  Traction     0     mins 33971      Timed Treatment:   39   mins   Total Treatment:     49   mins    Yissel Flannery, PT  [place] License: [id number]

## 2025-02-25 DIAGNOSIS — I10 ESSENTIAL HYPERTENSION: ICD-10-CM

## 2025-02-25 RX ORDER — AZILSARTAN KAMEDOXOMIL AND CHLORTHALIDONE 40; 12.5 MG/1; MG/1
TABLET ORAL
Qty: 90 TABLET | Refills: 1 | Status: SHIPPED | OUTPATIENT
Start: 2025-02-25

## 2025-02-26 ENCOUNTER — TREATMENT (OUTPATIENT)
Dept: PHYSICAL THERAPY | Facility: CLINIC | Age: 55
End: 2025-02-26
Payer: COMMERCIAL

## 2025-02-26 DIAGNOSIS — Z96.651 STATUS POST TOTAL RIGHT KNEE REPLACEMENT: Primary | ICD-10-CM

## 2025-02-26 DIAGNOSIS — Z74.09 IMPAIRED FUNCTIONAL MOBILITY, BALANCE, GAIT, AND ENDURANCE: ICD-10-CM

## 2025-02-26 DIAGNOSIS — R29.898 WEAKNESS OF RIGHT LOWER EXTREMITY: ICD-10-CM

## 2025-02-26 DIAGNOSIS — M25.661 STIFFNESS OF RIGHT KNEE: ICD-10-CM

## 2025-02-26 NOTE — PROGRESS NOTES
Physical Therapy Daily Treatment Note      Patient: Anthony Sol Jr.   : 1970  Referring practitioner: LAMAR Holguin  Date of Initial Visit: Type: THERAPY  Noted: 2025  Today's Date: 2025  Patient seen for 3 sessions       Visit Diagnoses:    ICD-10-CM ICD-9-CM   1. Status post total right knee replacement  Z96.651 V43.65   2. Stiffness of right knee  M25.661 719.56   3. Weakness of right lower extremity  R29.898 729.89   4. Impaired functional mobility, balance, gait, and endurance  Z74.09 V49.89       Subjective   Pt reports continued adherence with HEP.    Objective          Active Range of Motion     Right Knee   Flexion: 73 (AAROM) degrees       See Exercise, Manual, and Modality Logs for complete treatment.       Assessment/Plan    23 degree gain in ROM since initial evaluation. Ambulating well with straight cane.         Timed:         Manual Therapy:    0     mins  19379;     Therapeutic Exercise:    24     mins  84771;     Neuromuscular Zach:    10    mins  87792;    Therapeutic Activity:     10     mins  46857;     Gait Trainin     mins  29294;     Ultrasound:     0     mins  43109;    Ionto                               0    mins   52856  Self Care                       0     mins   89468  Canalith Repos    0     mins  14364      Un-Timed:  Electrical Stimulation:    0     mins  88971 ( );  Dry Needling     0     mins self-pay  Traction     0     mins 63087      Timed Treatment:   44   mins   Total Treatment:     54   mins    Yissel Flannery, PT  [place] License: [id number]

## 2025-02-28 ENCOUNTER — TREATMENT (OUTPATIENT)
Dept: PHYSICAL THERAPY | Facility: CLINIC | Age: 55
End: 2025-02-28
Payer: COMMERCIAL

## 2025-02-28 DIAGNOSIS — Z74.09 IMPAIRED FUNCTIONAL MOBILITY, BALANCE, GAIT, AND ENDURANCE: ICD-10-CM

## 2025-02-28 DIAGNOSIS — M25.661 STIFFNESS OF RIGHT KNEE: ICD-10-CM

## 2025-02-28 DIAGNOSIS — R29.898 WEAKNESS OF RIGHT LOWER EXTREMITY: ICD-10-CM

## 2025-02-28 DIAGNOSIS — Z96.651 STATUS POST TOTAL RIGHT KNEE REPLACEMENT: Primary | ICD-10-CM

## 2025-02-28 NOTE — PROGRESS NOTES
Physical Therapy Daily Treatment Note      Patient: Anthony Sol Jr.   : 1970  Referring practitioner: LAMAR Holguin  Date of Initial Visit: Type: THERAPY  Noted: 2025  Today's Date: 2025  Patient seen for 4 sessions       Visit Diagnoses:    ICD-10-CM ICD-9-CM   1. Status post total right knee replacement  Z96.651 V43.65   2. Stiffness of right knee  M25.661 719.56   3. Weakness of right lower extremity  R29.898 729.89   4. Impaired functional mobility, balance, gait, and endurance  Z74.09 V49.89       Subjective   I took my steri strips off and my knee feels better.    Objective   See Exercise, Manual, and Modality Logs for complete treatment.       Assessment/Plan    Gait improved today, with increased step length and speed.         Timed:         Manual Therapy:    0     mins  28456;     Therapeutic Exercise:    24     mins  06011;     Neuromuscular Zach:    8    mins  25719;    Therapeutic Activity:     8     mins  63371;     Gait Trainin     mins  43949;     Ultrasound:     0     mins  33680;    Ionto                               0    mins   32848  Self Care                       0     mins   54627  Canalith Repos    0     mins  80491      Un-Timed:  Electrical Stimulation:    0     mins  48139 ( );  Dry Needling     0     mins self-pay  Traction     0     mins 47076      Timed Treatment:   40   mins   Total Treatment:     50   mins    Yissel Flannery, PT  [place] License: [id number]

## 2025-03-03 ENCOUNTER — TREATMENT (OUTPATIENT)
Dept: PHYSICAL THERAPY | Facility: CLINIC | Age: 55
End: 2025-03-03
Payer: COMMERCIAL

## 2025-03-03 DIAGNOSIS — R29.898 WEAKNESS OF RIGHT LOWER EXTREMITY: ICD-10-CM

## 2025-03-03 DIAGNOSIS — Z74.09 IMPAIRED FUNCTIONAL MOBILITY, BALANCE, GAIT, AND ENDURANCE: ICD-10-CM

## 2025-03-03 DIAGNOSIS — Z96.651 STATUS POST TOTAL RIGHT KNEE REPLACEMENT: Primary | ICD-10-CM

## 2025-03-03 DIAGNOSIS — M25.661 STIFFNESS OF RIGHT KNEE: ICD-10-CM

## 2025-03-03 PROCEDURE — 97110 THERAPEUTIC EXERCISES: CPT | Performed by: PHYSICAL THERAPIST

## 2025-03-03 PROCEDURE — 97140 MANUAL THERAPY 1/> REGIONS: CPT | Performed by: PHYSICAL THERAPIST

## 2025-03-03 PROCEDURE — 97530 THERAPEUTIC ACTIVITIES: CPT | Performed by: PHYSICAL THERAPIST

## 2025-03-03 NOTE — PROGRESS NOTES
Physical Therapy Daily Treatment Note      Patient: Anthony Sol Jr.   : 1970  Referring practitioner: LAMAR Holguin  Date of Initial Visit: Type: THERAPY  Noted: 2025  Today's Date: 3/3/2025  Patient seen for 5 sessions       Visit Diagnoses:    ICD-10-CM ICD-9-CM   1. Status post total right knee replacement  Z96.651 V43.65   2. Stiffness of right knee  M25.661 719.56   3. Weakness of right lower extremity  R29.898 729.89   4. Impaired functional mobility, balance, gait, and endurance  Z74.09 V49.89       Subjective   Pt states his knee and thigh feel tight and swollen.     Objective   See Exercise, Manual, and Modality Logs for complete treatment.       Assessment/Plan    Added STM and edema massage today, tolerated well with improved muscle tone after. Continue per POC.         Timed:         Manual Therapy:    24     mins  77953;     Therapeutic Exercise:    8     mins  97431;     Neuromuscular Zach:    0    mins  28604;    Therapeutic Activity:     8     mins  50886;     Gait Trainin     mins  49003;     Ultrasound:     0     mins  52274;    Ionto                               0    mins   99353  Self Care                       0     mins   49517  Canalith Repos    0     mins  50038      Un-Timed:  Electrical Stimulation:    0     mins  61448 ( );  Dry Needling     0     mins self-pay  Traction     0     mins 42505      Timed Treatment:   40   mins   Total Treatment:     40   mins    Yissel Flannery, PT  [place] License: [id number]

## 2025-03-04 DIAGNOSIS — Z96.651 STATUS POST RIGHT KNEE REPLACEMENT: ICD-10-CM

## 2025-03-04 RX ORDER — OXYCODONE AND ACETAMINOPHEN 7.5; 325 MG/1; MG/1
1 TABLET ORAL EVERY 4 HOURS PRN
Qty: 25 TABLET | Refills: 0 | Status: SHIPPED | OUTPATIENT
Start: 2025-03-04

## 2025-03-05 ENCOUNTER — TREATMENT (OUTPATIENT)
Dept: PHYSICAL THERAPY | Facility: CLINIC | Age: 55
End: 2025-03-05
Payer: COMMERCIAL

## 2025-03-05 DIAGNOSIS — Z74.09 IMPAIRED FUNCTIONAL MOBILITY, BALANCE, GAIT, AND ENDURANCE: ICD-10-CM

## 2025-03-05 DIAGNOSIS — R29.898 WEAKNESS OF RIGHT LOWER EXTREMITY: ICD-10-CM

## 2025-03-05 DIAGNOSIS — M25.661 STIFFNESS OF RIGHT KNEE: ICD-10-CM

## 2025-03-05 DIAGNOSIS — Z96.651 STATUS POST TOTAL RIGHT KNEE REPLACEMENT: Primary | ICD-10-CM

## 2025-03-05 NOTE — PROGRESS NOTES
Physical Therapy Daily Treatment Note      Patient: Anthony Sol Jr.   : 1970  Referring practitioner: LAMAR Holguin  Date of Initial Visit: Type: THERAPY  Noted: 2025  Today's Date: 3/5/2025  Patient seen for 6 sessions       Visit Diagnoses:    ICD-10-CM ICD-9-CM   1. Status post total right knee replacement  Z96.651 V43.65   2. Stiffness of right knee  M25.661 719.56   3. Weakness of right lower extremity  R29.898 729.89   4. Impaired functional mobility, balance, gait, and endurance  Z74.09 V49.89       Subjective   Pt states massage helped a lot with pain and ROM. Knee is still really swollen, feels like its limiting his ROM. Asks about using a compression sleeve.    Objective   See Exercise, Manual, and Modality Logs for complete treatment.       Assessment/Plan    Improved ROM after session. Advised pt that compression sleeve would be ok, assisted in measuring for size.         Timed:         Manual Therapy:    10     mins  97644;     Therapeutic Exercise:    24     mins  92847;     Neuromuscular Zach:    0    mins  90277;    Therapeutic Activity:     8     mins  04851;     Gait Trainin     mins  27387;     Ultrasound:     0     mins  40013;    Ionto                               0    mins   44597  Self Care                       0     mins   66243  Canalith Repos    0     mins  01543      Un-Timed:  Electrical Stimulation:    0     mins  82308 ( );  Dry Needling     0     mins self-pay  Traction     0     mins 51099      Timed Treatment:   42   mins   Total Treatment:     52   mins    Yissel Flannery, PT  [place] License: [id number]

## 2025-03-07 ENCOUNTER — TREATMENT (OUTPATIENT)
Dept: PHYSICAL THERAPY | Facility: CLINIC | Age: 55
End: 2025-03-07
Payer: COMMERCIAL

## 2025-03-07 DIAGNOSIS — R29.898 WEAKNESS OF RIGHT LOWER EXTREMITY: ICD-10-CM

## 2025-03-07 DIAGNOSIS — Z96.651 STATUS POST TOTAL RIGHT KNEE REPLACEMENT: Primary | ICD-10-CM

## 2025-03-07 DIAGNOSIS — Z74.09 IMPAIRED FUNCTIONAL MOBILITY, BALANCE, GAIT, AND ENDURANCE: ICD-10-CM

## 2025-03-07 DIAGNOSIS — M25.661 STIFFNESS OF RIGHT KNEE: ICD-10-CM

## 2025-03-07 NOTE — PROGRESS NOTES
Physical Therapy Daily Treatment Note  [facility] [place]   [address], [address]  [place] [address]  P: [phone]  F: [phone]    Patient: Anthony Sol Jr.   : 1970  Referring practitioner: LAMAR Holguin  Date of Initial Visit: Type: THERAPY  Noted: 2025  Today's Date: 3/7/2025  Patient seen for 7 sessions       Visit Diagnoses:    ICD-10-CM ICD-9-CM   1. Status post total right knee replacement  Z96.651 V43.65   2. Stiffness of right knee  M25.661 719.56   3. Weakness of right lower extremity  R29.898 729.89   4. Impaired functional mobility, balance, gait, and endurance  Z74.09 V49.89         Subjective     Anthony Sol reports: No new changes since last visit. Knee is still stiff and swollen. Got a compression sleeve for the knee. Feels good when it's on, but not sure how compressive it really is.         Objective   See Exercise, Manual, and Modality Logs for complete treatment.   R knee ROM:   72 degrees flexion AAROM (w strap)    Assessment:  R knee ROM improved to ~85 degrees per visual assessment with new manual intervention (listed in flowsheet). AAROM knee flexion interventions continued. Pt will continue to benefit from manual OP from therapist, secondary to ROM gain in today's visit.         Plan:  Progress per Plan of Care            Timed:         Manual Therapy:    23     mins  05156;     Therapeutic Exercise:    24     mins  17655;     Neuromuscular Zach:        mins  25443;    Therapeutic Activity:     11     mins  31484;     Gait Training:           mins  76418;     Ultrasound:          mins  51755;    Ionto                                   mins  43292  Self Care                            mins  19330    Un-Timed:  Electrical Stimulation:         mins  10296 (MC );  Traction          mins 23343        Timed Treatment:   58   mins   Total Treatment:     68   mins      Gee Goldberg, Physical Therapist Assistant  [place] License #:  [id number]

## 2025-03-10 ENCOUNTER — TREATMENT (OUTPATIENT)
Dept: PHYSICAL THERAPY | Facility: CLINIC | Age: 55
End: 2025-03-10
Payer: COMMERCIAL

## 2025-03-10 DIAGNOSIS — Z74.09 IMPAIRED FUNCTIONAL MOBILITY, BALANCE, GAIT, AND ENDURANCE: ICD-10-CM

## 2025-03-10 DIAGNOSIS — R29.898 WEAKNESS OF RIGHT LOWER EXTREMITY: ICD-10-CM

## 2025-03-10 DIAGNOSIS — Z96.651 STATUS POST TOTAL RIGHT KNEE REPLACEMENT: Primary | ICD-10-CM

## 2025-03-10 DIAGNOSIS — M25.661 STIFFNESS OF RIGHT KNEE: ICD-10-CM

## 2025-03-10 NOTE — PROGRESS NOTES
Physical Therapy Daily Treatment Note  [facility] [place]   [address], [address]  [place] [address]  P: [phone]  F: [phone]    Patient: Anthony Sol Jr.   : 1970  Referring practitioner: LAMAR Holguin  Date of Initial Visit: Type: THERAPY  Noted: 2025  Today's Date: 3/10/2025  Patient seen for 8 sessions       Visit Diagnoses:    ICD-10-CM ICD-9-CM   1. Status post total right knee replacement  Z96.651 V43.65   2. Stiffness of right knee  M25.661 719.56   3. Weakness of right lower extremity  R29.898 729.89   4. Impaired functional mobility, balance, gait, and endurance  Z74.09 V49.89         Subjective     Anthony Sol reports: Frustrated with R quad continuing to be swollen.         Objective   See Exercise, Manual, and Modality Logs for complete treatment.   87 degrees with manual OP    Assessment:  Pt was able to achieve 87 degrees of passive R knee flexion with manual OP. Added HS curls with 3# resistance for R knee AROM. Prone quad stretch w strap added to HEP to facilitate greater bend in R knee. Pt performed all interventions well and will continue to benefit from skilled therapy to improve gait and decrease pain/stiffness in R knee.       Plan:  Progress per Plan of Care            Timed:         Manual Therapy:    10     mins  30470;     Therapeutic Exercise:    25     mins  06400;     Neuromuscular Zach:    10    mins  40029;    Therapeutic Activity:     11     mins  29699;     Gait Training:           mins  18483;     Ultrasound:          mins  66210;    Ionto                                   mins  25829  Self Care                            mins  60784    Un-Timed:  Electrical Stimulation:         mins  76986 ( );  Traction          mins 33384        Timed Treatment:   56   mins   Total Treatment:     66   mins      Gee Goldberg, Physical Therapist Assistant  [place] License #: [id number]

## 2025-03-12 ENCOUNTER — TREATMENT (OUTPATIENT)
Dept: PHYSICAL THERAPY | Facility: CLINIC | Age: 55
End: 2025-03-12
Payer: COMMERCIAL

## 2025-03-12 ENCOUNTER — OFFICE VISIT (OUTPATIENT)
Dept: CARDIOLOGY | Facility: CLINIC | Age: 55
End: 2025-03-12
Payer: COMMERCIAL

## 2025-03-12 VITALS
SYSTOLIC BLOOD PRESSURE: 150 MMHG | BODY MASS INDEX: 31.42 KG/M2 | WEIGHT: 232 LBS | HEART RATE: 81 BPM | OXYGEN SATURATION: 98 % | HEIGHT: 72 IN | DIASTOLIC BLOOD PRESSURE: 88 MMHG

## 2025-03-12 DIAGNOSIS — I10 ESSENTIAL HYPERTENSION: ICD-10-CM

## 2025-03-12 DIAGNOSIS — M25.661 STIFFNESS OF RIGHT KNEE: ICD-10-CM

## 2025-03-12 DIAGNOSIS — I25.10 CORONARY ARTERY DISEASE INVOLVING NATIVE CORONARY ARTERY OF NATIVE HEART WITHOUT ANGINA PECTORIS: Primary | ICD-10-CM

## 2025-03-12 DIAGNOSIS — Z96.651 STATUS POST TOTAL RIGHT KNEE REPLACEMENT: Primary | ICD-10-CM

## 2025-03-12 DIAGNOSIS — Z74.09 IMPAIRED FUNCTIONAL MOBILITY, BALANCE, GAIT, AND ENDURANCE: ICD-10-CM

## 2025-03-12 DIAGNOSIS — R29.898 WEAKNESS OF RIGHT LOWER EXTREMITY: ICD-10-CM

## 2025-03-12 DIAGNOSIS — E78.2 MIXED HYPERLIPIDEMIA: ICD-10-CM

## 2025-03-12 NOTE — PROGRESS NOTES
Cardiology Office Visit      Encounter Date:  03/12/2025    Patient ID:   Anthony Sol Jr. is a 55 y.o. male.    Reason For Followup:  Hypertension  Hyperlipidemia    Brief Clinical History:  Dear Dr. Linares, Benji Rasheed MD    I had the pleasure of seeing Anthony Sol Jr. today. As you are well aware, this is a 55 y.o. male with no known history of ischemic heart disease.  He does have a history of hypertension, hyperlipidemia, diabetes mellitus, and exogenous obesity.  He presents today for follow-up on the above conditions.    Interval History:  He denies any chest pain pressure heaviness or tightness.  He denies any shortness of breath.  He denies any PND orthopnea.  He denies any syncope or near syncope.  He reports feeling well from a cardiac perspective.    He did undergo a coronary calcium score about 4 years ago.  He does have an elevated CRP.  His calcium score was 481.9 suggestive of a moderate to high risk of a cardiac event in the ensuing 5 years.  This was performed in January 2019.    He subsequently underwent a repeat calcium score in July 2023.  His value had increased to 845.8.  We discussed options at that time and ultimately he underwent a CT coronary angiogram as well as a nuclear stress test.    His most recent nuclear stress test was performed in August 2023.  Normal left ventricular systolic function with an EF of 54% was noted.  No evidence of myocardial ischemia was noted.  The patient exercised for 11 minutes on a Osmin protocol.  He achieved 13.4 METS.    CT coronary angiogram was performed in October 2023.  Moderate multivessel coronary artery disease was noted with a calcium score of 1030.  Mildly elevated left ventricular end-diastolic volume was noted at 157 cc.  Hyperdynamic LV function was noted with an EF of 84%.  Cardiac output was measured at 6.83 L/min.  25 to 50% LAD stenosis was noted, less than 50% diagonal stenosis, greater than 50% distal circumflex, small OM1 with  "less than 50%, RCA with 50% proximal stenosis.    03/12/2025        He reports that he is no longer on the verapamil . His blood pressure is elevated. He just came from  and had knee surgery. He had surgery done. Pretty extesnive surgery had to have some reconstruction. He had a hematoma post op and locked the knee up. He had to have a picc line for antibiotics.     He feels well from a cardiuac perspective. No other new issues.  There was some concern about taking the verapamil along with his pain medication.  He will resume the verapamil.  He will monitor his blood pressure.  His knee is in a support sleeve today.    Assessment & Plan    Impressions:  Hypertension  Hyperlipidemia  Diabetes mellitus  Exogenous obesity    Recommendations:  Continuation of his current cardiovascular regimen at the present time.     This includes antihypertensives, and statin therapy.  Routine surveillance laboratory testing as per your direction  Repeat coronary CT scan in approximately a year to 18 months to evaluate for progression.  Follow-up in 1 years time sooner should there be difficulties    Diagnoses and all orders for this visit:    1. Coronary artery disease involving native coronary artery of native heart without angina pectoris (Primary)    2. Essential hypertension    3. Mixed hyperlipidemia              Objective:    Vitals:  Vitals:    03/12/25 1457   BP: 150/88   BP Location: Left arm   Patient Position: Sitting   Pulse: 81   SpO2: 98%   Weight: 105 kg (232 lb)   Height: 182.9 cm (72\")         Body mass index is 31.46 kg/m².      Physical Exam:    General: Alert, cooperative, no distress, appears stated age  Head:  Normocephalic, atraumatic, mucous membranes moist  Eyes:  Conjunctiva/corneas clear, EOM's intact     Neck:  Supple,  no bruit    Lungs: Clear to auscultation bilaterally, no wheezes rhonchi rales are noted  Chest wall: No tenderness  Heart::  Regular rate and rhythm, S1 and S2 normal, 1/6 holosystolic " murmur.  No rub or gallop  Abdomen: Soft, non-tender, nondistended bowel sounds active.  Extremities: No cyanosis, clubbing, or edema  Pulses: 2+ and symmetric all extremities  Skin:  No rashes or lesions  Neuro/psych: A&O x3. CN II through XII are grossly intact with appropriate affect      Allergies:  No Known Allergies    Medication Review:     Current Outpatient Medications:     amoxicillin (AMOXIL) 500 MG capsule, Take 2 capsules by mouth 3 (Three) Times a Day for 30 days. Indications: Bone and/or Joint Infection, Disp: 180 capsule, Rfl: 0    Coenzyme Q10 (COQ-10) 30 MG capsule, Daily. HOLDING FOR DOS  Indications: High Blood Pressure, Disp: , Rfl:     Edarbyclor 40-12.5 MG tablet, TAKE 1 TABLET BY MOUTH ONCE DAILY, Disp: 90 tablet, Rfl: 1    Empagliflozin (JARDIANCE) 10 MG tablet, Take 1 tablet by mouth Daily. For diabetes, Disp: 90 tablet, Rfl: 1    metFORMIN (GLUCOPHAGE) 1000 MG tablet, 1 tab po BID with meal for diabetes (Patient taking differently: Take 1 tablet by mouth 2 (Two) Times a Day With Meals. 1 tab po BID with meal for diabetes), Disp: 180 tablet, Rfl: 1    Mounjaro 10 MG/0.5ML solution auto-injector, Inject 10 mg under the skin into the appropriate area as directed 1 (One) Time Per Week. HOLDING FOR DOS  PLANS LAST DOSE PRIOR TO SURGERY 1-13-25, Disp: , Rfl:     oxyCODONE-acetaminophen (PERCOCET) 7.5-325 MG per tablet, Take 1 tablet by mouth Every 4 (Four) Hours As Needed for Moderate Pain for up to 25 doses. Indications: Post op pain, Disp: 25 tablet, Rfl: 0    rosuvastatin (CRESTOR) 10 MG tablet, Take 1 tablet by mouth 3 (Three) Times a Week. Indications: High Amount of Fats in the Blood, Disp: , Rfl:     Testosterone Cypionate (DEPOTESTOTERONE CYPIONATE) 200 MG/ML injection, Inject 1 mL into the appropriate muscle as directed by prescriber Every 14 (Fourteen) Days. Indications: Deficient Activity of the Testis, Disp: , Rfl: 5    verapamil SR (CALAN-SR) 240 MG CR tablet, Take 1 tablet by mouth  Every Night. Indications: High Blood Pressure, Disp: , Rfl:     EPINEPHrine (EPIPEN) 0.3 MG/0.3ML solution auto-injector injection, Inject 0.3 mL into the appropriate muscle as directed by prescriber As Needed (anaphylaxis). Indications: Life-Threatening Hypersensitivity Reaction, Disp: , Rfl:     Family History:  Family History   Problem Relation Age of Onset    Diabetes Mother     Hypertension Mother     Colon polyps Mother     Hypertension Father     No Known Problems Brother     Cancer Paternal Uncle         colon    Cancer Paternal Uncle     Malig Hyperthermia Neg Hx        Past Medical History:  Past Medical History:   Diagnosis Date    Adhesive capsulitis of right knee     Arthritis     OSTEOARTHRITIS    Cardiomegaly     Chronic pain of right knee     Coronary artery disease     Deep vein thrombosis     Diabetes mellitus     Diverticulosis     Fatty liver     Fracture, finger rt thumb 1987    GERD (gastroesophageal reflux disease)     H/O blood clots     rt calf  following rt total knee surgery 12/2016    Heart murmur     History of hepatitis     AS A CHILD food related    Hyperlipidemia     Hypertension     Hypotestosteronism     Hypovitaminosis D     Injury of back     Knee swelling     Low back pain     Lumbosacral disc disease L5S1 2001    Osteoarthritis     Tear of meniscus of knee 1980 Rt       Past Surgical History:  Past Surgical History:   Procedure Laterality Date    ADENOIDECTOMY      BACK SURGERY      LAMINECTOMY L5-S1    COLONOSCOPY N/A 05/23/2022    Procedure: COLONOSCOPY;  Surgeon: Marycruz Feliciano MD;  Location: [facility] OR;  Service: Gastroenterology;  Laterality: N/A;  Diverticulosis    INCISION AND DRAINAGE LEG Right 2/5/2025    Procedure: HEMATOMA EVACUATION RIGHT KNEE, PLACEMENT ANTIBIOITIC BEADS, WASH OUT, POLY EXCHANGE;  Surgeon: Pratik Durham MD;  Location: [facility];  Service: Orthopedics;  Laterality: Right;    JOINT MANIPULATION Right 02/16/2017    Procedure: MANIPULATION  OF RT KNEE;  Surgeon: Anthony Andino MD;  Location: [facility] OR;  Service:     JOINT REPLACEMENT  2018    KNEE ARTHROSCOPY Right     MULTIPLE    IA ARTHRP KNE CONDYLE&PLATU MEDIAL&LAT COMPARTMENTS Right 12/15/2016    Procedure: RT TOTAL KNEE ARTHROPLASTY;  Surgeon: Anthony Andino MD;  Location: [facility] OR;  Service: Orthopedics    IA REVJ TOTAL KNEE ARTHRP W/WO ALGRFT 1 COMPONENT Right 03/06/2017    Procedure: RIGHT TOTAL KNEE ARTHROPLASTY REVISION WITH LEFT KNEE STERIOID INJECTION;  Surgeon: Anthony Andino MD;  Location: [facility] OR;  Service: Orthopedics    TONSILLECTOMY      TOTAL KNEE ARTHROPLASTY REVISION Right 1/22/2025    Procedure: TOTAL KNEE ARTHROPLASTY REVISION;  Surgeon: Pratik Durham MD;  Location: [facility];  Service: Orthopedics;  Laterality: Right;       Social History:  Social History     Socioeconomic History    Marital status:    Tobacco Use    Smoking status: Never     Passive exposure: Never    Smokeless tobacco: Never   Vaping Use    Vaping status: Never Used   Substance and Sexual Activity    Alcohol use: Yes     Alcohol/week: 2.0 standard drinks of alcohol     Types: 2 Drinks containing 0.5 oz of alcohol per week     Comment: social    Drug use: No    Sexual activity: Yes     Partners: Female       Review of Systems:  The following systems were reviewed as they relate to the cardiovascular system: Constitutional, Eyes, ENT, Cardiovascular, Respiratory, Gastrointestinal, Integumentary, Neurological, Psychiatric, Hematologic, Endocrine, Musculoskeletal, and Genitourinary. The pertinent cardiovascular findings are reported above with all other cardiovascular points within those systems being negative.    Diagnostic Study Review:     Current Electrocardiogram:  Procedures no new EKG. EKG dated 8 January 2025 demonstrates sinus rhythm with a ventricular rate of 77 bpm.    Laboratory Data:  Lab Results   Component Value Date    GLUCOSE 111 (H) 02/17/2025    BUN 14  02/17/2025    CREATININE 0.81 02/17/2025    EGFRIFNONA 83 07/12/2018    EGFRIFAFRI 100 07/12/2018    BCR 17.3 02/17/2025    K 3.9 02/17/2025    CO2 25.3 02/17/2025    CALCIUM 9.4 02/17/2025    ALBUMIN 4.0 01/31/2025    LABIL2 1.5 08/10/2020    AST 20 01/31/2025    ALT 20 01/31/2025     Lab Results   Component Value Date    GLUCOSE 111 (H) 02/17/2025    CALCIUM 9.4 02/17/2025     02/17/2025    K 3.9 02/17/2025    CO2 25.3 02/17/2025     02/17/2025    BUN 14 02/17/2025    CREATININE 0.81 02/17/2025    EGFRIFAFRI 100 07/12/2018    EGFRIFNONA 83 07/12/2018    BCR 17.3 02/17/2025    ANIONGAP 10.7 02/17/2025     Lab Results   Component Value Date    WBC 4.01 02/17/2025    HGB 9.3 (L) 02/17/2025    HCT 29.5 (L) 02/17/2025    MCV 82.9 02/17/2025     02/17/2025     Lab Results   Component Value Date    CHLPL 108 07/12/2018    TRIG 225 (H) 07/12/2018    HDL 31 (L) 07/12/2018    LDL 32 07/12/2018     Lab Results   Component Value Date    HGBA1C 6.59 (H) 07/12/2018     Lab Results   Component Value Date    INR 0.98 01/31/2025    INR 1.01 03/03/2017    INR 0.97 12/06/2016    PROTIME 13.2 01/31/2025    PROTIME 12.9 03/03/2017    PROTIME 12.5 12/06/2016       Most Recent Echo:       Most Recent Stress Test:  Results for orders placed during the hospital encounter of 08/01/23    Stress Test With Myocardial Perfusion One Day    Interpretation Summary    Myocardial perfusion imaging indicates a normal myocardial perfusion study with no evidence of ischemia.    Left ventricular ejection fraction is normal (Calculated EF = 54%).    Low risk for ischemic heart disease.    Impressions are consistent with a low risk study.    There is no prior study available for comparison.    Findings consistent with a normal ECG stress test.       Most Recent Cardiac Catheterization:   No results found for this or any previous visit.       NOTE: The following portions of the patient's note were reviewed, confirmed and/or updated this  visit as appropriate: History of present illness/Interval history, physical examination, assessment & plan, allergies, current medications, past family history, past medical history, past social history, past surgical history and problem list.

## 2025-03-12 NOTE — PROGRESS NOTES
Physical Therapy Daily Treatment Note      Patient: Anthony Sol Jr.   : 1970  Referring practitioner: LAMAR Holguin  Date of Initial Visit: Type: THERAPY  Noted: 2025  Today's Date: 3/12/2025  Patient seen for 9 sessions       Visit Diagnoses:    ICD-10-CM ICD-9-CM   1. Status post total right knee replacement  Z96.651 V43.65   2. Stiffness of right knee  M25.661 719.56   3. Weakness of right lower extremity  R29.898 729.89   4. Impaired functional mobility, balance, gait, and endurance  Z74.09 V49.89       Subjective   Swelling is improved, I feel like I can move better.     Objective   See Exercise, Manual, and Modality Logs for complete treatment.       Assessment/Plan    Edema is reduced, increased flexion AAROM.         Timed:         Manual Therapy:    8     mins  89606;     Therapeutic Exercise:    10     mins  43186;     Neuromuscular Zach:    8    mins  33294;    Therapeutic Activity:     10     mins  61792;     Gait Trainin     mins  98213;     Ultrasound:     0     mins  49179;    Ionto                               0    mins   65819  Self Care                       0     mins   64791  Canalith Repos    0     mins  11312      Un-Timed:  Electrical Stimulation:    0     mins  64635 ( );  Dry Needling     0     mins self-pay  Traction     0     mins 48565      Timed Treatment:   36   mins   Total Treatment:     46   mins    Yissel Flannery, PT  [place] License: [id number]

## 2025-03-13 DIAGNOSIS — Z96.651 STATUS POST RIGHT KNEE REPLACEMENT: ICD-10-CM

## 2025-03-14 ENCOUNTER — TREATMENT (OUTPATIENT)
Dept: PHYSICAL THERAPY | Facility: CLINIC | Age: 55
End: 2025-03-14
Payer: COMMERCIAL

## 2025-03-14 DIAGNOSIS — Z74.09 IMPAIRED FUNCTIONAL MOBILITY, BALANCE, GAIT, AND ENDURANCE: ICD-10-CM

## 2025-03-14 DIAGNOSIS — R29.898 WEAKNESS OF RIGHT LOWER EXTREMITY: ICD-10-CM

## 2025-03-14 DIAGNOSIS — Z96.651 STATUS POST TOTAL RIGHT KNEE REPLACEMENT: Primary | ICD-10-CM

## 2025-03-14 DIAGNOSIS — M25.661 STIFFNESS OF RIGHT KNEE: ICD-10-CM

## 2025-03-14 RX ORDER — OXYCODONE AND ACETAMINOPHEN 7.5; 325 MG/1; MG/1
1 TABLET ORAL EVERY 4 HOURS PRN
Qty: 25 TABLET | Refills: 0 | Status: SHIPPED | OUTPATIENT
Start: 2025-03-14

## 2025-03-14 NOTE — PROGRESS NOTES
"  Physical Therapy Progress Note  Patient: Anhtony Sol Jr.   : 1970  Diagnosis/ICD-10 Code:  Status post total right knee replacement [Z96.651]  Referring practitioner: LAMAR Holguin  Date of Initial Visit: Type: THERAPY  Noted: 2025  Today's Date: 3/14/2025  Patient seen for 10 sessions         Visit Diagnoses:    ICD-10-CM ICD-9-CM   1. Status post total right knee replacement  Z96.651 V43.65   2. Stiffness of right knee  M25.661 719.56   3. Weakness of right lower extremity  R29.898 729.89   4. Impaired functional mobility, balance, gait, and endurance  Z74.09 V49.89         Anthony Sol reports: he feels like he is seeing improvement. Sleeve is really helping with swelling.   Subjective Questionnaire: LEFS: 31/80 (improved from 22/80 at initial evaluation)  Clinical Progress: improved  Home Program Compliance: Yes  Treatment has included: therapeutic exercise, neuromuscular re-education, manual therapy, therapeutic activity, and cryotherapy      Subjective       Objective          Active Range of Motion     Right Knee   Flexion: 82 degrees   Extension: 4 degrees           Assessment/Plan    Short Term Goals: 6 weeks. Patient will:     1. Patient to be adherent with HEP for ROM and strength.(MET)  2. Demonstrate right knee ROM 5 degrees - 80 degrees for improved functional mobility, gait and stair climbing. (MET)  3. Demonstrate adequate quad strength to perform SLR with no extensor lag. (PROGRESSING)     Long Term Goals: 12 weeks. Patient will:     1. Report improved ADL's and functional activites as evidenced by LEFS 60/80 or better. (PROGRESSING)  2. Exhibit improved right knee AROM 0 degrees - 90 degrees to allow for improved gait, stair climbing, bending, and squatting as is necessary for daily tasks.  (PROGRESSING)  3. Demonstrate increased balance and stability of the knee by stepping over 6\" nik without UE assist to traverse uneven terrain. (PROGRESSING)  4. Demonstrate LE " strength 4+/5 or better for improved gait, functional mobility and ADL's.  (PROGRESSING)     Recommendations: Continue as planned  Timeframe: 1 month  Prognosis to achieve goals: good      Timed:         Manual Therapy:    10     mins  98790;     Therapeutic Exercise:    10     mins  28711;     Neuromuscular Zach:    10    mins  14298;    Therapeutic Activity:     8     mins  73614;     Gait Trainin     mins  37583;     Ultrasound:     0     mins  59911;    Ionto                               0    mins   92142  Self Care                       0     mins   18279  Canalith Repos    0     mins 05869      Un-Timed:  Electrical Stimulation:    0     mins  24758 ( );  Dry Needling     0     mins self-pay  Traction     0     mins 36894  Re-Eval                           0    mins  05398      Timed Treatment:   38   mins   Total Treatment:     48   mins          PT: Yissel Flannery PT     [place] License:  [id number]    Electronically signed by Yissel Flannery PT, 25, 1:14 PM EDT

## 2025-03-17 ENCOUNTER — TREATMENT (OUTPATIENT)
Dept: PHYSICAL THERAPY | Facility: CLINIC | Age: 55
End: 2025-03-17
Payer: COMMERCIAL

## 2025-03-17 DIAGNOSIS — Z96.651 STATUS POST TOTAL RIGHT KNEE REPLACEMENT: Primary | ICD-10-CM

## 2025-03-17 DIAGNOSIS — Z74.09 IMPAIRED FUNCTIONAL MOBILITY, BALANCE, GAIT, AND ENDURANCE: ICD-10-CM

## 2025-03-17 DIAGNOSIS — R29.898 WEAKNESS OF RIGHT LOWER EXTREMITY: ICD-10-CM

## 2025-03-17 DIAGNOSIS — M25.661 STIFFNESS OF RIGHT KNEE: ICD-10-CM

## 2025-03-17 NOTE — PROGRESS NOTES
Physical Therapy Daily Treatment Note      Patient: Anthony Sol Jr.   : 1970  Referring practitioner: LAMAR Holguin  Date of Initial Visit: Type: THERAPY  Noted: 2025  Today's Date: 3/17/2025  Patient seen for 11 sessions       Visit Diagnoses:    ICD-10-CM ICD-9-CM   1. Status post total right knee replacement  Z96.651 V43.65   2. Stiffness of right knee  M25.661 719.56   3. Weakness of right lower extremity  R29.898 729.89   4. Impaired functional mobility, balance, gait, and endurance  Z74.09 V49.89       Subjective   Pt states he feels like the sleeve really helps with swelling. Sees MD tomorrow.     Objective          Passive Range of Motion     Right Knee   Flexion: 88 degrees   Extension: 2 degrees       See Exercise, Manual, and Modality Logs for complete treatment.       Assessment/Plan    Overall 38 degree gain in flexion PROM since initial evaluation, near 90 degrees passively. Pt is progressing toward stated goals.         Timed:         Manual Therapy:    10     mins  06401;     Therapeutic Exercise:    10     mins  43112;     Neuromuscular Zach:    10    mins  68806;    Therapeutic Activity:     10     mins  17671;     Gait Trainin     mins  26850;     Ultrasound:     0     mins  94443;    Ionto                               0    mins   32028  Self Care                       0     mins   07717  Canalith Repos    0     mins  70029      Un-Timed:  Electrical Stimulation:    0     mins  52777 ( );  Dry Needling     0     mins self-pay  Traction     0     mins 28004      Timed Treatment:   40   mins   Total Treatment:     50   mins    Yissel Flannery, PT  [place] License: [id number]

## 2025-03-18 ENCOUNTER — OFFICE VISIT (OUTPATIENT)
Dept: ORTHOPEDIC SURGERY | Facility: CLINIC | Age: 55
End: 2025-03-18
Payer: COMMERCIAL

## 2025-03-18 ENCOUNTER — LAB (OUTPATIENT)
Dept: LAB | Facility: HOSPITAL | Age: 55
End: 2025-03-18
Payer: COMMERCIAL

## 2025-03-18 VITALS — WEIGHT: 233.8 LBS | BODY MASS INDEX: 31.67 KG/M2 | TEMPERATURE: 98.4 F | HEIGHT: 72 IN

## 2025-03-18 DIAGNOSIS — Z96.651 STATUS POST RIGHT KNEE REPLACEMENT: Primary | ICD-10-CM

## 2025-03-18 LAB — CRP SERPL-MCNC: <0.3 MG/DL (ref 0–0.5)

## 2025-03-18 PROCEDURE — 86140 C-REACTIVE PROTEIN: CPT | Performed by: ORTHOPAEDIC SURGERY

## 2025-03-18 PROCEDURE — 36415 COLL VENOUS BLD VENIPUNCTURE: CPT | Performed by: ORTHOPAEDIC SURGERY

## 2025-03-18 PROCEDURE — 99024 POSTOP FOLLOW-UP VISIT: CPT | Performed by: ORTHOPAEDIC SURGERY

## 2025-03-19 ENCOUNTER — TREATMENT (OUTPATIENT)
Dept: PHYSICAL THERAPY | Facility: CLINIC | Age: 55
End: 2025-03-19
Payer: COMMERCIAL

## 2025-03-19 DIAGNOSIS — Z74.09 IMPAIRED FUNCTIONAL MOBILITY, BALANCE, GAIT, AND ENDURANCE: ICD-10-CM

## 2025-03-19 DIAGNOSIS — M25.661 STIFFNESS OF RIGHT KNEE: ICD-10-CM

## 2025-03-19 DIAGNOSIS — R29.898 WEAKNESS OF RIGHT LOWER EXTREMITY: ICD-10-CM

## 2025-03-19 DIAGNOSIS — Z96.651 STATUS POST TOTAL RIGHT KNEE REPLACEMENT: Primary | ICD-10-CM

## 2025-03-19 NOTE — PROGRESS NOTES
"Physical Therapy Daily Treatment Note  [facility] [place]   [address], [address]  [place] [address]  P: [phone]  F: [phone]    Patient: Anthony Sol Jr.   : 1970  Referring practitioner: LAMAR Holguin  Date of Initial Visit: Type: THERAPY  Noted: 2025  Today's Date: 3/19/2025  Patient seen for 12 sessions       Visit Diagnoses:    ICD-10-CM ICD-9-CM   1. Status post total right knee replacement  Z96.651 V43.65   2. Stiffness of right knee  M25.661 719.56   3. Weakness of right lower extremity  R29.898 729.89   4. Impaired functional mobility, balance, gait, and endurance  Z74.09 V49.89         Anthony Sol reports: Pt reports feeling tight today. Pt saw his referring surgeon yesterday and was told \"he was progressing well all things considered\". No new/other complaints/comments noted.       Subjective     Objective   See Exercise, Manual, and Modality Logs for complete treatment.       Assessment/Plan  PT treatment performed in line w/ established PoC and rehab goals. Interventions were progressed as appropriate to achieve set PoC goals.     Plan:  Pt will continue with current plan of care to achieve outlined goals. Therapeutic interventions will be progressed where and when appropriate.        Timed:         Manual Therapy:    0     mins  78754;     Therapeutic Exercise:    20     mins  36128;     Neuromuscular Zach:    10    mins  06190;    Therapeutic Activity:     10     mins  78749;     Gait Trainin     mins  26886;     Ultrasound:     0     mins  47531;    Ionto                               0    mins  20316  Self Care                       0     mins  86681  Traction     0     mins 08554      Un-Timed:  Canalith Repos    0     mins 53612  Electrical Stimulation:    0     mins  90470 (MC );  Dry Needling     0     mins self-pay  Traction     0     mins 52353        Timed Treatment:   40   mins   Total Treatment:     40   " mins    Roberto Bailey, PT  [place] License #: [id number]    Physical Therapist

## 2025-03-19 NOTE — PROGRESS NOTES
Anthony Sol Jr. : 1970 MRN: [id number] DATE: 3/19/2025    DIAGNOSIS: 8 week follow up right total knee revision, 6 weeks from irrigation and debridement and poly change    SUBJECTIVE:Patient returns today for 8 week follow up of right total knee replacement revision I&D and poly change as above. Patient reports doing well with no unusual complaints. Appears to be progressing appropriately.  He feels his motion is slowly continuing to improve.  He is still on oral antibiotics for Clostridium infection    OBJECTIVE:   Exam:. The incision is well healed. No sign of infection. Range of motion is measured at 0 to 86. The calf is soft and nontender with a negative Homans sign. Strength is progressing and the patient is ambulating appropriately.  There is no evidence of joint effusion and overall the skin looks without evidence of infection    DIAGNOSTIC STUDIES  Xrays: 3 views of the right knee (AP, lateral, and sunrise) were ordered and reviewed for evaluation of recent knee replacement. They demonstrate a well positioned, well aligned knee replacement without complicating factors noted. In comparison with previous films there has been no change.    ASSESSMENT: 8 week status post right knee replacement 7 I&D poly change.  He will continue on amoxicillin under the direction of Dr. Mares.  I would like to check him back in 2 months he will continue with aggressive range of motion stretching.  If he has any change in symptoms including fever chills increased pain worsening motion redness swelling or other concerns he will give me a call    PLAN: 1) Continue with PT exercises as prescribed   2) Follow up in 2 months    Pratik Durham MD  3/19/2025

## 2025-03-20 ENCOUNTER — OFFICE VISIT (OUTPATIENT)
Dept: INFECTIOUS DISEASES | Facility: CLINIC | Age: 55
End: 2025-03-20
Payer: COMMERCIAL

## 2025-03-20 VITALS
SYSTOLIC BLOOD PRESSURE: 127 MMHG | WEIGHT: 234 LBS | DIASTOLIC BLOOD PRESSURE: 78 MMHG | TEMPERATURE: 97.9 F | RESPIRATION RATE: 14 BRPM | BODY MASS INDEX: 31.74 KG/M2 | HEART RATE: 82 BPM

## 2025-03-20 DIAGNOSIS — E11.65 TYPE 2 DIABETES MELLITUS WITH HYPERGLYCEMIA, WITHOUT LONG-TERM CURRENT USE OF INSULIN: ICD-10-CM

## 2025-03-20 DIAGNOSIS — Z96.651 STATUS POST RIGHT KNEE REPLACEMENT: ICD-10-CM

## 2025-03-20 DIAGNOSIS — T84.53XD INFECTION ASSOCIATED WITH INTERNAL RIGHT KNEE PROSTHESIS, SUBSEQUENT ENCOUNTER: Primary | ICD-10-CM

## 2025-03-20 LAB — REF LAB TEST RESULTS: NORMAL

## 2025-03-20 RX ORDER — AMOXICILLIN 500 MG/1
500 CAPSULE ORAL EVERY 8 HOURS
Qty: 90 CAPSULE | Refills: 5 | Status: SHIPPED | OUTPATIENT
Start: 2025-03-20 | End: 2025-09-16

## 2025-03-20 NOTE — PROGRESS NOTES
"Chief Complaint  Follow-up    Subjective        Anthony Sol JrCayetano presents to [facility] INFECTIOUS DISEASES  History of Present Illness    Patient is a 55 y.o. 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  Seen in the hospital by infectious disease and cultures were all negative.  Discussed with orthopedics who is concern for infection and patient was placed on empiric coverage with vancomycin and ceftriaxone through an end date of 3/20/2025.  Here today for follow-up.  At the time of discharge 16S PCR was sent off to [facility] which returned negative for fungal pathogens.  Patient was doing well and after discussion over the phone he was transition to oral amoxicillin 1 g 3 times daily for the same duration after cultures and microbiology were reassuring.  Here today for follow-up.    Patient reports he is doing very well.  States he has been taking amoxicillin without any difficulty.  Denies any side effects.  Denies any nausea, vomiting, diarrhea.  States the knee is doing much better with improvement in his bending.  Does have a couple small stitches remaining.  Denies any redness or swelling.      Objective   Vital Signs:  /78   Pulse 82   Temp 97.9 °F (36.6 °C)   Resp 14   Wt 106 kg (234 lb)   BMI 31.74 kg/m²   Estimated body mass index is 31.74 kg/m² as calculated from the following:    Height as of 3/18/25: 182.9 cm (72\").    Weight as of this encounter: 106 kg (234 lb).            Physical Exam  Constitutional:       General: He is not in acute distress.     Appearance: Normal appearance. He is normal weight. He is not ill-appearing.   HENT:      Head: Normocephalic and atraumatic.      Nose: Nose normal. No rhinorrhea.      Mouth/Throat:      Mouth: Mucous membranes " are moist.      Pharynx: No oropharyngeal exudate.   Eyes:      General: No scleral icterus.     Extraocular Movements: Extraocular movements intact.      Pupils: Pupils are equal, round, and reactive to light.   Cardiovascular:      Rate and Rhythm: Normal rate.      Pulses: Normal pulses.   Pulmonary:      Effort: Pulmonary effort is normal. No respiratory distress.   Abdominal:      General: Abdomen is flat.      Palpations: Abdomen is soft.   Musculoskeletal:         General: No swelling or tenderness. Normal range of motion.      Cervical back: Normal range of motion and neck supple.      Right lower leg: No edema.      Left lower leg: No edema.   Skin:     General: Skin is warm and dry.      Findings: No rash.   Neurological:      General: No focal deficit present.      Mental Status: He is alert and oriented to person, place, and time.   Psychiatric:         Mood and Affect: Mood normal.         Behavior: Behavior normal.        Result Review :  The following data was reviewed by: Kevin Mares DO on 03/20/2025:  Common labs          2/8/2025    04:25 2/8/2025    10:15 2/10/2025    11:30 2/17/2025    08:52   Common Labs   Glucose  144  110  111    BUN  15  15  14    Creatinine  0.95  0.85  0.81    Sodium  137  139  140    Potassium  3.6  3.8  3.9    Chloride  100  101  104    Calcium  9.5  10.1  9.4    WBC   5.49  4.01    Hemoglobin 8.9   9.3  9.3    Hematocrit 27.1   30.0  29.5    Platelets   421  416      Data reviewed : Antibiotic monitoring labs, operative note, microbiology           Assessment and Plan   Diagnoses and all orders for this visit:    1. Infection associated with internal right knee prosthesis, subsequent encounter (Primary)    2. Type 2 diabetes mellitus with hyperglycemia, without long-term current use of insulin    Today we reviewed his cultures and send out PCR testing.  CRP has returned to normal.  Interestingly PCR testing was negative even though cultures grew  paraclostridium.  We discussed the risks and benefits of continuing antibiotic therapy.  Patient would like to avoid any further surgical intervention and we agreed that continuing antibiotics had amoxicillin 500 mg 3 times daily for 6-month course would be the next step.  We will discuss chronic suppressive therapy after 6 months.  Reviewed side effects with the patient today.  We discussed resistance and indications for suppressive antibiotics.       I spent 46 minutes caring for Anthony on this date of service. This time includes time spent by me in the following activities:preparing for the visit, reviewing tests, obtaining and/or reviewing a separately obtained history, performing a medically appropriate examination and/or evaluation , counseling and educating the patient/family/caregiver, ordering medications, tests, or procedures, documenting information in the medical record, independently interpreting results and communicating that information with the patient/family/caregiver, and care coordination  Follow Up   No follow-ups on file.  Patient was given instructions and counseling regarding his condition or for health maintenance advice. Please see specific information pulled into the AVS if appropriate.

## 2025-03-21 ENCOUNTER — TREATMENT (OUTPATIENT)
Dept: PHYSICAL THERAPY | Facility: CLINIC | Age: 55
End: 2025-03-21
Payer: COMMERCIAL

## 2025-03-21 DIAGNOSIS — Z96.651 STATUS POST TOTAL RIGHT KNEE REPLACEMENT: Primary | ICD-10-CM

## 2025-03-21 DIAGNOSIS — Z74.09 IMPAIRED FUNCTIONAL MOBILITY, BALANCE, GAIT, AND ENDURANCE: ICD-10-CM

## 2025-03-21 DIAGNOSIS — M25.661 STIFFNESS OF RIGHT KNEE: ICD-10-CM

## 2025-03-21 DIAGNOSIS — R29.898 WEAKNESS OF RIGHT LOWER EXTREMITY: ICD-10-CM

## 2025-03-21 NOTE — PROGRESS NOTES
Physical Therapy Daily Treatment Note      Patient: Anthony Sol Jr.   : 1970  Referring practitioner: LAMAR Holguin  Date of Initial Visit: Type: THERAPY  Noted: 2025  Today's Date: 3/21/2025  Patient seen for 13 sessions       Visit Diagnoses:    ICD-10-CM ICD-9-CM   1. Status post total right knee replacement  Z96.651 V43.65   2. Stiffness of right knee  M25.661 719.56   3. Weakness of right lower extremity  R29.898 729.89   4. Impaired functional mobility, balance, gait, and endurance  Z74.09 V49.89       Subjective   No new concerns. Able to ascend stairs with reciprocal pattern, but has to step to with descending stairs.     Objective   See Exercise, Manual, and Modality Logs for complete treatment.       Assessment/Plan    Gait is improved today, with increased symmetry and improved hip/knee flexion on the right.         Timed:         Manual Therapy:    0     mins  33651;     Therapeutic Exercise:    24     mins  77096;     Neuromuscular Zach:    8    mins  00746;    Therapeutic Activity:     8     mins  66351;     Gait Trainin     mins  13094;     Ultrasound:     0     mins  63906;    Ionto                               0    mins   80712  Self Care                       0     mins   72235  Canalith Repos    0     mins  68353      Un-Timed:  Electrical Stimulation:    0     mins  66122 ( );  Dry Needling     0     mins self-pay  Traction     0     mins 57560      Timed Treatment:   40   mins   Total Treatment:     50   mins    Yissel Flannery, PT  [place] License: [id number]

## 2025-03-24 ENCOUNTER — TREATMENT (OUTPATIENT)
Dept: PHYSICAL THERAPY | Facility: CLINIC | Age: 55
End: 2025-03-24
Payer: COMMERCIAL

## 2025-03-24 DIAGNOSIS — Z74.09 IMPAIRED FUNCTIONAL MOBILITY, BALANCE, GAIT, AND ENDURANCE: ICD-10-CM

## 2025-03-24 DIAGNOSIS — Z96.651 STATUS POST TOTAL RIGHT KNEE REPLACEMENT: Primary | ICD-10-CM

## 2025-03-24 DIAGNOSIS — R29.898 WEAKNESS OF RIGHT LOWER EXTREMITY: ICD-10-CM

## 2025-03-24 DIAGNOSIS — M25.661 STIFFNESS OF RIGHT KNEE: ICD-10-CM

## 2025-03-24 PROCEDURE — 97112 NEUROMUSCULAR REEDUCATION: CPT | Performed by: PHYSICAL THERAPIST

## 2025-03-24 PROCEDURE — 97140 MANUAL THERAPY 1/> REGIONS: CPT | Performed by: PHYSICAL THERAPIST

## 2025-03-24 PROCEDURE — 97530 THERAPEUTIC ACTIVITIES: CPT | Performed by: PHYSICAL THERAPIST

## 2025-03-24 PROCEDURE — 97110 THERAPEUTIC EXERCISES: CPT | Performed by: PHYSICAL THERAPIST

## 2025-03-24 RX ORDER — OXYCODONE AND ACETAMINOPHEN 7.5; 325 MG/1; MG/1
1 TABLET ORAL EVERY 4 HOURS PRN
Qty: 25 TABLET | Refills: 0 | Status: SHIPPED | OUTPATIENT
Start: 2025-03-24

## 2025-03-24 NOTE — PROGRESS NOTES
Physical Therapy Daily Treatment Note      Patient: Anthony Sol Jr.   : 1970  Referring practitioner: LAMAR Holguin  Date of Initial Visit: Type: THERAPY  Noted: 2025  Today's Date: 3/24/2025  Patient seen for 14 sessions       Visit Diagnoses:    ICD-10-CM ICD-9-CM   1. Status post total right knee replacement  Z96.651 V43.65   2. Stiffness of right knee  M25.661 719.56   3. Weakness of right lower extremity  R29.898 729.89   4. Impaired functional mobility, balance, gait, and endurance  Z74.09 V49.89       Subjective   Pt states his knee feels really good today. Feels like he is making progress with ROM.     Objective   See Exercise, Manual, and Modality Logs for complete treatment.       Assessment/Plan    Achieved 91 degrees of right knee flexion PROM today.         Timed:         Manual Therapy:    8     mins  25362;     Therapeutic Exercise:    10     mins  38078;     Neuromuscular Zach:    10    mins  89034;    Therapeutic Activity:     10     mins  63969;     Gait Trainin     mins  81462;     Ultrasound:     0     mins  71647;    Ionto                               0    mins   63907  Self Care                       0     mins   97605  Canalith Repos    0     mins  62142      Un-Timed:  Electrical Stimulation:    0     mins  73127 ( );  Dry Needling     0     mins self-pay  Traction     0     mins 35687      Timed Treatment:   38   mins   Total Treatment:     48   mins    Yissel Flannery, PT  [place] License: [id number]

## 2025-03-26 ENCOUNTER — TREATMENT (OUTPATIENT)
Dept: PHYSICAL THERAPY | Facility: CLINIC | Age: 55
End: 2025-03-26
Payer: COMMERCIAL

## 2025-03-26 DIAGNOSIS — R29.898 WEAKNESS OF RIGHT LOWER EXTREMITY: ICD-10-CM

## 2025-03-26 DIAGNOSIS — Z96.651 STATUS POST TOTAL RIGHT KNEE REPLACEMENT: Primary | ICD-10-CM

## 2025-03-26 DIAGNOSIS — Z74.09 IMPAIRED FUNCTIONAL MOBILITY, BALANCE, GAIT, AND ENDURANCE: ICD-10-CM

## 2025-03-26 DIAGNOSIS — M25.661 STIFFNESS OF RIGHT KNEE: ICD-10-CM

## 2025-03-26 NOTE — PROGRESS NOTES
Physical Therapy Daily Treatment Note      Patient: Anthony Sol Jr.   : 1970  Referring practitioner: LAMAR Holguin  Date of Initial Visit: Type: THERAPY  Noted: 2025  Today's Date: 3/26/2025  Patient seen for 15 sessions       Visit Diagnoses:    ICD-10-CM ICD-9-CM   1. Status post total right knee replacement  Z96.651 V43.65   2. Stiffness of right knee  M25.661 719.56   3. Weakness of right lower extremity  R29.898 729.89   4. Impaired functional mobility, balance, gait, and endurance  Z74.09 V49.89       Subjective   Pt states he has been working on his scar at home. Knee is feeling pretty good.     Objective   See Exercise, Manual, and Modality Logs for complete treatment.       Assessment/Plan    Able to increase step up height. Scar and patellar mobility are improved.         Timed:         Manual Therapy:    10     mins  03321;     Therapeutic Exercise:    10     mins  08336;     Neuromuscular Zach:    10    mins  46344;    Therapeutic Activity:     12     mins  40677;     Gait Trainin     mins  25371;     Ultrasound:     0     mins  36335;    Ionto                               0    mins   17915  Self Care                       0     mins   51408  Canalith Repos    0     mins  84936      Un-Timed:  Electrical Stimulation:    0     mins  73396 ( );  Dry Needling     0     mins self-pay  Traction     0     mins 69700      Timed Treatment:   42   mins   Total Treatment:     42   mins    Yissel Flannery, PT  [place] License: [id number]

## 2025-03-28 ENCOUNTER — TREATMENT (OUTPATIENT)
Dept: PHYSICAL THERAPY | Facility: CLINIC | Age: 55
End: 2025-03-28
Payer: COMMERCIAL

## 2025-03-28 DIAGNOSIS — M25.661 STIFFNESS OF RIGHT KNEE: ICD-10-CM

## 2025-03-28 DIAGNOSIS — Z96.651 STATUS POST TOTAL RIGHT KNEE REPLACEMENT: Primary | ICD-10-CM

## 2025-03-28 DIAGNOSIS — R29.898 WEAKNESS OF RIGHT LOWER EXTREMITY: ICD-10-CM

## 2025-03-28 DIAGNOSIS — Z74.09 IMPAIRED FUNCTIONAL MOBILITY, BALANCE, GAIT, AND ENDURANCE: ICD-10-CM

## 2025-03-28 NOTE — PROGRESS NOTES
Physical Therapy Daily Treatment Note      Patient: Anthony Sol Jr.   : 1970  Referring practitioner: LAMAR Holguin  Date of Initial Visit: Type: THERAPY  Noted: 2025  Today's Date: 3/28/2025  Patient seen for 16 sessions       Visit Diagnoses:    ICD-10-CM ICD-9-CM   1. Status post total right knee replacement  Z96.651 V43.65   2. Stiffness of right knee  M25.661 719.56   3. Weakness of right lower extremity  R29.898 729.89   4. Impaired functional mobility, balance, gait, and endurance  Z74.09 V49.89       Subjective   Feeling pretty good today.     Objective   See Exercise, Manual, and Modality Logs for complete treatment.       Assessment/Plan    ROM continues to progress. Improved ROM and gait with manual therapy.         Timed:         Manual Therapy:    10     mins  40813;     Therapeutic Exercise:    10     mins  04114;     Neuromuscular Zach:    12    mins  24135;    Therapeutic Activity:     12     mins  41510;     Gait Trainin     mins  84329;     Ultrasound:     0     mins  27994;    Ionto                               0    mins   39193  Self Care                       0     mins   96720  Canalith Repos    0     mins  35649      Un-Timed:  Electrical Stimulation:    0     mins  14035 ( );  Dry Needling     0     mins self-pay  Traction     0     mins 63084      Timed Treatment:   44   mins   Total Treatment:     44   mins    Yissel Flannery, PT  [place] License: [id number]

## 2025-03-31 ENCOUNTER — TREATMENT (OUTPATIENT)
Dept: PHYSICAL THERAPY | Facility: CLINIC | Age: 55
End: 2025-03-31
Payer: COMMERCIAL

## 2025-03-31 DIAGNOSIS — M25.661 STIFFNESS OF RIGHT KNEE: ICD-10-CM

## 2025-03-31 DIAGNOSIS — R29.898 WEAKNESS OF RIGHT LOWER EXTREMITY: ICD-10-CM

## 2025-03-31 DIAGNOSIS — Z74.09 IMPAIRED FUNCTIONAL MOBILITY, BALANCE, GAIT, AND ENDURANCE: ICD-10-CM

## 2025-03-31 DIAGNOSIS — Z96.651 STATUS POST TOTAL RIGHT KNEE REPLACEMENT: Primary | ICD-10-CM

## 2025-03-31 PROCEDURE — 97112 NEUROMUSCULAR REEDUCATION: CPT | Performed by: PHYSICAL THERAPIST

## 2025-03-31 PROCEDURE — 97110 THERAPEUTIC EXERCISES: CPT | Performed by: PHYSICAL THERAPIST

## 2025-03-31 PROCEDURE — 97140 MANUAL THERAPY 1/> REGIONS: CPT | Performed by: PHYSICAL THERAPIST

## 2025-03-31 PROCEDURE — 97530 THERAPEUTIC ACTIVITIES: CPT | Performed by: PHYSICAL THERAPIST

## 2025-03-31 NOTE — PROGRESS NOTES
Physical Therapy Daily Treatment Note      Patient: Anthony Sol Jr.   : 1970  Referring practitioner: LAMAR Holguin  Date of Initial Visit: Type: THERAPY  Noted: 2025  Today's Date: 3/31/2025  Patient seen for 17 sessions       Visit Diagnoses:    ICD-10-CM ICD-9-CM   1. Status post total right knee replacement  Z96.651 V43.65   2. Stiffness of right knee  M25.661 719.56   3. Weakness of right lower extremity  R29.898 729.89   4. Impaired functional mobility, balance, gait, and endurance  Z74.09 V49.89       Subjective   Pt states he feels like he is walking better.     Objective   See Exercise, Manual, and Modality Logs for complete treatment.       Assessment/Plan    Edema is well controlled, flexion and ease of motion are steadily improving.         Timed:         Manual Therapy:    10     mins  70614;     Therapeutic Exercise:    12     mins  02360;     Neuromuscular Zach:    10    mins  41799;    Therapeutic Activity:     10     mins  98274;     Gait Trainin     mins  84938;     Ultrasound:     0     mins  29627;    Ionto                               0    mins   76038  Self Care                       0     mins   59100  Canalith Repos    0     mins  47301      Un-Timed:  Electrical Stimulation:    0     mins  27729 ( );  Dry Needling     0     mins self-pay  Traction     0     mins 24710      Timed Treatment:   42   mins   Total Treatment:     42   mins    Yissel Flannery, PT  [place] License: [id number]

## 2025-04-01 ENCOUNTER — TELEPHONE (OUTPATIENT)
Dept: PHYSICAL THERAPY | Facility: CLINIC | Age: 55
End: 2025-04-01
Payer: COMMERCIAL

## 2025-04-01 NOTE — TELEPHONE ENCOUNTER
"  Caller: Anthony Sol Jr. \"Diomedes\"    Relationship: Self    Best call back number:   Telephone Information:   Mobile [phone]          What was the call regarding: PATIENT CAN'T COME AT 1PM TOMORROW, SAID THAT LASHAE TOLD HIM HE CAN SOMETIMES COME IN EARLIER, HE WOULD LIKE TO COME IN EARLIER IF POSSIBLE, PLEASE CALL BACK PATIENT  "

## 2025-04-02 ENCOUNTER — TREATMENT (OUTPATIENT)
Dept: PHYSICAL THERAPY | Facility: CLINIC | Age: 55
End: 2025-04-02
Payer: COMMERCIAL

## 2025-04-02 DIAGNOSIS — R29.898 WEAKNESS OF RIGHT LOWER EXTREMITY: ICD-10-CM

## 2025-04-02 DIAGNOSIS — Z74.09 IMPAIRED FUNCTIONAL MOBILITY, BALANCE, GAIT, AND ENDURANCE: ICD-10-CM

## 2025-04-02 DIAGNOSIS — Z96.651 STATUS POST TOTAL RIGHT KNEE REPLACEMENT: Primary | ICD-10-CM

## 2025-04-02 DIAGNOSIS — M25.661 STIFFNESS OF RIGHT KNEE: ICD-10-CM

## 2025-04-02 NOTE — PROGRESS NOTES
Physical Therapy Daily Treatment Note      Patient: Anthony Sol Jr.   : 1970  Referring practitioner: LAMAR Holguin  Date of Initial Visit: Type: THERAPY  Noted: 2025  Today's Date: 2025  Patient seen for 18 sessions       Visit Diagnoses:    ICD-10-CM ICD-9-CM   1. Status post total right knee replacement  Z96.651 V43.65   2. Stiffness of right knee  M25.661 719.56   3. Weakness of right lower extremity  R29.898 729.89   4. Impaired functional mobility, balance, gait, and endurance  Z74.09 V49.89       Subjective   No new concerns.     Objective   See Exercise, Manual, and Modality Logs for complete treatment.       Assessment/Plan    Subjectively, pt reports no increase of pain or discomfort with interventions performed today. Gait symmetry is improved.         Timed:         Manual Therapy:    8     mins  77224;     Therapeutic Exercise:    10     mins  57971;     Neuromuscular Zach:    10    mins  90775;    Therapeutic Activity:     10     mins  72548;     Gait Trainin     mins  23748;     Ultrasound:     0     mins  21439;    Ionto                               0    mins   00607  Self Care                       0     mins   56633  Canalith Repos    0     mins  78221      Un-Timed:  Electrical Stimulation:    0     mins  70864 ( );  Dry Needling     0     mins self-pay  Traction     0     mins 56837      Timed Treatment:   38   mins   Total Treatment:     38   mins    Yissel Flannery, PT  [place] License: [id number]

## 2025-04-07 ENCOUNTER — TREATMENT (OUTPATIENT)
Dept: PHYSICAL THERAPY | Facility: CLINIC | Age: 55
End: 2025-04-07
Payer: COMMERCIAL

## 2025-04-07 DIAGNOSIS — M25.661 STIFFNESS OF RIGHT KNEE: ICD-10-CM

## 2025-04-07 DIAGNOSIS — Z74.09 IMPAIRED FUNCTIONAL MOBILITY, BALANCE, GAIT, AND ENDURANCE: ICD-10-CM

## 2025-04-07 DIAGNOSIS — R29.898 WEAKNESS OF RIGHT LOWER EXTREMITY: ICD-10-CM

## 2025-04-07 DIAGNOSIS — Z96.651 STATUS POST TOTAL RIGHT KNEE REPLACEMENT: Primary | ICD-10-CM

## 2025-04-07 PROCEDURE — 97110 THERAPEUTIC EXERCISES: CPT | Performed by: PHYSICAL THERAPIST

## 2025-04-07 PROCEDURE — 97112 NEUROMUSCULAR REEDUCATION: CPT | Performed by: PHYSICAL THERAPIST

## 2025-04-07 PROCEDURE — 97530 THERAPEUTIC ACTIVITIES: CPT | Performed by: PHYSICAL THERAPIST

## 2025-04-07 NOTE — PROGRESS NOTES
Physical Therapy Daily Treatment Note      Patient: Anthony Sol Jr.   : 1970  Referring practitioner: LAMAR Holguin  Date of Initial Visit: Type: THERAPY  Noted: 2025  Today's Date: 2025  Patient seen for 19 sessions       Visit Diagnoses:    ICD-10-CM ICD-9-CM   1. Status post total right knee replacement  Z96.651 V43.65   2. Stiffness of right knee  M25.661 719.56   3. Weakness of right lower extremity  R29.898 729.89   4. Impaired functional mobility, balance, gait, and endurance  Z74.09 V49.89       Subjective   Feeling OK today.     Objective          Active Range of Motion     Right Knee   Flexion: 92 (AAROM) degrees       See Exercise, Manual, and Modality Logs for complete treatment.       Assessment/Plan    Initiated recumbent bike today, starting with partial revolutions and able to make full revolution by the end.         Timed:         Manual Therapy:    0     mins  81720;     Therapeutic Exercise:    10     mins  96520;     Neuromuscular Zach:    10    mins  23304;    Therapeutic Activity:     10     mins  48378;     Gait Trainin     mins  47407;     Ultrasound:     0     mins  62838;    Ionto                               0    mins   49531  Self Care                       0     mins   09938  Canalith Repos    0     mins  26608      Un-Timed:  Electrical Stimulation:    0     mins  13142 ( );  Dry Needling     0     mins self-pay  Traction     0     mins 65814      Timed Treatment:   30   mins   Total Treatment:     40   mins    Yissel Flannery, PT  [place] License: [id number]

## 2025-04-09 ENCOUNTER — TREATMENT (OUTPATIENT)
Dept: PHYSICAL THERAPY | Facility: CLINIC | Age: 55
End: 2025-04-09
Payer: COMMERCIAL

## 2025-04-09 DIAGNOSIS — Z96.651 STATUS POST TOTAL RIGHT KNEE REPLACEMENT: Primary | ICD-10-CM

## 2025-04-09 DIAGNOSIS — Z74.09 IMPAIRED FUNCTIONAL MOBILITY, BALANCE, GAIT, AND ENDURANCE: ICD-10-CM

## 2025-04-09 DIAGNOSIS — M25.661 STIFFNESS OF RIGHT KNEE: ICD-10-CM

## 2025-04-09 DIAGNOSIS — R29.898 WEAKNESS OF RIGHT LOWER EXTREMITY: ICD-10-CM

## 2025-04-09 NOTE — PROGRESS NOTES
Physical Therapy Daily Treatment Note      Patient: Anthony Sol Jr.   : 1970  Referring practitioner: LAMAR Holguin  Date of Initial Visit: Type: THERAPY  Noted: 2025  Today's Date: 2025  Patient seen for 20 sessions       Visit Diagnoses:    ICD-10-CM ICD-9-CM   1. Status post total right knee replacement  Z96.651 V43.65   2. Stiffness of right knee  M25.661 719.56   3. Weakness of right lower extremity  R29.898 729.89   4. Impaired functional mobility, balance, gait, and endurance  Z74.09 V49.89       Subjective   Feeling pretty good today.     Objective   See Exercise, Manual, and Modality Logs for complete treatment.       Assessment/Plan    Pt continues to make good progress with regard to ROM, strength and gait. Will formally reassess next visit.         Timed:         Manual Therapy:    0     mins  94551;     Therapeutic Exercise:    10     mins  81436;     Neuromuscular Zach:    10    mins  64117;    Therapeutic Activity:     8     mins  54206;     Gait Trainin     mins  50369;     Ultrasound:     0     mins  52645;    Ionto                               0    mins   74883  Self Care                       0     mins   67502  Canalith Repos    0     mins  36683      Un-Timed:  Electrical Stimulation:    0     mins  94316 ( );  Dry Needling     0     mins self-pay  Traction     0     mins 09878      Timed Treatment:   28   mins   Total Treatment:     28   mins    Yissel Flannery PT  [place] License: [id number]

## 2025-04-11 ENCOUNTER — TREATMENT (OUTPATIENT)
Dept: PHYSICAL THERAPY | Facility: CLINIC | Age: 55
End: 2025-04-11
Payer: COMMERCIAL

## 2025-04-11 DIAGNOSIS — R29.898 WEAKNESS OF RIGHT LOWER EXTREMITY: ICD-10-CM

## 2025-04-11 DIAGNOSIS — M25.661 STIFFNESS OF RIGHT KNEE: ICD-10-CM

## 2025-04-11 DIAGNOSIS — Z74.09 IMPAIRED FUNCTIONAL MOBILITY, BALANCE, GAIT, AND ENDURANCE: ICD-10-CM

## 2025-04-11 DIAGNOSIS — Z96.651 STATUS POST TOTAL RIGHT KNEE REPLACEMENT: Primary | ICD-10-CM

## 2025-04-11 NOTE — PROGRESS NOTES
Physical Therapy Daily Treatment Note      Patient: Anthony Sol Jr.   : 1970  Referring practitioner: LAMAR Holguin  Date of Initial Visit: Type: THERAPY  Noted: 2025  Today's Date: 2025  Patient seen for 21 sessions       Visit Diagnoses:    ICD-10-CM ICD-9-CM   1. Status post total right knee replacement  Z96.651 V43.65   2. Stiffness of right knee  M25.661 719.56   3. Weakness of right lower extremity  R29.898 729.89   4. Impaired functional mobility, balance, gait, and endurance  Z74.09 V49.89       Subjective   No new concerns. Continued adherence with HEP. Feels like his swelling continues to improve.    Objective   See Exercise, Manual, and Modality Logs for complete treatment.       Assessment/Plan    Subjectively, pt reports no increase of pain or discomfort with interventions performed today. Performed well with continued functional strengthening interventions. Continues to demonstrate good tolerance to exercise progressions. Continues to benefit from verbal/tactile cues to ensure proper form and technique for exercise performance. Reassess next visit.        Timed:         Manual Therapy:    0     mins  67710;     Therapeutic Exercise:    10     mins  47465;     Neuromuscular Zach:    10    mins  54981;    Therapeutic Activity:     10     mins  62716;     Gait Trainin     mins  77373;     Ultrasound:     0     mins  96819;    Ionto                               0    mins   92638  Self Care                       0     mins   07279  Canalith Repos    0     mins  97363      Un-Timed:  Electrical Stimulation:    0     mins  06266 ( );  Dry Needling     0     mins self-pay  Traction     0     mins 56972      Timed Treatment:   30   mins   Total Treatment:     30   mins    Yissel Flannery, PT  [place] License: [id number]

## 2025-04-14 ENCOUNTER — TREATMENT (OUTPATIENT)
Dept: PHYSICAL THERAPY | Facility: CLINIC | Age: 55
End: 2025-04-14
Payer: COMMERCIAL

## 2025-04-14 DIAGNOSIS — Z96.651 STATUS POST TOTAL RIGHT KNEE REPLACEMENT: Primary | ICD-10-CM

## 2025-04-14 DIAGNOSIS — M25.661 STIFFNESS OF RIGHT KNEE: ICD-10-CM

## 2025-04-14 DIAGNOSIS — R29.898 WEAKNESS OF RIGHT LOWER EXTREMITY: ICD-10-CM

## 2025-04-14 DIAGNOSIS — Z74.09 IMPAIRED FUNCTIONAL MOBILITY, BALANCE, GAIT, AND ENDURANCE: ICD-10-CM

## 2025-04-16 ENCOUNTER — TREATMENT (OUTPATIENT)
Dept: PHYSICAL THERAPY | Facility: CLINIC | Age: 55
End: 2025-04-16
Payer: COMMERCIAL

## 2025-04-16 DIAGNOSIS — M25.661 STIFFNESS OF RIGHT KNEE: ICD-10-CM

## 2025-04-16 DIAGNOSIS — Z96.651 STATUS POST TOTAL RIGHT KNEE REPLACEMENT: Primary | ICD-10-CM

## 2025-04-16 DIAGNOSIS — R29.898 WEAKNESS OF RIGHT LOWER EXTREMITY: ICD-10-CM

## 2025-04-16 DIAGNOSIS — Z74.09 IMPAIRED FUNCTIONAL MOBILITY, BALANCE, GAIT, AND ENDURANCE: ICD-10-CM

## 2025-04-16 NOTE — PROGRESS NOTES
Physical Therapy Daily Treatment Note  [facility]   [address], [address]  [place] [address]  P: [phone]  F: [phone]    Patient: Anthony Sol .   : 1970  Referring practitioner: LAMAR Holguin  Date of Initial Visit: Type: THERAPY  Noted: 2025  Today's Date: 2025  Patient seen for 22 sessions       Visit Diagnoses:    ICD-10-CM ICD-9-CM   1. Status post total right knee replacement  Z96.651 V43.65   2. Stiffness of right knee  M25.661 719.56   3. Weakness of right lower extremity  R29.898 729.89   4. Impaired functional mobility, balance, gait, and endurance  Z74.09 V49.89         Anthony Sol reports: Pt reports he is doing well. Pt notes his knee feels tight but nothing unusual for him. No new/other complaints/comments noted.       Subjective     Objective   See Exercise, Manual, and Modality Logs for complete treatment.       Assessment/Plan  Interventions were performed in line w/ established PT PoC. Pt was able to perform requested interventions today w/out exasperation of R knee pain s/s .     Plan:  Pt will continue with current plan of care to achieve outlined goals. Therapeutic interventions will be progressed where and when appropriate.        Timed:         Manual Therapy:    0     mins  94277;     Therapeutic Exercise:    15     mins  26632;     Neuromuscular Zach:    10    mins  32257;    Therapeutic Activity:     10     mins  54903;     Gait Trainin     mins  02832;     Ultrasound:     0     mins  11660;    Ionto                               0    mins  61540  Self Care                       0     mins  19522  Traction     0     mins 89370      Un-Timed:  Canalith Repos    0     mins 96363  Electrical Stimulation:    0     mins  57574 ( );  Dry Needling     0     mins self-pay  Traction     0     mins 92748        Timed Treatment:   35   mins   Total Treatment:     35   mins    Roberto Bailey PT  [place]  License #: [id number]    Physical Therapist

## 2025-04-18 ENCOUNTER — TREATMENT (OUTPATIENT)
Dept: PHYSICAL THERAPY | Facility: CLINIC | Age: 55
End: 2025-04-18
Payer: COMMERCIAL

## 2025-04-18 DIAGNOSIS — Z74.09 IMPAIRED FUNCTIONAL MOBILITY, BALANCE, GAIT, AND ENDURANCE: ICD-10-CM

## 2025-04-18 DIAGNOSIS — Z96.651 STATUS POST TOTAL RIGHT KNEE REPLACEMENT: Primary | ICD-10-CM

## 2025-04-18 DIAGNOSIS — M25.661 STIFFNESS OF RIGHT KNEE: ICD-10-CM

## 2025-04-18 DIAGNOSIS — R29.898 WEAKNESS OF RIGHT LOWER EXTREMITY: ICD-10-CM

## 2025-04-18 NOTE — PROGRESS NOTES
Physical Therapy Daily Treatment Note      Patient: Anthony Sol Jr.   : 1970  Referring practitioner: LAMAR Holguin  Date of Initial Visit: Type: THERAPY  Noted: 2025  Today's Date: 2025  Patient seen for 22 sessions       Visit Diagnoses:    ICD-10-CM ICD-9-CM   1. Status post total right knee replacement  Z96.651 V43.65   2. Stiffness of right knee  M25.661 719.56   3. Weakness of right lower extremity  R29.898 729.89   4. Impaired functional mobility, balance, gait, and endurance  Z74.09 V49.89       Subjective   Pt states he feels like he is stuck at 95 degrees.     Objective   See Exercise, Manual, and Modality Logs for complete treatment.       Assessment/Plan    Subjectively, pt reports no increase of pain or discomfort with interventions performed today. Performed well with continued functional strengthening interventions. Continues to demonstrate good tolerance to exercise progressions. Continues to benefit from verbal/tactile cues to ensure proper form and technique for exercise performance.        Timed:         Manual Therapy:    8     mins  59270;     Therapeutic Exercise:    10     mins  15259;     Neuromuscular Zach:    10    mins  86094;    Therapeutic Activity:     10     mins  14380;     Gait Trainin     mins  94855;     Ultrasound:     0     mins  29595;    Ionto                               0    mins   29479  Self Care                       0     mins   30389  Canalith Repos    0     mins  10022      Un-Timed:  Electrical Stimulation:    0     mins  02028 ( );  Dry Needling     0     mins self-pay  Traction     0     mins 97213      Timed Treatment:   38   mins   Total Treatment:     38   mins    Yissel Flannery, PT  [place] License: [id number]

## 2025-04-18 NOTE — PROGRESS NOTES
Physical Therapy Daily Treatment Note      Patient: Anthony Sol Jr.   : 1970  Referring practitioner: LAMAR Holguin  Date of Initial Visit: Type: THERAPY  Noted: 2025  Today's Date: 2025  Patient seen for 24 sessions       Visit Diagnoses:    ICD-10-CM ICD-9-CM   1. Status post total right knee replacement  Z96.651 V43.65   2. Stiffness of right knee  M25.661 719.56   3. Weakness of right lower extremity  R29.898 729.89   4. Impaired functional mobility, balance, gait, and endurance  Z74.09 V49.89       Subjective   No new concerns.     Objective   See Exercise, Manual, and Modality Logs for complete treatment.       Assessment/Plan    Patellar mobility is improved, still with significant tone and tightness in quad. Improved some with manual therapy.         Timed:         Manual Therapy:    10     mins  00504;     Therapeutic Exercise:    10     mins  44959;     Neuromuscular Zach:    10    mins  85669;    Therapeutic Activity:     8     mins  86874;     Gait Trainin     mins  91957;     Ultrasound:     0     mins  10047;    Ionto                               0    mins   53255  Self Care                       0     mins   08348  Canalith Repos    0     mins  04361      Un-Timed:  Electrical Stimulation:    0     mins  71972 ( );  Dry Needling     0     mins self-pay  Traction     0     mins 16177      Timed Treatment:   38   mins   Total Treatment:     38   mins    Yissel Flannery, PT  [place] License: [id number]

## 2025-04-21 ENCOUNTER — TREATMENT (OUTPATIENT)
Dept: PHYSICAL THERAPY | Facility: CLINIC | Age: 55
End: 2025-04-21
Payer: COMMERCIAL

## 2025-04-21 DIAGNOSIS — M25.661 STIFFNESS OF RIGHT KNEE: ICD-10-CM

## 2025-04-21 DIAGNOSIS — R29.898 WEAKNESS OF RIGHT LOWER EXTREMITY: ICD-10-CM

## 2025-04-21 DIAGNOSIS — Z74.09 IMPAIRED FUNCTIONAL MOBILITY, BALANCE, GAIT, AND ENDURANCE: ICD-10-CM

## 2025-04-21 DIAGNOSIS — Z96.651 STATUS POST TOTAL RIGHT KNEE REPLACEMENT: Primary | ICD-10-CM

## 2025-04-21 NOTE — PROGRESS NOTES
"  Physical Therapy Progress Note  Patient: Anthony Sol Jr.   : 1970  Diagnosis/ICD-10 Code:  Status post total right knee replacement [Z96.651]  Referring practitioner: LAMAR Holguin  Date of Initial Visit: Type: THERAPY  Noted: 2025  Today's Date: 2025  Patient seen for 25 sessions         Visit Diagnoses:    ICD-10-CM ICD-9-CM   1. Status post total right knee replacement  Z96.651 V43.65   2. Stiffness of right knee  M25.661 719.56   3. Weakness of right lower extremity  R29.898 729.89   4. Impaired functional mobility, balance, gait, and endurance  Z74.09 V49.89         Anthony Sol reports: overall he feels like he is doing well, but feels like his knee is stuck at 90 degrees.   Subjective Questionnaire: LEFS: 41/80 (improved from 31/80 at initial evaluation)  Clinical Progress: improved  Home Program Compliance: Yes  Treatment has included: therapeutic exercise, neuromuscular re-education, manual therapy, therapeutic activity, and cryotherapy      Subjective       Objective          Passive Range of Motion     Right Knee   Flexion: 93 degrees   Extension: 0 degrees           Assessment/Plan    Short Term Goals: 6 weeks. Patient will:     1. Patient to be adherent with HEP for ROM and strength.(MET)  2. Demonstrate right knee ROM 5 degrees - 80 degrees for improved functional mobility, gait and stair climbing. (MET)  3. Demonstrate adequate quad strength to perform SLR with no extensor lag (MET)     Long Term Goals: 12 weeks. Patient will:     1. Report improved ADL's and functional activites as evidenced by LEFS 60/80 or better. (PROGRESSING)  2. Exhibit improved right knee AROM 0 degrees - 100 degrees to allow for improved gait, stair climbing, bending, and squatting as is necessary for daily tasks.  (Previous goal met, updated goal)  3. Demonstrate increased balance and stability of the knee by stepping over 6\" nik without UE assist to traverse uneven terrain. (MET)  4. " Demonstrate LE strength 4+/5 or better for improved gait, functional mobility and ADL's.  (PROGRESSING)       Recommendations: Continue as planned  Timeframe: 1 month  Prognosis to achieve goals: good      Timed:         Manual Therapy:    8     mins  43733;     Therapeutic Exercise:    10     mins  25089;     Neuromuscular Zach:    8    mins  62644;    Therapeutic Activity:     8     mins  45168;     Gait Trainin     mins  97048;     Ultrasound:     0     mins  90714;    Ionto                               0    mins   99156  Self Care                       0     mins   28983  Canalith Repos    0     mins 37658      Un-Timed:  Electrical Stimulation:    0     mins  28576 ( );  Dry Needling     0     mins self-pay  Traction     0     mins 37698  Re-Eval                           0    mins  12581      Timed Treatment:   34   mins   Total Treatment:     34   mins          PT: Yissel Flannery PT     [place] License:  [id number]    Electronically signed by Yissel Flannery PT, 25, 9:25 AM EDT

## 2025-04-23 ENCOUNTER — TREATMENT (OUTPATIENT)
Dept: PHYSICAL THERAPY | Facility: CLINIC | Age: 55
End: 2025-04-23
Payer: COMMERCIAL

## 2025-04-23 DIAGNOSIS — Z96.651 STATUS POST TOTAL RIGHT KNEE REPLACEMENT: Primary | ICD-10-CM

## 2025-04-23 DIAGNOSIS — M25.661 STIFFNESS OF RIGHT KNEE: ICD-10-CM

## 2025-04-23 DIAGNOSIS — R29.898 WEAKNESS OF RIGHT LOWER EXTREMITY: ICD-10-CM

## 2025-04-23 PROCEDURE — 97530 THERAPEUTIC ACTIVITIES: CPT | Performed by: PHYSICAL THERAPIST

## 2025-04-23 PROCEDURE — 97112 NEUROMUSCULAR REEDUCATION: CPT | Performed by: PHYSICAL THERAPIST

## 2025-04-23 PROCEDURE — 97140 MANUAL THERAPY 1/> REGIONS: CPT | Performed by: PHYSICAL THERAPIST

## 2025-04-23 PROCEDURE — 97110 THERAPEUTIC EXERCISES: CPT | Performed by: PHYSICAL THERAPIST

## 2025-04-25 ENCOUNTER — TREATMENT (OUTPATIENT)
Dept: PHYSICAL THERAPY | Facility: CLINIC | Age: 55
End: 2025-04-25
Payer: COMMERCIAL

## 2025-04-25 DIAGNOSIS — M25.661 STIFFNESS OF RIGHT KNEE: ICD-10-CM

## 2025-04-25 DIAGNOSIS — R29.898 WEAKNESS OF RIGHT LOWER EXTREMITY: ICD-10-CM

## 2025-04-25 DIAGNOSIS — Z74.09 IMPAIRED FUNCTIONAL MOBILITY, BALANCE, GAIT, AND ENDURANCE: ICD-10-CM

## 2025-04-25 DIAGNOSIS — Z96.651 STATUS POST TOTAL RIGHT KNEE REPLACEMENT: Primary | ICD-10-CM

## 2025-04-25 PROCEDURE — 97110 THERAPEUTIC EXERCISES: CPT | Performed by: PHYSICAL THERAPIST

## 2025-04-25 PROCEDURE — 97112 NEUROMUSCULAR REEDUCATION: CPT | Performed by: PHYSICAL THERAPIST

## 2025-04-25 PROCEDURE — 97140 MANUAL THERAPY 1/> REGIONS: CPT | Performed by: PHYSICAL THERAPIST

## 2025-04-25 PROCEDURE — 97530 THERAPEUTIC ACTIVITIES: CPT | Performed by: PHYSICAL THERAPIST

## 2025-04-28 ENCOUNTER — TREATMENT (OUTPATIENT)
Dept: PHYSICAL THERAPY | Facility: CLINIC | Age: 55
End: 2025-04-28
Payer: COMMERCIAL

## 2025-04-28 DIAGNOSIS — R29.898 WEAKNESS OF RIGHT LOWER EXTREMITY: ICD-10-CM

## 2025-04-28 DIAGNOSIS — M25.661 STIFFNESS OF RIGHT KNEE: ICD-10-CM

## 2025-04-28 DIAGNOSIS — Z74.09 IMPAIRED FUNCTIONAL MOBILITY, BALANCE, GAIT, AND ENDURANCE: ICD-10-CM

## 2025-04-28 DIAGNOSIS — Z96.651 STATUS POST TOTAL RIGHT KNEE REPLACEMENT: Primary | ICD-10-CM

## 2025-04-28 NOTE — PROGRESS NOTES
Physical Therapy Daily Treatment Note      Patient: Anthony Sol Jr.   : 1970  Referring practitioner: LAMAR Holguin  Date of Initial Visit: Type: THERAPY  Noted: 2025  Today's Date: 2025  Patient seen for 27 sessions       Visit Diagnoses:    ICD-10-CM ICD-9-CM   1. Status post total right knee replacement  Z96.651 V43.65   2. Stiffness of right knee  M25.661 719.56   3. Weakness of right lower extremity  R29.898 729.89   4. Impaired functional mobility, balance, gait, and endurance  Z74.09 V49.89       Subjective   No new concerns.     Objective   See Exercise, Manual, and Modality Logs for complete treatment.       Assessment/Plan    Quad tone continues to limit knee flexion. Patellar mobility is WNL. Spoke with MD office, who Ok'd dry needling in quad. Pt is agreeable and will schedule.         Timed:         Manual Therapy:    8     mins  56335;     Therapeutic Exercise:    10     mins  78053;     Neuromuscular Zach:    10    mins  17997;    Therapeutic Activity:     10     mins  08839;     Gait Trainin     mins  96669;     Ultrasound:     0     mins  85862;    Ionto                               0    mins   30687  Self Care                       0     mins   76399  Canalith Repos    0     mins  02827      Un-Timed:  Electrical Stimulation:    0     mins  68642 ( );  Dry Needling     0     mins self-pay  Traction     0     mins 12541      Timed Treatment:   38   mins   Total Treatment:     38   mins    Yissel Flannery, PT  [place] License: [id number]

## 2025-04-28 NOTE — PROGRESS NOTES
Physical Therapy Daily Treatment Note      Patient: Anthony Sol Jr.   : 1970  Referring practitioner: LAMAR Holguin  Date of Initial Visit: Type: THERAPY  Noted: 2025  Today's Date: 2025  Patient seen for 26 sessions       Visit Diagnoses:    ICD-10-CM ICD-9-CM   1. Status post total right knee replacement  Z96.651 V43.65   2. Stiffness of right knee  M25.661 719.56   3. Weakness of right lower extremity  R29.898 729.89       Subjective   Quad is tight today.     Objective   See Exercise, Manual, and Modality Logs for complete treatment.       Assessment/Plan    Increased tone in quad limiting knee flexion. Pt is a good candidate for dry needling, will OK with MD and discuss next visit.         Timed:         Manual Therapy:    8     mins  30629;     Therapeutic Exercise:    10     mins  95165;     Neuromuscular Zach:    10    mins  07558;    Therapeutic Activity:     10     mins  09555;     Gait Trainin     mins  51583;     Ultrasound:     0     mins  33920;    Ionto                               0    mins   55541  Self Care                       0     mins   44982  Canalith Repos    0     mins  42550      Un-Timed:  Electrical Stimulation:    0     mins  78189 ( );  Dry Needling     0     mins self-pay  Traction     0     mins 63629      Timed Treatment:   38   mins   Total Treatment:     38   mins    Yissel Flannery PT  [place] License: [id number]

## 2025-04-30 ENCOUNTER — TREATMENT (OUTPATIENT)
Dept: PHYSICAL THERAPY | Facility: CLINIC | Age: 55
End: 2025-04-30
Payer: COMMERCIAL

## 2025-04-30 DIAGNOSIS — Z96.651 STATUS POST TOTAL RIGHT KNEE REPLACEMENT: Primary | ICD-10-CM

## 2025-04-30 DIAGNOSIS — M25.661 STIFFNESS OF RIGHT KNEE: ICD-10-CM

## 2025-04-30 DIAGNOSIS — R29.898 WEAKNESS OF RIGHT LOWER EXTREMITY: ICD-10-CM

## 2025-04-30 DIAGNOSIS — Z74.09 IMPAIRED FUNCTIONAL MOBILITY, BALANCE, GAIT, AND ENDURANCE: ICD-10-CM

## 2025-04-30 NOTE — PROGRESS NOTES
Physical Therapy Daily Treatment Note      Patient: Anthony Sol Jr.   : 1970  Referring practitioner: LAMAR Holguin  Date of Initial Visit: Type: THERAPY  Noted: 2025  Today's Date: 2025  Patient seen for 28 sessions       Visit Diagnoses:    ICD-10-CM ICD-9-CM   1. Status post total right knee replacement  Z96.651 V43.65   2. Stiffness of right knee  M25.661 719.56   3. Weakness of right lower extremity  R29.898 729.89   4. Impaired functional mobility, balance, gait, and endurance  Z74.09 V49.89       Subjective   No new concerns. Would like to try dry needling.     Objective   See Exercise, Manual, and Modality Logs for complete treatment.       Assessment/Plan    Subjectively, pt reports no increase of pain or discomfort with interventions performed today. Performed well with continued functional strengthening interventions. Continues to demonstrate good tolerance to exercise progressions. Will schedule for dry needling.         Timed:         Manual Therapy:    10     mins  35649;     Therapeutic Exercise:    12     mins  43097;     Neuromuscular Zach:    10    mins  08111;    Therapeutic Activity:     8     mins  06929;     Gait Trainin     mins  82264;     Ultrasound:     0     mins  39875;    Ionto                               0    mins   81927  Self Care                       0     mins   38725  Canalith Repos    0     mins  51796      Un-Timed:  Electrical Stimulation:    0     mins  74691 ( );  Dry Needling     0     mins self-pay  Traction     0     mins 69679      Timed Treatment:   40   mins   Total Treatment:     40   mins    Yissel Flannery, PT  [place] License: [id number]

## 2025-04-30 NOTE — PROGRESS NOTES
Physical Therapy Daily Treatment Note      Patient: Anthony Sol Jr.   : 1970  Referring practitioner: LAMAR Holguin  Date of Initial Visit: Type: THERAPY  Noted: 2025  Today's Date: 2025  Patient seen for 28 sessions       Visit Diagnoses:    ICD-10-CM ICD-9-CM   1. Status post total right knee replacement  Z96.651 V43.65   2. Stiffness of right knee  M25.661 719.56   3. Weakness of right lower extremity  R29.898 729.89   4. Impaired functional mobility, balance, gait, and endurance  Z74.09 V49.89       Subjective   Pt states his knee has been more sore since yesterday, unsure of cause.     Objective   See Exercise, Manual, and Modality Logs for complete treatment.       Assessment/Plan    No increased edema, redness or deformity. Advised pt to focus on stretching at home and ice for pain.         Timed:         Manual Therapy:    10     mins  79110;     Therapeutic Exercise:    10     mins  00212;     Neuromuscular Zach:    8    mins  55613;    Therapeutic Activity:     10     mins  54485;     Gait Trainin     mins  53930;     Ultrasound:     0     mins  54935;    Ionto                               0    mins   79245  Self Care                       0     mins   70715  Canalith Repos    0     mins  70947      Un-Timed:  Electrical Stimulation:    0     mins  57070 ( );  Dry Needling     0     mins self-pay  Traction     0     mins 02257      Timed Treatment:   38   mins   Total Treatment:     38   mins    Yissel Flannery, PT  [place] License: [id number]

## 2025-05-05 ENCOUNTER — TREATMENT (OUTPATIENT)
Dept: PHYSICAL THERAPY | Facility: CLINIC | Age: 55
End: 2025-05-05
Payer: COMMERCIAL

## 2025-05-05 DIAGNOSIS — M25.661 STIFFNESS OF RIGHT KNEE: ICD-10-CM

## 2025-05-05 DIAGNOSIS — Z96.651 STATUS POST TOTAL RIGHT KNEE REPLACEMENT: Primary | ICD-10-CM

## 2025-05-05 DIAGNOSIS — R29.898 WEAKNESS OF RIGHT LOWER EXTREMITY: ICD-10-CM

## 2025-05-05 DIAGNOSIS — Z74.09 IMPAIRED FUNCTIONAL MOBILITY, BALANCE, GAIT, AND ENDURANCE: ICD-10-CM

## 2025-05-05 PROCEDURE — 97112 NEUROMUSCULAR REEDUCATION: CPT | Performed by: PHYSICAL THERAPIST

## 2025-05-05 PROCEDURE — 97530 THERAPEUTIC ACTIVITIES: CPT | Performed by: PHYSICAL THERAPIST

## 2025-05-05 PROCEDURE — 97110 THERAPEUTIC EXERCISES: CPT | Performed by: PHYSICAL THERAPIST

## 2025-05-05 PROCEDURE — 97140 MANUAL THERAPY 1/> REGIONS: CPT | Performed by: PHYSICAL THERAPIST

## 2025-05-05 NOTE — PROGRESS NOTES
Brief Operative Note    Patient: Lolis Cunningham 34 year old female    MRN: [id number]    Surgeon(s): Regino Levin MD  Phone Number: [phone]                       Surgeons and Role:     * Regino Levin MD - Primary    Assistant(s): Marla Smyth [phone]     Physical Therapy Daily Treatment Note      Patient: Anthony Sol Jr.   : 1970  Referring practitioner: LAMAR Holguin  Date of Initial Visit: Type: THERAPY  Noted: 2025  Today's Date: 2025  Patient seen for 30 sessions       Visit Diagnoses:    ICD-10-CM ICD-9-CM   1. Status post total right knee replacement  Z96.651 V43.65   2. Stiffness of right knee  M25.661 719.56   3. Weakness of right lower extremity  R29.898 729.89   4. Impaired functional mobility, balance, gait, and endurance  Z74.09 V49.89       Subjective   Pt states knee is feeling better than last week, still with some clicking.     Objective   See Exercise, Manual, and Modality Logs for complete treatment.       Assessment/Plan    Edema reduced compared to last week. Continues with significant tightness in quad, most notably in vastus lateralis. Will benefit from dry needling next session.         Timed:         Manual Therapy:    8     mins  00076;     Therapeutic Exercise:    10     mins  10222;     Neuromuscular Zach:    10    mins  86642;    Therapeutic Activity:     10     mins  82931;     Gait Trainin     mins  05080;     Ultrasound:     0     mins  10063;    Ionto                               0    mins   29443  Self Care                       0     mins   56748  Canalith Repos    0     mins  48493      Un-Timed:  Electrical Stimulation:    0     mins  36952 ( );  Dry Needling     0     mins self-pay  Traction     0     mins 01879      Timed Treatment:   38   mins   Total Treatment:     38   mins    Yissel Flannery, PT  [place] License: [id number]

## 2025-05-07 ENCOUNTER — TREATMENT (OUTPATIENT)
Dept: PHYSICAL THERAPY | Facility: CLINIC | Age: 55
End: 2025-05-07
Payer: COMMERCIAL

## 2025-05-07 ENCOUNTER — TREATMENT (OUTPATIENT)
Dept: PHYSICAL THERAPY | Facility: CLINIC | Age: 55
End: 2025-05-07

## 2025-05-07 DIAGNOSIS — M25.561 CHRONIC PAIN OF RIGHT KNEE: Primary | ICD-10-CM

## 2025-05-07 DIAGNOSIS — R29.898 WEAKNESS OF RIGHT LOWER EXTREMITY: ICD-10-CM

## 2025-05-07 DIAGNOSIS — Z74.09 IMPAIRED FUNCTIONAL MOBILITY, BALANCE, GAIT, AND ENDURANCE: ICD-10-CM

## 2025-05-07 DIAGNOSIS — M25.661 STIFFNESS OF RIGHT KNEE: ICD-10-CM

## 2025-05-07 DIAGNOSIS — Z96.651 STATUS POST TOTAL RIGHT KNEE REPLACEMENT: Primary | ICD-10-CM

## 2025-05-07 DIAGNOSIS — G89.29 CHRONIC PAIN OF RIGHT KNEE: Primary | ICD-10-CM

## 2025-05-07 PROCEDURE — 20561 NDL INSJ W/O NJX 3+ MUSC: CPT | Performed by: PHYSICAL THERAPIST

## 2025-05-07 NOTE — PROGRESS NOTES
Physical Therapy Daily Treatment Note      Patient: Anthony Sol Jr.   : 1970  Referring practitioner: LAMAR Holguin  Date of Initial Visit: Type: THERAPY  Noted: 2025  Today's Date: 2025  Patient seen for 31 sessions       Visit Diagnoses:    ICD-10-CM ICD-9-CM   1. Status post total right knee replacement  Z96.651 V43.65   2. Stiffness of right knee  M25.661 719.56   3. Weakness of right lower extremity  R29.898 729.89   4. Impaired functional mobility, balance, gait, and endurance  Z74.09 V49.89       Subjective   Had dry needling before today's session, was painful but did get twitches in quad.     Objective   See Exercise, Manual, and Modality Logs for complete treatment.       Assessment/Plan    Subjectively, pt reports no increase of pain or discomfort with interventions performed today. Performed well with continued functional strengthening interventions. Continues to demonstrate good tolerance to exercise progressions. Continues to benefit from verbal/tactile cues to ensure proper form and technique for exercise performance.        Timed:         Manual Therapy:    0     mins  92844;     Therapeutic Exercise:    10     mins  13762;     Neuromuscular Zach:    10    mins  99611;    Therapeutic Activity:     10     mins  95156;     Gait Trainin     mins  52523;     Ultrasound:     0     mins  64630;    Ionto                               0    mins   03158  Self Care                       0     mins   81738  Canalith Repos    0     mins  05774      Un-Timed:  Electrical Stimulation:    0     mins  83824 ( );  Dry Needling     0     mins self-pay  Traction     0     mins 99617      Timed Treatment:   30   mins   Total Treatment:     30   mins    Yissel Flannery, RODRIGO  [place] License: [id number]

## 2025-05-09 ENCOUNTER — TREATMENT (OUTPATIENT)
Dept: PHYSICAL THERAPY | Facility: CLINIC | Age: 55
End: 2025-05-09

## 2025-05-09 ENCOUNTER — TREATMENT (OUTPATIENT)
Dept: PHYSICAL THERAPY | Facility: CLINIC | Age: 55
End: 2025-05-09
Payer: COMMERCIAL

## 2025-05-09 DIAGNOSIS — M25.661 STIFFNESS OF RIGHT KNEE: ICD-10-CM

## 2025-05-09 DIAGNOSIS — R29.898 WEAKNESS OF RIGHT LOWER EXTREMITY: ICD-10-CM

## 2025-05-09 DIAGNOSIS — M25.561 CHRONIC PAIN OF RIGHT KNEE: Primary | ICD-10-CM

## 2025-05-09 DIAGNOSIS — Z74.09 IMPAIRED FUNCTIONAL MOBILITY, BALANCE, GAIT, AND ENDURANCE: ICD-10-CM

## 2025-05-09 DIAGNOSIS — G89.29 CHRONIC PAIN OF RIGHT KNEE: Primary | ICD-10-CM

## 2025-05-09 DIAGNOSIS — Z96.651 STATUS POST TOTAL RIGHT KNEE REPLACEMENT: ICD-10-CM

## 2025-05-09 PROCEDURE — 97110 THERAPEUTIC EXERCISES: CPT | Performed by: PHYSICAL THERAPIST

## 2025-05-09 PROCEDURE — 20560 NDL INSJ W/O NJX 1 OR 2 MUSC: CPT | Performed by: PHYSICAL THERAPIST

## 2025-05-09 PROCEDURE — 97112 NEUROMUSCULAR REEDUCATION: CPT | Performed by: PHYSICAL THERAPIST

## 2025-05-09 PROCEDURE — 97140 MANUAL THERAPY 1/> REGIONS: CPT | Performed by: PHYSICAL THERAPIST

## 2025-05-09 PROCEDURE — 97530 THERAPEUTIC ACTIVITIES: CPT | Performed by: PHYSICAL THERAPIST

## 2025-05-09 NOTE — PROGRESS NOTES
Physical Therapy Daily Note    Patient: Anthony Sol Jr.   : 1970  Diagnosis/ICD-10 Code:  Chronic pain of right knee [M25.561, G89.29]  Referring practitioner: LAMAR Holguin  Date of Initial Visit: Type: THERAPY  Noted: 2025  Today's Date: 2025   Patient seen for 1 session  Location of Service: [place] [id number] [facility] - [address] [place] [address]       Visit Diagnoses:    ICD-10-CM ICD-9-CM   1. Chronic pain of right knee  M25.561 719.46    G89.29 338.29            Subjective  Anthony Sol reported today that he's doing well. Pt was agreeable and gave consent for dry needling today for treatment of their present symptoms.    Objective   No functional measures updated at today's visit unless otherwise stated. For functional assessment and documentation of patient progressions referred to the assessment section.    See Exercise, Manual, and Modality Logs for complete treatments.       Assessment/Plan  Treatment today consisted of a brief examination of the region to be treated followed by dry needling of the quad to address current limitations and impairments. Patient was educated on the effects of dry needling, and contraindications, risks, and post-treatment expectations prior to treatment and consent was obtained. Patient tolerated treatment well and without complaint. Patient left clinic in good rapport.    Plan: Continue with functional progressions related to areas of treatment; assess tolerance to needling interventions          Timed:         Manual Therapy:         mins  89882;     Therapeutic Exercise:         mins  34872;     Neuromuscular Zach:        mins  76007;    Therapeutic Activity:         mins  41225;     Gait Training:           mins  42870;     Ultrasound:          mins  03869;    Ionto                                   mins   96053  Self Care                            mins   88553  [facility]         mins  79065    Un-Timed:  Electrical Stimulation:         mins  71895 ( );  Dry Needling     30     mins self-pay  Traction          mins 85265  Low Eval          Mins  73198  Mod Eval          Mins  65049  High Eval                            Mins  63445      Timed Treatment:   0   mins   Total Treatment:     30   mins      PT: Omar Mcclain PT     License Number: [id number]  Electronically signed by Omar Mcclain PT, 05/09/25, 1:12 PM EDT

## 2025-05-12 ENCOUNTER — TREATMENT (OUTPATIENT)
Dept: PHYSICAL THERAPY | Facility: CLINIC | Age: 55
End: 2025-05-12
Payer: COMMERCIAL

## 2025-05-12 DIAGNOSIS — M25.561 CHRONIC PAIN OF RIGHT KNEE: Primary | ICD-10-CM

## 2025-05-12 DIAGNOSIS — M25.661 STIFFNESS OF RIGHT KNEE: ICD-10-CM

## 2025-05-12 DIAGNOSIS — Z96.651 STATUS POST TOTAL RIGHT KNEE REPLACEMENT: ICD-10-CM

## 2025-05-12 DIAGNOSIS — R29.898 WEAKNESS OF RIGHT LOWER EXTREMITY: ICD-10-CM

## 2025-05-12 DIAGNOSIS — Z74.09 IMPAIRED FUNCTIONAL MOBILITY, BALANCE, GAIT, AND ENDURANCE: ICD-10-CM

## 2025-05-12 DIAGNOSIS — G89.29 CHRONIC PAIN OF RIGHT KNEE: Primary | ICD-10-CM

## 2025-05-13 NOTE — PROGRESS NOTES
Physical Therapy Daily Note    Patient: Anthony Sol Jr.   : 1970  Diagnosis/ICD-10 Code:  Chronic pain of right knee [M25.561, G89.29]  Referring practitioner: No ref. provider found  Date of Initial Visit: Type: THERAPY  Noted: 2025  Today's Date: 2025   Patient seen for 2 session  Location of Service: [address] [place] [address]       Visit Diagnoses:    ICD-10-CM ICD-9-CM   1. Chronic pain of right knee  M25.561 719.46    G89.29 338.29            Subjective  Anthony Sol reported today that he's doing well. Pt was agreeable and gave consent for dry needling today for treatment of their present symptoms.    Objective   No functional measures updated at today's visit unless otherwise stated. For functional assessment and documentation of patient progressions referred to the assessment section.    See Exercise, Manual, and Modality Logs for complete treatments.       Assessment/Plan  Treatment today consisted of a brief examination of the region to be treated followed by dry needling of the quad to address current limitations and impairments. Patient was educated on the effects of dry needling, and contraindications, risks, and post-treatment expectations prior to treatment and consent was obtained. Patient tolerated treatment well and without complaint. Patient left clinic in good rapport.    Plan: Continue with functional progressions related to areas of treatment; assess tolerance to needling interventions          Timed:         Manual Therapy:         mins  45577;     Therapeutic Exercise:         mins  31993;     Neuromuscular Zach:        mins  56221;    Therapeutic Activity:         mins  64926;     Gait Training:           mins  03168;     Ultrasound:          mins  49325;    Ionto                                   mins   04887  Self Care                            mins   99782  [facility]         mins  09724    Un-Timed:  Electrical Stimulation:         mins  60231 ( );  Dry Needling     30     mins self-pay  Traction          mins 98701  Low Eval          Mins  07419  Mod Eval          Mins  01430  High Eval                            Mins  52088      Timed Treatment:   0   mins   Total Treatment:     30   mins      PT: Omar Mcclain PT     License Number: [id number]  Electronically signed by Omar Mcclain PT, 05/13/25, 1:55 PM EDT

## 2025-05-13 NOTE — PROGRESS NOTES
Physical Therapy Daily Treatment Note      Patient: Anthony Sol Jr.   : 1970  Referring practitioner: LAMAR Holguin  Date of Initial Visit: Type: THERAPY  Noted: 2025  Today's Date: 2025  Patient seen for 32 sessions       Visit Diagnoses:    ICD-10-CM ICD-9-CM   1. Chronic pain of right knee  M25.561 719.46    G89.29 338.29   2. Status post total right knee replacement  Z96.651 V43.65   3. Stiffness of right knee  M25.661 719.56   4. Weakness of right lower extremity  R29.898 729.89   5. Impaired functional mobility, balance, gait, and endurance  Z74.09 V49.89       Subjective   Needling is getting less painful. Feels like he's stuck at 90 degrees.     Objective   See Exercise, Manual, and Modality Logs for complete treatment.       Assessment/Plan    Tone in quad is improved. Good patellar mobility and minimal edema.         Timed:         Manual Therapy:    8     mins  77157;     Therapeutic Exercise:    10     mins  34842;     Neuromuscular Zach:    10    mins  33085;    Therapeutic Activity:     10     mins  97840;     Gait Trainin     mins  68694;     Ultrasound:     0     mins  34749;    Ionto                               0    mins   68876  Self Care                       0     mins   80286  Canalith Repos    0     mins  32454      Un-Timed:  Electrical Stimulation:    0     mins  58289 ( );  Dry Needling     0     mins self-pay  Traction     0     mins 71257      Timed Treatment:   38   mins   Total Treatment:     38   mins    Yissel Flannery, PT  [place] License: [id number]

## 2025-05-14 ENCOUNTER — TREATMENT (OUTPATIENT)
Dept: PHYSICAL THERAPY | Facility: CLINIC | Age: 55
End: 2025-05-14

## 2025-05-14 ENCOUNTER — TREATMENT (OUTPATIENT)
Dept: PHYSICAL THERAPY | Facility: CLINIC | Age: 55
End: 2025-05-14
Payer: COMMERCIAL

## 2025-05-14 DIAGNOSIS — M25.561 CHRONIC PAIN OF RIGHT KNEE: Primary | ICD-10-CM

## 2025-05-14 DIAGNOSIS — G89.29 CHRONIC PAIN OF RIGHT KNEE: Primary | ICD-10-CM

## 2025-05-14 DIAGNOSIS — Z74.09 IMPAIRED FUNCTIONAL MOBILITY, BALANCE, GAIT, AND ENDURANCE: ICD-10-CM

## 2025-05-14 DIAGNOSIS — R29.898 WEAKNESS OF RIGHT LOWER EXTREMITY: ICD-10-CM

## 2025-05-14 DIAGNOSIS — M25.661 STIFFNESS OF RIGHT KNEE: ICD-10-CM

## 2025-05-14 DIAGNOSIS — Z96.651 STATUS POST TOTAL RIGHT KNEE REPLACEMENT: ICD-10-CM

## 2025-05-15 NOTE — PROGRESS NOTES
Physical Therapy Daily Treatment Note      Patient: Anthony Sol Jr.   : 1970  Referring practitioner: LAMAR Holguin  Date of Initial Visit: Type: THERAPY  Noted: 2025  Today's Date: 2025  Patient seen for 33 sessions       Visit Diagnoses:    ICD-10-CM ICD-9-CM   1. Chronic pain of right knee  M25.561 719.46    G89.29 338.29   2. Status post total right knee replacement  Z96.651 V43.65   3. Stiffness of right knee  M25.661 719.56   4. Weakness of right lower extremity  R29.898 729.89   5. Impaired functional mobility, balance, gait, and endurance  Z74.09 V49.89       Subjective   Inside of quad feels better, outside is still tight and painful. Dry needling seems to be helping.     Objective   See Exercise, Manual, and Modality Logs for complete treatment.       Assessment/Plan    Subjectively, pt reports no increase of pain or discomfort with interventions performed today. Still with significant increased tone in vastus lateralis, but other portions of the quad are improved. Good patellar mobility. Will reassess at next visit.         Timed:         Manual Therapy:    10     mins  91991;     Therapeutic Exercise:    10     mins  20304;     Neuromuscular Zach:    10    mins  00952;    Therapeutic Activity:     8     mins  50947;     Gait Trainin     mins  75629;     Ultrasound:     0     mins  31517;    Ionto                               0    mins   35299  Self Care                       0     mins   87201  Canalith Repos    0     mins  60478      Un-Timed:  Electrical Stimulation:    0     mins  37275 ( );  Dry Needling     0     mins self-pay  Traction     0     mins 69153      Timed Treatment:   38   mins   Total Treatment:     38   mins    Yissel Flannery, PT  [place] License: [id number]

## 2025-05-16 ENCOUNTER — TREATMENT (OUTPATIENT)
Dept: PHYSICAL THERAPY | Facility: CLINIC | Age: 55
End: 2025-05-16
Payer: COMMERCIAL

## 2025-05-16 DIAGNOSIS — M25.661 STIFFNESS OF RIGHT KNEE: ICD-10-CM

## 2025-05-16 DIAGNOSIS — Z74.09 IMPAIRED FUNCTIONAL MOBILITY, BALANCE, GAIT, AND ENDURANCE: Primary | ICD-10-CM

## 2025-05-16 DIAGNOSIS — Z96.651 STATUS POST TOTAL RIGHT KNEE REPLACEMENT: ICD-10-CM

## 2025-05-16 DIAGNOSIS — R29.898 WEAKNESS OF RIGHT LOWER EXTREMITY: ICD-10-CM

## 2025-05-16 DIAGNOSIS — M25.561 CHRONIC PAIN OF RIGHT KNEE: ICD-10-CM

## 2025-05-16 DIAGNOSIS — G89.29 CHRONIC PAIN OF RIGHT KNEE: ICD-10-CM

## 2025-05-16 NOTE — PROGRESS NOTES
Physical Therapy Daily Note    Patient: Anthony Sol Jr.   : 1970  Diagnosis/ICD-10 Code:  Chronic pain of right knee [M25.561, G89.29]  Referring practitioner: No ref. provider found  Date of Initial Visit: Type: THERAPY  Noted: 2025  Today's Date: 2025   Patient seen for 3 session  Location of Service: [address] - [address] [place] [address]       Visit Diagnoses:    ICD-10-CM ICD-9-CM   1. Chronic pain of right knee  M25.561 719.46    G89.29 338.29            Subjective  Anthony Sol reported today that he's doing better. Pt was agreeable and gave consent for dry needling today for treatment of their present symptoms.    Objective   No functional measures updated at today's visit unless otherwise stated. For functional assessment and documentation of patient progressions referred to the assessment section.    See Exercise, Manual, and Modality Logs for complete treatments.       Assessment/Plan  Treatment today consisted of a brief examination of the region to be treated followed by dry needling of the quad to address current limitations and impairments. Patient was educated on the effects of dry needling, and contraindications, risks, and post-treatment expectations prior to treatment and consent was obtained. Patient tolerated treatment well and without complaint. Patient left clinic in good rapport.    Plan: Continue with functional progressions related to areas of treatment; assess tolerance to needling interventions          Timed:         Manual Therapy:         mins  42433;     Therapeutic Exercise:         mins  82106;     Neuromuscular Zach:        mins  48670;    Therapeutic Activity:         mins  61428;     Gait Training:           mins  06136;     Ultrasound:          mins  38976;    Ionto                                   mins   12749  Self Care                            mins   96605  [facility]         mins  15128    Un-Timed:  Electrical Stimulation:         mins  76191 ( );  Dry Needling     30     mins self-pay  Traction          mins 34971  Low Eval          Mins  64587  Mod Eval          Mins  95025  High Eval                            Mins  39998      Timed Treatment:   0   mins   Total Treatment:     30   mins      PT: Omar Mcclain PT     License Number: [id number]  Electronically signed by Omar Mcclain PT, 05/16/25, 7:50 AM EDT

## 2025-05-16 NOTE — PROGRESS NOTES
"  Physical Therapy Progress Note  Patient: Anthony Sol Jr.   : 1970  Diagnosis/ICD-10 Code:  Chronic pain of right knee [M25.561, G89.29]  Referring practitioner: LAMAR Holguin  Date of Initial Visit: Type: THERAPY  Noted: 2025  Today's Date: 2025  Patient seen for 34 sessions         Visit Diagnoses:    ICD-10-CM ICD-9-CM   1. Chronic pain of right knee  M25.561 719.46    G89.29 338.29   2. Status post total right knee replacement  Z96.651 V43.65   3. Stiffness of right knee  M25.661 719.56   4. Weakness of right lower extremity  R29.898 729.89   5. Impaired functional mobility, balance, gait, and endurance  Z74.09 V49.89         Anthony Sol reports: he continues to improve with regard to strength and endurance. Wishes his ROM was getting better faster.   Clinical Progress: improved  Home Program Compliance: Yes  Treatment has included: therapeutic exercise, neuromuscular re-education, manual therapy, and therapeutic activity      Subjective       Objective          Passive Range of Motion     Right Knee   Flexion: 100 degrees   Extension: 0 degrees           Assessment/Plan    Short Term Goals: 6 weeks. Patient will:     1. Patient to be adherent with HEP for ROM and strength.(MET)  2. Demonstrate right knee ROM 5 degrees - 80 degrees for improved functional mobility, gait and stair climbing. (MET)  3. Demonstrate adequate quad strength to perform SLR with no extensor lag (MET)     Long Term Goals: 12 weeks. Patient will:     1. Report improved ADL's and functional activites as evidenced by LEFS 60/80 or better. (PROGRESSING)  2. Exhibit improved right knee AROM 0 degrees - 100 degrees to allow for improved gait, stair climbing, bending, and squatting as is necessary for daily tasks.  (MET)  3. Demonstrate increased balance and stability of the knee by stepping over 6\" nik without UE assist to traverse uneven terrain. (MET)  4. Demonstrate LE strength 4+/5 or better for improved " gait, functional mobility and ADL's.  (MET)     Recommendations: Continue as needed for the next month  Timeframe: 1 month  Prognosis to achieve goals: good      Timed:         Manual Therapy:    10     mins  31579;     Therapeutic Exercise:    8     mins  64563;     Neuromuscular Zach:    10    mins  19378;    Therapeutic Activity:     10     mins  23658;     Gait Trainin     mins  66971;     Ultrasound:     0     mins  19359;    Ionto                               0    mins   71385  Self Care                       0     mins   81022  Canalith Repos    0     mins 92649      Un-Timed:  Electrical Stimulation:    0     mins  66133 ( );  Dry Needling     0     mins self-pay  Traction     0     mins 88864  Re-Eval                           0    mins  08904      Timed Treatment:   38   mins   Total Treatment:     38   mins          PT: Yissel Flannery PT     [place] License:  [id number]    Electronically signed by Yissel Flannery PT, 25, 1:09 PM EDT

## 2025-05-20 ENCOUNTER — OFFICE VISIT (OUTPATIENT)
Dept: ORTHOPEDIC SURGERY | Facility: CLINIC | Age: 55
End: 2025-05-20
Payer: COMMERCIAL

## 2025-05-20 VITALS — BODY MASS INDEX: 32.64 KG/M2 | TEMPERATURE: 98.4 F | WEIGHT: 228 LBS | HEIGHT: 70 IN

## 2025-05-20 DIAGNOSIS — R52 PAIN: Primary | ICD-10-CM

## 2025-05-20 DIAGNOSIS — Z96.651 STATUS POST RIGHT KNEE REPLACEMENT: ICD-10-CM

## 2025-05-20 PROCEDURE — 99024 POSTOP FOLLOW-UP VISIT: CPT | Performed by: ORTHOPAEDIC SURGERY

## 2025-05-20 RX ORDER — ICOSAPENT ETHYL 1 G/1
CAPSULE ORAL
COMMUNITY
Start: 2025-03-27

## 2025-05-20 NOTE — PROGRESS NOTES
Patient: Anthony Sol Jr.  YOB: 1970 55 y.o. male  Medical Record Number: [id number]    Chief Complaints:   Chief Complaint   Patient presents with    Right Knee - Follow-up, Pain       History of Present Illness:Anthony Sol Jr. is a 55 y.o. male who presents for follow-up of right knee revision he is now 4 months out and about 9 weeks out from irrigation debridement headliner change for possible infection.  He doing well he denies any signs or symptoms of infection and reports his motion is improving.    Allergies: No Known Allergies    Medications:   Current Outpatient Medications   Medication Sig Dispense Refill    amoxicillin (AMOXIL) 500 MG capsule Take 1 capsule by mouth Every 8 (Eight) Hours for 180 days. Indications: Bone and/or Joint Infection 90 capsule 5    Coenzyme Q10 (COQ-10) 30 MG capsule Daily. HOLDING FOR DOS  Indications: High Blood Pressure      Edarbyclor 40-12.5 MG tablet TAKE 1 TABLET BY MOUTH ONCE DAILY 90 tablet 1    Empagliflozin (JARDIANCE) 10 MG tablet Take 1 tablet by mouth Daily. For diabetes 90 tablet 1    icosapent ethyl (VASCEPA) 1 g capsule capsule TAKE 2 CAPSULES BY MOUTH TWICE A DAY WITH FOOD SWALLOW WHOLE DO NOT CHEW, OPEN, DISSOLVE OR CRUSH      metFORMIN (GLUCOPHAGE) 1000 MG tablet 1 tab po BID with meal for diabetes (Patient taking differently: Take 1 tablet by mouth 2 (Two) Times a Day With Meals. 1 tab po BID with meal for diabetes) 180 tablet 1    Mounjaro 10 MG/0.5ML solution auto-injector Inject 10 mg under the skin into the appropriate area as directed 1 (One) Time Per Week. HOLDING FOR DOS    PLANS LAST DOSE PRIOR TO SURGERY 1-13-25      rosuvastatin (CRESTOR) 10 MG tablet Take 1 tablet by mouth 3 (Three) Times a Week. Indications: High Amount of Fats in the Blood      Testosterone Cypionate (DEPOTESTOTERONE CYPIONATE) 200 MG/ML injection Inject 1 mL into the appropriate muscle as directed by prescriber Every 14 (Fourteen) Days. Indications:  "Deficient Activity of the Testis  5    verapamil SR (CALAN-SR) 240 MG CR tablet Take 1 tablet by mouth Every Night. Indications: High Blood Pressure      oxyCODONE-acetaminophen (PERCOCET) 7.5-325 MG per tablet Take 1 tablet by mouth Every 4 (Four) Hours As Needed for Moderate Pain for up to 25 doses. May take 1/2  -  1 tablet in attempt t wean for medicine  Indications: Post op pain (Patient not taking: Reported on 5/20/2025) 25 tablet 0     No current facility-administered medications for this visit.         The following portions of the patient's history were reviewed and updated as appropriate: allergies, current medications, past family history, past medical history, past social history, past surgical history and problem list.    Review of Systems:   Pertinent positives/negative listed in HPI above    Physical Exam:   Vitals:    05/20/25 1452   Temp: 98.4 °F (36.9 °C)   TempSrc: Temporal   Weight: 103 kg (228 lb)   Height: 177.8 cm (70\")   PainSc: 2    PainLoc: Knee       General: A and O x 3, ASA, NAD   Well-healed surgical incision no erythema no effusion range of motion is 0-100 no instability in flexion or extension       Radiology:  Xrays 3views right knee (ap,lateral, sunrise) were ordered and reviewed for evaluation of knee pain demonstratinga well positioned revision knee replacement without evidence of wear, loosening or osteolysis.  Comparison previous films there is no change      Assessment/Plan:  Right knee revision (s/p I & D) -  doing well.  Continue PT exercises return as needed otherwise I will check him back at his 1 year visit in January with repeat x-rays      Diagnoses and all orders for this visit:    1. Pain (Primary)  -     XR Knee 3 View Right        Pratik Durham MD  5/20/2025  "

## 2025-05-22 NOTE — PROGRESS NOTES
Physical Therapy Re Certification Of Plan of Care  Patient: Anthony Sol Jr.   : 1970  Diagnosis/ICD-10 Code:  Impaired functional mobility, balance, gait, and endurance [Z74.09]  Referring practitioner: LAMAR Holguin  Date of Initial Visit: Type: THERAPY  Noted: 2025  Today's Date: 2025  Patient seen for 35 sessions         Visit Diagnoses:    ICD-10-CM ICD-9-CM   1. Impaired functional mobility, balance, gait, and endurance  Z74.09 V49.89   2. Chronic pain of right knee  M25.561 719.46    G89.29 338.29   3. Status post total right knee replacement  Z96.651 V43.65   4. Stiffness of right knee  M25.661 719.56   5. Weakness of right lower extremity  R29.898 729.89         Anthony Sol reports: he feels like his strength and walking are fine. Is still pushing to get more ROM. Quad tightness and discomfort is improving.   Subjective Questionnaire: WOMAC  Clinical Progress: improved  Home Program Compliance: Yes  Treatment has included: therapeutic exercise, neuromuscular re-education, manual therapy, therapeutic activity, and dry needling      Subjective       Objective          Passive Range of Motion     Right Knee   Flexion: 100 degrees   Extension: 0 degrees           Assessment/Plan    Short Term Goals: 6 weeks. Patient will:     1. Patient to be adherent with HEP for ROM and strength.(MET)  2. Demonstrate right knee ROM 5 degrees - 80 degrees for improved functional mobility, gait and stair climbing. (MET)  3. Demonstrate adequate quad strength to perform SLR with no extensor lag (MET)     Long Term Goals: 12 weeks. Patient will:     1. Report improved ADL's and functional activites as evidenced by LEFS 60/80 or better. (PROGRESSING)  2. Exhibit improved right knee AROM 0 degrees - 110 degrees to allow for improved gait, stair climbing, bending, and squatting as is necessary for daily tasks.  (Previous goal met, updated goal)  3. Demonstrate increased balance and stability of the  "knee by stepping over 6\" nik without UE assist to traverse uneven terrain. (MET)  4. Demonstrate LE strength 4+/5 or better for improved gait, functional mobility and ADL's.  (PROGRESSING)     Recommendations: Continue as planned  Timeframe: 1 month  Prognosis to achieve goals: good      Timed:         Manual Therapy:    10     mins  21702;     Therapeutic Exercise:    10     mins  90758;     Neuromuscular Zach:   10    mins  89757;    Therapeutic Activity:     0     mins  09295;     Gait Trainin     mins  00409;     Ultrasound:     0     mins  67938;    Ionto                               0    mins   69235  Self Care                       0     mins   05754  Canalith Repos    0     mins 98228      Un-Timed:  Electrical Stimulation:    0     mins  08890 ( );  Dry Needling     0     mins self-pay  Traction     0     mins 79912  Re-Eval                           0    mins  33148      Timed Treatment:   30   mins   Total Treatment:     30   mins          PT: Yissel Flannery PT     [place] License:  [id number]    Electronically signed by Yissel Flannery PT, 25, 8:37 AM EDT    Certification Period: 2025 thru 2025  I certify that the therapy services are furnished while this patient is under my care.  The services outlined above are required by this patient, and will be reviewed every 90 days.         Physician Signature:__________________________________________________    PHYSICIAN: Jin Benton APRN  NPI: [id number]                                      DATE:  :     Please sign and return via fax to [email or web address] . Thank you, [facility]               "

## 2025-05-23 ENCOUNTER — TREATMENT (OUTPATIENT)
Dept: PHYSICAL THERAPY | Facility: CLINIC | Age: 55
End: 2025-05-23
Payer: COMMERCIAL

## 2025-05-23 DIAGNOSIS — G89.29 CHRONIC PAIN OF RIGHT KNEE: ICD-10-CM

## 2025-05-23 DIAGNOSIS — R29.898 WEAKNESS OF RIGHT LOWER EXTREMITY: ICD-10-CM

## 2025-05-23 DIAGNOSIS — M25.661 STIFFNESS OF RIGHT KNEE: ICD-10-CM

## 2025-05-23 DIAGNOSIS — Z96.651 STATUS POST TOTAL RIGHT KNEE REPLACEMENT: ICD-10-CM

## 2025-05-23 DIAGNOSIS — Z74.09 IMPAIRED FUNCTIONAL MOBILITY, BALANCE, GAIT, AND ENDURANCE: Primary | ICD-10-CM

## 2025-05-23 DIAGNOSIS — M25.561 CHRONIC PAIN OF RIGHT KNEE: ICD-10-CM

## 2025-05-23 NOTE — PROGRESS NOTES
Physical Therapy Daily Treatment Note      Patient: Anthony Sol Jr.   : 1970  Referring practitioner: LAMAR Holguin  Date of Initial Visit: Type: THERAPY  Noted: 2025  Today's Date: 2025  Patient seen for 36 sessions       Visit Diagnoses:    ICD-10-CM ICD-9-CM   1. Impaired functional mobility, balance, gait, and endurance  Z74.09 V49.89   2. Chronic pain of right knee  M25.561 719.46    G89.29 338.29   3. Status post total right knee replacement  Z96.651 V43.65   4. Stiffness of right knee  M25.661 719.56   5. Weakness of right lower extremity  R29.898 729.89       Subjective   Pt saw MD, who is pleased with progress.     Objective   See Exercise, Manual, and Modality Logs for complete treatment.       Assessment/Plan    Quad tone continues to improve, isolated area of tightness mid vastus lateralis.         Timed:         Manual Therapy:    10     mins  32728;     Therapeutic Exercise:    10     mins  28845;     Neuromuscular Zach:    10    mins  66284;    Therapeutic Activity:     0     mins  37389;     Gait Trainin     mins  96232;     Ultrasound:     0     mins  95032;    Ionto                               0    mins   82720  Self Care                       0     mins   77991  Canalith Repos    0     mins  94152      Un-Timed:  Electrical Stimulation:    0     mins  31197 ( );  Dry Needling     0     mins self-pay  Traction     0     mins 58132      Timed Treatment:   30   mins   Total Treatment:     30   mins    Yissel Flannery, PT  [place] License: [id number]

## 2025-05-28 ENCOUNTER — TELEPHONE (OUTPATIENT)
Dept: PHYSICAL THERAPY | Facility: CLINIC | Age: 55
End: 2025-05-28

## 2025-05-28 NOTE — TELEPHONE ENCOUNTER
"  Caller: Anthony Sol Jr. \"Diomedes\"    Relationship: Self         What was the call regarding: NOT FEELING WELL        "

## 2025-06-02 ENCOUNTER — TREATMENT (OUTPATIENT)
Dept: PHYSICAL THERAPY | Facility: CLINIC | Age: 55
End: 2025-06-02
Payer: COMMERCIAL

## 2025-06-02 DIAGNOSIS — Z96.651 STATUS POST TOTAL RIGHT KNEE REPLACEMENT: ICD-10-CM

## 2025-06-02 DIAGNOSIS — R29.898 WEAKNESS OF RIGHT LOWER EXTREMITY: ICD-10-CM

## 2025-06-02 DIAGNOSIS — Z74.09 IMPAIRED FUNCTIONAL MOBILITY, BALANCE, GAIT, AND ENDURANCE: Primary | ICD-10-CM

## 2025-06-02 DIAGNOSIS — G89.29 CHRONIC PAIN OF RIGHT KNEE: ICD-10-CM

## 2025-06-02 DIAGNOSIS — M25.661 STIFFNESS OF RIGHT KNEE: ICD-10-CM

## 2025-06-02 DIAGNOSIS — M25.561 CHRONIC PAIN OF RIGHT KNEE: ICD-10-CM

## 2025-06-02 NOTE — PROGRESS NOTES
Physical Therapy Daily Treatment Note      Patient: Anthony Sol Jr.   : 1970  Referring practitioner: LAMAR Holguin  Date of Initial Visit: Type: THERAPY  Noted: 2025  Today's Date: 2025  Patient seen for 37 sessions       Visit Diagnoses:    ICD-10-CM ICD-9-CM   1. Impaired functional mobility, balance, gait, and endurance  Z74.09 V49.89   2. Chronic pain of right knee  M25.561 719.46    G89.29 338.29   3. Status post total right knee replacement  Z96.651 V43.65   4. Stiffness of right knee  M25.661 719.56   5. Weakness of right lower extremity  R29.898 729.89       Subjective   Functionally feeling good. Would still like more flexion ROM.     Objective   See Exercise, Manual, and Modality Logs for complete treatment.       Assessment/Plan    Subjectively, pt reports no increase of pain or discomfort with interventions performed today. Performed well with continued functional strengthening interventions. Continues to demonstrate good tolerance to exercise progressions. Continues to benefit from verbal/tactile cues to ensure proper form and technique for exercise performance.        Timed:         Manual Therapy:    10     mins  92834;     Therapeutic Exercise:    10     mins  97779;     Neuromuscular Zach:    10    mins  66340;    Therapeutic Activity:     8     mins  67808;     Gait Trainin     mins  97801;     Ultrasound:     0     mins  52605;    Ionto                               0    mins   59089  Self Care                       0     mins   80332  Canalith Repos    0     mins  67780      Un-Timed:  Electrical Stimulation:    0     mins  96632 ( );  Dry Needling     0     mins self-pay  Traction     0     mins 95487      Timed Treatment:   38   mins   Total Treatment:     38   mins    Yissel Flannery, PT  [place] License: [id number]

## 2025-06-04 ENCOUNTER — TREATMENT (OUTPATIENT)
Dept: PHYSICAL THERAPY | Facility: CLINIC | Age: 55
End: 2025-06-04
Payer: COMMERCIAL

## 2025-06-04 DIAGNOSIS — R29.898 WEAKNESS OF RIGHT LOWER EXTREMITY: ICD-10-CM

## 2025-06-04 DIAGNOSIS — G89.29 CHRONIC PAIN OF RIGHT KNEE: ICD-10-CM

## 2025-06-04 DIAGNOSIS — M25.661 STIFFNESS OF RIGHT KNEE: ICD-10-CM

## 2025-06-04 DIAGNOSIS — Z74.09 IMPAIRED FUNCTIONAL MOBILITY, BALANCE, GAIT, AND ENDURANCE: Primary | ICD-10-CM

## 2025-06-04 DIAGNOSIS — M25.561 CHRONIC PAIN OF RIGHT KNEE: ICD-10-CM

## 2025-06-04 DIAGNOSIS — Z96.651 STATUS POST TOTAL RIGHT KNEE REPLACEMENT: ICD-10-CM

## 2025-06-04 NOTE — PROGRESS NOTES
Physical Therapy Daily Treatment Note      Patient: Anthony Sol Jr.   : 1970  Referring practitioner: LAMAR Holguin  Date of Initial Visit: Type: THERAPY  Noted: 2025  Today's Date: 2025  Patient seen for 38 sessions       Visit Diagnoses:    ICD-10-CM ICD-9-CM   1. Impaired functional mobility, balance, gait, and endurance  Z74.09 V49.89   2. Chronic pain of right knee  M25.561 719.46    G89.29 338.29   3. Status post total right knee replacement  Z96.651 V43.65   4. Stiffness of right knee  M25.661 719.56   5. Weakness of right lower extremity  R29.898 729.89       Subjective   Needling is helping with quad tightness, but still with one spot in lateral quad that is tight and painful. States he is still hesitant to descend stairs with reciprocal pattern.     Objective   See Exercise, Manual, and Modality Logs for complete treatment.       Assessment/Plan    Added fwd step downs to HEP, cueing for technique today. Continue per POC.         Timed:         Manual Therapy:    10     mins  65039;     Therapeutic Exercise:    10     mins  73429;     Neuromuscular Zach:    10    mins  42699;    Therapeutic Activity:     0     mins  25094;     Gait Trainin     mins  87042;     Ultrasound:     0     mins  97098;    Ionto                               0    mins   87511  Self Care                       0     mins   66928  Canalith Repos    0     mins  37468      Un-Timed:  Electrical Stimulation:    0     mins  77283 ( );  Dry Needling     0     mins self-pay  Traction     0     mins 13618      Timed Treatment:   30   mins   Total Treatment:     30   mins    Yissel Flannery, PT  [place] License: [id number]

## 2025-06-04 NOTE — PROGRESS NOTES
Physical Therapy Daily Note    Patient: Anthony Sol Jr.   : 1970  Diagnosis/ICD-10 Code:  Impaired functional mobility, balance, gait, and endurance [Z74.09]  Referring practitioner: No ref. provider found  Date of Initial Visit: Type: THERAPY  Noted: 2025  Today's Date: 2025   Patient seen for 4 session  Location of Service: [address] [place] [address]       Visit Diagnoses:    ICD-10-CM ICD-9-CM   1. Impaired functional mobility, balance, gait, and endurance  Z74.09 V49.89   2. Chronic pain of right knee  M25.561 719.46    G89.29 338.29            Subjective  Anthony Sol reported today that he's doing better. Pt was agreeable and gave consent for dry needling today for treatment of their present symptoms.    Objective   No functional measures updated at today's visit unless otherwise stated. For functional assessment and documentation of patient progressions referred to the assessment section.    See Exercise, Manual, and Modality Logs for complete treatments.       Assessment/Plan  Treatment today consisted of a brief examination of the region to be treated followed by dry needling of the quad to address current limitations and impairments. Patient was educated on the effects of dry needling, and contraindications, risks, and post-treatment expectations prior to treatment and consent was obtained. Patient tolerated treatment well and without complaint. Patient left clinic in good rapport.    Plan: Continue with functional progressions related to areas of treatment; assess tolerance to needling interventions          Timed:         Manual Therapy:         mins  62610;     Therapeutic Exercise:         mins  52786;     Neuromuscular Zach:        mins  72338;    Therapeutic Activity:         mins  96876;     Gait Training:           mins  48571;     Ultrasound:          mins  11154;    Ionto                                   mins    35796  Self Care                            mins   67398  Canalith Repos         mins 06974    Un-Timed:  Electrical Stimulation:         mins  53262 ( );  Dry Needling     30     mins self-pay  Traction          mins 81032  Low Eval          Mins  37508  Mod Eval          Mins  47967  High Eval                            Mins  57524      Timed Treatment:   0   mins   Total Treatment:     30   mins      PT: Omar Mcclain PT     License Number: [id number]  Electronically signed by Omar Mcclain PT, 06/04/25, 1:32 PM EDT

## 2025-06-06 ENCOUNTER — TREATMENT (OUTPATIENT)
Dept: PHYSICAL THERAPY | Facility: CLINIC | Age: 55
End: 2025-06-06
Payer: COMMERCIAL

## 2025-06-06 DIAGNOSIS — G89.29 CHRONIC PAIN OF RIGHT KNEE: ICD-10-CM

## 2025-06-06 DIAGNOSIS — M25.661 STIFFNESS OF RIGHT KNEE: ICD-10-CM

## 2025-06-06 DIAGNOSIS — Z96.651 STATUS POST TOTAL RIGHT KNEE REPLACEMENT: ICD-10-CM

## 2025-06-06 DIAGNOSIS — M25.561 CHRONIC PAIN OF RIGHT KNEE: ICD-10-CM

## 2025-06-06 DIAGNOSIS — Z74.09 IMPAIRED FUNCTIONAL MOBILITY, BALANCE, GAIT, AND ENDURANCE: Primary | ICD-10-CM

## 2025-06-06 DIAGNOSIS — R29.898 WEAKNESS OF RIGHT LOWER EXTREMITY: ICD-10-CM

## 2025-06-06 PROCEDURE — 20560 NDL INSJ W/O NJX 1 OR 2 MUSC: CPT | Performed by: PHYSICAL THERAPIST

## 2025-06-06 NOTE — PROGRESS NOTES
Physical Therapy Daily Treatment Note/Discharge      Patient: Anthony Sol Jr.   : 1970  Referring practitioner: LAMAR Holguin  Date of Initial Visit: Type: THERAPY  Noted: 2025  Today's Date: 2025  Patient seen for 39 sessions       Visit Diagnoses:    ICD-10-CM ICD-9-CM   1. Impaired functional mobility, balance, gait, and endurance  Z74.09 V49.89   2. Chronic pain of right knee  M25.561 719.46    G89.29 338.29   3. Status post total right knee replacement  Z96.651 V43.65   4. Stiffness of right knee  M25.661 719.56   5. Weakness of right lower extremity  R29.898 729.89       Subjective   Pt states he feels that he is ready to transition to HEP.     Objective   See Exercise, Manual, and Modality Logs for complete treatment.       Assessment/Plan    Pt has met most goals, anticipate continued progress with long term HEP. D/C at this time.         Timed:         Manual Therapy:    0     mins  32864;     Therapeutic Exercise:    10     mins  98179;     Neuromuscular Zach:    8    mins  03782;    Therapeutic Activity:     0     mins  29970;     Gait Trainin     mins  24101;     Ultrasound:     0     mins  30008;    Ionto                               0    mins   45435  Self Care                       0     mins   36064  Canalith Repos    0     mins  29739      Un-Timed:  Electrical Stimulation:    0     mins  83122 ( );  Dry Needling     0     mins self-pay  Traction     0     mins 13271      Timed Treatment:   18   mins   Total Treatment:     18   mins    Yissel Flannery, PT  [place] License: [id number]

## 2025-06-10 NOTE — PROGRESS NOTES
Physical Therapy Daily Note    Patient: Anthony Sol Jr.   : 1970  Diagnosis/ICD-10 Code:  Impaired functional mobility, balance, gait, and endurance [Z74.09]  Referring practitioner: No ref. provider found  Date of Initial Visit: Type: THERAPY  Noted: 2025  Today's Date: 2025   Patient seen for 5 session  Location of Service: [address] - [address] [place] [address]       Visit Diagnoses:    ICD-10-CM ICD-9-CM   1. Impaired functional mobility, balance, gait, and endurance  Z74.09 V49.89   2. Chronic pain of right knee  M25.561 719.46    G89.29 338.29            Subjective  Anthony Sol reported today that he's doing better. Pt was agreeable and gave consent for dry needling today for treatment of their present symptoms.    Objective   No functional measures updated at today's visit unless otherwise stated. For functional assessment and documentation of patient progressions referred to the assessment section.    See Exercise, Manual, and Modality Logs for complete treatments.       Assessment/Plan  Treatment today consisted of a brief examination of the region to be treated followed by dry needling of the quad to address current limitations and impairments. Patient was educated on the effects of dry needling, and contraindications, risks, and post-treatment expectations prior to treatment and consent was obtained. Patient tolerated treatment well and without complaint. Patient left clinic in good rapport.    Plan: Continue with functional progressions related to areas of treatment; assess tolerance to needling interventions          Timed:         Manual Therapy:         mins  52738;     Therapeutic Exercise:         mins  10901;     Neuromuscular Zach:        mins  08704;    Therapeutic Activity:         mins  73801;     Gait Training:           mins  35750;     Ultrasound:          mins  82456;    Ionto                                   mins    06959  Self Care                            mins   39536  Canalith Repos         mins 81436    Un-Timed:  Electrical Stimulation:         mins  26177 ( );  Dry Needling     30     mins self-pay  Traction          mins 15237  Low Eval          Mins  63284  Mod Eval          Mins  50219  High Eval                            Mins  37079      Timed Treatment:   0   mins   Total Treatment:     30   mins      PT: Omar Mcclain PT     License Number: [id number]  Electronically signed by Omar Mcclain PT, 06/10/25, 5:23 PM EDT

## 2025-08-05 DIAGNOSIS — I10 ESSENTIAL HYPERTENSION: ICD-10-CM

## 2025-08-05 RX ORDER — AZILSARTAN KAMEDOXOMIL AND CHLORTHALIDONE 40; 12.5 MG/1; MG/1
1 TABLET ORAL DAILY
Qty: 90 TABLET | Refills: 1 | Status: SHIPPED | OUTPATIENT
Start: 2025-08-05

## (undated) DEVICE — CHISEL BLADE LATERAL 10X68X1MM -                                    R100 C: Brand: RENOVATION

## (undated) DEVICE — KT ORCA ORCAPOD DISP STRL

## (undated) DEVICE — SUT PDS 1 CT1 36IN Z347H

## (undated) DEVICE — DRSNG WND GZ CURAD OIL EMULSION 3X8IN LF STRL 1PK

## (undated) DEVICE — NEEDLE, QUINCKE 22GX3.5": Brand: MEDLINE INDUSTRIES, INC.

## (undated) DEVICE — 3M™ IOBAN™ 2 ANTIMICROBIAL INCISE DRAPE 6640EZ: Brand: IOBAN™ 2

## (undated) DEVICE — PK KN TOTL 40

## (undated) DEVICE — PATIENT RETURN ELECTRODE, SINGLE-USE, CONTACT QUALITY MONITORING, ADULT, WITH 9FT CORD, FOR PATIENTS WEIGING OVER 33LBS. (15KG): Brand: MEGADYNE

## (undated) DEVICE — GLV SURG SENSICARE W/ALOE PF LF 7.5 STRL

## (undated) DEVICE — STPLR SKIN VISISTAT WD 35CT

## (undated) DEVICE — APPL DURAPREP IODOPHOR APL 26ML

## (undated) DEVICE — T-DRAPE,EXTREMITY,STERILE: Brand: MEDLINE

## (undated) DEVICE — BANDAGE,GAUZE,BULKEE II,4.5"X4.1YD,STRL: Brand: MEDLINE

## (undated) DEVICE — SOL IRR NACL 0.9PCT 3000ML

## (undated) DEVICE — Device

## (undated) DEVICE — DRSNG ADAPTIC 3X8

## (undated) DEVICE — CULT AER/ANAEROB FASTIDIOUS BACT

## (undated) DEVICE — DRSNG BURN ACTICOAT FLEX 7 1X24IN

## (undated) DEVICE — SYS CLS SKIN PREMIERPRO EXOFINFUSION 22CM

## (undated) DEVICE — FLEX ADVANTAGE 1500CC: Brand: FLEX ADVANTAGE

## (undated) DEVICE — KT DRN EVAC WND PVC PCH WTROC RND 10F400

## (undated) DEVICE — NDL HYPO PRECISIONGLIDE REG 25G 1 1/2

## (undated) DEVICE — INTENDED TO SUPPORT AND MAINTAIN THE POSITION OF AN ANESTHETIZED PATIENT DURING SURGERY: Brand: HERMANTOR XL PINK KNEE POSITIONING PAD

## (undated) DEVICE — NDL SPINE 20G 3 1/2 YEL STRL 1P/U

## (undated) DEVICE — SUT VIC 1 CT1 36IN J947H

## (undated) DEVICE — GENESIS TROCHLEAR PIN 1/8 X 3: Brand: GENESIS

## (undated) DEVICE — 450 ML BOTTLE OF 0.05% CHLORHEXIDINE GLUCONATE IN 99.95% STERILE WATER FOR IRRIGATION, USP AND APPLICATOR.: Brand: IRRISEPT ANTIMICROBIAL WOUND LAVAGE

## (undated) DEVICE — BNDG ELAS ELITE V/CLOSE 6IN 5YD LF STRL

## (undated) DEVICE — SYR LUERLOK 20CC

## (undated) DEVICE — TRAP FLD MINIVAC MEGADYNE 100ML

## (undated) DEVICE — SUT PDS 0 CT1 36IN Z346H

## (undated) DEVICE — HANDPIECE SET WITH COAXIAL HIGH FLOW TIP AND SUCTION TUBE: Brand: INTERPULSE

## (undated) DEVICE — SKIN PREP TRAY 4 COMPARTM TRAY: Brand: MEDLINE INDUSTRIES, INC.

## (undated) DEVICE — ENCORE® LATEX ORTHO SIZE 8.5, STERILE LATEX POWDER-FREE SURGICAL GLOVE: Brand: ENCORE

## (undated) DEVICE — PICO 7 10CM X 30CM: Brand: PICO™ 7

## (undated) DEVICE — CANN NASL CO2 TRULINK W/O2 A/

## (undated) DEVICE — PIN TROC SIGNATURE PK/2

## (undated) DEVICE — ANTIBACTERIAL UNDYED BRAIDED (POLYGLACTIN 910), SYNTHETIC ABSORBABLE SUTURE: Brand: COATED VICRYL

## (undated) DEVICE — Device: Brand: XPERIENCE

## (undated) DEVICE — SOL WND BACTISURE LAVG 1L

## (undated) DEVICE — DECANT BG O JET

## (undated) DEVICE — SOL NACL 0.9PCT 1000ML

## (undated) DEVICE — YANKAUER,BULB TIP,W/O VENT,RIGID,STERILE: Brand: MEDLINE

## (undated) DEVICE — GOWN ISOL W/THUMB UNIV BLU BX/15

## (undated) DEVICE — BNDG ELAS CO-FLEX SLF ADHR 6IN 5YD LF STRL

## (undated) DEVICE — PREMIUM WET SKIN PREP TRAY: Brand: MEDLINE INDUSTRIES, INC.

## (undated) DEVICE — UNDERGLV SURG BIOGEL INDICATOR LF PF 7.5

## (undated) DEVICE — GLV SURG SENSICARE W/ALOE PF LF 8 STRL

## (undated) DEVICE — SPONGE,LAP,18"X18",DLX,XR,ST,5/PK,40/PK: Brand: MEDLINE

## (undated) DEVICE — GLV SURG SENSICARE PI MIC PF SZ7 LF STRL

## (undated) DEVICE — DUAL CUT SAGITTAL BLADE

## (undated) DEVICE — SPNG GZ WOVN 4X4IN 12PLY 10/BX STRL

## (undated) DEVICE — PREP SOL POVIDONE/IODINE BT 4OZ

## (undated) DEVICE — SUT PDS 2/0 CT1 27IN DYED Z339H

## (undated) DEVICE — GLV SURG TRIUMPH CLASSIC PF LTX 8 STRL

## (undated) DEVICE — GLV SURG SENSICARE PI PF LF 7 GRN STRL

## (undated) DEVICE — UNDERCAST PADDING: Brand: DEROYAL

## (undated) DEVICE — SOL IRRIG SOD CHL 0.9PCT 3000ML

## (undated) DEVICE — BNDG,ELSTC,MATRIX,STRL,6"X5YD,LF,HOOK&LP: Brand: MEDLINE

## (undated) DEVICE — AMD ANTIMICROBIAL BANDAGE ROLL,6 PLY: Brand: KERLIX

## (undated) DEVICE — GAUZE,SPONGE,FLUFF,6"X6.75",STRL,10/TRAY: Brand: MEDLINE

## (undated) DEVICE — PENCL SMOKE/EVAC MEGADYNE TELESCP 10FT

## (undated) DEVICE — 3M™ IOBAN™ 2 ANTIMICROBIAL INCISE DRAPE 6650EZ: Brand: IOBAN™ 2

## (undated) DEVICE — GENESIS TROCHLEAR PIN 1/8 IN. X 5IN: Brand: GENESIS

## (undated) DEVICE — BNDG CURITY ADHS 1 1/2X1 1/2IN